# Patient Record
Sex: FEMALE | Race: ASIAN | NOT HISPANIC OR LATINO | Employment: OTHER | URBAN - METROPOLITAN AREA
[De-identification: names, ages, dates, MRNs, and addresses within clinical notes are randomized per-mention and may not be internally consistent; named-entity substitution may affect disease eponyms.]

---

## 2017-01-13 DIAGNOSIS — E78.5 HYPERLIPIDEMIA, UNSPECIFIED HYPERLIPIDEMIA TYPE: ICD-10-CM

## 2017-01-13 RX ORDER — ATORVASTATIN CALCIUM 40 MG/1
TABLET, FILM COATED ORAL
Qty: 90 TABLET | Refills: 1 | Status: SHIPPED | OUTPATIENT
Start: 2017-01-13 | End: 2018-01-11 | Stop reason: SDUPTHER

## 2017-01-30 ENCOUNTER — OFFICE VISIT (OUTPATIENT)
Dept: FAMILY MEDICINE | Facility: CLINIC | Age: 82
End: 2017-01-30
Payer: MEDICARE

## 2017-01-30 ENCOUNTER — HOSPITAL ENCOUNTER (OUTPATIENT)
Dept: RADIOLOGY | Facility: HOSPITAL | Age: 82
Discharge: HOME | End: 2017-01-30
Attending: FAMILY MEDICINE
Payer: MEDICARE

## 2017-01-30 VITALS
HEART RATE: 76 BPM | HEIGHT: 62 IN | DIASTOLIC BLOOD PRESSURE: 60 MMHG | BODY MASS INDEX: 36.31 KG/M2 | SYSTOLIC BLOOD PRESSURE: 120 MMHG | WEIGHT: 197.31 LBS | TEMPERATURE: 99 F

## 2017-01-30 DIAGNOSIS — I10 ESSENTIAL HYPERTENSION: ICD-10-CM

## 2017-01-30 DIAGNOSIS — Z79.4 TYPE 2 DIABETES MELLITUS WITHOUT COMPLICATION, WITH LONG-TERM CURRENT USE OF INSULIN: ICD-10-CM

## 2017-01-30 DIAGNOSIS — T14.90XA TRAUMA: ICD-10-CM

## 2017-01-30 DIAGNOSIS — T14.90XA TRAUMA: Primary | ICD-10-CM

## 2017-01-30 DIAGNOSIS — E11.9 TYPE 2 DIABETES MELLITUS WITHOUT COMPLICATION, WITH LONG-TERM CURRENT USE OF INSULIN: ICD-10-CM

## 2017-01-30 PROCEDURE — 99214 OFFICE O/P EST MOD 30 MIN: CPT | Mod: S$PBB,,, | Performed by: FAMILY MEDICINE

## 2017-01-30 PROCEDURE — 99999 PR PBB SHADOW E&M-EST. PATIENT-LVL III: CPT | Mod: PBBFAC,,, | Performed by: FAMILY MEDICINE

## 2017-01-30 PROCEDURE — 73100 X-RAY EXAM OF WRIST: CPT | Mod: 26,50,, | Performed by: RADIOLOGY

## 2017-01-30 PROCEDURE — 73080 X-RAY EXAM OF ELBOW: CPT | Mod: TC,PO,LT

## 2017-01-30 PROCEDURE — 73100 X-RAY EXAM OF WRIST: CPT | Mod: 50,TC,PO

## 2017-01-30 PROCEDURE — 73130 X-RAY EXAM OF HAND: CPT | Mod: 26,LT,, | Performed by: RADIOLOGY

## 2017-01-30 PROCEDURE — 73130 X-RAY EXAM OF HAND: CPT | Mod: TC,PO,LT

## 2017-01-30 PROCEDURE — 73080 X-RAY EXAM OF ELBOW: CPT | Mod: 26,LT,, | Performed by: RADIOLOGY

## 2017-01-30 NOTE — MR AVS SNAPSHOT
[facility]  [address] [facility], [address]  [facility] [address]  Phone: [phone]  Fax: [phone]                  Eliana Wagner   2017 4:00 PM   Office Visit    Description:  Female : 1935   Provider:  Nara Lawton MD   Department:  [facility]           Reason for Visit     Fall           Diagnoses this Visit        Comments    Trauma    -  Primary     Type 2 diabetes mellitus without complication, with long-term current use of insulin         Essential hypertension                To Do List           Future Appointments        Provider Department Dept Phone    2017  4:45 PM [facility] XR1 300 LB LIMIT [facility] [phone]      Goals (5 Years of Data)     None      [facility]     [facility] -  Assistance  Registered nurses in the [facility] provide clinical advisement, health education, appointment booking, and other advisory services.  Call for this free service at [phone].             Medications           Message regarding Medications     Verify the changes and/or additions to your medication regime listed below are the same as discussed with your clinician today.  If any of these changes or additions are incorrect, please notify your healthcare provider.        STOP taking these medications     naproxen (NAPROSYN) 500 MG tablet Take 1 tablet (500 mg total) by mouth 2 (two) times daily with meals.    omega-3 fatty acids-vitamin E (FISH OIL) 1,000 mg Cap Take by mouth. 1 Capsule Oral Every day           Verify that the below list of medications is an accurate representation of the medications you are currently taking.  If none reported, the list may be blank. If incorrect, please contact your healthcare provider. Carry this list with you in case of emergency.           Current Medications     amlodipine (NORVASC) 2.5 MG tablet Take 1 tablet (2.5 mg total) by mouth  "once daily.    aspirin 81 mg Tab Take by mouth. 1 Tablet Oral Every day    atorvastatin (LIPITOR) 40 MG tablet TAKE 1 TABLET ONE TIME DAILY    blood sugar diagnostic Strp 1 strip by Misc.(Non-Drug; Combo Route) route 2 (two) times daily as needed.    blood-glucose meter kit Use as instructed    clopidogrel (PLAVIX) 75 mg tablet Take 1 tablet (75 mg total) by mouth once daily.    glipiZIDE (GLUCOTROL) 2.5 MG TR24 Take 1 tablet (2.5 mg total) by mouth once daily.    lancets Misc 1 lancet by Misc.(Non-Drug; Combo Route) route 2 (two) times daily as needed.    lisinopril (PRINIVIL,ZESTRIL) 40 MG tablet Take 1 tablet (40 mg total) by mouth once daily.    metoprolol succinate (TOPROL-XL) 25 MG 24 hr tablet Take 1 tablet (25 mg total) by mouth once daily.    mv-min-FA-Rj-Sl-gqdojgq-lutein (CENTRUM) 0.4-162-18 mg Tab Take by mouth. 1 Tablet Oral Every day    nitroGLYCERIN (NITROSTAT) 0.4 MG SL tablet Place 1 tablet (0.4 mg total) under the tongue every 5 (five) minutes as needed for Chest pain. 1 Tablet, Sublingual Sublingual As directed.  take one dose with chest pain up to 3 doses in 15 minutes, if no better go to ER           Clinical Reference Information           Vital Signs - Last Recorded  Most recent update: 1/30/2017  3:42 PM by Magaly Collins MA    BP Pulse Temp Ht Wt BMI    120/60 (BP Location: Right arm) 76 98.5 °F (36.9 °C) 5' 2" (1.575 m) 89.5 kg (197 lb 5 oz) 36.09 kg/m2      Blood Pressure          Most Recent Value    BP  120/60      Allergies as of 1/30/2017     Penicillins      Immunizations Administered on Date of Encounter - 1/30/2017     None      Orders Placed During Today's Visit     Future Labs/Procedures Expected by Expires    CBC auto differential  1/30/2017 3/31/2018    Comprehensive metabolic panel  1/30/2017 3/31/2018    Hemoglobin A1c  1/30/2017 3/31/2018    Lipid panel  1/30/2017 3/31/2018    TSH  1/30/2017 3/31/2018    X-Ray Elbow Complete Left  1/30/2017 1/30/2018    X-Ray Hand Complete Left  " 1/30/2017 1/30/2018    X-Ray Wrist 2 View Bilateral  1/30/2017 1/30/2018

## 2017-01-31 NOTE — PROGRESS NOTES
Pt experiancing left elbow wrist and hand pain  for 9 days.  Patient experienced a fall onto her butt and her left elbow  She was on a cruise at the time said that she did not seek medical attention.  Patient experienced swelling and pain at the   elbow since that time a lot of fluid is draining down to the wrist.  The pain is gotten somewhat better.  But the inner portion of   her arm was contused and swollen.  Patient is having stiffness of the rest of the hand.  She is one to come in today to make sure nothing was broken.    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     Social History Main Topics    Smoking status: Never Smoker    Smokeless tobacco: Never Used    Alcohol use Yes      Comment: socially    Drug use: No    Sexual activity: Not Currently     Other Topics Concern    Not on file     Social History Narrative       ROS: Negative except elbow pain, wrist pain and hand pain from fall    Physical exam:    General appearance: Alert oriented no acute distress   Vs:  Vitals:    01/30/17 1537   BP: 120/60   Pulse: 76   Temp: 98.5 °F (36.9 °C)     Musculo: Patient has some edema at the elbow soft tissue swelling from the elbow to the wrist  Full range of motion of the elbow is exhibited full range of motion of the wrist and hand are exhibited   Vascular: Distal pulses are palpable throughout swelling in the left forearm is soft.    Skin: Large amount of ecchymoses resolving on the medial aspect of the elbow down to the wrist area  The remained of the skin examinations within normal limits  X-rays  of the elbow wrist and hand were obtained no acute fractures were identified results were discussed with the patient and her daughter   IMP: Contusion of the elbow          Wrist sprain          Hand contusion           History of type 2 diabetes   Plan: Patient declined any medications at this time  We will order CBC, TSH, lipid profile, hemoglobin A1c comprehensive metabolic profile.  Patient will return for follow-up of all the above and routine medical problems in 2 months.  Patient follow-up if she starts to experience increased swelling or pain.

## 2017-02-21 ENCOUNTER — OFFICE VISIT (OUTPATIENT)
Dept: PODIATRY | Facility: CLINIC | Age: 82
End: 2017-02-21
Payer: MEDICARE

## 2017-02-21 ENCOUNTER — LAB VISIT (OUTPATIENT)
Dept: LAB | Facility: HOSPITAL | Age: 82
End: 2017-02-21
Attending: FAMILY MEDICINE
Payer: MEDICARE

## 2017-02-21 VITALS
SYSTOLIC BLOOD PRESSURE: 132 MMHG | WEIGHT: 197 LBS | DIASTOLIC BLOOD PRESSURE: 71 MMHG | HEIGHT: 62 IN | HEART RATE: 73 BPM | BODY MASS INDEX: 36.25 KG/M2 | RESPIRATION RATE: 18 BRPM

## 2017-02-21 DIAGNOSIS — E11.9 TYPE 2 DIABETES MELLITUS WITHOUT COMPLICATION, WITH LONG-TERM CURRENT USE OF INSULIN: ICD-10-CM

## 2017-02-21 DIAGNOSIS — E11.9 ENCOUNTER FOR DIABETIC FOOT EXAM: Primary | ICD-10-CM

## 2017-02-21 DIAGNOSIS — Z79.4 TYPE 2 DIABETES MELLITUS WITHOUT COMPLICATION, WITH LONG-TERM CURRENT USE OF INSULIN: ICD-10-CM

## 2017-02-21 DIAGNOSIS — M20.5X9 OVERRIDING TOE, UNSPECIFIED LATERALITY: ICD-10-CM

## 2017-02-21 DIAGNOSIS — I10 ESSENTIAL HYPERTENSION: ICD-10-CM

## 2017-02-21 DIAGNOSIS — E11.9 TYPE 2 DIABETES MELLITUS WITHOUT COMPLICATION, UNSPECIFIED LONG TERM INSULIN USE STATUS: ICD-10-CM

## 2017-02-21 LAB
ALBUMIN SERPL BCP-MCNC: 3.4 G/DL
ALP SERPL-CCNC: 90 U/L
ALT SERPL W/O P-5'-P-CCNC: 32 U/L
ANION GAP SERPL CALC-SCNC: 8 MMOL/L
AST SERPL-CCNC: 30 U/L
BASOPHILS # BLD AUTO: 0.02 K/UL
BASOPHILS NFR BLD: 0.2 %
BILIRUB SERPL-MCNC: 0.5 MG/DL
BUN SERPL-MCNC: 17 MG/DL
CALCIUM SERPL-MCNC: 9 MG/DL
CHLORIDE SERPL-SCNC: 107 MMOL/L
CHOLEST/HDLC SERPL: 3.2 {RATIO}
CO2 SERPL-SCNC: 26 MMOL/L
CREAT SERPL-MCNC: 0.8 MG/DL
DIFFERENTIAL METHOD: NORMAL
EOSINOPHIL # BLD AUTO: 0.1 K/UL
EOSINOPHIL NFR BLD: 1.2 %
ERYTHROCYTE [DISTWIDTH] IN BLOOD BY AUTOMATED COUNT: 13.8 %
EST. GFR  (AFRICAN AMERICAN): >60 ML/MIN/1.73 M^2
EST. GFR  (NON AFRICAN AMERICAN): >60 ML/MIN/1.73 M^2
ESTIMATED AVG GLUCOSE: 186 MG/DL
GLUCOSE SERPL-MCNC: 157 MG/DL
HBA1C MFR BLD HPLC: 8.1 %
HCT VFR BLD AUTO: 43.8 %
HDL/CHOLESTEROL RATIO: 31.6 %
HDLC SERPL-MCNC: 171 MG/DL
HDLC SERPL-MCNC: 54 MG/DL
HGB BLD-MCNC: 14 G/DL
LDLC SERPL CALC-MCNC: 82.4 MG/DL
LYMPHOCYTES # BLD AUTO: 3.5 K/UL
LYMPHOCYTES NFR BLD: 33.8 %
MCH RBC QN AUTO: 29.5 PG
MCHC RBC AUTO-ENTMCNC: 32 %
MCV RBC AUTO: 92 FL
MONOCYTES # BLD AUTO: 0.6 K/UL
MONOCYTES NFR BLD: 5.8 %
NEUTROPHILS # BLD AUTO: 6 K/UL
NEUTROPHILS NFR BLD: 58.6 %
NONHDLC SERPL-MCNC: 117 MG/DL
PLATELET # BLD AUTO: 162 K/UL
PMV BLD AUTO: 10.7 FL
POTASSIUM SERPL-SCNC: 5 MMOL/L
PROT SERPL-MCNC: 6.8 G/DL
RBC # BLD AUTO: 4.75 M/UL
SODIUM SERPL-SCNC: 141 MMOL/L
TRIGL SERPL-MCNC: 173 MG/DL
TSH SERPL DL<=0.005 MIU/L-ACNC: 1.92 UIU/ML
WBC # BLD AUTO: 10.3 K/UL

## 2017-02-21 PROCEDURE — 99214 OFFICE O/P EST MOD 30 MIN: CPT | Mod: S$PBB,,, | Performed by: PODIATRIST

## 2017-02-21 PROCEDURE — 99214 OFFICE O/P EST MOD 30 MIN: CPT | Mod: PBBFAC,PO | Performed by: PODIATRIST

## 2017-02-21 PROCEDURE — 99999 PR PBB SHADOW E&M-EST. PATIENT-LVL IV: CPT | Mod: PBBFAC,,, | Performed by: PODIATRIST

## 2017-02-21 RX ORDER — SULFACETAMIDE SODIUM AND PREDNISOLONE SODIUM PHOSPHATE 100; 2.3 MG/ML; MG/ML
SOLUTION/ DROPS OPHTHALMIC
Refills: 0 | COMMUNITY
Start: 2017-02-02 | End: 2017-02-21 | Stop reason: ALTCHOICE

## 2017-02-21 RX ORDER — CEFDINIR 300 MG/1
300 CAPSULE ORAL 2 TIMES DAILY
Refills: 0 | COMMUNITY
Start: 2017-02-02 | End: 2018-06-10 | Stop reason: ALTCHOICE

## 2017-02-21 NOTE — PATIENT INSTRUCTIONS
Your A1c:    Hemoglobin A1C   Date Value Ref Range Status   12/30/2015 7.2 (H) 4.5 - 6.2 % Final   10/08/2015 7.4 (H) 4.5 - 6.2 % Final   01/14/2015 7.2 (H) 4.5 - 6.2 % Final       How to Check Your Feet    Below are tips to help you look for foot problems. Try to check your feet at the same time each day, such as when you get out of bed in the morning.    · Check the top of each foot. The tops of toes, back of the heel, and outer edge of the foot can get a lot of rubbing from poor-fitting shoes.    · Check the bottom of each foot. Daily wear and tear often leads to problems at pressure spots.    · Check the toes and nails. Fungal infections often occur between toes. Toenail problems can also be a sign of fungal infections or lead to breaks in the skin.    · Check your shoes, too. Loose objects inside a shoe can injure the foot. Use your hand to feel inside your shoes for things like viraj, loose stitching, or rough areas that could irritate your skin.        Diabetic Foot Care    Diabetes can lead to a number of different foot complications. Fortunately, most of these complications can be prevented with a little extra foot care. If diabetes is not well controlled, the high blood sugar can cause damage to blood vessels and result in poor circulation to the foot. When the skin does not get enough blood flow, it becomes prone to pressure sores and ulcers, which heal slowly.  High blood sugar can also damage nerves, interfering with the ability to feel pain and pressure. When you cant feel your foot normally, it is easy to injure your skin, bones and joints without knowing it. For these reasons diabetes increases the risk of fungal infections, bunions and ulcers. Deep ulcers can lead to bone infection. Gangrene is the most serious foot complication of diabetes. It usually occurs on the tips of the toes as blacked areas of skin. The black area is dead tissue. In severe cases, gangrene spreads to involve the entire  toe, other toes and the entire foot. Foot or toe amputation may be required. Good foot care and blood sugar control can prevent this.    Home Care  1. Wear comfortable, proper fitting shoes.  2. Wash your feet daily with warm water and mild soap.  3. After drying, apply a moisturizing cream or lotion.  4. Check your feet daily for skin breaks, blisters, swelling, or redness. Look between your toes also.  5. Wear cotton socks and change them every day.  6. Trim toe nails carefully and do not cut your cuticles.  7. Strive to keep your blood sugar under control with a combination of medicines, diet and activity.  8. If you smoke and have diabetes, it is very important that you stop. Smoking reduces blood flow to your foot.  9. Avoid activities that increase your risk of foot injury:  · Do not walk barefoot.  · Do not use heating pads or hot water bottles on your feet.  · Do not put your foot in a hot tub without first checking the temperature with your hand.  10) Schedule yearly foot exams.    Follow Up  with your doctor or as advised by our staff. Report any cut, puncture, scrape, other injury, blister, ingrown toenail or ulcer on your foot.    Get Prompt Medical Attention  if any of the following occur:  -- Open ulcer with pus draining from the wound  -- Increasing foot or leg pain  -- New areas of redness or swelling or tender areas of the foot    © 1709-3930 [employer]. [address] [place] [address], [place] [address]. All rights reserved. This information is not intended as a substitute for professional medical care. Always follow your healthcare professional's instructions.

## 2017-02-21 NOTE — PROGRESS NOTES
CC:     Foot exam       HPI:   The patient is a 82 y.o.  female  who presents for a diabetic foot exam.     Patient reports no presence of abnormal sensation to the feet .    History of diabetic foot ulcers: none   History of foot surgery: left foot neuroma surgery.     Shoes worn today:  Flat suede shoes.   She has overlapping 2nd toes and gets relief with toe spacers.  She is not interested in surgical correction.   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 90 tablet 3    aspirin 81 mg Tab Take by mouth. 1 Tablet Oral Every day      atorvastatin (LIPITOR) 40 MG tablet TAKE 1 TABLET ONE TIME DAILY 90 tablet 1    blood sugar diagnostic Strp 1 strip by Misc.(Non-Drug; Combo Route) route 2 (two) times daily as needed. 200 strip 4    blood-glucose meter kit Use as instructed 1 each 0    clopidogrel (PLAVIX) 75 mg tablet Take 1 tablet (75 mg total) by mouth once daily. 90 tablet 3    glipiZIDE (GLUCOTROL) 2.5 MG TR24 Take 1 tablet (2.5 mg total) by mouth once daily. 90 tablet 3    lancets Misc 1 lancet by Misc.(Non-Drug; Combo Route) route 2 (two) times daily as needed. 200 each 2    lisinopril (PRINIVIL,ZESTRIL) 40 MG tablet Take 1 tablet (40 mg total) by mouth once daily. 90 tablet 3    metoprolol succinate (TOPROL-XL) 25 MG 24 hr tablet Take 1 tablet (25 mg total) by mouth once daily. 90 tablet 3    mv-min-FA-Ak-Uq-nvklxjy-lutein  "(CENTRUM) 0.4-162-18 mg Tab Take by mouth. 1 Tablet Oral Every day      nitroGLYCERIN (NITROSTAT) 0.4 MG SL tablet Place 1 tablet (0.4 mg total) under the tongue every 5 (five) minutes as needed for Chest pain. 1 Tablet, Sublingual Sublingual As directed.  take one dose with chest pain up to 3 doses in 15 minutes, if no better go to  tablet 5     No current facility-administered medications on file prior to visit.          Review of patient's allergies indicates:   Allergen Reactions    Penicillins      Caused uti             ROS:  General ROS: negative  Respiratory ROS: no cough, shortness of breath, or wheezing  Cardiovascular ROS: no chest pain or dyspnea on exertion  Musculoskeletal ROS: negative  Neurological ROS:   negative for - impaired coordination/balance or numbness/tingling  Dermatological ROS: negative      LAST HbA1c:   Hemoglobin A1C   Date Value Ref Range Status   12/30/2015 7.2 (H) 4.5 - 6.2 % Final   10/08/2015 7.4 (H) 4.5 - 6.2 % Final   01/14/2015 7.2 (H) 4.5 - 6.2 % Final           EXAM:   Vitals:    02/21/17 1005   BP: 132/71   Pulse: 73   Resp: 18   Weight: 89.4 kg (197 lb)   Height: 5' 2" (1.575 m)       General: alert, no distress, cooperative    Vascular:   Dorsalis pedis:   2+ bilateral.   Posterior Tibial:   2+ bilateral.   3 seconds capillary refill time   Temperature of toes are warm to touch.   normal hair growth on the feet.    Edema on feet:   Trace and non-pitting   Varicosities:  none    Dermatological:    Skin: thin,  Warm and dry. no hyperpigmentation, vitiligo, or suspicious lesions  Nails: toenails 1-5 L and 1-5 R  are normal nails without lesions  Callus:  None  Open Wounds: None  Ecchymoses is not observed.      Erythema:  none .     Interdigital spaces: clean, dry and without evidence of break in skin integrity      Neurological:    normal light touch sensation and normal position sensation  [place] diminished      Musculoskeletal:     Muscle strength: 5/5, adequate " ROM, adequate strength     Right foot:  2nd digit overrides hallux , no wounds.   Left foot: 2nd digit overrides hallux, no wounds             ASSESSMENT/PLAN:      I counseled the patient on her conditions, their implications and medical management.       Encounter for diabetic foot exam    Type 2 diabetes mellitus without complication, unspecified long term insulin use status    Overriding toe, unspecified laterality   - continue toe spacer.    - wider extra depth shoes.       Shoe inspection. Diabetic Foot Education. Patient reminded of the importance of good nutrition and blood sugar control to help prevent podiatric complications of diabetes. Patient instructed on proper foot hygiene. We discussed wearing proper shoe gear, daily foot inspections, never walking without protective shoe gear, never putting sharp instruments to feet.    Return in about 1 year (around 2/21/2018) for diabetic foot exam, or sooner if concerned.

## 2017-02-22 DIAGNOSIS — E11.9 TYPE 2 DIABETES MELLITUS WITHOUT COMPLICATION, WITHOUT LONG-TERM CURRENT USE OF INSULIN: Primary | ICD-10-CM

## 2017-02-22 RX ORDER — GLIPIZIDE 5 MG/1
5 TABLET, FILM COATED, EXTENDED RELEASE ORAL
Qty: 90 TABLET | Refills: 3 | Status: SHIPPED | OUTPATIENT
Start: 2017-02-22 | End: 2017-03-07 | Stop reason: SDUPTHER

## 2017-03-07 DIAGNOSIS — E11.9 TYPE 2 DIABETES MELLITUS WITHOUT COMPLICATION, WITHOUT LONG-TERM CURRENT USE OF INSULIN: ICD-10-CM

## 2017-03-08 RX ORDER — GLIPIZIDE 5 MG/1
5 TABLET, FILM COATED, EXTENDED RELEASE ORAL
Qty: 30 TABLET | Refills: 0 | Status: SHIPPED | OUTPATIENT
Start: 2017-03-08 | End: 2017-04-07

## 2017-03-21 ENCOUNTER — OFFICE VISIT (OUTPATIENT)
Dept: OBSTETRICS AND GYNECOLOGY | Facility: CLINIC | Age: 82
End: 2017-03-21
Payer: MEDICARE

## 2017-03-21 VITALS
WEIGHT: 192.25 LBS | BODY MASS INDEX: 35.38 KG/M2 | HEIGHT: 62 IN | SYSTOLIC BLOOD PRESSURE: 142 MMHG | DIASTOLIC BLOOD PRESSURE: 70 MMHG

## 2017-03-21 DIAGNOSIS — Z01.419 WELL WOMAN EXAM WITH ROUTINE GYNECOLOGICAL EXAM: ICD-10-CM

## 2017-03-21 DIAGNOSIS — Z12.39 SCREENING FOR BREAST CANCER: Primary | ICD-10-CM

## 2017-03-21 PROCEDURE — G0101 CA SCREEN;PELVIC/BREAST EXAM: HCPCS | Mod: S$PBB,,, | Performed by: OBSTETRICS & GYNECOLOGY

## 2017-03-21 PROCEDURE — 99213 OFFICE O/P EST LOW 20 MIN: CPT | Mod: PBBFAC | Performed by: OBSTETRICS & GYNECOLOGY

## 2017-03-21 PROCEDURE — 99999 PR PBB SHADOW E&M-EST. PATIENT-LVL III: CPT | Mod: PBBFAC,,, | Performed by: OBSTETRICS & GYNECOLOGY

## 2017-03-21 NOTE — PROGRESS NOTES
Subjective:       Patient ID: Eliana Wagner is a 82 y.o. female.    Chief Complaint:  Well Woman      History of Present Illness  HPI  This 82 yr old P4 female is here for routine exam and has no complaints.  She is a very young 82 yr old and travels extensively.  She has defibrillater on her right chest and we will get ultrasound instead of mammogram.  No complaints. She has some tenderness occasionaly in lower pelvis but comes and goes and dull 2-3/10.  She knows to call if persists.    GYN & OB History  No LMP recorded. Patient is postmenopausal.   Date of Last Pap: 2016    OB History    Para Term  AB SAB TAB Ectopic Multiple Living   4         4      # Outcome Date GA Lbr [place]/2nd Weight Sex Delivery Anes PTL Lv   4             3             2             1                    Review of Systems  Review of Systems   Constitutional: Negative for chills and fever.   Respiratory: Negative for shortness of breath.    Cardiovascular: Negative for chest pain.   Gastrointestinal: Negative for abdominal pain, nausea and vomiting.   Genitourinary: Negative for difficulty urinating, dyspareunia, genital sores, menstrual problem, pelvic pain, vaginal bleeding, vaginal discharge and vaginal pain.   Skin: Negative for wound.   Hematological: Negative for adenopathy.           Objective:    Physical Exam:   Constitutional: She is oriented to person, place, and time. She appears well-developed and well-nourished.    HENT:   Head: Normocephalic.    Eyes: EOM are normal.    Neck: Normal range of motion.    Cardiovascular: Normal rate.     Pulmonary/Chest: Effort normal. She exhibits no mass and no tenderness. Right breast exhibits no inverted nipple, no mass, no skin change and no tenderness. Left breast exhibits no inverted nipple, no mass, no skin change and no tenderness.        Abdominal: Soft. She exhibits no distension. There is no tenderness.     Genitourinary: Vagina normal  and uterus normal. There is no rash, tenderness or lesion on the right labia. There is no rash, tenderness or lesion on the left labia. Uterus is not tender. Cervix is normal. Right adnexum displays no mass, no tenderness and no fullness. Left adnexum displays no mass, no tenderness and no fullness. Cervix exhibits no discharge.           Musculoskeletal: Normal range of motion.       Neurological: She is alert and oriented to person, place, and time.    Skin: Skin is warm and dry.    Psychiatric: She has a normal mood and affect.          Assessment:        1. Screening for breast cancer    2. Well woman exam with routine gynecological exam               Plan:      Routine follow up as needed  Ultrasound breast today

## 2017-03-30 ENCOUNTER — HOSPITAL ENCOUNTER (OUTPATIENT)
Dept: RADIOLOGY | Facility: HOSPITAL | Age: 82
Discharge: HOME | End: 2017-03-30
Attending: OBSTETRICS & GYNECOLOGY
Payer: MEDICARE

## 2017-03-30 ENCOUNTER — TELEPHONE (OUTPATIENT)
Dept: OBSTETRICS AND GYNECOLOGY | Facility: CLINIC | Age: 82
End: 2017-03-30

## 2017-03-30 DIAGNOSIS — Z12.39 SCREENING FOR BREAST CANCER: ICD-10-CM

## 2017-03-30 DIAGNOSIS — Z12.31 VISIT FOR SCREENING MAMMOGRAM: ICD-10-CM

## 2017-03-30 PROCEDURE — 77063 BREAST TOMOSYNTHESIS BI: CPT | Mod: 26,,, | Performed by: RADIOLOGY

## 2017-03-30 PROCEDURE — 77067 SCR MAMMO BI INCL CAD: CPT | Mod: 26,52,RT, | Performed by: RADIOLOGY

## 2017-03-30 PROCEDURE — 76641 ULTRASOUND BREAST COMPLETE: CPT | Mod: 26,LT,, | Performed by: RADIOLOGY

## 2017-03-30 PROCEDURE — 77067 SCR MAMMO BI INCL CAD: CPT | Mod: TC

## 2017-03-30 PROCEDURE — 76641 ULTRASOUND BREAST COMPLETE: CPT | Mod: TC,LT

## 2017-03-30 NOTE — TELEPHONE ENCOUNTER
----- Message from Rose Louis MD sent at 3/30/2017  1:08 PM CDT -----  Please let this pt know her breast imaging is normal.  Thanks

## 2017-03-30 NOTE — TELEPHONE ENCOUNTER
Patient made aware that breast imaging was normal. Patient verbalized understanding. Patient asked about her PAP smear results, pt informed that due to her age guidelines no longer require her to have the PAP smear. Pt verbalized understanding.

## 2017-04-10 ENCOUNTER — TELEPHONE (OUTPATIENT)
Dept: FAMILY MEDICINE | Facility: CLINIC | Age: 82
End: 2017-04-10

## 2017-04-10 NOTE — TELEPHONE ENCOUNTER
Spoke with patient states she's traveling to [place] and would like to get the small pox vaccine.  Advised patient that she would need to contact the travel clinic who will assist with the recommended vaccines in that area.  Department phone number provided to patient.

## 2017-04-10 NOTE — TELEPHONE ENCOUNTER
----- Message from Amanda Olsen sent at 4/10/2017  2:38 PM CDT -----  Contact: daughter   Pt would like shot for small pox.

## 2017-04-20 ENCOUNTER — TELEPHONE (OUTPATIENT)
Dept: OBSTETRICS AND GYNECOLOGY | Facility: CLINIC | Age: 82
End: 2017-04-20

## 2017-06-03 DIAGNOSIS — I25.10 CORONARY ARTERY DISEASE INVOLVING NATIVE CORONARY ARTERY WITHOUT ANGINA PECTORIS, UNSPECIFIED WHETHER NATIVE OR TRANSPLANTED HEART: ICD-10-CM

## 2017-06-03 DIAGNOSIS — I10 ESSENTIAL HYPERTENSION: ICD-10-CM

## 2017-06-03 RX ORDER — AMLODIPINE BESYLATE 2.5 MG/1
TABLET ORAL
Qty: 90 TABLET | Refills: 3 | Status: SHIPPED | OUTPATIENT
Start: 2017-06-03 | End: 2018-06-04 | Stop reason: SDUPTHER

## 2017-06-03 RX ORDER — LISINOPRIL 40 MG/1
TABLET ORAL
Qty: 90 TABLET | Refills: 3 | Status: SHIPPED | OUTPATIENT
Start: 2017-06-03 | End: 2018-06-04 | Stop reason: SDUPTHER

## 2017-06-03 RX ORDER — METOPROLOL SUCCINATE 25 MG/1
TABLET, EXTENDED RELEASE ORAL
Qty: 90 TABLET | Refills: 3 | Status: SHIPPED | OUTPATIENT
Start: 2017-06-03 | End: 2018-06-04 | Stop reason: SDUPTHER

## 2017-06-03 RX ORDER — METOPROLOL SUCCINATE 25 MG/1
TABLET, EXTENDED RELEASE ORAL
Qty: 90 TABLET | Refills: 3 | Status: SHIPPED | OUTPATIENT
Start: 2017-06-03 | End: 2017-06-03 | Stop reason: SDUPTHER

## 2017-06-03 RX ORDER — AMLODIPINE BESYLATE 2.5 MG/1
TABLET ORAL
Qty: 90 TABLET | Refills: 3 | Status: SHIPPED | OUTPATIENT
Start: 2017-06-03 | End: 2017-06-03 | Stop reason: SDUPTHER

## 2017-08-01 RX ORDER — CLOPIDOGREL BISULFATE 75 MG/1
TABLET ORAL
Qty: 90 TABLET | Refills: 3 | Status: SHIPPED | OUTPATIENT
Start: 2017-08-01 | End: 2019-10-11 | Stop reason: SDUPTHER

## 2017-10-27 DIAGNOSIS — E11.9 TYPE 2 DIABETES MELLITUS WITHOUT COMPLICATION: ICD-10-CM

## 2017-11-13 RX ORDER — GLIPIZIDE 2.5 MG/1
TABLET, EXTENDED RELEASE ORAL
Qty: 90 TABLET | Refills: 3 | Status: SHIPPED | OUTPATIENT
Start: 2017-11-13 | End: 2017-11-14 | Stop reason: SDUPTHER

## 2017-11-14 RX ORDER — GLIPIZIDE 2.5 MG/1
TABLET, EXTENDED RELEASE ORAL
Qty: 90 TABLET | Refills: 3 | Status: SHIPPED | OUTPATIENT
Start: 2017-11-14 | End: 2018-06-12

## 2018-01-11 DIAGNOSIS — E78.5 HYPERLIPIDEMIA, UNSPECIFIED HYPERLIPIDEMIA TYPE: ICD-10-CM

## 2018-01-11 RX ORDER — ATORVASTATIN CALCIUM 40 MG/1
TABLET, FILM COATED ORAL
Qty: 90 TABLET | Refills: 1 | Status: SHIPPED | OUTPATIENT
Start: 2018-01-11 | End: 2019-10-11 | Stop reason: SDUPTHER

## 2018-05-30 DIAGNOSIS — E78.5 HYPERLIPIDEMIA, UNSPECIFIED HYPERLIPIDEMIA TYPE: ICD-10-CM

## 2018-05-31 RX ORDER — ATORVASTATIN CALCIUM 40 MG/1
TABLET, FILM COATED ORAL
Qty: 90 TABLET | Refills: 1 | OUTPATIENT
Start: 2018-05-31

## 2018-06-04 ENCOUNTER — TELEPHONE (OUTPATIENT)
Dept: ADMINISTRATIVE | Facility: HOSPITAL | Age: 83
End: 2018-06-04

## 2018-06-04 DIAGNOSIS — I10 ESSENTIAL HYPERTENSION: ICD-10-CM

## 2018-06-04 DIAGNOSIS — I25.10 CORONARY ARTERY DISEASE INVOLVING NATIVE CORONARY ARTERY WITHOUT ANGINA PECTORIS, UNSPECIFIED WHETHER NATIVE OR TRANSPLANTED HEART: ICD-10-CM

## 2018-06-04 DIAGNOSIS — Z00.00 ROUTINE GENERAL MEDICAL EXAMINATION AT A HEALTH CARE FACILITY: Primary | ICD-10-CM

## 2018-06-04 RX ORDER — AMLODIPINE BESYLATE 2.5 MG/1
TABLET ORAL
Qty: 90 TABLET | Refills: 3 | Status: SHIPPED | OUTPATIENT
Start: 2018-06-04 | End: 2019-10-11 | Stop reason: SDUPTHER

## 2018-06-04 RX ORDER — METOPROLOL SUCCINATE 25 MG/1
TABLET, EXTENDED RELEASE ORAL
Qty: 90 TABLET | Refills: 3 | Status: SHIPPED | OUTPATIENT
Start: 2018-06-04 | End: 2019-10-11 | Stop reason: SDUPTHER

## 2018-06-04 RX ORDER — LISINOPRIL 40 MG/1
TABLET ORAL
Qty: 30 TABLET | Refills: 0 | Status: SHIPPED | OUTPATIENT
Start: 2018-06-04 | End: 2018-07-02 | Stop reason: SDUPTHER

## 2018-06-04 NOTE — TELEPHONE ENCOUNTER
Spoke with patient's daughter states patient is currently sleeping will inform her mother to contact our office when she wakes up concerning scheduled appt on 6/6/18.

## 2018-06-04 NOTE — TELEPHONE ENCOUNTER
----- Message from Wood Villarreal sent at 6/4/2018  4:29 PM CDT -----  Doctor appointment and lab have been scheduled.  Please link lab orders to the lab appointment.  Date of doctor appointment:  8/30  Physical or EP:  Physical  Date of lab appointment: 8/30   Comments:

## 2018-06-05 NOTE — TELEPHONE ENCOUNTER
2nd attempt to contact patient voicemail left advising her that all requested medications was refilled and no need for scheduled appt tomorrow unless she still would like to be seen.  Informed that medications will be refilled until she's seen in clinic for her physical in August.  Advised patient to return call if she would like to cancel or keep scheduled appt for 6/6/18

## 2018-06-10 ENCOUNTER — OFFICE VISIT (OUTPATIENT)
Dept: URGENT CARE | Facility: CLINIC | Age: 83
End: 2018-06-10
Payer: MEDICARE

## 2018-06-10 VITALS
TEMPERATURE: 99 F | OXYGEN SATURATION: 98 % | HEART RATE: 88 BPM | HEIGHT: 62 IN | SYSTOLIC BLOOD PRESSURE: 145 MMHG | WEIGHT: 195 LBS | BODY MASS INDEX: 35.88 KG/M2 | DIASTOLIC BLOOD PRESSURE: 75 MMHG

## 2018-06-10 DIAGNOSIS — J02.9 PHARYNGITIS, UNSPECIFIED ETIOLOGY: Primary | ICD-10-CM

## 2018-06-10 DIAGNOSIS — J02.9 SORE THROAT: ICD-10-CM

## 2018-06-10 PROCEDURE — 96372 THER/PROPH/DIAG INJ SC/IM: CPT | Mod: S$GLB,,, | Performed by: NURSE PRACTITIONER

## 2018-06-10 PROCEDURE — 99214 OFFICE O/P EST MOD 30 MIN: CPT | Mod: 25,S$GLB,, | Performed by: NURSE PRACTITIONER

## 2018-06-10 RX ORDER — CEFDINIR 300 MG/1
300 CAPSULE ORAL 2 TIMES DAILY
Qty: 14 CAPSULE | Refills: 0 | Status: SHIPPED | OUTPATIENT
Start: 2018-06-10 | End: 2018-06-17

## 2018-06-10 RX ORDER — BETAMETHASONE SODIUM PHOSPHATE AND BETAMETHASONE ACETATE 3; 3 MG/ML; MG/ML
6 INJECTION, SUSPENSION INTRA-ARTICULAR; INTRALESIONAL; INTRAMUSCULAR; SOFT TISSUE
Status: COMPLETED | OUTPATIENT
Start: 2018-06-10 | End: 2018-06-10

## 2018-06-10 RX ADMIN — BETAMETHASONE SODIUM PHOSPHATE AND BETAMETHASONE ACETATE 6 MG: 3; 3 INJECTION, SUSPENSION INTRA-ARTICULAR; INTRALESIONAL; INTRAMUSCULAR; SOFT TISSUE at 06:06

## 2018-06-10 NOTE — PATIENT INSTRUCTIONS
S      Start taking Omnicef in 2 days if still not feeling better or if symptoms worsen          Pharyngitis: Strep (Presumed)    You have pharyngitis (sore throat). The cause is thought to be the streptococcus, or strep, bacterium. Strep throat infection can cause throat pain that is worse when swallowing, aching all over, headache, and fever. The infection may be spread by coughing, kissing, or touching others after touching your mouth or nose. Antibiotic medications are given to treat the infection.  Home care  · Rest at home. Drink plenty of fluids to avoid dehydration.  · No work or school for the first 2 days of taking the antibiotics. After this time, you will not be contagious. You can then return to work or school if you are feeling better.   · The antibiotic medication must be taken for the full 10 days, even if you feel better. This is very important to ensure the infection is treated. It is also important to prevent drug-resistant organisms from developing. If you were given an antibiotic shot, no more antibiotics are needed.  · You may use acetaminophen or ibuprofen to control pain or fever, unless another medicine was prescribed for this. If you have chronic liver or kidney disease or ever had a stomach ulcer or GI bleeding, talk with your doctor before using these medicines.  · Throat lozenges or a throat-numbing sprays can help reduce throat pain. Gargling with warm salt water can also help. Dissolve 1/2 teaspoon of salt in 1 8 ounce glass of warm water.   · Avoid salty or spicy foods, which can irritate the throat.  Follow-up care  Follow up with your healthcare provider or our staff if you are not improving over the next week.  When to seek medical advice  Call your healthcare provider right away if any of these occur:  · Fever as directed by your doctor.   · New or worsening ear pain, sinus pain, or headache  · Painful lumps in the back of neck  · Stiff neck  · Lymph nodes are getting  larger  · Inability to swallow liquids, excessive drooling, or inability to open mouth wide due to throat pain  · Signs of dehydration (very dark urine or no urine, sunken eyes, dizziness)  · Trouble breathing or noisy breathing  · Muffled voice  · New rash  Date Last Reviewed: 4/13/2015  © 5776-2356 [employer]. [address] [place] [address], [place] [id number]. All rights reserved. This information is not intended as a substitute for professional medical care. Always follow your healthcare professional's instructions.

## 2018-06-10 NOTE — PROGRESS NOTES
"Subjective:       Patient ID: Eliana Wagner is a 83 y.o. female.    Vitals:  height is 5' 2" (1.575 m) and weight is 88.5 kg (195 lb). Her temperature is 98.5 °F (36.9 °C). Her blood pressure is 145/75 (abnormal) and her pulse is 88. Her oxygen saturation is 98%.     Chief Complaint: Sore Throat    Sore Throat    This is a new problem. The current episode started today. The pain is worse on the left side. There has been no fever. The pain is at a severity of 4/10. The pain is mild. Associated symptoms include ear pain, a hoarse voice and trouble swallowing. Pertinent negatives include no abdominal pain, diarrhea, headaches, shortness of breath or vomiting. She has tried nothing for the symptoms. The treatment provided no relief.     Review of Systems   Constitution: Negative for chills and fever.   HENT: Positive for ear pain, hoarse voice, sore throat and trouble swallowing.    Eyes: Negative for blurred vision.   Cardiovascular: Negative for chest pain.   Respiratory: Negative for shortness of breath.    Skin: Negative for rash.   Musculoskeletal: Negative for back pain and joint pain.   Gastrointestinal: Negative for abdominal pain, diarrhea, nausea and vomiting.   Neurological: Negative for headaches.   Psychiatric/Behavioral: The patient is not nervous/anxious.        Objective:      Physical Exam   Constitutional: She is oriented to person, place, and time. She appears well-developed and well-nourished. She is cooperative.  Non-toxic appearance. She does not appear ill. No distress.   HENT:   Head: Normocephalic and atraumatic.   Right Ear: Hearing, tympanic membrane and ear canal normal.   Left Ear: Hearing, tympanic membrane and ear canal normal.   Nose: No mucosal edema, rhinorrhea or nasal deformity. No epistaxis. Right sinus exhibits no maxillary sinus tenderness and no frontal sinus tenderness. Left sinus exhibits no maxillary sinus tenderness and no frontal sinus tenderness.   Mouth/Throat: Uvula " is midline and mucous membranes are normal. No trismus in the jaw. Normal dentition. No uvula swelling. Posterior oropharyngeal erythema present.   Eyes: Conjunctivae and lids are normal. Right eye exhibits no discharge. Left eye exhibits no discharge. No scleral icterus.   Sclera clear bilat   Neck: Trachea normal, normal range of motion, full passive range of motion without pain and phonation normal. Neck supple.   Cardiovascular: Normal rate, regular rhythm and normal pulses.    Pulmonary/Chest: Effort normal and breath sounds normal. No respiratory distress.   Abdominal: Normal appearance. She exhibits no distension, no pulsatile midline mass and no mass. There is no tenderness.   Musculoskeletal: Normal range of motion. She exhibits no edema or deformity.   Lymphadenopathy:        Head (left side): Tonsillar adenopathy present.   Neurological: She is alert and oriented to person, place, and time. She exhibits normal muscle tone. Coordination normal.   Skin: Skin is warm, dry and intact. She is not diaphoretic. No pallor.   Psychiatric: She has a normal mood and affect. Her speech is normal and behavior is normal. Judgment and thought content normal. Cognition and memory are normal.   Nursing note and vitals reviewed.      Assessment:       1. Pharyngitis, unspecified etiology    2. Sore throat        Plan:       Patient Instructions   S      Start taking Omnicef in 2 days if still not feeling better or if symptoms worsen          Pharyngitis: Strep (Presumed)    You have pharyngitis (sore throat). The cause is thought to be the streptococcus, or strep, bacterium. Strep throat infection can cause throat pain that is worse when swallowing, aching all over, headache, and fever. The infection may be spread by coughing, kissing, or touching others after touching your mouth or nose. Antibiotic medications are given to treat the infection.  Home care  · Rest at home. Drink plenty of fluids to avoid dehydration.  · No  work or school for the first 2 days of taking the antibiotics. After this time, you will not be contagious. You can then return to work or school if you are feeling better.   · The antibiotic medication must be taken for the full 10 days, even if you feel better. This is very important to ensure the infection is treated. It is also important to prevent drug-resistant organisms from developing. If you were given an antibiotic shot, no more antibiotics are needed.  · You may use acetaminophen or ibuprofen to control pain or fever, unless another medicine was prescribed for this. If you have chronic liver or kidney disease or ever had a stomach ulcer or GI bleeding, talk with your doctor before using these medicines.  · Throat lozenges or a throat-numbing sprays can help reduce throat pain. Gargling with warm salt water can also help. Dissolve 1/2 teaspoon of salt in 1 8 ounce glass of warm water.   · Avoid salty or spicy foods, which can irritate the throat.  Follow-up care  Follow up with your healthcare provider or our staff if you are not improving over the next week.  When to seek medical advice  Call your healthcare provider right away if any of these occur:  · Fever as directed by your doctor.   · New or worsening ear pain, sinus pain, or headache  · Painful lumps in the back of neck  · Stiff neck  · Lymph nodes are getting larger  · Inability to swallow liquids, excessive drooling, or inability to open mouth wide due to throat pain  · Signs of dehydration (very dark urine or no urine, sunken eyes, dizziness)  · Trouble breathing or noisy breathing  · Muffled voice  · New rash  Date Last Reviewed: 4/13/2015  © 1116-5299 [employer]. [address] [place] [address], [place] [address]. All rights reserved. This information is not intended as a substitute for professional medical care. Always follow your healthcare professional's instructions.              Pharyngitis, unspecified etiology    Sore  throat    Other orders  -     betamethasone acetate-betamethasone sodium phosphate injection 6 mg; Inject 1 mL (6 mg total) into the muscle one time.  -     cefdinir (OMNICEF) 300 MG capsule; Take 1 capsule (300 mg total) by mouth 2 (two) times daily.  Dispense: 14 capsule; Refill: 0  -     diphenhydrAMINE-aluminum-magnesium hydroxide-simethicone-lidocaine HCl 2%; Swish and spit 15 mLs every 4 (four) hours as needed.  Dispense: 240 mL; Refill: 0

## 2018-06-11 ENCOUNTER — TELEPHONE (OUTPATIENT)
Dept: FAMILY MEDICINE | Facility: CLINIC | Age: 83
End: 2018-06-11

## 2018-06-11 NOTE — TELEPHONE ENCOUNTER
----- Message from Arnoldo Maguire sent at 6/11/2018  9:48 AM CDT -----  Contact: Patient [phone] cell or [phone] Sissy daughter's cell  She would like a call if there is a cancellation today.    Thank you

## 2018-06-12 ENCOUNTER — OFFICE VISIT (OUTPATIENT)
Dept: FAMILY MEDICINE | Facility: CLINIC | Age: 83
End: 2018-06-12
Payer: MEDICARE

## 2018-06-12 VITALS
TEMPERATURE: 98 F | HEIGHT: 62 IN | SYSTOLIC BLOOD PRESSURE: 119 MMHG | WEIGHT: 186.94 LBS | DIASTOLIC BLOOD PRESSURE: 60 MMHG | RESPIRATION RATE: 20 BRPM | BODY MASS INDEX: 34.4 KG/M2

## 2018-06-12 DIAGNOSIS — N39.0 URINARY TRACT INFECTION WITH HEMATURIA, SITE UNSPECIFIED: ICD-10-CM

## 2018-06-12 DIAGNOSIS — Z79.4 TYPE 2 DIABETES MELLITUS WITHOUT COMPLICATION, WITH LONG-TERM CURRENT USE OF INSULIN: Primary | ICD-10-CM

## 2018-06-12 DIAGNOSIS — E11.9 TYPE 2 DIABETES MELLITUS WITHOUT COMPLICATION, WITH LONG-TERM CURRENT USE OF INSULIN: Primary | ICD-10-CM

## 2018-06-12 DIAGNOSIS — R31.9 URINARY TRACT INFECTION WITH HEMATURIA, SITE UNSPECIFIED: ICD-10-CM

## 2018-06-12 DIAGNOSIS — M94.9 DISORDER OF CARTILAGE: ICD-10-CM

## 2018-06-12 LAB
BILIRUB SERPL-MCNC: NORMAL MG/DL
BLOOD URINE, POC: NORMAL
COLOR, POC UA: YELLOW
CREAT UR-MCNC: 33 MG/DL
GLUCOSE SERPL-MCNC: 288 MG/DL (ref 70–110)
GLUCOSE UR QL STRIP: 250
KETONES UR QL STRIP: NORMAL
LEUKOCYTE ESTERASE URINE, POC: NORMAL
MICROALBUMIN UR DL<=1MG/L-MCNC: 6 UG/ML
MICROALBUMIN/CREATININE RATIO: 18.2 UG/MG
NITRITE, POC UA: NORMAL
PH, POC UA: 6
PROTEIN, POC: NORMAL
SPECIFIC GRAVITY, POC UA: 1.01
UROBILINOGEN, POC UA: NORMAL

## 2018-06-12 PROCEDURE — 81002 URINALYSIS NONAUTO W/O SCOPE: CPT | Mod: PBBFAC,PO | Performed by: FAMILY MEDICINE

## 2018-06-12 PROCEDURE — 36416 COLLJ CAPILLARY BLOOD SPEC: CPT | Mod: PBBFAC,PO | Performed by: FAMILY MEDICINE

## 2018-06-12 PROCEDURE — 99214 OFFICE O/P EST MOD 30 MIN: CPT | Mod: PBBFAC,PO | Performed by: FAMILY MEDICINE

## 2018-06-12 PROCEDURE — 87086 URINE CULTURE/COLONY COUNT: CPT

## 2018-06-12 PROCEDURE — 82043 UR ALBUMIN QUANTITATIVE: CPT

## 2018-06-12 PROCEDURE — 82948 REAGENT STRIP/BLOOD GLUCOSE: CPT | Mod: PBBFAC,PO | Performed by: FAMILY MEDICINE

## 2018-06-12 PROCEDURE — 99999 PR PBB SHADOW E&M-EST. PATIENT-LVL IV: CPT | Mod: PBBFAC,,, | Performed by: FAMILY MEDICINE

## 2018-06-12 PROCEDURE — 99214 OFFICE O/P EST MOD 30 MIN: CPT | Mod: S$PBB,,, | Performed by: FAMILY MEDICINE

## 2018-06-12 RX ORDER — CIPROFLOXACIN 500 MG/1
500 TABLET ORAL 2 TIMES DAILY
Qty: 20 TABLET | Refills: 0 | Status: SHIPPED | OUTPATIENT
Start: 2018-06-12 | End: 2018-06-12 | Stop reason: SDUPTHER

## 2018-06-12 RX ORDER — METFORMIN HYDROCHLORIDE 500 MG/1
500 TABLET, FILM COATED, EXTENDED RELEASE ORAL
Qty: 30 TABLET | Refills: 11 | Status: SHIPPED | OUTPATIENT
Start: 2018-06-12 | End: 2018-07-24

## 2018-06-12 RX ORDER — CIPROFLOXACIN 500 MG/1
500 TABLET ORAL 2 TIMES DAILY
Qty: 20 TABLET | Refills: 0 | Status: SHIPPED | OUTPATIENT
Start: 2018-06-12 | End: 2018-08-30

## 2018-06-12 RX ORDER — METFORMIN HYDROCHLORIDE 500 MG/1
500 TABLET, FILM COATED, EXTENDED RELEASE ORAL
Qty: 30 TABLET | Refills: 11 | Status: SHIPPED | OUTPATIENT
Start: 2018-06-12 | End: 2018-06-12 | Stop reason: SDUPTHER

## 2018-06-12 NOTE — PROGRESS NOTES
Subjective:       Patient ID: Eliana Wagner is a 83 y.o. female.    Chief Complaint: Lymphadenopathy (left neck region) and Animal Bite (scrached by pet cat)   [facility] was [facility] and treated for sore throat feeling much better but now   patient is and will bites and scratches and concerned about cat scratch disease with her history of uncontrolled type 2 diabetes.  HPI see above  Review of Systems   Constitutional: Negative.    HENT: Positive for rhinorrhea and sore throat.    Eyes: Negative.    Respiratory: Negative.    Cardiovascular: Negative.    Gastrointestinal: Negative.    Endocrine:        Uncontrolled type 2 diabetes   Genitourinary: Negative.    Musculoskeletal: Negative.    Skin: Positive for color change and wound.   Allergic/Immunologic: Negative.    Neurological: Negative.    Hematological: Positive for adenopathy.   Psychiatric/Behavioral: Negative.        Objective:      Physical Exam   Constitutional: She is oriented to person, place, and time. She appears well-developed and well-nourished. No distress.   HENT:   Head: Normocephalic and atraumatic.   Right Ear: External ear normal.   Left Ear: External ear normal.   Nose: Nose normal.   Mouth/Throat: Oropharynx is clear and moist. No oropharyngeal exudate.   Eyes: Conjunctivae and EOM are normal. Pupils are equal, round, and reactive to light. Right eye exhibits no discharge. Left eye exhibits no discharge. No scleral icterus.   Neck: Normal range of motion. Neck supple. No JVD present. No tracheal deviation present. No thyromegaly present.   Cardiovascular: Normal rate, regular rhythm, normal heart sounds and intact distal pulses.    No murmur heard.  Pulmonary/Chest: Effort normal and breath sounds normal. No respiratory distress.   Abdominal: Soft. Bowel sounds are normal. She exhibits no distension. There is no tenderness. There is no guarding.   Musculoskeletal: Normal range of motion.   Lymphadenopathy:     She has  cervical adenopathy.   Neurological: She is alert and oriented to person, place, and time. She displays normal reflexes. No cranial nerve deficit or sensory deficit. She exhibits normal muscle tone. Coordination normal.   Skin: Skin is warm and dry. She is not diaphoretic. There is erythema. No pallor.        The marks on the graph represent cat bites and scratches on the patient's arms   Psychiatric: She has a normal mood and affect. Her behavior is normal. Judgment and thought content normal.   Nursing note and vitals reviewed.      Assessment:       1. Type 2 diabetes mellitus without complication, with long-term current use of insulin    2. Followup 1 cat scratches lymphadenopathy   3. Urinary tract infection with hematuria, site unspecified        Plan:     orders for today:   Calcitriol         CBC auto differential         Comprehensive metabolic panel         Hemoglobin A1c         Lipid panel         Microalbumin/creatinine urine ratio         POCT glucose         POCT URINE DIPSTICK WITHOUT MICROSCOPE         TSH         Urine culture          Will contact patient with results of testing once available  Refer to the med card dated 06/12/2018  amLODIPine (NORVASC) 2.5 MG tablet          aspirin 81 mg Tab         atorvastatin (LIPITOR) 40 MG tablet         blood sugar diagnostic Strp 1 strip, 2 times daily PRN        blood-glucose meter kit         cefdinir (OMNICEF) 300 MG capsule 300 mg, 2 times daily        ciprofloxacin HCl (CIPRO) 500 MG tablet 500 mg, 2 times daily        clopidogrel (PLAVIX) 75 mg tablet         diphenhydrAMINE-aluminum-magnesium hydroxide-simethicone-lidocaine HCl 2% 15 mL, Every 4 hours PRN        lancets Misc 1 lancet, 2 times daily PRN        lisinopril (PRINIVIL,ZESTRIL) 40 MG tablet         metFORMIN (GLUMETZA) 500 MG (MOD) 24 hr tablet 500 mg, With breakfast        metoprolol succinate (TOPROL-XL) 25 MG 24 hr tablet         mv-min-FA-Ew-Rs-rctkdzf-lutein (CENTRUM) 0.4-162-18 mg  Tab         nitroGLYCERIN (NITROSTAT) 0.4 MG SL tablet 0.4 mg, Every 5 min PRN

## 2018-06-14 LAB — BACTERIA UR CULT: NO GROWTH

## 2018-07-02 DIAGNOSIS — I25.10 CORONARY ARTERY DISEASE INVOLVING NATIVE CORONARY ARTERY WITHOUT ANGINA PECTORIS, UNSPECIFIED WHETHER NATIVE OR TRANSPLANTED HEART: ICD-10-CM

## 2018-07-02 RX ORDER — LISINOPRIL 40 MG/1
TABLET ORAL
Qty: 30 TABLET | Refills: 0 | Status: SHIPPED | OUTPATIENT
Start: 2018-07-02 | End: 2018-07-27 | Stop reason: SDUPTHER

## 2018-07-06 ENCOUNTER — TELEPHONE (OUTPATIENT)
Dept: FAMILY MEDICINE | Facility: CLINIC | Age: 83
End: 2018-07-06

## 2018-07-06 NOTE — TELEPHONE ENCOUNTER
Prior authorization completed for metFORMIN (GLUMETZA) 500 MG (MOD) 24 hr tablet per covermymeds, patient informed per voicemail.

## 2018-07-06 NOTE — TELEPHONE ENCOUNTER
"----- Message from Brianne Heredia sent at 7/6/2018  2:21 PM CDT -----  Contact: self/ [phone]  RX request - refill or new RX.  Is this a refill or new RX:    RX name and strength: metFORMIN (GLUMETZA) 500 MG (MOD) 24 hr tablet  Directions:   Is this a 30 day or 90 day RX:    Local pharmacy or mail order pharmacy:    Pharmacy name and phone # (DON'T enter "on file" or "in chart"): [facility] - [address] [phone] (Phone)  [phone] (Fax)    Comments:  Need a prior auth for medication, it is a pier 4. Please fax to . Patient Human id# [id number]    "

## 2018-07-17 NOTE — TELEPHONE ENCOUNTER
spSpoke with patient advised her that her insurance company has denied her medication metformin (glumetza).  Advised patient to contact her insurance company to see exactly what's covered under her formulary, and to contact our office back with the approved medications.

## 2018-07-24 ENCOUNTER — TELEPHONE (OUTPATIENT)
Dept: FAMILY MEDICINE | Facility: CLINIC | Age: 83
End: 2018-07-24

## 2018-07-24 DIAGNOSIS — E11.9 TYPE 2 DIABETES MELLITUS WITHOUT COMPLICATION, WITH LONG-TERM CURRENT USE OF INSULIN: Primary | ICD-10-CM

## 2018-07-24 DIAGNOSIS — Z79.4 TYPE 2 DIABETES MELLITUS WITHOUT COMPLICATION, WITH LONG-TERM CURRENT USE OF INSULIN: Primary | ICD-10-CM

## 2018-07-24 RX ORDER — METFORMIN HYDROCHLORIDE 500 MG/1
500 TABLET, EXTENDED RELEASE ORAL
Qty: 90 TABLET | Refills: 0 | Status: SHIPPED | OUTPATIENT
Start: 2018-07-24 | End: 2018-11-14 | Stop reason: SDUPTHER

## 2018-07-24 NOTE — TELEPHONE ENCOUNTER
----- Message from Briseyda Eric sent at 7/24/2018  9:33 AM CDT -----  Contact: [facility] pharmacy [phone]  Pharmacy is calling to clarify an RX.  RX name:  metFORMIN (GLUMETZA) 500 MG (MOD) 24 hr tablet  What do they need to clarify:  Modified version of medication costs about 600.00 and pt can not afford, would like to know if you could call in regular version of this medication   Comments:

## 2018-07-27 DIAGNOSIS — I25.10 CORONARY ARTERY DISEASE INVOLVING NATIVE CORONARY ARTERY WITHOUT ANGINA PECTORIS, UNSPECIFIED WHETHER NATIVE OR TRANSPLANTED HEART: ICD-10-CM

## 2018-07-27 RX ORDER — LISINOPRIL 40 MG/1
TABLET ORAL
Qty: 30 TABLET | Refills: 0 | Status: SHIPPED | OUTPATIENT
Start: 2018-07-27 | End: 2019-12-05

## 2018-08-08 ENCOUNTER — LAB VISIT (OUTPATIENT)
Dept: LAB | Facility: HOSPITAL | Age: 83
End: 2018-08-08
Attending: FAMILY MEDICINE
Payer: MEDICARE

## 2018-08-08 DIAGNOSIS — M94.9 DISORDER OF CARTILAGE: ICD-10-CM

## 2018-08-08 DIAGNOSIS — I25.10 CORONARY ARTERY DISEASE INVOLVING NATIVE CORONARY ARTERY WITHOUT ANGINA PECTORIS, UNSPECIFIED WHETHER NATIVE OR TRANSPLANTED HEART: ICD-10-CM

## 2018-08-08 DIAGNOSIS — Z00.00 ROUTINE GENERAL MEDICAL EXAMINATION AT A HEALTH CARE FACILITY: ICD-10-CM

## 2018-08-08 DIAGNOSIS — I10 ESSENTIAL HYPERTENSION: ICD-10-CM

## 2018-08-08 DIAGNOSIS — E11.9 TYPE 2 DIABETES MELLITUS WITHOUT COMPLICATION, WITH LONG-TERM CURRENT USE OF INSULIN: ICD-10-CM

## 2018-08-08 DIAGNOSIS — Z79.4 TYPE 2 DIABETES MELLITUS WITHOUT COMPLICATION, WITH LONG-TERM CURRENT USE OF INSULIN: ICD-10-CM

## 2018-08-08 LAB
ALBUMIN SERPL BCP-MCNC: 3.5 G/DL
ALP SERPL-CCNC: 89 U/L
ALT SERPL W/O P-5'-P-CCNC: 31 U/L
ANION GAP SERPL CALC-SCNC: 8 MMOL/L
AST SERPL-CCNC: 26 U/L
BASOPHILS # BLD AUTO: 0.03 K/UL
BASOPHILS NFR BLD: 0.4 %
BILIRUB SERPL-MCNC: 0.4 MG/DL
BUN SERPL-MCNC: 15 MG/DL
CALCIUM SERPL-MCNC: 9.6 MG/DL
CHLORIDE SERPL-SCNC: 104 MMOL/L
CHOLEST SERPL-MCNC: 174 MG/DL
CHOLEST/HDLC SERPL: 3.3 {RATIO}
CO2 SERPL-SCNC: 28 MMOL/L
CREAT SERPL-MCNC: 0.8 MG/DL
DIFFERENTIAL METHOD: ABNORMAL
EOSINOPHIL # BLD AUTO: 0.1 K/UL
EOSINOPHIL NFR BLD: 1.2 %
ERYTHROCYTE [DISTWIDTH] IN BLOOD BY AUTOMATED COUNT: 13.6 %
EST. GFR  (AFRICAN AMERICAN): >60 ML/MIN/1.73 M^2
EST. GFR  (NON AFRICAN AMERICAN): >60 ML/MIN/1.73 M^2
ESTIMATED AVG GLUCOSE: 223 MG/DL
GLUCOSE SERPL-MCNC: 199 MG/DL
HBA1C MFR BLD HPLC: 9.4 %
HCT VFR BLD AUTO: 42.6 %
HDLC SERPL-MCNC: 52 MG/DL
HDLC SERPL: 29.9 %
HGB BLD-MCNC: 13.3 G/DL
IMM GRANULOCYTES # BLD AUTO: 0.05 K/UL
IMM GRANULOCYTES NFR BLD AUTO: 0.7 %
LDLC SERPL CALC-MCNC: 87.4 MG/DL
LYMPHOCYTES # BLD AUTO: 3.1 K/UL
LYMPHOCYTES NFR BLD: 41.1 %
MCH RBC QN AUTO: 29.6 PG
MCHC RBC AUTO-ENTMCNC: 31.2 G/DL
MCV RBC AUTO: 95 FL
MONOCYTES # BLD AUTO: 0.4 K/UL
MONOCYTES NFR BLD: 5.8 %
NEUTROPHILS # BLD AUTO: 3.8 K/UL
NEUTROPHILS NFR BLD: 50.8 %
NONHDLC SERPL-MCNC: 122 MG/DL
NRBC BLD-RTO: 0 /100 WBC
PLATELET # BLD AUTO: 160 K/UL
PMV BLD AUTO: 10.4 FL
POTASSIUM SERPL-SCNC: 4.5 MMOL/L
PROT SERPL-MCNC: 6.6 G/DL
RBC # BLD AUTO: 4.5 M/UL
SODIUM SERPL-SCNC: 140 MMOL/L
TRIGL SERPL-MCNC: 173 MG/DL
TSH SERPL DL<=0.005 MIU/L-ACNC: 2.55 UIU/ML
WBC # BLD AUTO: 7.54 K/UL

## 2018-08-08 PROCEDURE — 82652 VIT D 1 25-DIHYDROXY: CPT

## 2018-08-08 PROCEDURE — 80061 LIPID PANEL: CPT

## 2018-08-08 PROCEDURE — 83036 HEMOGLOBIN GLYCOSYLATED A1C: CPT

## 2018-08-08 PROCEDURE — 84443 ASSAY THYROID STIM HORMONE: CPT

## 2018-08-08 PROCEDURE — 80053 COMPREHEN METABOLIC PANEL: CPT

## 2018-08-08 PROCEDURE — 85025 COMPLETE CBC W/AUTO DIFF WBC: CPT

## 2018-08-08 PROCEDURE — 36415 COLL VENOUS BLD VENIPUNCTURE: CPT | Mod: PO

## 2018-08-09 LAB — 1,25(OH)2D3 SERPL-MCNC: 44 PG/ML

## 2018-08-30 ENCOUNTER — LAB VISIT (OUTPATIENT)
Dept: LAB | Facility: HOSPITAL | Age: 83
End: 2018-08-30
Attending: FAMILY MEDICINE
Payer: MEDICARE

## 2018-08-30 ENCOUNTER — OFFICE VISIT (OUTPATIENT)
Dept: FAMILY MEDICINE | Facility: CLINIC | Age: 83
End: 2018-08-30
Payer: MEDICARE

## 2018-08-30 VITALS
SYSTOLIC BLOOD PRESSURE: 124 MMHG | TEMPERATURE: 98 F | HEART RATE: 73 BPM | DIASTOLIC BLOOD PRESSURE: 64 MMHG | BODY MASS INDEX: 33.48 KG/M2 | HEIGHT: 63 IN | WEIGHT: 188.94 LBS

## 2018-08-30 DIAGNOSIS — E11.9 TYPE 2 DIABETES MELLITUS WITHOUT COMPLICATION, WITHOUT LONG-TERM CURRENT USE OF INSULIN: ICD-10-CM

## 2018-08-30 DIAGNOSIS — Z23 IMMUNIZATION DUE: ICD-10-CM

## 2018-08-30 DIAGNOSIS — M85.80 OSTEOPENIA, UNSPECIFIED LOCATION: Primary | ICD-10-CM

## 2018-08-30 DIAGNOSIS — M94.9 DISORDER OF CARTILAGE: ICD-10-CM

## 2018-08-30 DIAGNOSIS — E11.9 TYPE 2 DIABETES MELLITUS WITHOUT COMPLICATION, WITHOUT LONG-TERM CURRENT USE OF INSULIN: Primary | ICD-10-CM

## 2018-08-30 LAB
ALBUMIN SERPL BCP-MCNC: 3.5 G/DL
ALP SERPL-CCNC: 89 U/L
ALT SERPL W/O P-5'-P-CCNC: 36 U/L
ANION GAP SERPL CALC-SCNC: 8 MMOL/L
AST SERPL-CCNC: 30 U/L
BILIRUB SERPL-MCNC: 0.3 MG/DL
BUN SERPL-MCNC: 13 MG/DL
CALCIUM SERPL-MCNC: 9.4 MG/DL
CHLORIDE SERPL-SCNC: 102 MMOL/L
CO2 SERPL-SCNC: 27 MMOL/L
CREAT SERPL-MCNC: 0.8 MG/DL
EST. GFR  (AFRICAN AMERICAN): >60 ML/MIN/1.73 M^2
EST. GFR  (NON AFRICAN AMERICAN): >60 ML/MIN/1.73 M^2
ESTIMATED AVG GLUCOSE: 220 MG/DL
GLUCOSE SERPL-MCNC: 272 MG/DL
HBA1C MFR BLD HPLC: 9.3 %
POTASSIUM SERPL-SCNC: 4.5 MMOL/L
PROT SERPL-MCNC: 6.5 G/DL
SODIUM SERPL-SCNC: 137 MMOL/L

## 2018-08-30 PROCEDURE — 99214 OFFICE O/P EST MOD 30 MIN: CPT | Mod: S$PBB,,, | Performed by: FAMILY MEDICINE

## 2018-08-30 PROCEDURE — 36415 COLL VENOUS BLD VENIPUNCTURE: CPT | Mod: PO

## 2018-08-30 PROCEDURE — 36416 COLLJ CAPILLARY BLOOD SPEC: CPT | Mod: PBBFAC,PO | Performed by: FAMILY MEDICINE

## 2018-08-30 PROCEDURE — 99215 OFFICE O/P EST HI 40 MIN: CPT | Mod: PBBFAC,PO,25 | Performed by: FAMILY MEDICINE

## 2018-08-30 PROCEDURE — 83036 HEMOGLOBIN GLYCOSYLATED A1C: CPT

## 2018-08-30 PROCEDURE — 90670 PCV13 VACCINE IM: CPT | Mod: PBBFAC,PO

## 2018-08-30 PROCEDURE — 80053 COMPREHEN METABOLIC PANEL: CPT

## 2018-08-30 PROCEDURE — 99999 PR PBB SHADOW E&M-EST. PATIENT-LVL V: CPT | Mod: PBBFAC,,, | Performed by: FAMILY MEDICINE

## 2018-08-30 RX ORDER — FLUOROURACIL 50 MG/G
CREAM TOPICAL DAILY
COMMUNITY
Start: 2018-06-18

## 2018-08-30 RX ORDER — EZETIMIBE 10 MG/1
1 TABLET ORAL EVERY MORNING
COMMUNITY
Start: 2018-08-29 | End: 2019-10-11 | Stop reason: SDUPTHER

## 2018-08-30 NOTE — PROGRESS NOTES
Two patient identifiers verified.  Allergies reviewed.   Prevnar administered to Left deltoid as per MD order advise patient to wait 15min after shot is given for any adverse reaction.

## 2018-08-30 NOTE — PROGRESS NOTES
Eliana Wagner is a 83 y.o. 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 she has never used smokeless tobacco. She reports that she drinks alcohol. She reports that she does not use drugs.  Family History   Problem Relation Age of Onset    Breast cancer Neg Hx     Colon cancer Neg Hx     Ovarian cancer Neg Hx      ROS:  GENERAL: No fever, chills, fatigability or weight loss.poor sugar control  SKIN: No rashes, itching or changes in color or texture of skin.  HEAD: No headaches or recent head trauma.  EYES: Visual acuity fine. No photophobia, ocular pain or diplopia.  EARS: Denies ear pain, discharge or vertigo.  NOSE: No loss of smell, no epistaxis or postnasal drip.  MOUTH & THROAT: No hoarseness or change in voice. No excessive gum bleeding.  NODES: Denies swollen glands.  CHEST: Denies BUENROSTRO, cyanosis, wheezing, cough and sputum production.  CARDIOVASCULAR: Denies chest pain, PND, orthopnea or reduced exercise tolerance.  ABDOMEN: Appetite fine. No weight loss. Denies diarrhea, abdominal pain, hematemesis or blood in stool.  URINARY: No flank pain, dysuria or hematuria.  PERIPHERAL VASCULAR: No claudication or cyanosis.  MUSCULOSKELETAL: No joint stiffness or swelling. Denies back pain.  NEUROLOGIC: No history of seizures, paralysis, alteration of gait or coordination.    OBJECTIVE:  APPEARANCE:   Vitals:    08/30/18 1016   BP: 124/64   Pulse: 73   Temp: 98 °F (36.7 °C)     SKIN: Normal skin turgor, no lesions.  HEENT: Both external auditory canals clear. Both tympanic membranes intact. PERRL. EOMI.   Disk margins sharp. No tonsillar enlargement. No pharyngeal erythema or exudate.  No stridor.  NECK: No bruits. No cervical spine tenderness. No cervical lymphadenopathy. No thyromegaly.  NODES: No cervical, axillary or inguinal lymph node enlargement.  CHEST: Breath sounds clear bilaterally. Lungs clear to auscultation & percussion.   Good air movement. No rales. No retractions. No rhonchi. No stridor. No wheezes.  CARDIOVASCULAR: Normal S1, S2. No murmurs. No edema.  BREASTS: no masses palpated in either breast or axillary area, symmetry noted.  ABDOMEN: Bowel sounds normal. No palpable aortic enlargement. No CVA tenderness. No pulsatile mass. No rebound tenderness.  PERIPHERAL VASCULAR: Femoral pulses present and symmetrical. No edema.  MUSCULOSKELETAL: Degenerative changes of both ankles, foot, knee, wrist and hand.  BACK: No CVA tenderness. There is no spasm, tenderness or radiculopathy noted with palpation and there is full range of motion.   NEUROLOGIC:   Cranial Nerves: II-XII grossly intact.  Motor: 5/5 strength major flexors/extensors. No tremor.  DTR's: Knees, Ankles 2+ and equal bilaterally; downgoing toes.  Sensory: Intact to light touch distally.  Gait & Posture: Normal gait and fine motion. No cerebellar signs.  MENTAL STATUS: Alert. Oriented x 3. Language skills normal. Memory intact. No suicidal ideation. Well kept appearance.    ASSESSMENT/PLAN:   Eliana SHAH was seen today for annual exam.    Diagnoses and all orders for this visit:    Osteopenia, unspecified location  -     DXA Bone Density Spine And Hip; Future    Type 2 diabetes mellitus without complication, without long-term current use of insulin  -     Ambulatory consult to Podiatry  -     Ambulatory referral to Ophthalmology  -     Comprehensive metabolic panel; Future  -     Hemoglobin A1c; Future  -     Ambulatory consult to Diabetic Education    Immunization due  -     (In Office Administered) Pneumococcal Conjugate Vaccine (13 Valent) (IM)    Disorder of cartilage   -     DXA Bone Density Spine And Hip;  Future    will contact pt with results when available.  Labs discussed with pt    F/u 3 months with labs.

## 2018-09-20 ENCOUNTER — TELEPHONE (OUTPATIENT)
Dept: FAMILY MEDICINE | Facility: CLINIC | Age: 83
End: 2018-09-20

## 2018-09-20 NOTE — TELEPHONE ENCOUNTER
----- Message from Wisam Valles sent at 9/20/2018 10:42 AM CDT -----  Contact: Patient [phone]  Patient would like to get test results.  Name of test (lab, mammo, etc.):  NON FASTING LAB [[id number]]  Date of test:  8/30/18  Ordering provider: LUZ  Where was the test performed: [facility]  Comments:    Please call an advise  Thank you

## 2018-09-24 ENCOUNTER — CLINICAL SUPPORT (OUTPATIENT)
Dept: DIABETES | Facility: CLINIC | Age: 83
End: 2018-09-24
Payer: MEDICARE

## 2018-09-24 DIAGNOSIS — E11.9 TYPE 2 DIABETES MELLITUS WITHOUT COMPLICATION, WITHOUT LONG-TERM CURRENT USE OF INSULIN: ICD-10-CM

## 2018-09-24 PROCEDURE — 99999 PR PBB SHADOW E&M-EST. PATIENT-LVL II: CPT | Mod: PBBFAC,,,

## 2018-09-24 PROCEDURE — G0108 DIAB MANAGE TRN  PER INDIV: HCPCS | Mod: PBBFAC,PO | Performed by: DIETITIAN, REGISTERED

## 2018-09-24 PROCEDURE — 99212 OFFICE O/P EST SF 10 MIN: CPT | Mod: PBBFAC,PO

## 2018-09-24 RX ORDER — INSULIN PUMP SYRINGE, 3 ML
EACH MISCELLANEOUS
Qty: 1 EACH | Refills: 0 | Status: ON HOLD | OUTPATIENT
Start: 2018-09-24 | End: 2023-10-15 | Stop reason: HOSPADM

## 2018-09-24 RX ORDER — LANCETS
1 EACH MISCELLANEOUS DAILY
Qty: 100 EACH | Refills: 3 | Status: SHIPPED | OUTPATIENT
Start: 2018-09-24

## 2018-09-24 NOTE — TELEPHONE ENCOUNTER
Patient informed of your message below, patient states she has an appt scheduled with a dietitian today.

## 2018-09-24 NOTE — PROGRESS NOTES
Diabetes Education  Author: Stephania Barr RD, CDE  Date: 9/24/2018    Diabetes Care Management Summary  Diabetes Education Record Assessment/Progress: Initial  Current Diabetes Risk Level: High     Last A1c:   Lab Results   Component Value Date    HGBA1C 9.3 (H) 08/30/2018     Last Visit with Diabetes Educator: : 09/24/2018  Last OPCM Referral: : Not Found   Enrolled in OPCM: No    Diabetes Type  Diabetes Type : Type II    Diabetes History  Diabetes Diagnosis: 3-5 years  Current Treatment: Oral Medication(Had been on Glipizide - just discontinued and started on Metformin)  Reviewed Problem List with Patient: No    Health Maintenance was reviewed today with patient. Discussed with patient importance of routine eye exams, foot exams/foot care, blood work (i.e.: A1c, microalbumin, and lipid), dental visits, yearly flu vaccine, and pneumonia vaccine as indicated by PCP. Patient verbalized understanding.     Health Maintenance Topics with due status: Not Due       Topic Last Completion Date    Lipid Panel 08/08/2018    Pneumococcal (65+) 08/30/2018    Foot Exam 08/30/2018    Hemoglobin A1c 08/30/2018    DEXA SCAN 08/30/2018     Health Maintenance Due   Topic Date Due    TETANUS VACCINE  01/19/1953    Influenza Vaccine  08/01/2018     Nutrition  Meal Planning: 3 meals per day, snacks between meal, water, artificial sweeteners, diet drinks, eats out often  What type of sweetener do you use?: Splenda  What type of beverages do you drink?: water, milk, other (see comments), diet soda/tea, juice(Coffee, iced tea w/ splenda, fresh squeezed OJ - 1/3 cup before bed)    Monitoring   Self Monitoring : (Checks every other day - reports  this AM)  Blood Glucose Logs: No  Do you use a personal continuous glucose monitor?: No  In the last month, how often have you had a low blood sugar reaction?: never  What are your symptoms of low blood sugar?: (N/A)  How do you treat low blood sugar?: (N/A)  Can you tell when your blood  sugar is too high?: yes  How do you treat high blood sugar?: (Avoids sweets)    Exercise   Exercise Type: ([facility] - walks a mile and 2 days will do the weight machines)    Current Diabetes Treatment   Current Treatment: Oral Medication(Had been on Glipizide - just discontinued and started on Metformin)    Social History  Preferred Learning Method: Face to Face  Primary Support: Self  Smoking Status: Never a Smoker  Alcohol Use: Rarely    Barriers to Change  Barriers to Change: None  Learning Challenges : None    Readiness to Learn   Readiness to Learn : Acceptance    Cultural Influences  Cultural Influences: No    Diabetes Education Assessment/Progress  Diabetes Disease Process (diabetes disease process and treatment options): Instructed, Discussion, Individual Session, Written Materials Provided  Nutrition (Incorporating nutritional management into one's lifestyle): Instructed, Discussion, Individual Session, Written Materials Provided(Reviewed CHO counting, label reading and addt'l resources to assist w/ CHO counting; plate method discussed)  Physical Activity (incorporating physical activity into one's lifestyle): Instructed, Discussion, Individual Session, Written Materials Provided(Reviewed goals and benefits)  Medications (states correct name, dose, onset, peak, duration, side effects & timing of meds): Instructed, Discussion, Individual Session, Written Materials Provided(Reviewed medication regimen)  Monitoring (monitoring blood glucose/other parameters & using results): Instructed, Discussion, Individual Session, Written Materials Provided(Reviewed SMBG schedule and BG goals; log sheet provided and encouraged to call me with BG readings in 2 weeks for review; patient would like Rx for new meter/supplies sent to [facility])  Acute Complications (preventing, detecting, and treating acute complications): Instructed, Discussion, Individual Session, Written Materials  Provided(Reviewed s/s and treatment of hypoglycemia)  Chronic Complications (preventing, detecting, and treating chronic complications): Instructed, Discussion, Individual Session, Written Materials Provided(Reviewed standards of care; scheduled for eye and podiatry exams this week)  Clinical (diabetes, other pertinent medical history, and relevant comorbidities reviewed during visit): Instructed, Discussion, Individual Session  Cognitive (knowledge of self-management skills, functional health literacy): Instructed, Discussion, Individual Session  Psychosocial (emotional response to diabetes): Instructed, Discussion, Individual Session  Diabetes Distress and Support Systems: Not Covered/Deferred(Time constraints)  Behavioral (readiness for change, lifestyle practices, self-care behaviors): Instructed, Discussion, Individual Session    Goals  Patient has selected/evaluated goals during today's session: Yes, selected  Monitoring: Set(Check BG once a day and call in 2 weeks with BG readings)    Diabetes Care Plan/Intervention  Education Plan/Intervention: Individual Follow-Up DSMT    Diabetes Meal Plan  Carbohydrate Per Meal: 30-45g  Carbohydrate Per Snack : 7-15g    Today's Self-Management Care Plan was developed with the patient's input and is based on barriers identified during today's assessment.    The long and short-term goals in the care plan were written with the patient/caregiver's input. The patient has agreed to work toward these goals to improve her overall diabetes control.      The patient received a copy of today's self-management plan and verbalized understanding of the care plan, goals, and all of today's instructions.      The patient was encouraged to communicate with her physician and care team regarding her condition(s) and treatment.  I provided the patient with my contact information today and encouraged her to contact me via phone or patient portal as needed.     Education Units of Time   Time  Spent: 60 min

## 2018-09-26 ENCOUNTER — OFFICE VISIT (OUTPATIENT)
Dept: PODIATRY | Facility: CLINIC | Age: 83
End: 2018-09-26
Payer: MEDICARE

## 2018-09-26 VITALS
HEIGHT: 62 IN | SYSTOLIC BLOOD PRESSURE: 138 MMHG | DIASTOLIC BLOOD PRESSURE: 61 MMHG | HEART RATE: 66 BPM | WEIGHT: 188 LBS | BODY MASS INDEX: 34.6 KG/M2

## 2018-09-26 DIAGNOSIS — E11.9 DIABETES MELLITUS WITHOUT COMPLICATION: Primary | ICD-10-CM

## 2018-09-26 PROCEDURE — 99214 OFFICE O/P EST MOD 30 MIN: CPT | Mod: PBBFAC,PO | Performed by: PODIATRIST

## 2018-09-26 PROCEDURE — 99213 OFFICE O/P EST LOW 20 MIN: CPT | Mod: S$PBB,,, | Performed by: PODIATRIST

## 2018-09-26 PROCEDURE — 99999 PR PBB SHADOW E&M-EST. PATIENT-LVL IV: CPT | Mod: PBBFAC,,, | Performed by: PODIATRIST

## 2018-09-26 NOTE — LETTER
October 1, 2018      Nara Lawton MD  [address] [facility]  [address]  [facility] [address]           [place] [facility]  [address] [facility] [address]  Phone: [phone]          Patient: Eliana Wagner   MR Number: [id number]   YOB: 1935   Date of Visit: 9/26/2018       Dear Dr. Nara Lawton:    Thank you for referring Eliana Wagner to me for evaluation. Attached you will find relevant portions of my assessment and plan of care.    If you have questions, please do not hesitate to call me. I look forward to following Eliana Wagner along with you.    Sincerely,    Rhonda Montana, CAMILLE    Enclosure  CC:  No Recipients    If you would like to receive this communication electronically, please contact [email or web address] or [phone] to request more information on [employer] Link access.    For providers and/or their staff who would like to refer a patient to [facility], please contact us through our one-stop-shop provider referral line, [facility], at [phone].    If you feel you have received this communication in error or would no longer like to receive these types of communications, please e-mail [email or web address]

## 2018-09-27 ENCOUNTER — APPOINTMENT (OUTPATIENT)
Dept: RADIOLOGY | Facility: CLINIC | Age: 83
End: 2018-09-27
Attending: FAMILY MEDICINE
Payer: MEDICARE

## 2018-09-27 ENCOUNTER — OFFICE VISIT (OUTPATIENT)
Dept: OPTOMETRY | Facility: CLINIC | Age: 83
End: 2018-09-27
Payer: MEDICARE

## 2018-09-27 DIAGNOSIS — Z13.5 GLAUCOMA SCREENING: ICD-10-CM

## 2018-09-27 DIAGNOSIS — D31.31 CHOROIDAL NEVUS OF RIGHT EYE: ICD-10-CM

## 2018-09-27 DIAGNOSIS — M94.9 DISORDER OF CARTILAGE: ICD-10-CM

## 2018-09-27 DIAGNOSIS — E11.9 TYPE 2 DIABETES MELLITUS WITHOUT RETINOPATHY: Primary | ICD-10-CM

## 2018-09-27 DIAGNOSIS — M85.80 OSTEOPENIA, UNSPECIFIED LOCATION: ICD-10-CM

## 2018-09-27 DIAGNOSIS — H52.4 PRESBYOPIA: ICD-10-CM

## 2018-09-27 PROCEDURE — 99212 OFFICE O/P EST SF 10 MIN: CPT | Mod: PBBFAC,25,PO | Performed by: OPTOMETRIST

## 2018-09-27 PROCEDURE — 92004 COMPRE OPH EXAM NEW PT 1/>: CPT | Mod: S$PBB,,, | Performed by: OPTOMETRIST

## 2018-09-27 PROCEDURE — 77080 DXA BONE DENSITY AXIAL: CPT | Mod: 26,,, | Performed by: INTERNAL MEDICINE

## 2018-09-27 PROCEDURE — 99999 PR PBB SHADOW E&M-EST. PATIENT-LVL II: CPT | Mod: PBBFAC,,, | Performed by: OPTOMETRIST

## 2018-09-27 PROCEDURE — 77080 DXA BONE DENSITY AXIAL: CPT | Mod: TC,PO

## 2018-09-27 PROCEDURE — 92015 DETERMINE REFRACTIVE STATE: CPT | Mod: ,,, | Performed by: OPTOMETRIST

## 2018-09-27 NOTE — LETTER
September 27, 2018      Nara Lawton MD  [address] [facility]  [address]  [facility] [address]           [place] - Optometry  [address] [facility] [place] [address]  Phone: [phone]  Fax: [phone]          Patient: Eliana Wagner   MR Number: [id number]   YOB: 1935   Date of Visit: 9/27/2018       Dear Dr. Nara Lawton:    Thank you for referring Eliana Wagner to me for evaluation. Attached you will find relevant portions of my assessment and plan of care.    If you have questions, please do not hesitate to call me. I look forward to following Eliana Wagner along with you.    Sincerely,    Moises Cobos, OD    Enclosure  CC:  No Recipients    If you would like to receive this communication electronically, please contact [email or web address] or [phone] to request more information on [employer] Link access.    For providers and/or their staff who would like to refer a patient to [facility], please contact us through our one-stop-shop provider referral line, [facility], at [phone].    If you feel you have received this communication in error or would no longer like to receive these types of communications, please e-mail [email or web address]

## 2018-09-27 NOTE — PROGRESS NOTES
HPI     JESSICA:2016  Pt states at night sees halos and has trouble seeing the signs. During the   day she is fine. States she has a mole in the right eye.   floater in right eye  No gtts   Cat Sx about 8 years ago both eye   Hemoglobin A1C       Date                     Value               Ref Range             Status                08/30/2018               9.3 (H)             4.0 - 5.6 %           Final              Comment:    ADA Screening Guidelines:  5.7-6.4%  Consistent with   prediabetes  >or=6.5%  Consistent with diabetes  High levels of fetal   hemoglobin interfere with the HbA1C  assay. Heterozygous hemoglobin   variants (HbS, HgC, etc)do  not significantly interfere with this assay.     However, presence of multiple variants may affect accuracy.         08/08/2018               9.4 (H)             4.0 - 5.6 %           Final              Comment:    ADA Screening Guidelines:  5.7-6.4%  Consistent with   prediabetes  >or=6.5%  Consistent with diabetes  High levels of fetal   hemoglobin interfere with the HbA1C  assay. Heterozygous hemoglobin   variants (HbS, HgC, etc)do  not significantly interfere with this assay.     However, presence of multiple variants may affect accuracy.         02/21/2017               8.1 (H)             4.5 - 6.2 %           Final              Comment:    According to ADA guidelines, hemoglobin A1C <7.0% represents  optimal   control in non-pregnant diabetic patients.  Different  metrics may apply   to specific populations.   Standards of Medical Care in Diabetes -   2016.  For the purpose of screening for the presence of diabetes:  <5.7%       Consistent with the absence of diabetes  5.7-6.4%  Consistent with   increasing risk for diabetes   (prediabetes)  >or=6.5%  Consistent with   diabetes  Currently no consensus exists for use of hemoglobin A1C  for   diagnosis of diabetes for children.    ----------      Last edited by Moises Cobos OD on 9/27/2018  8:47 AM. (History)         ROS     Positive for: Endocrine (DM), Eyes (cat surgery)    Negative for: Constitutional, Gastrointestinal, Neurological, Skin,   Genitourinary, Musculoskeletal, HENT, Cardiovascular, Respiratory,   Psychiatric, Allergic/Imm, Heme/Lymph    Last edited by Moises Cobos, OD on 9/27/2018  8:47 AM. (History)        Assessment /Plan     For exam results, see Encounter Report.    Type 2 diabetes mellitus without retinopathy    Choroidal nevus of right eye    Glaucoma screening    Presbyopia      1. Sp MFIOL OU.  Pt happy without spex, but reports problems with glare at night. Wrote distance Rx and discussed CRIZAL for glare  2. Choroidal nevus OD--pt reports was first noticed in 1980s  3. DM- WITHOUT RETINOPATHY.  Advised yearly DFE    PLAN:    rtc 1 yr

## 2018-09-28 RX ORDER — INSULIN PUMP SYRINGE, 3 ML
EACH MISCELLANEOUS
Qty: 1 EACH | Refills: 0 | Status: SHIPPED | OUTPATIENT
Start: 2018-09-28 | End: 2019-02-15 | Stop reason: SDUPTHER

## 2018-09-28 RX ORDER — LANCETS
1 EACH MISCELLANEOUS 2 TIMES DAILY PRN
Qty: 200 EACH | Refills: 6 | Status: SHIPPED | OUTPATIENT
Start: 2018-09-28 | End: 2019-02-15 | Stop reason: SDUPTHER

## 2018-09-28 NOTE — TELEPHONE ENCOUNTER
----- Message from Arnoldo Maguire sent at 9/28/2018 11:51 AM CDT -----  Contact: Patient [phone]  She said to order the diabetic supplies from [facility] pharmacy. She has the SHA Precision Freestyle Kit, per [employer] because it is done under part B, not Part D    Thank you

## 2018-09-28 NOTE — TELEPHONE ENCOUNTER
Patient informed that requested diabetic supplies sent to [facility] pharmacy.

## 2018-09-30 NOTE — TELEPHONE ENCOUNTER
----- Message from Dameon Kinney MA sent at 3/7/2017  9:51 AM CST -----  Contact: Aorg-[phone]  Type: Rx    Name of medication(s): glipiZIDE (GLUCOTROL) 5 MG TR24    Is this a refill? New rx? Refill    Who prescribed medication?    Pharmacy Name, Phone, & Location: [facility] [place] [address]    Comments: Please advise. Thanks!    
Patient notified Rx sent to pharmacy.  
Spoke with patient advised her that glipizide was faxed to [facility] on 2/22/17 patient states she's going out of town and [employer] advised her that the medication would not arrive before she leave.  Patient would like a temporary supply sent to local pharmacy.  Medication pended.    
11/01/2016     U/A:    Lab Results   Component Value Date    COLORU YELLOW 03/26/2018    TURBIDITY CLEAR 03/26/2018    SPECGRAV 1.020 03/26/2018    HGBUR 3+ 03/26/2018    PHUR 5.5 03/26/2018    PROTEINU TRACE 03/26/2018    GLUCOSEU 3+ 03/26/2018    KETUA NEGATIVE 03/26/2018    BILIRUBINUR NEGATIVE 03/26/2018    UROBILINOGEN Normal 03/26/2018    NITRU POSITIVE 03/26/2018    LEUKOCYTESUR NEGATIVE 03/26/2018     TSH:    Lab Results   Component Value Date    TSH 1.63 01/17/2017       Radiology:    Xr Chest Standard (2 Vw)    Result Date: 9/28/2018  EXAMINATION: TWO VIEWS OF THE CHEST 9/28/2018 4:54 pm COMPARISON: Chest x-ray dated 02/15/2018 HISTORY: ORDERING SYSTEM PROVIDED HISTORY: seizure TECHNOLOGIST PROVIDED HISTORY: seizure FINDINGS: Cardiomediastinal silhouette and pulmonary vasculature are within normal limits. No focal airspace consolidation, pneumothorax, or pleural effusion. Mild interstitial prominence in the lung bases is unchanged. No free air beneath the diaphragm. No acute osseous abnormality. No acute intrathoracic process. Ct Head Wo Contrast    Result Date: 9/28/2018  EXAMINATION: CT OF THE HEAD WITHOUT CONTRAST  9/28/2018 4:56 pm TECHNIQUE: CT of the head was performed without the administration of intravenous contrast. Dose modulation, iterative reconstruction, and/or weight based adjustment of the mA/kV was utilized to reduce the radiation dose to as low as reasonably achievable. COMPARISON: 02/21/2018 HISTORY: ORDERING SYSTEM PROVIDED HISTORY: seizure TECHNOLOGIST PROVIDED HISTORY: FINDINGS: BRAIN/VENTRICLES: There is no acute intracranial hemorrhage, mass effect or midline shift. No abnormal extra-axial fluid collection. The gray-white differentiation is maintained without evidence of an acute infarct. There is no evidence of hydrocephalus. ORBITS: The visualized portion of the orbits demonstrate no acute abnormality.  SINUSES: Peripheral mucosal thickening left sphenoid sinus,

## 2018-10-01 NOTE — PROGRESS NOTES
Subjective:      Patient ID: Eliana Wagner is a 83 y.o. female.    Chief Complaint: Diabetic Foot Exam (dr elvira pinto 8/30/18)    Eliana SHAH is a 83 y.o. female who presents to the clinic upon referral from Dr. Lawton  for evaluation and treatment of diabetic feet. 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 Patient relates no major problem with feet. Only complaints today consist of diabetic foot exam.    PCP: Elvira Lawton MD    Date Last Seen by PCP:     Current shoe gear: Slip-on shoes    Hemoglobin A1C   Date Value Ref Range Status   08/30/2018 9.3 (H) 4.0 - 5.6 % Final     Comment:     ADA Screening Guidelines:  5.7-6.4%  Consistent with prediabetes  >or=6.5%  Consistent with diabetes  High levels of fetal hemoglobin interfere with the HbA1C  assay. Heterozygous hemoglobin variants (HbS, HgC, etc)do  not significantly interfere with this assay.   However, presence of multiple variants may affect accuracy.     08/08/2018 9.4 (H) 4.0 - 5.6 % Final     Comment:     ADA Screening Guidelines:  5.7-6.4%  Consistent with prediabetes  >or=6.5%  Consistent with diabetes  High levels of fetal hemoglobin interfere with the HbA1C  assay. Heterozygous hemoglobin variants (HbS, HgC, etc)do  not significantly interfere with this assay.   However, presence of multiple variants may affect accuracy.     02/21/2017 8.1 (H) 4.5 - 6.2 % Final     Comment:     According to ADA guidelines, hemoglobin A1C <7.0% represents  optimal control in non-pregnant diabetic patients.  Different  metrics may apply to specific populations.   Standards of Medical Care in Diabetes - 2016.  For the purpose of screening for the presence of diabetes:  <5.7%     Consistent with the absence of diabetes  5.7-6.4%  Consistent with increasing risk for diabetes   (prediabetes)  >or=6.5%  Consistent  with diabetes  Currently no consensus exists for use of hemoglobin A1C  for diagnosis of diabetes for children.             Review of Systems   Constitution: Negative for chills, fever and malaise/fatigue.   HENT: Negative for hearing loss.    Cardiovascular: Negative for claudication.   Respiratory: Negative for shortness of breath.    Skin: Negative for flushing and rash.   Musculoskeletal: Negative for joint pain and myalgias.   Neurological: Negative for loss of balance, numbness, paresthesias and sensory change.   Psychiatric/Behavioral: Negative for altered mental status.           Objective:      Physical Exam   Constitutional: She is oriented to person, place, and time. She appears well-developed and well-nourished.   Cardiovascular:   Pulses:       Dorsalis pedis pulses are 2+ on the right side, and 2+ on the left side.        Posterior tibial pulses are 2+ on the right side, and 2+ on the left side.   no edema noted to b/L LEs   Musculoskeletal:        Right knee: She exhibits no swelling and no ecchymosis.        Left knee: She exhibits no swelling and no ecchymosis.        Right ankle: She exhibits normal range of motion, no swelling, no ecchymosis and normal pulse. No lateral malleolus, no medial malleolus and no head of 5th metatarsal tenderness found. Achilles tendon exhibits no pain, no defect and normal Schafer's test results.        Left ankle: She exhibits normal range of motion, no swelling, no ecchymosis and normal pulse. No lateral malleolus, no medial malleolus and no head of 5th metatarsal tenderness found. Achilles tendon exhibits no pain and normal Schafer's test results.        Right lower leg: She exhibits no tenderness, no bony tenderness, no swelling, no edema and no deformity.        Left lower leg: She exhibits no tenderness, no swelling and no edema.        Right foot: There is normal range of motion and no deformity.        Left foot: There is normal range of motion and no  deformity.   Adequate joint ROM noted to all lower extremity muscle groups with no pain or crepitation noted. Muscle strength is 5/5 in all groups bilaterally.  Hammertoes noted, digits 2-5 b/L    with adductovarus rotation of b/L   fifth digit.         Feet:   Right Foot:   Protective Sensation: 5 sites tested. 5 sites sensed.   Left Foot:   Protective Sensation: 5 sites tested. 5 sites sensed.   Neurological: She is alert and oriented to person, place, and time.   Gross sensation intact to b/L lower extremities   Skin: Skin is warm. Capillary refill takes more than 3 seconds. No abrasion, no bruising, no burn and no ecchymosis noted.   No open lesions noted to b/L lower extremities.   Skin temp is warm to warm from proximal to distal b/L.  Nails x10 short  Webspaces 1-4 b/L clean, dry, and intact.       Psychiatric: She has a normal mood and affect. Her speech is normal and behavior is normal. She is attentive.             Assessment:       Encounter Diagnosis   Name Primary?    Diabetes mellitus without complication Yes         Plan:       Eliana SHAH was seen today for diabetic foot exam.    Diagnoses and all orders for this visit:    Diabetes mellitus without complication      I counseled the patient on her conditions, their implications and medical management.      Diabetic foot exam performed on pt. Pt advised on daily monitoring and moisturizing of the feet and keeping the webspaces clean and dry Patient advised to contact the clinic if any new pedal problems should arise.   RTC in 1 year or sooner if any new pedal problems should arise.    .

## 2018-11-14 ENCOUNTER — TELEPHONE (OUTPATIENT)
Dept: FAMILY MEDICINE | Facility: CLINIC | Age: 83
End: 2018-11-14

## 2018-11-14 DIAGNOSIS — E11.9 TYPE 2 DIABETES MELLITUS WITHOUT COMPLICATION, WITH LONG-TERM CURRENT USE OF INSULIN: ICD-10-CM

## 2018-11-14 DIAGNOSIS — Z79.4 TYPE 2 DIABETES MELLITUS WITHOUT COMPLICATION, WITH LONG-TERM CURRENT USE OF INSULIN: ICD-10-CM

## 2018-11-14 DIAGNOSIS — B00.1 COLD SORE: Primary | ICD-10-CM

## 2018-11-14 RX ORDER — METFORMIN HYDROCHLORIDE 500 MG/1
TABLET, EXTENDED RELEASE ORAL
Qty: 90 TABLET | Refills: 0 | Status: SHIPPED | OUTPATIENT
Start: 2018-11-14 | End: 2018-12-10 | Stop reason: SDUPTHER

## 2018-11-15 RX ORDER — ACYCLOVIR 400 MG/1
400 TABLET ORAL 2 TIMES DAILY
Qty: 15 TABLET | Refills: 0 | Status: SHIPPED | OUTPATIENT
Start: 2018-11-15 | End: 2020-02-21

## 2018-11-15 NOTE — TELEPHONE ENCOUNTER
"----- Message from Brianne Heredia sent at 11/15/2018  9:21 AM CST -----  Contact: nick/ Ev/ [phone]  Patient would like to get medical advice.    Symptoms (please be specific):  Patient  bottom lip has fever blister    How long has patient had these symptoms:  4 days    Pharmacy name and phone # (DON'T enter "on file" or "in chart"):  [facility] - [place], [place] - [address] [place] [address] [phone] (Phone)  [phone] (Fax)        Any drug allergies:      Would you prefer a response via [employer]?:      Comments:  Patient would like Zovirax or generic.  Please call to confirm.  "

## 2018-11-30 ENCOUNTER — LAB VISIT (OUTPATIENT)
Dept: LAB | Facility: HOSPITAL | Age: 83
End: 2018-11-30
Attending: FAMILY MEDICINE
Payer: MEDICARE

## 2018-11-30 DIAGNOSIS — E11.9 TYPE 2 DIABETES MELLITUS WITHOUT COMPLICATION, WITHOUT LONG-TERM CURRENT USE OF INSULIN: ICD-10-CM

## 2018-11-30 LAB
ALBUMIN SERPL BCP-MCNC: 3.4 G/DL
ALP SERPL-CCNC: 93 U/L
ALT SERPL W/O P-5'-P-CCNC: 28 U/L
ANION GAP SERPL CALC-SCNC: 9 MMOL/L
AST SERPL-CCNC: 23 U/L
BILIRUB SERPL-MCNC: 0.4 MG/DL
BUN SERPL-MCNC: 15 MG/DL
CALCIUM SERPL-MCNC: 9.1 MG/DL
CHLORIDE SERPL-SCNC: 103 MMOL/L
CO2 SERPL-SCNC: 26 MMOL/L
CREAT SERPL-MCNC: 0.8 MG/DL
EST. GFR  (AFRICAN AMERICAN): >60 ML/MIN/1.73 M^2
EST. GFR  (NON AFRICAN AMERICAN): >60 ML/MIN/1.73 M^2
ESTIMATED AVG GLUCOSE: 223 MG/DL
GLUCOSE SERPL-MCNC: 248 MG/DL
HBA1C MFR BLD HPLC: 9.4 %
POTASSIUM SERPL-SCNC: 4.7 MMOL/L
PROT SERPL-MCNC: 6.6 G/DL
SODIUM SERPL-SCNC: 138 MMOL/L

## 2018-11-30 PROCEDURE — 80053 COMPREHEN METABOLIC PANEL: CPT

## 2018-11-30 PROCEDURE — 83036 HEMOGLOBIN GLYCOSYLATED A1C: CPT

## 2018-11-30 PROCEDURE — 36415 COLL VENOUS BLD VENIPUNCTURE: CPT | Mod: PO

## 2018-12-07 ENCOUNTER — TELEPHONE (OUTPATIENT)
Dept: FAMILY MEDICINE | Facility: CLINIC | Age: 83
End: 2018-12-07

## 2018-12-07 DIAGNOSIS — E11.9 TYPE 2 DIABETES MELLITUS WITHOUT COMPLICATION, WITH LONG-TERM CURRENT USE OF INSULIN: Primary | ICD-10-CM

## 2018-12-07 DIAGNOSIS — Z79.4 TYPE 2 DIABETES MELLITUS WITHOUT COMPLICATION, WITH LONG-TERM CURRENT USE OF INSULIN: Primary | ICD-10-CM

## 2018-12-07 NOTE — TELEPHONE ENCOUNTER
Called pt about labs will increase metformin to 2 tablets po qd and make an appt in 2 months to check [id number].

## 2018-12-07 NOTE — TELEPHONE ENCOUNTER
Attempted to contact patient VM left informing patient to return our call to clarify below messages, according to patient chart she's not on insulin, and several diabetic supplies were sent to her pharmacy recently.     Spoke with Todd with [facility] pharmacy, states she received the diabetic supplies on September which patient never pickup.  Patient's insurance will only cover accu check or freestyle.  Todd states she will give patient the option between the covered supplies.

## 2018-12-07 NOTE — TELEPHONE ENCOUNTER
----- Message from Dorene Aguillon sent at 12/7/2018  9:50 AM CST -----  Contact: fyi  Patient sent a message to you requesting a rx for insulin. She forgot to tell you in that message that you have to request free style light.

## 2018-12-07 NOTE — TELEPHONE ENCOUNTER
----- Message from Arnoldo Maguire sent at 12/7/2018  9:16 AM CST -----  Contact: Patient [phone]  She needs a new prescription for a Michael Meter plus supplies because, she said she bought some strips OTC and the  gave her the wrong ones. The pharmacy told her to order the Michael Meter that the strips go with.    She said if you can email the new prescription to her personal email address that would be appreciated at, [email or web address].    Please call and advise

## 2018-12-10 DIAGNOSIS — E11.9 TYPE 2 DIABETES MELLITUS WITHOUT COMPLICATION, WITH LONG-TERM CURRENT USE OF INSULIN: ICD-10-CM

## 2018-12-10 DIAGNOSIS — Z79.4 TYPE 2 DIABETES MELLITUS WITHOUT COMPLICATION, WITH LONG-TERM CURRENT USE OF INSULIN: ICD-10-CM

## 2018-12-10 RX ORDER — METFORMIN HYDROCHLORIDE 500 MG/1
TABLET, EXTENDED RELEASE ORAL
Qty: 90 TABLET | Refills: 0 | Status: SHIPPED | OUTPATIENT
Start: 2018-12-10 | End: 2019-02-15 | Stop reason: DRUGHIGH

## 2018-12-10 NOTE — TELEPHONE ENCOUNTER
Spoke with patient lab appt scheduled for HA1C in 2 months, please send new RX to [facility] pharmacy.

## 2018-12-21 NOTE — TELEPHONE ENCOUNTER
"Received fax from patient's pharmacy stating new RX was never called in for the change in directions for the "metformin 500mg 2 tabs po QD"  Please advise.   "

## 2018-12-24 RX ORDER — METFORMIN HYDROCHLORIDE 500 MG/1
500 TABLET, EXTENDED RELEASE ORAL
Qty: 180 TABLET | Refills: 3 | Status: SHIPPED | OUTPATIENT
Start: 2018-12-24 | End: 2019-01-04

## 2019-01-04 RX ORDER — METFORMIN HYDROCHLORIDE 500 MG/1
500 TABLET, EXTENDED RELEASE ORAL 2 TIMES DAILY WITH MEALS
Qty: 180 TABLET | Refills: 3 | Status: SHIPPED | OUTPATIENT
Start: 2019-01-04 | End: 2019-03-14 | Stop reason: SDUPTHER

## 2019-02-11 ENCOUNTER — LAB VISIT (OUTPATIENT)
Dept: LAB | Facility: HOSPITAL | Age: 84
End: 2019-02-11
Attending: FAMILY MEDICINE
Payer: MEDICARE

## 2019-02-11 DIAGNOSIS — E11.9 TYPE 2 DIABETES MELLITUS WITHOUT COMPLICATION: ICD-10-CM

## 2019-02-11 LAB
ESTIMATED AVG GLUCOSE: 217 MG/DL
HBA1C MFR BLD HPLC: 9.2 %

## 2019-02-11 PROCEDURE — 83036 HEMOGLOBIN GLYCOSYLATED A1C: CPT

## 2019-02-11 PROCEDURE — 36415 COLL VENOUS BLD VENIPUNCTURE: CPT | Mod: PO

## 2019-02-15 ENCOUNTER — OFFICE VISIT (OUTPATIENT)
Dept: INTERNAL MEDICINE | Facility: CLINIC | Age: 84
End: 2019-02-15
Payer: MEDICARE

## 2019-02-15 ENCOUNTER — TELEPHONE (OUTPATIENT)
Dept: FAMILY MEDICINE | Facility: CLINIC | Age: 84
End: 2019-02-15

## 2019-02-15 VITALS
TEMPERATURE: 99 F | SYSTOLIC BLOOD PRESSURE: 112 MMHG | RESPIRATION RATE: 16 BRPM | BODY MASS INDEX: 33.87 KG/M2 | WEIGHT: 184.06 LBS | HEART RATE: 70 BPM | DIASTOLIC BLOOD PRESSURE: 64 MMHG | OXYGEN SATURATION: 98 % | HEIGHT: 62 IN

## 2019-02-15 DIAGNOSIS — N30.00 ACUTE CYSTITIS WITHOUT HEMATURIA: Primary | ICD-10-CM

## 2019-02-15 DIAGNOSIS — B37.9 YEAST INFECTION: ICD-10-CM

## 2019-02-15 PROCEDURE — 99999 PR PBB SHADOW E&M-EST. PATIENT-LVL IV: ICD-10-PCS | Mod: PBBFAC,,, | Performed by: INTERNAL MEDICINE

## 2019-02-15 PROCEDURE — 99214 PR OFFICE/OUTPT VISIT, EST, LEVL IV, 30-39 MIN: ICD-10-PCS | Mod: S$PBB,,, | Performed by: INTERNAL MEDICINE

## 2019-02-15 PROCEDURE — 99999 PR PBB SHADOW E&M-EST. PATIENT-LVL IV: CPT | Mod: PBBFAC,,, | Performed by: INTERNAL MEDICINE

## 2019-02-15 PROCEDURE — 99214 OFFICE O/P EST MOD 30 MIN: CPT | Mod: PBBFAC,PO | Performed by: INTERNAL MEDICINE

## 2019-02-15 PROCEDURE — 99214 OFFICE O/P EST MOD 30 MIN: CPT | Mod: S$PBB,,, | Performed by: INTERNAL MEDICINE

## 2019-02-15 RX ORDER — CEPHALEXIN 500 MG/1
CAPSULE ORAL
COMMUNITY
Start: 2019-01-29 | End: 2019-02-15 | Stop reason: ALTCHOICE

## 2019-02-15 RX ORDER — FLUCONAZOLE 150 MG/1
150 TABLET ORAL ONCE
Qty: 2 TABLET | Refills: 0 | Status: SHIPPED | OUTPATIENT
Start: 2019-02-15 | End: 2019-02-15

## 2019-02-15 RX ORDER — CIPROFLOXACIN 500 MG/1
500 TABLET ORAL EVERY 12 HOURS
Qty: 6 TABLET | Refills: 0 | Status: SHIPPED | OUTPATIENT
Start: 2019-02-15 | End: 2019-02-18

## 2019-02-15 NOTE — TELEPHONE ENCOUNTER
Spoke with patient's sister informed her that pt would need to be seen, call transferred to [facility] to schedule patient an appt today.

## 2019-02-15 NOTE — PROGRESS NOTES
Subjective:       Patient ID: Eliana Wagner is a 84 y.o. female.    Chief Complaint: Dysuria; Vaginal Itching (feels raw.  recently completed abx); and Urinary Frequency    HPI     84-year-old female here for evaluation of dysuria, vaginal itching, and urinary frequency.  Patient reports she had antibiotics 2 weeks ago for her tooth.  After this, she started with vaginal itching.  She has had diarrhea and changing.  She has tried butt paste and vaginal still and azo.  Patient complains of dysuria and urinary frequency.  She also has urinary incontinence since this started.    Review of Systems    Objective:      Physical Exam   Constitutional: She is oriented to person, place, and time. She appears well-developed and well-nourished.   HENT:   Head: Normocephalic and atraumatic.   Mouth/Throat: No oropharyngeal exudate.   Eyes: EOM are normal. Pupils are equal, round, and reactive to light. Right eye exhibits no discharge. Left eye exhibits no discharge. No scleral icterus.   Neck: Normal range of motion. Neck supple. No tracheal deviation present. No thyromegaly present.   Cardiovascular: Normal rate, regular rhythm and normal heart sounds. Exam reveals no gallop and no friction rub.   No murmur heard.  Pulmonary/Chest: Effort normal and breath sounds normal. No respiratory distress. She has no wheezes. She has no rales. She exhibits no tenderness.   Abdominal: Soft. Bowel sounds are normal. She exhibits no distension and no mass. There is no tenderness. There is no rebound and no guarding.   Musculoskeletal: Normal range of motion. She exhibits no edema or tenderness.   Neurological: She is alert and oriented to person, place, and time.   Skin: Skin is warm and dry. No rash noted. No erythema. No pallor.   Psychiatric: She has a normal mood and affect. Her behavior is normal.   Vitals reviewed.      Assessment:       1. Acute cystitis without hematuria    2. Yeast infection        Plan:       1.  Cipro 500 mg  b.i.d. x3 days, urinalysis, urine culture.  2.  Diflucan after completion of Cipro.

## 2019-02-15 NOTE — TELEPHONE ENCOUNTER
----- Message from Dorene Yu sent at 2/15/2019  9:24 AM CST -----  Contact: daughter/radha/[phone]  Pt daughter called in regards to the pt may have a UTI or a yeast infection and would like to drop off a urine sample.      Please advise

## 2019-02-21 ENCOUNTER — TELEPHONE (OUTPATIENT)
Dept: INTERNAL MEDICINE | Facility: CLINIC | Age: 84
End: 2019-02-21

## 2019-02-21 ENCOUNTER — TELEPHONE (OUTPATIENT)
Dept: FAMILY MEDICINE | Facility: CLINIC | Age: 84
End: 2019-02-21

## 2019-02-21 NOTE — TELEPHONE ENCOUNTER
----- Message from Carisa Rocha sent at 2/21/2019 11:12 AM CST -----  Contact: self/[phone]  Name of test:HEMOGLOBIN A1C [LAB90    Date of test: 02/11/19    Ordering provider: Dr Cabrales    Where was the test performed:[facility]    Comments: please sent results by my [facility]

## 2019-02-21 NOTE — TELEPHONE ENCOUNTER
----- Message from Carisa Rocha sent at 2/21/2019 11:10 AM CST -----  Contact: self/[phone]  Name of test:URINE [2283    Date of test: 02/15/19    Ordering provider: Dr Collins    Where was the test performed:[facility]    Comments:

## 2019-02-21 NOTE — TELEPHONE ENCOUNTER
Her blood sugar was poorly controlled 9.2 hemoglobin A1c.she needs to come in for an appt to get her sugars controlled. I sent her a letter on 2-18-19

## 2019-03-14 ENCOUNTER — TELEPHONE (OUTPATIENT)
Dept: FAMILY MEDICINE | Facility: CLINIC | Age: 84
End: 2019-03-14

## 2019-03-14 ENCOUNTER — OFFICE VISIT (OUTPATIENT)
Dept: FAMILY MEDICINE | Facility: CLINIC | Age: 84
End: 2019-03-14
Payer: MEDICARE

## 2019-03-14 VITALS
HEART RATE: 63 BPM | BODY MASS INDEX: 32.94 KG/M2 | WEIGHT: 179 LBS | DIASTOLIC BLOOD PRESSURE: 64 MMHG | TEMPERATURE: 98 F | HEIGHT: 62 IN | SYSTOLIC BLOOD PRESSURE: 128 MMHG

## 2019-03-14 DIAGNOSIS — Z79.4 TYPE 2 DIABETES MELLITUS WITHOUT COMPLICATION, WITH LONG-TERM CURRENT USE OF INSULIN: Primary | ICD-10-CM

## 2019-03-14 DIAGNOSIS — E11.9 TYPE 2 DIABETES MELLITUS WITHOUT COMPLICATION, WITH LONG-TERM CURRENT USE OF INSULIN: Primary | ICD-10-CM

## 2019-03-14 DIAGNOSIS — E11.9 TYPE 2 DIABETES MELLITUS WITHOUT COMPLICATION, WITHOUT LONG-TERM CURRENT USE OF INSULIN: Primary | ICD-10-CM

## 2019-03-14 DIAGNOSIS — Z23 IMMUNIZATION DUE: ICD-10-CM

## 2019-03-14 LAB
BILIRUB SERPL-MCNC: NORMAL MG/DL
BLOOD URINE, POC: NORMAL
COLOR, POC UA: YELLOW
GLUCOSE SERPL-MCNC: 166 MG/DL (ref 70–110)
GLUCOSE UR QL STRIP: NORMAL
KETONES UR QL STRIP: NORMAL
LEUKOCYTE ESTERASE URINE, POC: NORMAL
NITRITE, POC UA: NORMAL
PH, POC UA: 5
PROTEIN, POC: NORMAL
SPECIFIC GRAVITY, POC UA: 1.01
UROBILINOGEN, POC UA: NORMAL

## 2019-03-14 PROCEDURE — 99214 OFFICE O/P EST MOD 30 MIN: CPT | Mod: S$PBB,,, | Performed by: FAMILY MEDICINE

## 2019-03-14 PROCEDURE — 99999 PR PBB SHADOW E&M-EST. PATIENT-LVL III: ICD-10-PCS | Mod: PBBFAC,,, | Performed by: FAMILY MEDICINE

## 2019-03-14 PROCEDURE — 81002 URINALYSIS NONAUTO W/O SCOPE: CPT | Mod: PBBFAC,PO | Performed by: FAMILY MEDICINE

## 2019-03-14 PROCEDURE — 99214 PR OFFICE/OUTPT VISIT, EST, LEVL IV, 30-39 MIN: ICD-10-PCS | Mod: S$PBB,,, | Performed by: FAMILY MEDICINE

## 2019-03-14 PROCEDURE — 82962 GLUCOSE BLOOD TEST: CPT | Mod: PBBFAC,PO | Performed by: FAMILY MEDICINE

## 2019-03-14 PROCEDURE — 99999 PR PBB SHADOW E&M-EST. PATIENT-LVL III: CPT | Mod: PBBFAC,,, | Performed by: FAMILY MEDICINE

## 2019-03-14 PROCEDURE — 99213 OFFICE O/P EST LOW 20 MIN: CPT | Mod: PBBFAC,PO | Performed by: FAMILY MEDICINE

## 2019-03-14 PROCEDURE — 36415 COLL VENOUS BLD VENIPUNCTURE: CPT | Mod: PBBFAC,PO | Performed by: FAMILY MEDICINE

## 2019-03-14 RX ORDER — METFORMIN HYDROCHLORIDE 500 MG/1
500 TABLET, EXTENDED RELEASE ORAL 3 TIMES DAILY
Qty: 270 TABLET | Refills: 3 | Status: SHIPPED | OUTPATIENT
Start: 2019-03-14 | End: 2019-10-11 | Stop reason: SDUPTHER

## 2019-03-14 NOTE — TELEPHONE ENCOUNTER
----- Message from Nadia Gar sent at 3/14/2019  2:13 PM CDT -----  Contact: [phone]  Patient is returning a phone call.  Who left a message for the patient: Tyra  Does patient know what this is regarding:    Comments: please advise, thanks

## 2019-03-14 NOTE — TELEPHONE ENCOUNTER
Patient returning phone call informed her of your message below, patient verbalizes understanding.

## 2019-03-14 NOTE — TELEPHONE ENCOUNTER
----- Message from Brianne Heredia sent at 3/14/2019 10:25 AM CDT -----  Contact: self/ [phone]  Please call patient about how she is to take metFORMIN (GLUCOPHAGE-XR) 500 MG 24 hr tablet.     Patient says she is to take 6 a day instead of 3 , please call to confirm.

## 2019-03-14 NOTE — TELEPHONE ENCOUNTER
The last bottle said one tablet 2x per day.  If she has been taking 4 tablets per day,  She needs to drop it to 3 tablets per day  And we will add a new medication invokana   This has been sent to her pharmacy

## 2019-03-14 NOTE — TELEPHONE ENCOUNTER
Spoke with patient informed her of your message below, patient states according to her last RX for metformin she was taking a total of 4 tabs daily.  Patient would like to know how to proceed since she's been taking 4 daily since January.

## 2019-03-15 NOTE — PROGRESS NOTES
Eliana Wagner 84 y.o. in for sugar check and f/u of meds  well tolerated  Past Medical History:   Diagnosis Date    AICD (automatic cardioverter/defibrillator) present 10/2013    medtronic  Model [id number]   Serial [id number]    pacemaker    Diabetes mellitus, type 2     Hyperlipidemia     Hypertension     Stroke     tia x 3   ( last in 2005)     Patient  reports that  has never smoked. she has never used smokeless tobacco. She reports that she drinks alcohol. She reports that she does not use drugs.  Family History   Problem Relation Age of Onset    Cataracts Mother     Cataracts Father     Glaucoma Maternal Grandfather     Breast cancer Neg Hx     Colon cancer Neg Hx     Ovarian cancer Neg Hx        Physical Exam:  Vitals:  Vitals:    03/14/19 0802   BP: 128/64   Pulse: 63   Temp: 98 °F (36.7 °C)     CV:rrr without murmur  Lungs:clear to auscultation bilaterally with good resp excursion  ABD: normal BS, non-tender,no  hepatosplenomegaly  Extremities:no clubbing, cyanosis or edema  Skin:good turgor, no rashes,without areas of break down  Imp: diabetes uncontrolled            Current Outpatient Medications:     amLODIPine (NORVASC) 2.5 MG tablet, TAKE 1 TABLET EVERY DAY, Disp: 90 tablet, Rfl: 3    aspirin 81 mg Tab, Take by mouth. 1 Tablet Oral Every day, Disp: , Rfl:     atorvastatin (LIPITOR) 40 MG tablet, TAKE 1 TABLET EVERY DAY, Disp: 90 tablet, Rfl: 1    blood sugar diagnostic Strp, 1 each by Misc.(Non-Drug; Combo Route) route once daily., Disp: 100 each, Rfl: 3    blood-glucose meter kit, Use as instructed, Disp: 1 each, Rfl: 0    clopidogrel (PLAVIX) 75 mg tablet, TAKE 1 TABLET ONE TIME DAILY, Disp: 90 tablet, Rfl: 3    diphenhydrAMINE-aluminum-magnesium hydroxide-simethicone-lidocaine HCl 2%, Swish and spit 15 mLs every 4 (four) hours as needed., Disp: 240 mL, Rfl: 0    ezetimibe (ZETIA) 10 mg tablet, Take 1 tablet by mouth every morning., Disp: , Rfl:     fluorouracil (EFUDEX) 5 %  cream, , Disp: , Rfl:     lancets Misc, 1 each by Misc.(Non-Drug; Combo Route) route once daily., Disp: 100 each, Rfl: 3    lisinopril (PRINIVIL,ZESTRIL) 40 MG tablet, TAKE 1 TABLET EVERY DAY, Disp: 30 tablet, Rfl: 0    metFORMIN (GLUCOPHAGE-XR) 500 MG 24 hr tablet, Take 1 tablet (500 mg total) by mouth 3 (three) times daily., Disp: 270 tablet, Rfl: 3    metoprolol succinate (TOPROL-XL) 25 MG 24 hr tablet, TAKE 1 TABLET EVERY DAY, Disp: 90 tablet, Rfl: 3    mv-min-FA-Ly-Oc-qhpyytc-lutein (CENTRUM) 0.4-162-18 mg Tab, Take by mouth. 1 Tablet Oral Every day, Disp: , Rfl:     nitroGLYCERIN (NITROSTAT) 0.4 MG SL tablet, Place 1 tablet (0.4 mg total) under the tongue every 5 (five) minutes as needed for Chest pain. 1 Tablet, Sublingual Sublingual As directed.  take one dose with chest pain up to 3 doses in 15 minutes, if no better go to ER, Disp: 100 tablet, Rfl: 5    acyclovir (ZOVIRAX) 400 MG tablet, Take 1 tablet (400 mg total) by mouth 2 (two) times daily., Disp: 15 tablet, Rfl: 0    canagliflozin (INVOKANA) 100 mg Tab, Take 1 tablet (100 mg total) by mouth once daily., Disp: 30 tablet, Rfl: 3    diphth,pertus,acell,,tetanus (BOOSTRIX) 2.5-8-5 Lf-mcg-Lf/0.5mL Susp, Inject 0.5 mLs into the muscle once. for 1 dose, Disp: 0.5 mL, Rfl: 0  Plan:see eye doctor yearly, podiatrist yearly  MEDS:   Diet: ADA diet reenforced  lifestlye modifications: low impact aerobic exercise  F/u: in 3-4 months.  Labs reviewed today  Refer to the med card for medication changes patient follow-up in 3 months to re-evaluate blood sugar control

## 2019-04-17 DIAGNOSIS — E11.9 TYPE 2 DIABETES MELLITUS WITHOUT COMPLICATION, WITHOUT LONG-TERM CURRENT USE OF INSULIN: ICD-10-CM

## 2019-04-17 RX ORDER — IRBESARTAN 300 MG/1
TABLET ORAL
COMMUNITY
Start: 2019-03-27 | End: 2019-10-11 | Stop reason: SDUPTHER

## 2019-05-10 ENCOUNTER — OFFICE VISIT (OUTPATIENT)
Dept: OBSTETRICS AND GYNECOLOGY | Facility: CLINIC | Age: 84
End: 2019-05-10
Payer: MEDICARE

## 2019-05-10 ENCOUNTER — TELEPHONE (OUTPATIENT)
Dept: OBSTETRICS AND GYNECOLOGY | Facility: CLINIC | Age: 84
End: 2019-05-10

## 2019-05-10 VITALS
WEIGHT: 177.5 LBS | HEIGHT: 62 IN | SYSTOLIC BLOOD PRESSURE: 110 MMHG | DIASTOLIC BLOOD PRESSURE: 70 MMHG | BODY MASS INDEX: 32.66 KG/M2

## 2019-05-10 DIAGNOSIS — N90.89 IRRITATION OF VULVA: Primary | ICD-10-CM

## 2019-05-10 LAB
CANDIDA RRNA VAG QL PROBE: NEGATIVE
G VAGINALIS RRNA GENITAL QL PROBE: NEGATIVE
T VAGINALIS RRNA GENITAL QL PROBE: NEGATIVE

## 2019-05-10 PROCEDURE — 99999 PR PBB SHADOW E&M-EST. PATIENT-LVL IV: CPT | Mod: PBBFAC,,, | Performed by: NURSE PRACTITIONER

## 2019-05-10 PROCEDURE — 99214 OFFICE O/P EST MOD 30 MIN: CPT | Mod: PBBFAC | Performed by: NURSE PRACTITIONER

## 2019-05-10 PROCEDURE — 87480 CANDIDA DNA DIR PROBE: CPT

## 2019-05-10 PROCEDURE — 99999 PR PBB SHADOW E&M-EST. PATIENT-LVL IV: ICD-10-PCS | Mod: PBBFAC,,, | Performed by: NURSE PRACTITIONER

## 2019-05-10 PROCEDURE — 87510 GARDNER VAG DNA DIR PROBE: CPT

## 2019-05-10 PROCEDURE — 99214 OFFICE O/P EST MOD 30 MIN: CPT | Mod: S$PBB,,, | Performed by: NURSE PRACTITIONER

## 2019-05-10 PROCEDURE — 99214 PR OFFICE/OUTPT VISIT, EST, LEVL IV, 30-39 MIN: ICD-10-PCS | Mod: S$PBB,,, | Performed by: NURSE PRACTITIONER

## 2019-05-10 RX ORDER — NYSTATIN AND TRIAMCINOLONE ACETONIDE 100000; 1 [USP'U]/G; MG/G
CREAM TOPICAL
Qty: 30 G | Refills: 1 | Status: SHIPPED | OUTPATIENT
Start: 2019-05-10 | End: 2020-02-21

## 2019-05-10 NOTE — PROGRESS NOTES
History & Physical  Gynecology      SUBJECTIVE:     Chief Complaint: Vaginal Itching       History of Present Illness: Patient reports vulva irritation and itching that began 1-2 months ago after taking an antibiotic following a tooth procedure, she also is prescribed Invokana for treatment of T2DM. Denies abnormal vaginal discharge. She is not sexually active and does not desire STD testing.       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    medtronic  Model [id number]   Serial [id number]    pacemaker    Diabetes mellitus, type 2     Hyperlipidemia     Hypertension     Stroke     tia x 3   ( last in )     Past Surgical History:   Procedure Laterality Date    APPENDECTOMY      EYE SURGERY Bilateral     cataract    [id number] N/A 2015    Performed [facility]    TONSILLECTOMY       OB History        4    Para        Term                AB        Living   4       SAB        TAB        Ectopic        Multiple        Live Births                   Family History   Problem Relation Age of Onset    Cataracts Mother     Cataracts Father     Glaucoma Maternal Grandfather     Breast cancer Neg Hx     Colon cancer Neg Hx     Ovarian cancer Neg Hx      Social History     Tobacco Use    Smoking status: Never Smoker    Smokeless tobacco: Never Used   Substance Use Topics    Alcohol use: Yes     Comment: socially    Drug use: No       Current Outpatient Medications   Medication Sig    amLODIPine (NORVASC) 2.5 MG tablet TAKE 1 TABLET EVERY DAY    aspirin-calcium carbonate (WOMEN'S ASPIRIN WITH CALCIUM) 81 mg-300 mg calcium(777 mg) Tab Take by mouth.    atorvastatin (LIPITOR) 40 MG tablet TAKE 1 TABLET EVERY DAY    blood sugar diagnostic Strp 1 each by Misc.(Non-Drug; Combo Route) route  once daily.    blood-glucose meter kit Use as instructed    canagliflozin (INVOKANA) 100 mg Tab Take 1 tablet (100 mg total) by mouth once daily.    clopidogrel (PLAVIX) 75 mg tablet TAKE 1 TABLET ONE TIME DAILY    diphenhydrAMINE-aluminum-magnesium hydroxide-simethicone-lidocaine HCl 2% Swish and spit 15 mLs every 4 (four) hours as needed.    irbesartan (AVAPRO) 300 MG tablet     lancets Misc 1 each by Misc.(Non-Drug; Combo Route) route once daily.    metFORMIN (GLUCOPHAGE-XR) 500 MG 24 hr tablet Take 1 tablet (500 mg total) by mouth 3 (three) times daily.    metoprolol succinate (TOPROL-XL) 25 MG 24 hr tablet TAKE 1 TABLET EVERY DAY    mv-min-FA-Tc-Ay-koicdbx-lutein (CENTRUM) 0.4-162-18 mg Tab Take by mouth. 1 Tablet Oral Every day    acyclovir (ZOVIRAX) 400 MG tablet Take 1 tablet (400 mg total) by mouth 2 (two) times daily.    ezetimibe (ZETIA) 10 mg tablet Take 1 tablet by mouth every morning.    fluorouracil (EFUDEX) 5 % cream     lisinopril (PRINIVIL,ZESTRIL) 40 MG tablet TAKE 1 TABLET EVERY DAY    nitroGLYCERIN (NITROSTAT) 0.4 MG SL tablet Place 1 tablet (0.4 mg total) under the tongue every 5 (five) minutes as needed for Chest pain. 1 Tablet, Sublingual Sublingual As directed.  take one dose with chest pain up to 3 doses in 15 minutes, if no better go to ER    nystatin-triamcinolone (MYCOLOG II) cream Apply to affected area 2 times daily     No current facility-administered medications for this visit.          Review of Systems:  Review of Systems   Constitutional: Negative for activity change, appetite change, chills, fatigue, fever and unexpected weight change.   HENT: Negative for nasal congestion and mouth sores.    Eyes: Negative for discharge and visual disturbance.   Respiratory: Negative for shortness of breath and wheezing.    Cardiovascular: Negative for chest pain, palpitations and leg swelling.   Gastrointestinal: Negative for abdominal pain, constipation, diarrhea, nausea,  vomiting and reflux.   Endocrine: Negative for diabetes, hair loss, hot flashes, hyperthyroidism and hypothyroidism.   Genitourinary: Negative for bladder incontinence, decreased libido, dysmenorrhea, dyspareunia, dysuria, flank pain, frequency, genital sores, hematuria, hot flashes, menorrhagia, menstrual problem, pelvic pain, urgency, vaginal bleeding, vaginal discharge, vaginal pain, urinary incontinence, postcoital bleeding, postmenopausal bleeding, vaginal dryness and vaginal odor.        Vulvar irritation and itching   Musculoskeletal: Negative for arthralgias, back pain, joint swelling, leg pain and myalgias.   Integumentary:  Negative for rash, acne, hair changes, mole/lesion, breast mass, nipple discharge, breast skin changes and breast tenderness.   Neurological: Negative for vertigo, seizures, syncope, numbness and headaches.   Hematological: Negative for adenopathy. Does not bruise/bleed easily.   Psychiatric/Behavioral: Negative for depression and sleep disturbance. The patient is not nervous/anxious.    Breast: Negative for asymmetry, breast self exam, lump, mass, mastodynia, nipple discharge, skin changes and tenderness       OBJECTIVE:     Physical Exam:  Physical Exam   Constitutional: She is oriented to person, place, and time. She appears well-developed and well-nourished. No distress.   HENT:   Head: Normocephalic and atraumatic.   Nose: Nose normal.   Mouth/Throat: Oropharynx is clear and moist.   Eyes: Pupils are equal, round, and reactive to light. Conjunctivae and EOM are normal.   Neck: Normal range of motion. Neck supple. No JVD present. No tracheal deviation present. No thyromegaly present.   Cardiovascular: Normal rate, regular rhythm, normal heart sounds and intact distal pulses. Exam reveals no gallop and no friction rub.   No murmur heard.  Pulmonary/Chest: Effort normal and breath sounds normal. No stridor. No respiratory distress. She has no wheezes. She has no rales. She exhibits no  tenderness.   Abdominal: Soft. Bowel sounds are normal. She exhibits no distension and no mass. There is no tenderness. There is no rebound and no guarding. No hernia. Hernia confirmed negative in the right inguinal area and confirmed negative in the left inguinal area.   Genitourinary: Vagina normal and uterus normal. There is rash on the right labia. There is no tenderness, lesion or injury on the right labia. There is rash on the left labia. There is no tenderness, lesion or injury on the left labia. Cervix exhibits no motion tenderness, no discharge and no friability. No erythema, tenderness or bleeding in the vagina. No vaginal discharge found.   Genitourinary Comments: Beefy, erythremic rash noted to labia majora bilaterally with noted satellite lesions at left groin.   Musculoskeletal: Normal range of motion. She exhibits no edema or tenderness.   Lymphadenopathy:     She has no cervical adenopathy. No inguinal adenopathy noted on the right or left side.   Neurological: She is alert and oriented to person, place, and time. She displays normal reflexes. No sensory deficit. She exhibits normal muscle tone. Coordination normal.   Skin: Skin is warm and dry. Capillary refill takes less than 2 seconds. No rash noted. She is not diaphoretic. No erythema. No pallor.   Psychiatric: She has a normal mood and affect. Her behavior is normal. Judgment and thought content normal.   Nursing note and vitals reviewed.        ASSESSMENT:       ICD-10-CM ICD-9-CM    1. Irritation of vulva N90.89 624.8 nystatin-triamcinolone (MYCOLOG II) cream      Vaginosis Screen by DNA Probe          Plan:      Based on HPI and physical exam findings, the patient is likely experiencing an external vulva fungal infection. She is prescribed Mycolog for treatment. Will await Affirm testing for further treatment as needed.    Counseling time: 15 minutes       RAUL Becerra

## 2019-05-11 ENCOUNTER — TELEPHONE (OUTPATIENT)
Dept: OBSTETRICS AND GYNECOLOGY | Facility: CLINIC | Age: 84
End: 2019-05-11

## 2019-05-11 NOTE — TELEPHONE ENCOUNTER
Please call pt and inform her that her vaginosis screening was negative for yeast and bacteria.    Continue using cream that was prescribed at visit.     Thanks

## 2019-08-02 ENCOUNTER — TELEPHONE (OUTPATIENT)
Dept: PRIMARY CARE CLINIC | Facility: CLINIC | Age: 84
End: 2019-08-02

## 2019-08-02 DIAGNOSIS — R35.0 FREQUENCY OF MICTURITION: Primary | ICD-10-CM

## 2019-08-02 NOTE — TELEPHONE ENCOUNTER
Please see message and advise     ----- Message from Natalia White sent at 8/2/2019  9:33 AM CDT -----  Contact: pt [phone]  Patient would like to get medical advice.  Symptoms (please be specific):  Possible UTI   How long has patient had these symptoms:  2 days   Pharmacy name and phone # (copy/paste from chart):  [facility] - [place], [place] - [address] [phone] (Phone)  [phone] (Fax)  Any drug allergies (copy/paste from chart):      Would the patient rather a call back or a response via [facility]?:  Call back   Comments:  Pt requesting order for urine speciman

## 2019-08-02 NOTE — TELEPHONE ENCOUNTER
Spoke with pt and advised the urin order is in and pt can bring in sample to send off for possible UTI. Pt verbalized understanding and stated she will drop it off on Monday.

## 2019-08-05 ENCOUNTER — LAB VISIT (OUTPATIENT)
Dept: LAB | Facility: HOSPITAL | Age: 84
End: 2019-08-05
Attending: FAMILY MEDICINE
Payer: MEDICARE

## 2019-08-05 DIAGNOSIS — R35.0 FREQUENCY OF MICTURITION: ICD-10-CM

## 2019-08-05 LAB
BACTERIA #/AREA URNS AUTO: ABNORMAL /HPF
BILIRUB UR QL STRIP: NEGATIVE
CLARITY UR REFRACT.AUTO: ABNORMAL
COLOR UR AUTO: YELLOW
GLUCOSE UR QL STRIP: ABNORMAL
HGB UR QL STRIP: ABNORMAL
HYALINE CASTS UR QL AUTO: 0 /LPF
KETONES UR QL STRIP: NEGATIVE
LEUKOCYTE ESTERASE UR QL STRIP: ABNORMAL
MICROSCOPIC COMMENT: ABNORMAL
NITRITE UR QL STRIP: NEGATIVE
PH UR STRIP: 5 [PH] (ref 5–8)
PROT UR QL STRIP: ABNORMAL
RBC #/AREA URNS AUTO: 78 /HPF (ref 0–4)
SP GR UR STRIP: 1.01 (ref 1–1.03)
URN SPEC COLLECT METH UR: ABNORMAL
WBC #/AREA URNS AUTO: >100 /HPF (ref 0–5)
WBC CLUMPS UR QL AUTO: ABNORMAL
YEAST UR QL AUTO: ABNORMAL

## 2019-08-05 PROCEDURE — 87186 SC STD MICRODIL/AGAR DIL: CPT

## 2019-08-05 PROCEDURE — 81001 URINALYSIS AUTO W/SCOPE: CPT

## 2019-08-05 PROCEDURE — 87088 URINE BACTERIA CULTURE: CPT

## 2019-08-05 PROCEDURE — 87086 URINE CULTURE/COLONY COUNT: CPT

## 2019-08-05 PROCEDURE — 87077 CULTURE AEROBIC IDENTIFY: CPT

## 2019-08-06 ENCOUNTER — TELEPHONE (OUTPATIENT)
Dept: PRIMARY CARE CLINIC | Facility: CLINIC | Age: 84
End: 2019-08-06

## 2019-08-06 DIAGNOSIS — R31.9 URINARY TRACT INFECTION WITH HEMATURIA, SITE UNSPECIFIED: ICD-10-CM

## 2019-08-06 DIAGNOSIS — I10 HYPERTENSION, UNSPECIFIED TYPE: ICD-10-CM

## 2019-08-06 DIAGNOSIS — E11.9 TYPE 2 DIABETES MELLITUS WITHOUT COMPLICATION, UNSPECIFIED WHETHER LONG TERM INSULIN USE: ICD-10-CM

## 2019-08-06 DIAGNOSIS — E10.10 DIABETIC ACETONEMIA: Primary | ICD-10-CM

## 2019-08-06 DIAGNOSIS — N39.0 URINARY TRACT INFECTION WITH HEMATURIA, SITE UNSPECIFIED: ICD-10-CM

## 2019-08-06 RX ORDER — SULFAMETHOXAZOLE AND TRIMETHOPRIM 800; 160 MG/1; MG/1
1 TABLET ORAL 2 TIMES DAILY
Qty: 20 TABLET | Refills: 0 | Status: SHIPPED | OUTPATIENT
Start: 2019-08-06 | End: 2019-12-05

## 2019-08-06 NOTE — TELEPHONE ENCOUNTER
Patients appt  and labs are scheduled for pt. Was unable to reach pt to give appt details. Pt VM was full. Will try again after 12 pm

## 2019-08-06 NOTE — TELEPHONE ENCOUNTER
Pt needs to schedule diabetic appt now with hemoglobin A1c, comprehensive met profile and next available epp with cbc, tsh, comp met profile, lipid

## 2019-08-07 LAB — BACTERIA UR CULT: ABNORMAL

## 2019-08-08 ENCOUNTER — TELEPHONE (OUTPATIENT)
Dept: PRIMARY CARE CLINIC | Facility: CLINIC | Age: 84
End: 2019-08-08

## 2019-08-08 NOTE — TELEPHONE ENCOUNTER
Left VM advising pt to call the office back to advise pt per provider;Please let the pt know that the culture showed an infectionn that should be cured by the antibiotic we sent for her . Complete full course of the medication

## 2019-08-08 NOTE — TELEPHONE ENCOUNTER
Please let the pt know that the culture showed an infectionn that should be cured by the antibiotic we sent for her . Complete full course of the medication

## 2019-09-18 ENCOUNTER — TELEPHONE (OUTPATIENT)
Dept: PRIMARY CARE CLINIC | Facility: CLINIC | Age: 84
End: 2019-09-18

## 2019-09-18 DIAGNOSIS — E11.9 TYPE 2 DIABETES MELLITUS WITHOUT COMPLICATION, WITHOUT LONG-TERM CURRENT USE OF INSULIN: ICD-10-CM

## 2019-09-19 RX ORDER — CANAGLIFLOZIN 100 MG/1
TABLET, FILM COATED ORAL
Qty: 30 TABLET | Refills: 0 | Status: SHIPPED | OUTPATIENT
Start: 2019-09-19 | End: 2019-10-11 | Stop reason: SDUPTHER

## 2019-09-19 NOTE — TELEPHONE ENCOUNTER
Left VM advising pt per attending provider;She is past due for a follow up with Dr Wagner. Please contact to schedule.

## 2019-10-01 ENCOUNTER — TELEPHONE (OUTPATIENT)
Dept: PRIMARY CARE CLINIC | Facility: CLINIC | Age: 84
End: 2019-10-01

## 2019-10-01 DIAGNOSIS — I10 ESSENTIAL HYPERTENSION: ICD-10-CM

## 2019-10-01 DIAGNOSIS — I10 HYPERTENSION, UNSPECIFIED TYPE: Primary | ICD-10-CM

## 2019-10-04 ENCOUNTER — TELEPHONE (OUTPATIENT)
Dept: PRIMARY CARE CLINIC | Facility: CLINIC | Age: 84
End: 2019-10-04

## 2019-10-04 NOTE — TELEPHONE ENCOUNTER
Left VM advising pt I was returning her called but initially  call to advised pt that she is past due for her follow up with the provider and needs to schedule an appointment.

## 2019-10-04 NOTE — TELEPHONE ENCOUNTER
----- Message from Carisa Rocha sent at 10/4/2019  4:12 PM CDT -----  Contact: self/[phone]  Patient is returning a phone call.  Who left a message for the patient: Nereida  Does patient know what this is regarding:  no  Comments:

## 2019-10-07 ENCOUNTER — TELEPHONE (OUTPATIENT)
Dept: PRIMARY CARE CLINIC | Facility: CLINIC | Age: 84
End: 2019-10-07

## 2019-10-07 DIAGNOSIS — I10 HYPERTENSION, UNSPECIFIED TYPE: Primary | ICD-10-CM

## 2019-10-07 DIAGNOSIS — E11.9 TYPE 2 DIABETES MELLITUS WITHOUT COMPLICATION, WITHOUT LONG-TERM CURRENT USE OF INSULIN: ICD-10-CM

## 2019-10-08 ENCOUNTER — LAB VISIT (OUTPATIENT)
Dept: LAB | Facility: HOSPITAL | Age: 84
End: 2019-10-08
Attending: FAMILY MEDICINE
Payer: MEDICARE

## 2019-10-08 ENCOUNTER — TELEPHONE (OUTPATIENT)
Dept: PRIMARY CARE CLINIC | Facility: CLINIC | Age: 84
End: 2019-10-08

## 2019-10-08 DIAGNOSIS — E11.9 TYPE 2 DIABETES MELLITUS WITHOUT COMPLICATION, UNSPECIFIED WHETHER LONG TERM INSULIN USE: ICD-10-CM

## 2019-10-08 DIAGNOSIS — I10 HYPERTENSION, UNSPECIFIED TYPE: ICD-10-CM

## 2019-10-08 LAB
ALBUMIN SERPL BCP-MCNC: 3.7 G/DL (ref 3.5–5.2)
ALP SERPL-CCNC: 76 U/L (ref 55–135)
ALT SERPL W/O P-5'-P-CCNC: 30 U/L (ref 10–44)
ANION GAP SERPL CALC-SCNC: 11 MMOL/L (ref 8–16)
AST SERPL-CCNC: 26 U/L (ref 10–40)
BILIRUB SERPL-MCNC: 0.5 MG/DL (ref 0.1–1)
BUN SERPL-MCNC: 19 MG/DL (ref 8–23)
CALCIUM SERPL-MCNC: 9.3 MG/DL (ref 8.7–10.5)
CHLORIDE SERPL-SCNC: 104 MMOL/L (ref 95–110)
CO2 SERPL-SCNC: 25 MMOL/L (ref 23–29)
CREAT SERPL-MCNC: 0.8 MG/DL (ref 0.5–1.4)
EST. GFR  (AFRICAN AMERICAN): >60 ML/MIN/1.73 M^2
EST. GFR  (NON AFRICAN AMERICAN): >60 ML/MIN/1.73 M^2
GLUCOSE SERPL-MCNC: 126 MG/DL (ref 70–110)
POTASSIUM SERPL-SCNC: 4.7 MMOL/L (ref 3.5–5.1)
PROT SERPL-MCNC: 7 G/DL (ref 6–8.4)
SODIUM SERPL-SCNC: 140 MMOL/L (ref 136–145)

## 2019-10-08 PROCEDURE — 36415 COLL VENOUS BLD VENIPUNCTURE: CPT | Mod: PN

## 2019-10-08 PROCEDURE — 80053 COMPREHEN METABOLIC PANEL: CPT

## 2019-10-08 NOTE — TELEPHONE ENCOUNTER
left VM advising pt that she will have to get a A1 c done since it wasn't added to labs she just had done and to call us back to schedule

## 2019-10-08 NOTE — TELEPHONE ENCOUNTER
No hemoglobin A1c was included the order was from 8-6-19 and was not linked let pt know she will need to be drawn again for this test.

## 2019-10-10 ENCOUNTER — LAB VISIT (OUTPATIENT)
Dept: LAB | Facility: HOSPITAL | Age: 84
End: 2019-10-10
Attending: FAMILY MEDICINE
Payer: MEDICARE

## 2019-10-10 DIAGNOSIS — E11.9 TYPE 2 DIABETES MELLITUS WITHOUT COMPLICATION, WITHOUT LONG-TERM CURRENT USE OF INSULIN: ICD-10-CM

## 2019-10-10 LAB
ESTIMATED AVG GLUCOSE: 177 MG/DL (ref 68–131)
HBA1C MFR BLD HPLC: 7.8 % (ref 4–5.6)

## 2019-10-10 PROCEDURE — 36415 COLL VENOUS BLD VENIPUNCTURE: CPT | Mod: PO

## 2019-10-10 PROCEDURE — 83036 HEMOGLOBIN GLYCOSYLATED A1C: CPT

## 2019-10-11 ENCOUNTER — OFFICE VISIT (OUTPATIENT)
Dept: PRIMARY CARE CLINIC | Facility: CLINIC | Age: 84
End: 2019-10-11
Payer: MEDICARE

## 2019-10-11 ENCOUNTER — IMMUNIZATION (OUTPATIENT)
Dept: PHARMACY | Facility: CLINIC | Age: 84
End: 2019-10-11
Payer: MEDICARE

## 2019-10-11 VITALS
OXYGEN SATURATION: 97 % | WEIGHT: 178.81 LBS | TEMPERATURE: 98 F | BODY MASS INDEX: 32.91 KG/M2 | SYSTOLIC BLOOD PRESSURE: 124 MMHG | HEIGHT: 62 IN | HEART RATE: 73 BPM | DIASTOLIC BLOOD PRESSURE: 62 MMHG

## 2019-10-11 DIAGNOSIS — Z79.4 TYPE 2 DIABETES MELLITUS WITHOUT COMPLICATION, WITH LONG-TERM CURRENT USE OF INSULIN: ICD-10-CM

## 2019-10-11 DIAGNOSIS — I25.10 CORONARY ARTERY DISEASE, ANGINA PRESENCE UNSPECIFIED, UNSPECIFIED VESSEL OR LESION TYPE, UNSPECIFIED WHETHER NATIVE OR TRANSPLANTED HEART: ICD-10-CM

## 2019-10-11 DIAGNOSIS — E11.9 TYPE 2 DIABETES MELLITUS WITHOUT COMPLICATION, WITH LONG-TERM CURRENT USE OF INSULIN: ICD-10-CM

## 2019-10-11 DIAGNOSIS — I25.10 CORONARY ARTERY DISEASE INVOLVING NATIVE CORONARY ARTERY WITHOUT ANGINA PECTORIS, UNSPECIFIED WHETHER NATIVE OR TRANSPLANTED HEART: ICD-10-CM

## 2019-10-11 DIAGNOSIS — E11.9 TYPE 2 DIABETES MELLITUS WITHOUT COMPLICATION, WITHOUT LONG-TERM CURRENT USE OF INSULIN: ICD-10-CM

## 2019-10-11 DIAGNOSIS — I10 ESSENTIAL HYPERTENSION: ICD-10-CM

## 2019-10-11 DIAGNOSIS — E78.5 HYPERLIPIDEMIA, UNSPECIFIED HYPERLIPIDEMIA TYPE: ICD-10-CM

## 2019-10-11 DIAGNOSIS — Z23 IMMUNIZATION DUE: Primary | ICD-10-CM

## 2019-10-11 PROCEDURE — 99999 PR PBB SHADOW E&M-EST. PATIENT-LVL IV: ICD-10-PCS | Mod: PBBFAC,,, | Performed by: FAMILY MEDICINE

## 2019-10-11 PROCEDURE — 99999 PR PBB SHADOW E&M-EST. PATIENT-LVL IV: CPT | Mod: PBBFAC,,, | Performed by: FAMILY MEDICINE

## 2019-10-11 PROCEDURE — 99214 OFFICE O/P EST MOD 30 MIN: CPT | Mod: PBBFAC,PN,25 | Performed by: FAMILY MEDICINE

## 2019-10-11 PROCEDURE — 99214 PR OFFICE/OUTPT VISIT, EST, LEVL IV, 30-39 MIN: ICD-10-PCS | Mod: S$PBB,,, | Performed by: FAMILY MEDICINE

## 2019-10-11 PROCEDURE — 99214 OFFICE O/P EST MOD 30 MIN: CPT | Mod: S$PBB,,, | Performed by: FAMILY MEDICINE

## 2019-10-11 PROCEDURE — G0009 ADMIN PNEUMOCOCCAL VACCINE: HCPCS | Mod: PBBFAC,PN

## 2019-10-11 RX ORDER — ATORVASTATIN CALCIUM 40 MG/1
40 TABLET, FILM COATED ORAL DAILY
Qty: 90 TABLET | Refills: 1 | Status: SHIPPED | OUTPATIENT
Start: 2019-10-11 | End: 2020-10-22 | Stop reason: SDUPTHER

## 2019-10-11 RX ORDER — METOPROLOL SUCCINATE 25 MG/1
25 TABLET, EXTENDED RELEASE ORAL DAILY
Qty: 90 TABLET | Refills: 3 | Status: SHIPPED | OUTPATIENT
Start: 2019-10-11 | End: 2020-10-22 | Stop reason: SDUPTHER

## 2019-10-11 RX ORDER — METFORMIN HYDROCHLORIDE 500 MG/1
500 TABLET, EXTENDED RELEASE ORAL 3 TIMES DAILY
Qty: 270 TABLET | Refills: 3 | Status: SHIPPED | OUTPATIENT
Start: 2019-10-11 | End: 2019-12-05

## 2019-10-11 RX ORDER — EZETIMIBE 10 MG/1
10 TABLET ORAL EVERY MORNING
Qty: 90 TABLET | Refills: 1 | Status: SHIPPED | OUTPATIENT
Start: 2019-10-11 | End: 2019-12-05

## 2019-10-11 RX ORDER — CLOPIDOGREL BISULFATE 75 MG/1
TABLET ORAL
Qty: 90 TABLET | Refills: 3 | Status: SHIPPED | OUTPATIENT
Start: 2019-10-11 | End: 2021-04-05 | Stop reason: SDUPTHER

## 2019-10-11 RX ORDER — AMLODIPINE BESYLATE 2.5 MG/1
2.5 TABLET ORAL DAILY
Qty: 90 TABLET | Refills: 3 | Status: SHIPPED | OUTPATIENT
Start: 2019-10-11 | End: 2020-10-22 | Stop reason: SDUPTHER

## 2019-10-11 RX ORDER — IRBESARTAN 300 MG/1
150 TABLET ORAL DAILY
Qty: 30 TABLET | Refills: 4 | Status: SHIPPED | OUTPATIENT
Start: 2019-10-11 | End: 2020-10-22 | Stop reason: SDUPTHER

## 2019-10-11 NOTE — PROGRESS NOTES
Two patient identifiers verified.  Allergies reviewed.  Pneumovax IM administered to right deltoid per MD order.  Patient tolerated injection well; no redness, bleeding, or bruising noted to injection site.  Patient instructed to remain in clinic setting for 15 minutes.  Verbalizes understanding.

## 2019-10-14 NOTE — PROGRESS NOTES
Eliana Wagner 84 y.o. 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 She has never used smokeless tobacco. She reports that she drinks alcohol. She reports that she does not use drugs.  Family History   Problem Relation Age of Onset    Cataracts Mother     Cataracts Father     Glaucoma Maternal Grandfather     Breast cancer Neg Hx     Colon cancer Neg Hx     Ovarian cancer Neg Hx        Physical Exam:  Vitals:  Vitals:    10/11/19 0938   BP: 124/62   Pulse: 73   Temp: 98.4 °F (36.9 °C)     CV:rrr without murmur  Lungs:clear to auscultation bilaterally with good resp excursion  ABD: normal BS, non-tender,no  hepatosplenomegaly  Extremities:no clubbing, cyanosis or edema  Skin:good turgor, no rashes,without areas of break down  Foot exam:  Deferred  Imp: diabetes improving control            Current Outpatient Medications:     amLODIPine (NORVASC) 2.5 MG tablet, Take 1 tablet (2.5 mg total) by mouth once daily., Disp: 90 tablet, Rfl: 3    aspirin-calcium carbonate (WOMEN'S ASPIRIN WITH CALCIUM) 81 mg-300 mg calcium(777 mg) Tab, Take by mouth., Disp: , Rfl:     atorvastatin (LIPITOR) 40 MG tablet, Take 1 tablet (40 mg total) by mouth once daily., Disp: 90 tablet, Rfl: 1    blood sugar diagnostic Strp, 1 each by Misc.(Non-Drug; Combo Route) route once daily., Disp: 100 each, Rfl: 3    canagliflozin (INVOKANA) 100 mg Tab, Take 1 tablet (100 mg total) by mouth once daily., Disp: 30 tablet, Rfl: 5    clopidogrel (PLAVIX) 75 mg tablet, TAKE 1 TABLET ONE TIME DAILY, Disp: 90 tablet, Rfl: 3    irbesartan (AVAPRO) 300 MG tablet, Take 0.5 tablets (150 mg total) by mouth once daily., Disp:  30 tablet, Rfl: 4    lancets Misc, 1 each by Misc.(Non-Drug; Combo Route) route once daily., Disp: 100 each, Rfl: 3    metFORMIN (GLUCOPHAGE-XR) 500 MG 24 hr tablet, Take 1 tablet (500 mg total) by mouth 3 (three) times daily., Disp: 270 tablet, Rfl: 3    metoprolol succinate (TOPROL-XL) 25 MG 24 hr tablet, Take 1 tablet (25 mg total) by mouth once daily., Disp: 90 tablet, Rfl: 3    mv-min-FA-Cl-Gi-ybwzcnb-lutein (CENTRUM) 0.4-162-18 mg Tab, Take by mouth. 1 Tablet Oral Every day, Disp: , Rfl:     nitroGLYCERIN (NITROSTAT) 0.4 MG SL tablet, Place 1 tablet (0.4 mg total) under the tongue every 5 (five) minutes as needed for Chest pain. 1 Tablet, Sublingual Sublingual As directed.  take one dose with chest pain up to 3 doses in 15 minutes, if no better go to ER, Disp: 100 tablet, Rfl: 5    acyclovir (ZOVIRAX) 400 MG tablet, Take 1 tablet (400 mg total) by mouth 2 (two) times daily. (Patient not taking: Reported on 10/11/2019), Disp: 15 tablet, Rfl: 0    aspirin-calcium carbonate 81 mg-300 mg calcium(777 mg) Tab, Take 81 mg by mouth., Disp: , Rfl:     blood-glucose meter kit, Use as instructed, Disp: 1 each, Rfl: 0    diphenhydrAMINE-aluminum-magnesium hydroxide-simethicone-lidocaine HCl 2%, Swish and spit 15 mLs every 4 (four) hours as needed. (Patient not taking: Reported on 10/11/2019), Disp: 240 mL, Rfl: 0    ezetimibe (ZETIA) 10 mg tablet, Take 1 tablet (10 mg total) by mouth every morning., Disp: 90 tablet, Rfl: 1    diphth,pertus,acell,,tetanus (BOOSTRIX) 2.5-8-5 Lf-mcg-Lf/0.5mL Syrg injection, Inject into the muscle. (Patient not taking: Reported on 10/11/2019), Disp: 0.5 mL, Rfl: 0    fluorouracil (EFUDEX) 5 % cream, , Disp: , Rfl:     lisinopril (PRINIVIL,ZESTRIL) 40 MG tablet, TAKE 1 TABLET EVERY DAY (Patient not taking: Reported on 10/11/2019), Disp: 30 tablet, Rfl: 0    nystatin-triamcinolone (MYCOLOG II) cream, Apply to affected area 2 times daily (Patient not taking: Reported on  10/11/2019), Disp: 30 g, Rfl: 1    pneumococcal 23-gigi ps (PNEUMOVAX 23) 25 mcg/0.5 mL, Inject into the muscle., Disp: 0.5 mL, Rfl: 0    sulfamethoxazole-trimethoprim 800-160mg (BACTRIM DS) 800-160 mg Tab, Take 1 tablet by mouth 2 (two) times daily. (Patient not taking: Reported on 10/11/2019), Disp: 20 tablet, Rfl: 0  Plan:see eye doctor yearly, podiatrist yearly  MEDS:   Diet: ADA diet  lifestlye modifications: low impact aerobic exercise  F/u: in 3-4 months.  Labs today

## 2019-11-08 ENCOUNTER — TELEPHONE (OUTPATIENT)
Dept: ADMINISTRATIVE | Facility: OTHER | Age: 84
End: 2019-11-08

## 2019-11-08 NOTE — TELEPHONE ENCOUNTER
Left voice message for patient to return call to schedule appointment from referral to Podiatry department.  Mónica STEWART [phone]

## 2019-11-12 ENCOUNTER — LAB VISIT (OUTPATIENT)
Dept: LAB | Facility: HOSPITAL | Age: 84
End: 2019-11-12
Attending: FAMILY MEDICINE
Payer: MEDICARE

## 2019-11-12 DIAGNOSIS — I10 HYPERTENSION, UNSPECIFIED TYPE: ICD-10-CM

## 2019-11-12 DIAGNOSIS — E11.9 TYPE 2 DIABETES MELLITUS WITHOUT COMPLICATION, UNSPECIFIED WHETHER LONG TERM INSULIN USE: ICD-10-CM

## 2019-11-12 LAB
ALBUMIN SERPL BCP-MCNC: 3.4 G/DL (ref 3.5–5.2)
ALP SERPL-CCNC: 74 U/L (ref 55–135)
ALT SERPL W/O P-5'-P-CCNC: 25 U/L (ref 10–44)
ANION GAP SERPL CALC-SCNC: 8 MMOL/L (ref 8–16)
AST SERPL-CCNC: 22 U/L (ref 10–40)
BASOPHILS # BLD AUTO: 0.04 K/UL (ref 0–0.2)
BASOPHILS NFR BLD: 0.5 % (ref 0–1.9)
BILIRUB SERPL-MCNC: 0.4 MG/DL (ref 0.1–1)
BUN SERPL-MCNC: 17 MG/DL (ref 8–23)
CALCIUM SERPL-MCNC: 9 MG/DL (ref 8.7–10.5)
CHLORIDE SERPL-SCNC: 106 MMOL/L (ref 95–110)
CHOLEST SERPL-MCNC: 180 MG/DL (ref 120–199)
CHOLEST/HDLC SERPL: 3.2 {RATIO} (ref 2–5)
CO2 SERPL-SCNC: 27 MMOL/L (ref 23–29)
CREAT SERPL-MCNC: 0.8 MG/DL (ref 0.5–1.4)
DIFFERENTIAL METHOD: ABNORMAL
EOSINOPHIL # BLD AUTO: 0.1 K/UL (ref 0–0.5)
EOSINOPHIL NFR BLD: 1.7 % (ref 0–8)
ERYTHROCYTE [DISTWIDTH] IN BLOOD BY AUTOMATED COUNT: 14.1 % (ref 11.5–14.5)
EST. GFR  (AFRICAN AMERICAN): >60 ML/MIN/1.73 M^2
EST. GFR  (NON AFRICAN AMERICAN): >60 ML/MIN/1.73 M^2
ESTIMATED AVG GLUCOSE: 183 MG/DL (ref 68–131)
GLUCOSE SERPL-MCNC: 166 MG/DL (ref 70–110)
HBA1C MFR BLD HPLC: 8 % (ref 4–5.6)
HCT VFR BLD AUTO: 46.3 % (ref 37–48.5)
HDLC SERPL-MCNC: 56 MG/DL (ref 40–75)
HDLC SERPL: 31.1 % (ref 20–50)
HGB BLD-MCNC: 14.6 G/DL (ref 12–16)
IMM GRANULOCYTES # BLD AUTO: 0.03 K/UL (ref 0–0.04)
IMM GRANULOCYTES NFR BLD AUTO: 0.4 % (ref 0–0.5)
LDLC SERPL CALC-MCNC: 88.4 MG/DL (ref 63–159)
LYMPHOCYTES # BLD AUTO: 3 K/UL (ref 1–4.8)
LYMPHOCYTES NFR BLD: 37 % (ref 18–48)
MCH RBC QN AUTO: 29.7 PG (ref 27–31)
MCHC RBC AUTO-ENTMCNC: 31.5 G/DL (ref 32–36)
MCV RBC AUTO: 94 FL (ref 82–98)
MONOCYTES # BLD AUTO: 0.5 K/UL (ref 0.3–1)
MONOCYTES NFR BLD: 6.3 % (ref 4–15)
NEUTROPHILS # BLD AUTO: 4.4 K/UL (ref 1.8–7.7)
NEUTROPHILS NFR BLD: 54.1 % (ref 38–73)
NONHDLC SERPL-MCNC: 124 MG/DL
NRBC BLD-RTO: 0 /100 WBC
PLATELET # BLD AUTO: 144 K/UL (ref 150–350)
PMV BLD AUTO: 10.2 FL (ref 9.2–12.9)
POTASSIUM SERPL-SCNC: 5 MMOL/L (ref 3.5–5.1)
PROT SERPL-MCNC: 6.8 G/DL (ref 6–8.4)
RBC # BLD AUTO: 4.91 M/UL (ref 4–5.4)
SODIUM SERPL-SCNC: 141 MMOL/L (ref 136–145)
TRIGL SERPL-MCNC: 178 MG/DL (ref 30–150)
TSH SERPL DL<=0.005 MIU/L-ACNC: 1.81 UIU/ML (ref 0.4–4)
WBC # BLD AUTO: 8.11 K/UL (ref 3.9–12.7)

## 2019-11-12 PROCEDURE — 85025 COMPLETE CBC W/AUTO DIFF WBC: CPT

## 2019-11-12 PROCEDURE — 80061 LIPID PANEL: CPT

## 2019-11-12 PROCEDURE — 84443 ASSAY THYROID STIM HORMONE: CPT

## 2019-11-12 PROCEDURE — 36415 COLL VENOUS BLD VENIPUNCTURE: CPT | Mod: PO

## 2019-11-12 PROCEDURE — 83036 HEMOGLOBIN GLYCOSYLATED A1C: CPT

## 2019-11-12 PROCEDURE — 80053 COMPREHEN METABOLIC PANEL: CPT

## 2019-11-19 ENCOUNTER — TELEPHONE (OUTPATIENT)
Dept: PRIMARY CARE CLINIC | Facility: CLINIC | Age: 84
End: 2019-11-19

## 2019-11-19 NOTE — TELEPHONE ENCOUNTER
Spoke with pt and advised per provider;We don't call in antibiotics for anyone unless they have been seen and it it determined that they need one. Pt verbalized understanding.

## 2019-11-19 NOTE — TELEPHONE ENCOUNTER
Please see message and advise     ----- Message from Dorene Yu sent at 11/19/2019  2:21 PM CST -----  Contact: daughter/LISA/[phone]  Pt daughter called in regards to getting a new Rx for a z pack due to has a sinus infection. Pt will be going out of town.    [facility] [phone]   Please advise

## 2019-11-19 NOTE — TELEPHONE ENCOUNTER
We don't call in antibiotics for anyone unless they have been seen and it it determined that they need one

## 2019-11-21 ENCOUNTER — PATIENT OUTREACH (OUTPATIENT)
Dept: ADMINISTRATIVE | Facility: HOSPITAL | Age: 84
End: 2019-11-21

## 2019-11-21 NOTE — PROGRESS NOTES
Pre-visit chart review completed.  Immunizations reviewed and updated.    Patient due for the following    Shingles Vaccine (2 of 3)

## 2019-12-05 ENCOUNTER — OFFICE VISIT (OUTPATIENT)
Dept: PRIMARY CARE CLINIC | Facility: CLINIC | Age: 84
End: 2019-12-05
Payer: MEDICARE

## 2019-12-05 ENCOUNTER — IMMUNIZATION (OUTPATIENT)
Dept: PHARMACY | Facility: CLINIC | Age: 84
End: 2019-12-05
Payer: MEDICARE

## 2019-12-05 VITALS
DIASTOLIC BLOOD PRESSURE: 60 MMHG | HEIGHT: 62 IN | HEART RATE: 73 BPM | TEMPERATURE: 98 F | SYSTOLIC BLOOD PRESSURE: 110 MMHG | BODY MASS INDEX: 33.1 KG/M2 | OXYGEN SATURATION: 98 % | RESPIRATION RATE: 16 BRPM | WEIGHT: 179.88 LBS

## 2019-12-05 DIAGNOSIS — E11.9 TYPE 2 DIABETES MELLITUS WITHOUT COMPLICATION, WITH LONG-TERM CURRENT USE OF INSULIN: ICD-10-CM

## 2019-12-05 DIAGNOSIS — Z79.4 TYPE 2 DIABETES MELLITUS WITHOUT COMPLICATION, WITH LONG-TERM CURRENT USE OF INSULIN: ICD-10-CM

## 2019-12-05 DIAGNOSIS — R92.2 INCONCLUSIVE MAMMOGRAM: ICD-10-CM

## 2019-12-05 DIAGNOSIS — Z12.31 SCREENING MAMMOGRAM FOR HIGH-RISK PATIENT: Primary | ICD-10-CM

## 2019-12-05 DIAGNOSIS — E11.40 TYPE 2 DIABETES MELLITUS WITH DIABETIC NEUROPATHY, WITHOUT LONG-TERM CURRENT USE OF INSULIN: ICD-10-CM

## 2019-12-05 LAB
BILIRUB SERPL-MCNC: NORMAL MG/DL
BLOOD URINE, POC: NORMAL
COLOR, POC UA: NORMAL
GLUCOSE UR QL STRIP: 1000
KETONES UR QL STRIP: NORMAL
LEUKOCYTE ESTERASE URINE, POC: NORMAL
NITRITE, POC UA: NORMAL
PH, POC UA: 5
PROTEIN, POC: NORMAL
SPECIFIC GRAVITY, POC UA: 1.01
UROBILINOGEN, POC UA: NORMAL

## 2019-12-05 PROCEDURE — 1126F AMNT PAIN NOTED NONE PRSNT: CPT | Mod: ,,, | Performed by: FAMILY MEDICINE

## 2019-12-05 PROCEDURE — 99215 OFFICE O/P EST HI 40 MIN: CPT | Mod: S$PBB,,, | Performed by: FAMILY MEDICINE

## 2019-12-05 PROCEDURE — 99215 PR OFFICE/OUTPT VISIT, EST, LEVL V, 40-54 MIN: ICD-10-PCS | Mod: S$PBB,,, | Performed by: FAMILY MEDICINE

## 2019-12-05 PROCEDURE — 1126F PR PAIN SEVERITY QUANTIFIED, NO PAIN PRESENT: ICD-10-PCS | Mod: ,,, | Performed by: FAMILY MEDICINE

## 2019-12-05 PROCEDURE — 99999 PR PBB SHADOW E&M-EST. PATIENT-LVL IV: CPT | Mod: PBBFAC,,, | Performed by: FAMILY MEDICINE

## 2019-12-05 PROCEDURE — 99999 PR PBB SHADOW E&M-EST. PATIENT-LVL IV: ICD-10-PCS | Mod: PBBFAC,,, | Performed by: FAMILY MEDICINE

## 2019-12-05 PROCEDURE — 1159F MED LIST DOCD IN RCRD: CPT | Mod: ,,, | Performed by: FAMILY MEDICINE

## 2019-12-05 PROCEDURE — 1159F PR MEDICATION LIST DOCUMENTED IN MEDICAL RECORD: ICD-10-PCS | Mod: ,,, | Performed by: FAMILY MEDICINE

## 2019-12-05 PROCEDURE — 81002 URINALYSIS NONAUTO W/O SCOPE: CPT | Mod: PBBFAC,PN | Performed by: FAMILY MEDICINE

## 2019-12-05 PROCEDURE — 99214 OFFICE O/P EST MOD 30 MIN: CPT | Mod: PBBFAC,PN | Performed by: FAMILY MEDICINE

## 2019-12-05 RX ORDER — METFORMIN HYDROCHLORIDE 500 MG/1
1000 TABLET, EXTENDED RELEASE ORAL 2 TIMES DAILY WITH MEALS
Qty: 360 TABLET | Refills: 3 | Status: SHIPPED | OUTPATIENT
Start: 2019-12-05 | End: 2020-10-22 | Stop reason: SDUPTHER

## 2019-12-06 NOTE — PROGRESS NOTES
Eliana Wagner is a 84 y.o. 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 She has never used smokeless tobacco. She reports that she drinks alcohol. She reports that she does not use drugs.  Family History   Problem Relation Age of Onset    Cataracts Mother     Cataracts Father     Glaucoma Maternal Grandfather     Breast cancer Neg Hx     Colon cancer Neg Hx     Ovarian cancer Neg Hx      ROS:  GENERAL: No fever, chills, fatigability or weight loss.  SKIN: No rashes, itching or changes in color or texture of skin.  HEAD: No headaches or recent head trauma.  EYES: Visual acuity fine. No photophobia, ocular pain or diplopia.  EARS: Denies ear pain, discharge or vertigo.  NOSE: No loss of smell, no epistaxis or postnasal drip.  MOUTH & THROAT: No hoarseness or change in voice. No excessive gum bleeding.  NODES: Denies swollen glands.  CHEST: Denies BUENROSTRO, cyanosis, wheezing, cough and sputum production.  CARDIOVASCULAR: Denies chest pain, PND, orthopnea or reduced exercise tolerance.  ABDOMEN: Appetite fine. No weight loss. Denies diarrhea, abdominal pain, hematemesis or blood in stool.  URINARY: No flank pain, dysuria or hematuria.  PERIPHERAL VASCULAR: No claudication or cyanosis.  MUSCULOSKELETAL: No joint stiffness or swelling. Denies back pain.  NEUROLOGIC: No history of seizures, paralysis, alteration of gait or coordination.    OBJECTIVE:  APPEARANCE:  Overweight no acute distress  Vitals:    12/05/19 1002   BP: 110/60   Pulse: 73   Resp: 16   Temp: 98.3 °F (36.8 °C)     SKIN: Normal skin turgor, no lesions.  HEENT: Both external auditory canals clear. Both tympanic membranes  intact. PERRL. EOMI.   Disk margins sharp. No tonsillar enlargement. No pharyngeal erythema or exudate. No stridor.  NECK: No bruits. No cervical spine tenderness. No cervical lymphadenopathy. No thyromegaly.  NODES: No cervical, axillary or inguinal lymph node enlargement.  CHEST: Breath sounds clear bilaterally. Lungs clear to auscultation & percussion.   Good air movement. No rales. No retractions. No rhonchi. No stridor. No wheezes.  CARDIOVASCULAR: Normal S1, S2. No murmurs. No edema.  BREASTS: no masses palpated in either breast or axillary area, symmetry noted.  ABDOMEN: Bowel sounds normal. No palpable aortic enlargement. No CVA tenderness.   No pulsatile mass. No rebound tenderness.  PERIPHERAL VASCULAR: Femoral pulses present and symmetrical. No edema.  MUSCULOSKELETAL: Degenerative changes of both ankles, foot, knee, wrist and hand.  BACK: No CVA tenderness. There is no spasm, tenderness or radiculopathy noted with palpation and there is full range of motion.   NEUROLOGIC:   Cranial Nerves: II-XII grossly intact.  Motor: 5/5 strength major flexors/extensors. No tremor.  DTR's: Knees, Ankles 2+ and equal bilaterally; downgoing toes.  Sensory: Intact to light touch distally.  Gait & Posture: Normal gait and fine motion. No cerebellar signs.  MENTAL STATUS: Alert. Oriented x 3. Language skills normal. Memory intact.   No suicidal ideation. Normal affect. Normal cognitive functions. Well kept appearance.    ASSESSMENT/PLAN:   Eliana SHAH was seen today for annual exam.    Diagnoses and all orders for this visit:    Screening mammogram for high-risk patient  -     Mammo Digital Diagnostic Bilateral; Future  -     US Breast Right Complete; Future    Inconclusive mammogram   -     Mammo Digital Diagnostic Bilateral; Future  -     US Breast Left Complete; Future    Type 2 diabetes mellitus with diabetic neuropathy, without long-term current use of insulin  -     POCT URINE DIPSTICK WITHOUT MICROSCOPE  -      Hemoglobin A1c; Future    Type 2 diabetes mellitus without complication, with long-term current use of insulin  -     metFORMIN (GLUCOPHAGE-XR) 500 MG 24 hr tablet; Take 2 tablets (1,000 mg total) by mouth 2 (two) times daily with meals.     Patient's labs reviewed will contact patient with results of pending test when available  Follow-up in 4 months to re-evaluate blood sugars  25 min extra was spent in counseling regarding medications possible side effects and proper diet. all questions were answered

## 2019-12-26 ENCOUNTER — TELEPHONE (OUTPATIENT)
Dept: OPHTHALMOLOGY | Facility: CLINIC | Age: 84
End: 2019-12-26

## 2019-12-26 NOTE — TELEPHONE ENCOUNTER
Left vm and call back number to arrange appt. Nothing available til after the new year     ----- Message from Meera Mathews sent at 12/26/2019 11:18 AM CST -----  Contact:   Pt  [phone]  Pt calling to schedule  an appt before 1/1 due to change of health insurance , give the pt a call back to schedule

## 2019-12-27 ENCOUNTER — OFFICE VISIT (OUTPATIENT)
Dept: OPTOMETRY | Facility: CLINIC | Age: 84
End: 2019-12-27
Payer: MEDICARE

## 2019-12-27 DIAGNOSIS — Z96.1 PSEUDOPHAKIA: ICD-10-CM

## 2019-12-27 DIAGNOSIS — E11.9 TYPE 2 DIABETES MELLITUS WITHOUT RETINOPATHY: Primary | ICD-10-CM

## 2019-12-27 DIAGNOSIS — D31.31 CHOROIDAL NEVUS OF RIGHT EYE: ICD-10-CM

## 2019-12-27 DIAGNOSIS — H52.7 REFRACTIVE ERROR: ICD-10-CM

## 2019-12-27 PROCEDURE — 99999 PR PBB SHADOW E&M-EST. PATIENT-LVL II: CPT | Mod: PBBFAC,,, | Performed by: OPTOMETRIST

## 2019-12-27 PROCEDURE — 92014 PR EYE EXAM, EST PATIENT,COMPREHESV: ICD-10-PCS | Mod: S$PBB,,, | Performed by: OPTOMETRIST

## 2019-12-27 PROCEDURE — 92015 DETERMINE REFRACTIVE STATE: CPT | Mod: ,,, | Performed by: OPTOMETRIST

## 2019-12-27 PROCEDURE — 92015 PR REFRACTION: ICD-10-PCS | Mod: ,,, | Performed by: OPTOMETRIST

## 2019-12-27 PROCEDURE — 92014 COMPRE OPH EXAM EST PT 1/>: CPT | Mod: S$PBB,,, | Performed by: OPTOMETRIST

## 2019-12-27 PROCEDURE — 99999 PR PBB SHADOW E&M-EST. PATIENT-LVL II: ICD-10-PCS | Mod: PBBFAC,,, | Performed by: OPTOMETRIST

## 2019-12-27 PROCEDURE — 99212 OFFICE O/P EST SF 10 MIN: CPT | Mod: PBBFAC,PO | Performed by: OPTOMETRIST

## 2019-12-27 NOTE — PROGRESS NOTES
"Subjective:       Patient ID: Eliana Wagner is a 84 y.o. female      Chief Complaint   Patient presents with    Diabetic Eye Exam     History of Present Illness  DLS: 9/27/18 With Dr. Cobos    Pt here for Diabetic Eye Exam;  Pt states near vision seems to be decreasing "bringing material closer to face to see." Pt denies any flashes or diplopia but does notice floaters in OU. Pt states she didn't get glasses at her last visit.     Meds: No GTTS     today    Hemoglobin A1C       Date                     Value               Ref Range           Status               11/12/2019               8.0 (H)             4.0 - 5.6 %         Final         10/10/2019               7.8 (H)             4.0 - 5.6 %         Final              02/11/2019               9.2 (H)             4.0 - 5.6 %         Final               Assessment/Plan:     1. Type 2 diabetes mellitus without retinopathy  No diabetic retinopathy. Discussed with pt the effects of diabetes on vision, importance of good blood sugar control, compliance with meds, and follow up care with PCP. Return in 1 year for dilated eye exam, sooner PRN.    2. Choroidal nevus of right eye  Stable. Monitor.    3. Pseudophakia  MFIOL. Well-centered. Clear.     4. Refractive error  Educated patient on refractive error and discussed lens options. Dispensed updated spectacle Rx. Educated about adaptation period to new specs.    Eyeglass Final Rx     Eyeglass Final Rx       Sphere Cylinder Axis    Right -0.25 Sphere     Left -0.50 +0.50 065    Type:  SVL    Expiration Date:  12/27/2020                  Follow up in about 1 year (around 12/27/2020) for Diabetic Eye Exam.       "

## 2020-01-03 ENCOUNTER — OFFICE VISIT (OUTPATIENT)
Dept: PODIATRY | Facility: CLINIC | Age: 85
End: 2020-01-03
Payer: MEDICARE

## 2020-01-03 VITALS
BODY MASS INDEX: 32.94 KG/M2 | WEIGHT: 179 LBS | HEART RATE: 77 BPM | HEIGHT: 62 IN | SYSTOLIC BLOOD PRESSURE: 126 MMHG | DIASTOLIC BLOOD PRESSURE: 71 MMHG

## 2020-01-03 DIAGNOSIS — E11.9 ENCOUNTER FOR COMPREHENSIVE DIABETIC FOOT EXAMINATION, TYPE 2 DIABETES MELLITUS: Primary | ICD-10-CM

## 2020-01-03 PROCEDURE — 1159F MED LIST DOCD IN RCRD: CPT | Mod: ,,, | Performed by: PODIATRIST

## 2020-01-03 PROCEDURE — 99999 PR PBB SHADOW E&M-EST. PATIENT-LVL III: ICD-10-PCS | Mod: PBBFAC,,, | Performed by: PODIATRIST

## 2020-01-03 PROCEDURE — 1126F PR PAIN SEVERITY QUANTIFIED, NO PAIN PRESENT: ICD-10-PCS | Mod: ,,, | Performed by: PODIATRIST

## 2020-01-03 PROCEDURE — 99213 OFFICE O/P EST LOW 20 MIN: CPT | Mod: PBBFAC | Performed by: PODIATRIST

## 2020-01-03 PROCEDURE — 99213 OFFICE O/P EST LOW 20 MIN: CPT | Mod: S$PBB,,, | Performed by: PODIATRIST

## 2020-01-03 PROCEDURE — 99999 PR PBB SHADOW E&M-EST. PATIENT-LVL III: CPT | Mod: PBBFAC,,, | Performed by: PODIATRIST

## 2020-01-03 PROCEDURE — 1126F AMNT PAIN NOTED NONE PRSNT: CPT | Mod: ,,, | Performed by: PODIATRIST

## 2020-01-03 PROCEDURE — 1159F PR MEDICATION LIST DOCUMENTED IN MEDICAL RECORD: ICD-10-PCS | Mod: ,,, | Performed by: PODIATRIST

## 2020-01-03 PROCEDURE — 99213 PR OFFICE/OUTPT VISIT, EST, LEVL III, 20-29 MIN: ICD-10-PCS | Mod: S$PBB,,, | Performed by: PODIATRIST

## 2020-01-03 NOTE — PROGRESS NOTES
Subjective:      Patient ID: Eliana Wagner is a 84 y.o. female.    Chief Complaint: Diabetic Foot Exam (12/5/19 dr elvira wagner)    Eliana SHAH is a 84 y.o. female who presents to the clinic upon referral from Dr. Alvarenga ref. provider found  for evaluation and treatment of diabetic feet. 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 Patient relates no major problem with feet. Only complaints today consist of diabetic foot exam.    PCP: Elvira Lawton MD    Date Last Seen by PCP:     Current shoe gear: Slip-on shoes    Hemoglobin A1C   Date Value Ref Range Status   11/12/2019 8.0 (H) 4.0 - 5.6 % Final     Comment:     ADA Screening Guidelines:  5.7-6.4%  Consistent with prediabetes  >or=6.5%  Consistent with diabetes  High levels of fetal hemoglobin interfere with the HbA1C  assay. Heterozygous hemoglobin variants (HbS, HgC, etc)do  not significantly interfere with this assay.   However, presence of multiple variants may affect accuracy.     10/10/2019 7.8 (H) 4.0 - 5.6 % Final     Comment:     ADA Screening Guidelines:  5.7-6.4%  Consistent with prediabetes  >or=6.5%  Consistent with diabetes  High levels of fetal hemoglobin interfere with the HbA1C  assay. Heterozygous hemoglobin variants (HbS, HgC, etc)do  not significantly interfere with this assay.   However, presence of multiple variants may affect accuracy.     02/11/2019 9.2 (H) 4.0 - 5.6 % Final     Comment:     ADA Screening Guidelines:  5.7-6.4%  Consistent with prediabetes  >or=6.5%  Consistent with diabetes  High levels of fetal hemoglobin interfere with the HbA1C  assay. Heterozygous hemoglobin variants (HbS, HgC, etc)do  not significantly interfere with this assay.   However, presence of multiple variants may affect accuracy.             Review of Systems   Constitution: Negative for chills, fever and  malaise/fatigue.   HENT: Negative for hearing loss.    Cardiovascular: Negative for claudication.   Respiratory: Negative for shortness of breath.    Skin: Negative for flushing and rash.   Musculoskeletal: Negative for joint pain and myalgias.   Neurological: Negative for loss of balance, numbness, paresthesias and sensory change.   Psychiatric/Behavioral: Negative for altered mental status.           Objective:      Physical Exam   Constitutional: She is oriented to person, place, and time. She appears well-developed and well-nourished.   Cardiovascular:   Pulses:       Dorsalis pedis pulses are 2+ on the right side, and 2+ on the left side.        Posterior tibial pulses are 2+ on the right side, and 2+ on the left side.   no edema noted to b/L LEs   Musculoskeletal:        Right knee: She exhibits no swelling and no ecchymosis.        Left knee: She exhibits no swelling and no ecchymosis.        Right ankle: She exhibits normal range of motion, no swelling, no ecchymosis and normal pulse. No lateral malleolus, no medial malleolus and no head of 5th metatarsal tenderness found. Achilles tendon exhibits no pain, no defect and normal Schafer's test results.        Left ankle: She exhibits normal range of motion, no swelling, no ecchymosis and normal pulse. No lateral malleolus, no medial malleolus and no head of 5th metatarsal tenderness found. Achilles tendon exhibits no pain and normal Schafer's test results.        Right lower leg: She exhibits no tenderness, no bony tenderness, no swelling, no edema and no deformity.        Left lower leg: She exhibits no tenderness, no swelling and no edema.        Right foot: There is normal range of motion and no deformity.        Left foot: There is normal range of motion and no deformity.   Adequate joint ROM noted to all lower extremity muscle groups with no pain or crepitation noted. Muscle strength is 5/5 in all groups bilaterally.  Hammertoes noted, digits 2-5 b/L     with adductovarus rotation of b/L   fifth digit.         Feet:   Right Foot:   Protective Sensation: 5 sites tested. 5 sites sensed.   Left Foot:   Protective Sensation: 5 sites tested. 5 sites sensed.   Neurological: She is alert and oriented to person, place, and time.   Gross sensation intact to b/L lower extremities   Skin: Skin is warm. Capillary refill takes more than 3 seconds. No abrasion, no bruising, no burn and no ecchymosis noted.   No open lesions noted to b/L lower extremities.   Skin temp is warm to warm from proximal to distal b/L.  Nails x10 short  Webspaces 1-4 b/L clean, dry, and intact.    Left hallux nail non-adherent    Psychiatric: She has a normal mood and affect. Her speech is normal and behavior is normal. She is attentive.             Assessment:       Encounter Diagnosis   Name Primary?    Encounter for comprehensive diabetic foot examination, type 2 diabetes mellitus Yes         Plan:       Eliana SHAH was seen today for diabetic foot exam.    Diagnoses and all orders for this visit:    Encounter for comprehensive diabetic foot examination, type 2 diabetes mellitus      I counseled the patient on her conditions, their implications and medical management.    Pt refused debridement of loose hallux nail  Diabetic foot exam performed on pt. Pt advised on daily monitoring and moisturizing of the feet and keeping the webspaces clean and dry Patient advised to contact the clinic if any new pedal problems should arise.   RTC in 1 year or sooner if any new pedal problems should arise.    .

## 2020-02-17 ENCOUNTER — TELEPHONE (OUTPATIENT)
Dept: PRIMARY CARE CLINIC | Facility: CLINIC | Age: 85
End: 2020-02-17

## 2020-02-17 NOTE — TELEPHONE ENCOUNTER
schedule pt to come in to get refill on medications before pt goes out of country.     ----- Message from Arnoldo Maguire sent at 2/17/2020  8:05 AM CST -----  Contact: Pt [phone]  Caller is requesting an earlier appt than we can schedule.  Caller declined first available appointment listed below. Caller will not accept being placed on the wait list and is requesting a message be sent to the provider.  When is the next available appointment:  3/4/20  Did you offer to schedule the next available appt and put the patient on the wait list?:  Yes   What visit type: EP  Symptoms: Check up before flying, per Dr. Wagner   Patient preference of timeframe to be scheduled:  Any day, preferrably in the morning this week.  What is the reason the patient is requesting a sooner appointment? (insurance terminating, changing jobs):  She only wants to see you

## 2020-02-21 ENCOUNTER — OFFICE VISIT (OUTPATIENT)
Dept: PRIMARY CARE CLINIC | Facility: CLINIC | Age: 85
End: 2020-02-21
Payer: MEDICARE

## 2020-02-21 VITALS
SYSTOLIC BLOOD PRESSURE: 130 MMHG | HEART RATE: 70 BPM | BODY MASS INDEX: 33.6 KG/M2 | DIASTOLIC BLOOD PRESSURE: 62 MMHG | HEIGHT: 62 IN | OXYGEN SATURATION: 98 % | WEIGHT: 182.56 LBS | TEMPERATURE: 98 F

## 2020-02-21 DIAGNOSIS — E11.40 TYPE 2 DIABETES MELLITUS WITH DIABETIC NEUROPATHY, WITHOUT LONG-TERM CURRENT USE OF INSULIN: ICD-10-CM

## 2020-02-21 DIAGNOSIS — E11.59 OBESITY, DIABETES, AND HYPERTENSION SYNDROME: ICD-10-CM

## 2020-02-21 DIAGNOSIS — D31.31 CHOROIDAL NEVUS OF RIGHT EYE: ICD-10-CM

## 2020-02-21 DIAGNOSIS — I50.22 CHRONIC SYSTOLIC (CONGESTIVE) HEART FAILURE: ICD-10-CM

## 2020-02-21 DIAGNOSIS — E11.69 OBESITY, DIABETES, AND HYPERTENSION SYNDROME: ICD-10-CM

## 2020-02-21 DIAGNOSIS — E66.9 OBESITY, DIABETES, AND HYPERTENSION SYNDROME: ICD-10-CM

## 2020-02-21 DIAGNOSIS — Z71.84 COUNSELING ABOUT TRAVEL: Primary | ICD-10-CM

## 2020-02-21 DIAGNOSIS — H52.7 REFRACTIVE ERROR: ICD-10-CM

## 2020-02-21 DIAGNOSIS — I20.89 OTHER FORMS OF ANGINA PECTORIS: ICD-10-CM

## 2020-02-21 DIAGNOSIS — M54.50 ACUTE MIDLINE LOW BACK PAIN WITHOUT SCIATICA: ICD-10-CM

## 2020-02-21 DIAGNOSIS — I15.2 OBESITY, DIABETES, AND HYPERTENSION SYNDROME: ICD-10-CM

## 2020-02-21 DIAGNOSIS — I10 ESSENTIAL HYPERTENSION: ICD-10-CM

## 2020-02-21 DIAGNOSIS — Z96.1 PSEUDOPHAKIA: ICD-10-CM

## 2020-02-21 PROCEDURE — 99999 PR PBB SHADOW E&M-EST. PATIENT-LVL III: CPT | Mod: PBBFAC,,, | Performed by: FAMILY MEDICINE

## 2020-02-21 PROCEDURE — 99214 OFFICE O/P EST MOD 30 MIN: CPT | Mod: S$PBB,,, | Performed by: FAMILY MEDICINE

## 2020-02-21 PROCEDURE — 99999 PR PBB SHADOW E&M-EST. PATIENT-LVL III: ICD-10-PCS | Mod: PBBFAC,,, | Performed by: FAMILY MEDICINE

## 2020-02-21 PROCEDURE — 99214 PR OFFICE/OUTPT VISIT, EST, LEVL IV, 30-39 MIN: ICD-10-PCS | Mod: S$PBB,,, | Performed by: FAMILY MEDICINE

## 2020-02-21 PROCEDURE — 99213 OFFICE O/P EST LOW 20 MIN: CPT | Mod: PBBFAC,PN | Performed by: FAMILY MEDICINE

## 2020-02-21 RX ORDER — CIPROFLOXACIN 500 MG/1
500 TABLET ORAL EVERY 12 HOURS
Qty: 20 TABLET | Refills: 0 | Status: SHIPPED | OUTPATIENT
Start: 2020-02-21 | End: 2020-10-22 | Stop reason: ALTCHOICE

## 2020-02-21 RX ORDER — HYDROCODONE BITARTRATE AND ACETAMINOPHEN 7.5; 325 MG/1; MG/1
1 TABLET ORAL EVERY 6 HOURS PRN
Qty: 28 TABLET | Refills: 0 | Status: SHIPPED | OUTPATIENT
Start: 2020-02-21 | End: 2020-10-22 | Stop reason: ALTCHOICE

## 2020-02-21 RX ORDER — ONDANSETRON 4 MG/1
4 TABLET, ORALLY DISINTEGRATING ORAL EVERY 6 HOURS PRN
Qty: 30 TABLET | Refills: 0 | Status: SHIPPED | OUTPATIENT
Start: 2020-02-21 | End: 2020-10-22 | Stop reason: ALTCHOICE

## 2020-02-21 NOTE — PROGRESS NOTES
Subjective:   Patient ID: Eliana Wagner is a 85 y.o. female.    Chief Complaint: Medication Management      HPI  85-year-old female with type 2 diabetes mellitus going on vacation to [place].  Patient queried and denies any further complaints.        ALLERGIES AND MEDICATIONS: updated and reviewed.  Review of patient's allergies indicates:   Allergen Reactions    Penicillins      Caused uti       Current Outpatient Medications:     amLODIPine (NORVASC) 2.5 MG tablet, Take 1 tablet (2.5 mg total) by mouth once daily., Disp: 90 tablet, Rfl: 3    aspirin-calcium carbonate (WOMEN'S ASPIRIN WITH CALCIUM) 81 mg-300 mg calcium(777 mg) Tab, Take by mouth., Disp: , Rfl:     aspirin-calcium carbonate 81 mg-300 mg calcium(777 mg) Tab, Take 81 mg by mouth., Disp: , Rfl:     atorvastatin (LIPITOR) 40 MG tablet, Take 1 tablet (40 mg total) by mouth once daily., Disp: 90 tablet, Rfl: 1    blood sugar diagnostic Strp, 1 each by Misc.(Non-Drug; Combo Route) route once daily., Disp: 100 each, Rfl: 3    canagliflozin (INVOKANA) 100 mg Tab, Take 1 tablet (100 mg total) by mouth once daily., Disp: 30 tablet, Rfl: 5    clopidogrel (PLAVIX) 75 mg tablet, TAKE 1 TABLET ONE TIME DAILY, Disp: 90 tablet, Rfl: 3    irbesartan (AVAPRO) 300 MG tablet, Take 0.5 tablets (150 mg total) by mouth once daily., Disp: 30 tablet, Rfl: 4    lancets Misc, 1 each by Misc.(Non-Drug; Combo Route) route once daily., Disp: 100 each, Rfl: 3    metFORMIN (GLUCOPHAGE-XR) 500 MG 24 hr tablet, Take 2 tablets (1,000 mg total) by mouth 2 (two) times daily with meals., Disp: 360 tablet, Rfl: 3    metoprolol succinate (TOPROL-XL) 25 MG 24 hr tablet, Take 1 tablet (25 mg total) by mouth once daily., Disp: 90 tablet, Rfl: 3    mv-min-FA-Vh-Kl-ckzdfjc-lutein (CENTRUM) 0.4-162-18 mg Tab, Take by mouth. 1 Tablet Oral Every day, Disp: , Rfl:     blood-glucose meter kit, Use as instructed, Disp: 1 each, Rfl: 0    ciprofloxacin HCl (CIPRO)  "500 MG tablet, Take 1 tablet (500 mg total) by mouth every 12 (twelve) hours. X 10 days, Disp: 20 tablet, Rfl: 0    fluorouracil (EFUDEX) 5 % cream, , Disp: , Rfl:     HYDROcodone-acetaminophen (NORCO) 7.5-325 mg per tablet, Take 1 tablet by mouth every 6 (six) hours as needed. Prn severe pain; do not take and drive/work Dx M54.5. Medically necessary, Disp: 28 tablet, Rfl: 0    ondansetron (ZOFRAN-ODT) 4 MG TbDL, Take 1 tablet (4 mg total) by mouth every 6 (six) hours as needed. nausea, Disp: 30 tablet, Rfl: 0    Review of Systems   Constitutional: Negative for activity change, fatigue, fever and unexpected weight change.   HENT: Negative for congestion, hearing loss, postnasal drip, rhinorrhea, sinus pressure, sore throat and trouble swallowing.    Eyes: Negative for discharge and visual disturbance.   Respiratory: Negative for chest tightness, shortness of breath and wheezing.    Cardiovascular: Negative for chest pain, palpitations and leg swelling.   Gastrointestinal: Negative for abdominal pain, blood in stool, constipation, diarrhea, nausea and vomiting.   Endocrine: Negative for polydipsia and polyuria.   Genitourinary: Negative for difficulty urinating, dysuria, hematuria and menstrual problem.   Musculoskeletal: Negative for arthralgias, joint swelling and neck pain.   Neurological: Negative for dizziness, weakness, light-headedness and headaches.   Psychiatric/Behavioral: Negative for confusion and dysphoric mood.       Objective:     Vitals:    02/21/20 1106   BP: 130/62   Pulse: 70   Temp: 97.9 °F (36.6 °C)   TempSrc: Oral   SpO2: 98%   Weight: 82.8 kg (182 lb 8.7 oz)   Height: 5' 2" (1.575 m)   PainSc: 0-No pain     Body mass index is 33.39 kg/m².    Physical Exam   Constitutional: She appears well-developed and well-nourished.   HENT:   Head: Normocephalic and atraumatic.   Eyes: Conjunctivae are normal. Right eye exhibits no discharge. Left eye exhibits no discharge.   Skin: Skin is warm. "   Psychiatric: She has a normal mood and affect. Her behavior is normal.   Nursing note and vitals reviewed.      Assessment and Plan:   Eliana SHAH was seen today for medication management.    Diagnoses and all orders for this visit:    Counseling about travel    Type 2 diabetes mellitus with diabetic neuropathy, without long-term current use of insulin    Chronic systolic (congestive) heart failure    Other forms of angina pectoris    Refractive error    Pseudophakia    Choroidal nevus of right eye    Essential hypertension    Obesity, diabetes, and hypertension syndrome    Acute midline low back pain without sciatica    Other orders  -     ciprofloxacin HCl (CIPRO) 500 MG tablet; Take 1 tablet (500 mg total) by mouth every 12 (twelve) hours. X 10 days  -     HYDROcodone-acetaminophen (NORCO) 7.5-325 mg per tablet; Take 1 tablet by mouth every 6 (six) hours as needed. Prn severe pain; do not take and drive/work Dx M54.5. Medically necessary  -     ondansetron (ZOFRAN-ODT) 4 MG TbDL; Take 1 tablet (4 mg total) by mouth every 6 (six) hours as needed. nausea     She will hold antibiotics unless she develops a urinary tract infection or severe upper respiratory tract infection occurs on her trip    Old records reviewed.  Went over medications and last labs with patient and her daughter who is present.  Patient had normal renal function last labs.    Time spent in the evaluation and management of this patient exceeded 45min and greater than 50% of this time was in face-to-face education regarding diagnoses, medications, plan, and follow-up.      Follow up in about 3 months (around 5/21/2020).    THIS NOTE WILL BE SHARED WITH THE PATIENT.

## 2020-05-22 ENCOUNTER — LAB VISIT (OUTPATIENT)
Dept: PRIMARY CARE CLINIC | Facility: CLINIC | Age: 85
End: 2020-05-22
Payer: MEDICARE

## 2020-05-22 DIAGNOSIS — R05.9 COUGH: Primary | ICD-10-CM

## 2020-05-22 PROCEDURE — U0003 INFECTIOUS AGENT DETECTION BY NUCLEIC ACID (DNA OR RNA); SEVERE ACUTE RESPIRATORY SYNDROME CORONAVIRUS 2 (SARS-COV-2) (CORONAVIRUS DISEASE [COVID-19]), AMPLIFIED PROBE TECHNIQUE, MAKING USE OF HIGH THROUGHPUT TECHNOLOGIES AS DESCRIBED BY CMS-2020-01-R: HCPCS

## 2020-05-23 LAB — SARS-COV-2 RNA RESP QL NAA+PROBE: NOT DETECTED

## 2020-07-08 ENCOUNTER — IMMUNIZATION (OUTPATIENT)
Dept: PHARMACY | Facility: CLINIC | Age: 85
End: 2020-07-08
Payer: MEDICARE

## 2020-08-13 DIAGNOSIS — E11.9 TYPE 2 DIABETES MELLITUS WITHOUT COMPLICATION, WITHOUT LONG-TERM CURRENT USE OF INSULIN: ICD-10-CM

## 2020-08-13 RX ORDER — CANAGLIFLOZIN 100 MG/1
TABLET, FILM COATED ORAL
Qty: 90 TABLET | OUTPATIENT
Start: 2020-08-13

## 2020-08-13 NOTE — TELEPHONE ENCOUNTER
Left VM advising pt per provider:Past due diabetic appt and labs. Pt can call and scheduled theses appts at her convenience.

## 2020-09-10 ENCOUNTER — TELEPHONE (OUTPATIENT)
Dept: PRIMARY CARE CLINIC | Facility: CLINIC | Age: 85
End: 2020-09-10

## 2020-09-10 DIAGNOSIS — I10 ESSENTIAL HYPERTENSION: Primary | ICD-10-CM

## 2020-09-10 DIAGNOSIS — E11.9 TYPE 2 DIABETES MELLITUS WITHOUT COMPLICATION, WITHOUT LONG-TERM CURRENT USE OF INSULIN: ICD-10-CM

## 2020-09-10 RX ORDER — CANAGLIFLOZIN 100 MG/1
100 TABLET, FILM COATED ORAL DAILY
Qty: 30 TABLET | Refills: 5 | Status: SHIPPED | OUTPATIENT
Start: 2020-09-10 | End: 2020-10-22 | Stop reason: SDUPTHER

## 2020-09-10 NOTE — TELEPHONE ENCOUNTER
----- Message from Radha Cordoba sent at 9/10/2020 10:59 AM CDT -----  Contact: Eliana lind [phone]  Type:  Needs Medical Advice    Who Called: Eliana lind  Symptoms (please be specific):   How long has patient had these symptoms:   Pharmacy name and phone #:   Would the patient rather a call back or a response via [facility]? Call back  Best Call Back Number: [phone]  Additional Information: Pt is requesting a call back from the nurse because she needs her diabetic medication but the doctor won't fill the medication until she is seen. She says that she has an appt but not until 10/22 and she called in August to make an appt and that was first available. If she can't refill the medication she would like to be referred to the diabetes doctor so that she could get her medication refilled because she's been out of medication for a month

## 2020-09-10 NOTE — TELEPHONE ENCOUNTER
----- Message from Arnoldo Maguire sent at 9/10/2020  1:06 PM CDT -----  Is this a refill or new RX:  Refill    RX name and strength: canagliflozin (INVOKANA) 100 mg Tabt    Pharmacy name and phone # [facility] [place] - [address] [facility] [phone] (Phone) [phone] (Fax)

## 2020-09-29 ENCOUNTER — PATIENT MESSAGE (OUTPATIENT)
Dept: OTHER | Facility: OTHER | Age: 85
End: 2020-09-29

## 2020-10-22 ENCOUNTER — OFFICE VISIT (OUTPATIENT)
Dept: PRIMARY CARE CLINIC | Facility: CLINIC | Age: 85
End: 2020-10-22
Payer: MEDICARE

## 2020-10-22 VITALS
OXYGEN SATURATION: 93 % | BODY MASS INDEX: 32.33 KG/M2 | HEIGHT: 62 IN | SYSTOLIC BLOOD PRESSURE: 112 MMHG | WEIGHT: 175.69 LBS | DIASTOLIC BLOOD PRESSURE: 58 MMHG | HEART RATE: 93 BPM

## 2020-10-22 DIAGNOSIS — R92.8 ABNORMAL MAMMOGRAM: ICD-10-CM

## 2020-10-22 DIAGNOSIS — E78.5 HYPERLIPIDEMIA, UNSPECIFIED HYPERLIPIDEMIA TYPE: ICD-10-CM

## 2020-10-22 DIAGNOSIS — Z79.4 TYPE 2 DIABETES MELLITUS WITHOUT COMPLICATION, WITH LONG-TERM CURRENT USE OF INSULIN: Primary | ICD-10-CM

## 2020-10-22 DIAGNOSIS — I25.10 CORONARY ARTERY DISEASE, ANGINA PRESENCE UNSPECIFIED, UNSPECIFIED VESSEL OR LESION TYPE, UNSPECIFIED WHETHER NATIVE OR TRANSPLANTED HEART: ICD-10-CM

## 2020-10-22 DIAGNOSIS — I25.10 CORONARY ARTERY DISEASE INVOLVING NATIVE CORONARY ARTERY WITHOUT ANGINA PECTORIS, UNSPECIFIED WHETHER NATIVE OR TRANSPLANTED HEART: ICD-10-CM

## 2020-10-22 DIAGNOSIS — Z23 NEED FOR INFLUENZA VACCINATION: ICD-10-CM

## 2020-10-22 DIAGNOSIS — I10 ESSENTIAL HYPERTENSION: ICD-10-CM

## 2020-10-22 DIAGNOSIS — B37.9 YEAST INFECTION: ICD-10-CM

## 2020-10-22 DIAGNOSIS — E11.9 TYPE 2 DIABETES MELLITUS WITHOUT COMPLICATION, WITH LONG-TERM CURRENT USE OF INSULIN: Primary | ICD-10-CM

## 2020-10-22 LAB
BILIRUB SERPL-MCNC: NORMAL MG/DL
BLOOD URINE, POC: NORMAL
CLARITY, POC UA: NORMAL
COLOR, POC UA: NORMAL
GLUCOSE SERPL-MCNC: 135 MG/DL (ref 70–110)
GLUCOSE UR QL STRIP: 1000
KETONES UR QL STRIP: NORMAL
LEUKOCYTE ESTERASE URINE, POC: NORMAL
NITRITE, POC UA: NORMAL
PH, POC UA: 5
PROTEIN, POC: NORMAL
SPECIFIC GRAVITY, POC UA: 1.01
UROBILINOGEN, POC UA: NORMAL

## 2020-10-22 PROCEDURE — G0008 ADMIN INFLUENZA VIRUS VAC: HCPCS | Mod: PBBFAC,PN

## 2020-10-22 PROCEDURE — 99215 PR OFFICE/OUTPT VISIT, EST, LEVL V, 40-54 MIN: ICD-10-PCS | Mod: S$PBB,,, | Performed by: FAMILY MEDICINE

## 2020-10-22 PROCEDURE — 81002 URINALYSIS NONAUTO W/O SCOPE: CPT | Mod: PBBFAC,PN | Performed by: FAMILY MEDICINE

## 2020-10-22 PROCEDURE — 99999 PR PBB SHADOW E&M-EST. PATIENT-LVL V: CPT | Mod: PBBFAC,,, | Performed by: FAMILY MEDICINE

## 2020-10-22 PROCEDURE — 82962 GLUCOSE BLOOD TEST: CPT | Mod: PBBFAC,PN | Performed by: FAMILY MEDICINE

## 2020-10-22 PROCEDURE — 99999 PR PBB SHADOW E&M-EST. PATIENT-LVL V: ICD-10-PCS | Mod: PBBFAC,,, | Performed by: FAMILY MEDICINE

## 2020-10-22 PROCEDURE — 99215 OFFICE O/P EST HI 40 MIN: CPT | Mod: PBBFAC,PN | Performed by: FAMILY MEDICINE

## 2020-10-22 PROCEDURE — 90694 VACC AIIV4 NO PRSRV 0.5ML IM: CPT | Mod: PBBFAC,PN

## 2020-10-22 PROCEDURE — 99215 OFFICE O/P EST HI 40 MIN: CPT | Mod: S$PBB,,, | Performed by: FAMILY MEDICINE

## 2020-10-22 RX ORDER — CANAGLIFLOZIN 100 MG/1
100 TABLET, FILM COATED ORAL DAILY
Qty: 90 TABLET | Refills: 3 | Status: SHIPPED | OUTPATIENT
Start: 2020-10-22 | End: 2020-12-16

## 2020-10-22 RX ORDER — AMLODIPINE BESYLATE 2.5 MG/1
2.5 TABLET ORAL DAILY
Qty: 90 TABLET | Refills: 3 | Status: SHIPPED | OUTPATIENT
Start: 2020-10-22 | End: 2021-02-19

## 2020-10-22 RX ORDER — METFORMIN HYDROCHLORIDE 500 MG/1
1000 TABLET, EXTENDED RELEASE ORAL 2 TIMES DAILY WITH MEALS
Qty: 360 TABLET | Refills: 3 | Status: SHIPPED | OUTPATIENT
Start: 2020-10-22 | End: 2020-12-16

## 2020-10-22 RX ORDER — ATORVASTATIN CALCIUM 40 MG/1
40 TABLET, FILM COATED ORAL DAILY
Qty: 90 TABLET | Refills: 3 | Status: SHIPPED | OUTPATIENT
Start: 2020-10-22 | End: 2021-11-01

## 2020-10-22 RX ORDER — IRBESARTAN 300 MG/1
150 TABLET ORAL DAILY
Qty: 90 TABLET | Refills: 3 | Status: SHIPPED | OUTPATIENT
Start: 2020-10-22 | End: 2022-01-07 | Stop reason: SDUPTHER

## 2020-10-22 RX ORDER — NYSTATIN AND TRIAMCINOLONE ACETONIDE 100000; 1 [USP'U]/G; MG/G
CREAM TOPICAL 4 TIMES DAILY
Qty: 30 G | Refills: 6 | Status: SHIPPED | OUTPATIENT
Start: 2020-10-22 | End: 2021-01-13

## 2020-10-22 RX ORDER — METOPROLOL SUCCINATE 25 MG/1
25 TABLET, EXTENDED RELEASE ORAL DAILY
Qty: 90 TABLET | Refills: 3 | Status: SHIPPED | OUTPATIENT
Start: 2020-10-22 | End: 2020-12-03

## 2020-10-22 NOTE — PROGRESS NOTES
Two patient identifiers verified.  Allergies reviewed. Flu HD 0.5 ml IM administered to Left deltoid per MD order.  Patient tolerated injection well; no redness, bleeding, or bruising noted to injection site.  Patient instructed to remain in clinic setting for 15 minutes.  Verbalizes understanding.

## 2020-10-23 NOTE — PROGRESS NOTES
Eliana Wagner is a 85 y.o. female   F/u multiple medical issues  Source of history: Patient  Past Medical History:   Diagnosis Date    AICD (automatic cardioverter/defibrillator) present 10/2013    medtronic  Model [id number]   Serial [id number]    pacemaker    Cervical polyp 5/28/2015    Diabetes mellitus, type 2     Hyperlipidemia     Hypertension     Stroke     tia x 3   ( last in 2005)     Patient  reports that she has never smoked. She has never used smokeless tobacco. She reports current alcohol use. She reports that she does not use drugs.  Family History   Problem Relation Age of Onset    Cataracts Mother     Cataracts Father     Glaucoma Maternal Grandfather     Breast cancer Neg Hx     Colon cancer Neg Hx     Ovarian cancer Neg Hx      ROS:  GENERAL: No fever, chills, fatigability or weight loss.  SKIN: No rashes, itching or changes in color or texture of skin.  HEAD: No headaches or recent head trauma.  EYES: Visual acuity fine. No photophobia, ocular pain or diplopia.  EARS: Denies ear pain, discharge or vertigo.  NOSE: No loss of smell, no epistaxis or postnasal drip.  MOUTH & THROAT: No hoarseness or change in voice. No excessive gum bleeding.  NODES: Denies swollen glands.  CHEST: Denies BUENROSTRO, cyanosis, wheezing, cough and sputum production.  CARDIOVASCULAR: Denies chest pain, PND, orthopnea or reduced exercise tolerance.  ABDOMEN: Appetite fine. No weight loss. Denies diarrhea, abdominal pain, hematemesis or blood in stool.  URINARY: No flank pain, dysuria or hematuria.  PERIPHERAL VASCULAR: No claudication or cyanosis.  MUSCULOSKELETAL: No joint stiffness or swelling. Denies back pain.  NEUROLOGIC: No history of seizures, paralysis, alteration of gait or coordination.    OBJECTIVE:  APPEARANCE: normal  Vitals:    10/22/20 1255   BP: (!) 112/58   Pulse: 93     SKIN: Normal skin turgor, no lesions.  HEENT: Both external auditory canals clear. Both tympanic   membranes intact. PERRL. EOMI.  Disk margins sharp. No tonsillar enlargement.   No pharyngeal erythema or exudate. No stridor.  NECK: No bruits. No cervical spine tenderness. No cervical lymphadenopathy. No thyromegaly.  NODES: No cervical, axillary or inguinal lymph node enlargement.  CHEST: Breath sounds clear bilaterally. Lungs clear to auscultation & percussion.   Good air movement. No rales. No retractions. No rhonchi. No stridor. No wheezes.  CARDIOVASCULAR: Normal S1, S2. No murmurs. No edema.  BREASTS: no masses palpated in either breast or axillary area, symmetry noted.  ABDOMEN: Bowel sounds normal. No palpable aortic enlargement.   No CVA tenderness. No pulsatile mass. No rebound tenderness.  PERIPHERAL VASCULAR: Femoral pulses present and symmetrical. No edema.  MUSCULOSKELETAL: Degenerative changes of both ankles, foot, knee, wrist and hand.  BACK: No CVA tenderness. There is no spasm, tenderness or radiculopathy noted with palpation and there is full range of motion.   NEUROLOGIC:   Cranial Nerves: II-XII grossly intact.  Motor: 5/5 strength major flexors/extensors. No tremor.  DTR's: Knees, Ankles 2+ and equal bilaterally; downgoing toes.  Sensory: Intact to light touch distally.  Gait & Posture: Normal gait and fine motion. No cerebellar signs.  MENTAL STATUS: Alert. Oriented x 3. Language skills normal. Memory intact.   No suicidal ideation. Normal affect. Normal cognitive functions. Well kept appearance.    ASSESSMENT/PLAN:   Eliana SHAH was seen today for medication refill.    Diagnoses and all orders for this visit:    Type 2 diabetes mellitus without complication, with long-term current use of insulin  -     POCT URINE DIPSTICK WITHOUT MICROSCOPE  -     POCT Glucose, Hand-Held Device  -     canagliflozin (INVOKANA) 100 mg Tab tablet; Take 1 tablet (100 mg total) by mouth once daily.  -     metFORMIN (GLUCOPHAGE-XR) 500 MG ER 24hr tablet; Take 2 tablets (1,000 mg total) by mouth 2 (two) times daily with meals.  -      Comprehensive Metabolic Panel; Future  -     Hemoglobin A1C; Future  -     Ambulatory referral/consult to Ophthalmology; Future  -     Ambulatory referral/consult to Podiatry; Future    Coronary artery disease, angina presence unspecified, unspecified vessel or lesion type, unspecified whether native or transplanted heart  -     irbesartan (AVAPRO) 300 MG tablet; Take 0.5 tablets (150 mg total) by mouth once daily.    Hyperlipidemia, unspecified hyperlipidemia type  -     atorvastatin (LIPITOR) 40 MG tablet; Take 1 tablet (40 mg total) by mouth once daily.  -     Lipid Panel; Future    Coronary artery disease involving native coronary artery without angina pectoris, unspecified whether native or transplanted heart  -     metoprolol succinate (TOPROL-XL) 25 MG 24 hr tablet; Take 1 tablet (25 mg total) by mouth once daily.  -     CBC auto differential; Future    Essential hypertension  -     amLODIPine (NORVASC) 2.5 MG tablet; Take 1 tablet (2.5 mg total) by mouth once daily.  -     TSH; Future    Yeast infection  -     nystatin-triamcinolone (MYCOLOG II) cream; Apply topically 4 (four) times daily.    Abnormal mammogram  -     US Breast Bilateral Limited; Future    Need for influenza vaccination  -     Influenza (FLUAD) - Quadrivalent (Adjuvanted) *Preferred* (65+) (PF)    labs reviewed will contact pt will results when available  A total of 45 minutes were spent face-to-face with the patient during this   encounter and over half of that time was spent on counseling and coordination of care.

## 2020-11-04 ENCOUNTER — HOSPITAL ENCOUNTER (OUTPATIENT)
Dept: RADIOLOGY | Facility: HOSPITAL | Age: 85
Discharge: HOME | End: 2020-11-04
Attending: FAMILY MEDICINE
Payer: MEDICARE

## 2020-11-04 ENCOUNTER — PATIENT OUTREACH (OUTPATIENT)
Dept: ADMINISTRATIVE | Facility: OTHER | Age: 85
End: 2020-11-04

## 2020-11-04 DIAGNOSIS — Z12.31 SCREENING MAMMOGRAM FOR HIGH-RISK PATIENT: ICD-10-CM

## 2020-11-04 DIAGNOSIS — R92.2 INCONCLUSIVE MAMMOGRAM: ICD-10-CM

## 2020-11-04 DIAGNOSIS — R92.8 ABNORMAL MAMMOGRAM: ICD-10-CM

## 2020-11-04 DIAGNOSIS — Z87.898 HX OF ABNORMAL MAMMOGRAM: ICD-10-CM

## 2020-11-04 PROCEDURE — 77062 MAMMO DIGITAL DIAGNOSTIC BILAT WITH TOMO: ICD-10-PCS | Mod: 26,,, | Performed by: RADIOLOGY

## 2020-11-04 PROCEDURE — 77066 DX MAMMO INCL CAD BI: CPT | Mod: TC

## 2020-11-04 PROCEDURE — 77062 BREAST TOMOSYNTHESIS BI: CPT | Mod: 26,,, | Performed by: RADIOLOGY

## 2020-11-04 PROCEDURE — 77066 DX MAMMO INCL CAD BI: CPT | Mod: 26,,, | Performed by: RADIOLOGY

## 2020-11-04 PROCEDURE — 77066 MAMMO DIGITAL DIAGNOSTIC BILAT WITH TOMO: ICD-10-PCS | Mod: 26,,, | Performed by: RADIOLOGY

## 2020-11-05 NOTE — PROGRESS NOTES
Care Everywhere: updated  Immunization:   Health Maintenance: updated  Media Review:   Legacy Review:   Order placed:   Upcoming appts:hemoglobin 11/10

## 2020-11-06 ENCOUNTER — OFFICE VISIT (OUTPATIENT)
Dept: PODIATRY | Facility: CLINIC | Age: 85
End: 2020-11-06
Payer: MEDICARE

## 2020-11-06 VITALS
HEIGHT: 62 IN | SYSTOLIC BLOOD PRESSURE: 135 MMHG | HEART RATE: 71 BPM | BODY MASS INDEX: 32.14 KG/M2 | DIASTOLIC BLOOD PRESSURE: 75 MMHG

## 2020-11-06 DIAGNOSIS — M20.41 HAMMER TOES OF BOTH FEET: ICD-10-CM

## 2020-11-06 DIAGNOSIS — Z79.4 TYPE 2 DIABETES MELLITUS WITHOUT COMPLICATION, WITH LONG-TERM CURRENT USE OF INSULIN: ICD-10-CM

## 2020-11-06 DIAGNOSIS — M20.42 HAMMER TOES OF BOTH FEET: ICD-10-CM

## 2020-11-06 DIAGNOSIS — E11.9 TYPE 2 DIABETES MELLITUS WITHOUT COMPLICATION, WITH LONG-TERM CURRENT USE OF INSULIN: ICD-10-CM

## 2020-11-06 DIAGNOSIS — B35.1 ONYCHOMYCOSIS DUE TO DERMATOPHYTE: Primary | ICD-10-CM

## 2020-11-06 PROCEDURE — 11721 DEBRIDE NAIL 6 OR MORE: CPT | Mod: Q9,S$PBB,, | Performed by: PODIATRIST

## 2020-11-06 PROCEDURE — 99999 PR PBB SHADOW E&M-EST. PATIENT-LVL III: CPT | Mod: PBBFAC,,, | Performed by: PODIATRIST

## 2020-11-06 PROCEDURE — 99213 OFFICE O/P EST LOW 20 MIN: CPT | Mod: PBBFAC,PO,25 | Performed by: PODIATRIST

## 2020-11-06 PROCEDURE — 11721 PR DEBRIDEMENT OF NAILS, 6 OR MORE: ICD-10-PCS | Mod: Q9,S$PBB,, | Performed by: PODIATRIST

## 2020-11-06 PROCEDURE — 11721 DEBRIDE NAIL 6 OR MORE: CPT | Mod: Q9,PBBFAC,PO | Performed by: PODIATRIST

## 2020-11-06 PROCEDURE — 99999 PR PBB SHADOW E&M-EST. PATIENT-LVL III: ICD-10-PCS | Mod: PBBFAC,,, | Performed by: PODIATRIST

## 2020-11-06 PROCEDURE — 99213 PR OFFICE/OUTPT VISIT, EST, LEVL III, 20-29 MIN: ICD-10-PCS | Mod: 25,S$PBB,, | Performed by: PODIATRIST

## 2020-11-06 PROCEDURE — 99213 OFFICE O/P EST LOW 20 MIN: CPT | Mod: 25,S$PBB,, | Performed by: PODIATRIST

## 2020-11-06 RX ORDER — CICLOPIROX 80 MG/ML
SOLUTION TOPICAL NIGHTLY
Qty: 1 BOTTLE | Refills: 0 | Status: SHIPPED | OUTPATIENT
Start: 2020-11-06 | End: 2021-01-13

## 2020-11-06 NOTE — PROGRESS NOTES
Subjective:      Patient ID: Eliana Wagner is a 85 y.o. female.    Chief Complaint:   Diabetes Mellitus (10/22/20 PCP Dr. Iza Bloom), Diabetic Foot Exam, Routine Foot Care, and Peripheral Neuropathy    Eliana SHAH is a 85 y.o. female who presents to the clinic upon referral from Dr. Lawton  for evaluation and treatment of diabetic feet. 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 Patient relates no major problem with feet. Only complaints today consist of needing a diabetic foot exam.   Pt relates she gets occasional tightness in his toes... she denies any real numbness or tinglining. She usually gets pedicures... but hasn't in while ... the left big toenail has been bad for a while.     Pt relates she is very active... plays cards and jacinda's with a group of widows.... also goes regularly to the [place]....and is very active. No issues with shoes.     Last podiatry visit 1/2020 with Dr. Chavez.    PCP: Nara Lawton MD    Date Last Seen by PCP:10/22/20    Current shoe gear: Flip-flops    Hemoglobin A1C   Date Value Ref Range Status   11/12/2019 8.0 (H) 4.0 - 5.6 % Final     Comment:     ADA Screening Guidelines:  5.7-6.4%  Consistent with prediabetes  >or=6.5%  Consistent with diabetes  High levels of fetal hemoglobin interfere with the HbA1C  assay. Heterozygous hemoglobin variants (HbS, HgC, etc)do  not significantly interfere with this assay.   However, presence of multiple variants may affect accuracy.     10/10/2019 7.8 (H) 4.0 - 5.6 % Final     Comment:     ADA Screening Guidelines:  5.7-6.4%  Consistent with prediabetes  >or=6.5%  Consistent with diabetes  High levels of fetal hemoglobin interfere with the HbA1C  assay. Heterozygous hemoglobin variants (HbS, HgC, etc)do  not significantly interfere with this assay.    However, presence of multiple variants may affect accuracy.     02/11/2019 9.2 (H) 4.0 - 5.6 % Final     Comment:     ADA Screening Guidelines:  5.7-6.4%  Consistent with prediabetes  >or=6.5%  Consistent with diabetes  High levels of fetal hemoglobin interfere with the HbA1C  assay. Heterozygous hemoglobin variants (HbS, HgC, etc)do  not significantly interfere with this assay.   However, presence of multiple variants may affect accuracy.              Past Medical History:   Diagnosis Date    AICD (automatic cardioverter/defibrillator) present 10/2013    medtronic  Model [id number]   Serial [id number]    pacemaker    Cervical polyp 5/28/2015    Diabetes mellitus, type 2     Hyperlipidemia     Hypertension     Stroke     tia x 3   ( last in 2005)     Past Surgical History:   Procedure Laterality Date    APPENDECTOMY      EYE SURGERY Bilateral     cataract    INSERTION OF PACEMAKER      july 22 2019     Orestes Kim MD    TONSILLECTOMY       Current Outpatient Medications on File Prior to Visit   Medication Sig Dispense Refill    amLODIPine (NORVASC) 2.5 MG tablet Take 1 tablet (2.5 mg total) by mouth once daily. 90 tablet 3    aspirin-calcium carbonate (WOMEN'S ASPIRIN WITH CALCIUM) 81 mg-300 mg calcium(777 mg) Tab Take by mouth.      aspirin-calcium carbonate 81 mg-300 mg calcium(777 mg) Tab Take 81 mg by mouth.      atorvastatin (LIPITOR) 40 MG tablet Take 1 tablet (40 mg total) by mouth once daily. 90 tablet 3    blood sugar diagnostic Strp 1 each by Misc.(Non-Drug; Combo Route) route once daily. 100 each 3    canagliflozin (INVOKANA) 100 mg Tab tablet Take 1 tablet (100 mg total) by mouth once daily. 90 tablet 3    clopidogrel (PLAVIX) 75 mg tablet TAKE 1 TABLET ONE TIME DAILY 90 tablet 3    fluorouracil (EFUDEX) 5 % cream       irbesartan (AVAPRO) 300 MG tablet Take 0.5 tablets (150 mg total) by mouth once daily. 90 tablet 3    lancets Misc 1 each by Misc.(Non-Drug; Combo Route) route  once daily. 100 each 3    metFORMIN (GLUCOPHAGE-XR) 500 MG ER 24hr tablet Take 2 tablets (1,000 mg total) by mouth 2 (two) times daily with meals. 360 tablet 3    metoprolol succinate (TOPROL-XL) 25 MG 24 hr tablet Take 1 tablet (25 mg total) by mouth once daily. 90 tablet 3    mv-min-FA-Rd-Mu-cdxouez-lutein (CENTRUM) 0.4-162-18 mg Tab Take by mouth. 1 Tablet Oral Every day      nystatin-triamcinolone (MYCOLOG II) cream Apply topically 4 (four) times daily. 30 g 6    blood-glucose meter kit Use as instructed 1 each 0     No current facility-administered medications on file prior to visit.      Review of patient's allergies indicates:   Allergen Reactions    Penicillins      Caused uti       Review of Systems   Constitution: Negative for chills, decreased appetite, fever, malaise/fatigue, night sweats, weight gain and weight loss.   Cardiovascular: Positive for leg swelling. Negative for chest pain, claudication, dyspnea on exertion, palpitations and syncope.   Respiratory: Negative for cough and shortness of breath.    Endocrine: Negative for cold intolerance and heat intolerance.   Hematologic/Lymphatic: Negative for bleeding problem. Does not bruise/bleed easily.   Skin: Positive for nail changes. Negative for color change, dry skin, flushing, itching, poor wound healing, rash, skin cancer, suspicious lesions and unusual hair distribution.   Musculoskeletal: Positive for arthritis. Negative for back pain, falls, gout, joint pain, joint swelling, muscle cramps, muscle weakness, myalgias, neck pain and stiffness.   Gastrointestinal: Negative for diarrhea, nausea and vomiting.   Neurological: Negative for dizziness, focal weakness, light-headedness, numbness, paresthesias, tremors, vertigo and weakness.   Psychiatric/Behavioral: Negative for altered mental status and depression. The patient does not have insomnia.    Allergic/Immunologic: Negative.            Objective:       Vitals:    11/06/20 1007   BP:  "135/75   Pulse: 71   Height: 5' 2" (1.575 m)   PainSc: 0-No pain         Physical Exam  Vitals signs reviewed.   Constitutional:       General: She is not in acute distress.     Appearance: She is well-developed. She is not ill-appearing, toxic-appearing or diaphoretic.      Comments: Proper supportive shoegear   Cardiovascular:      Pulses:           Dorsalis pedis pulses are 2+ on the right side and 2+ on the left side.        Posterior tibial pulses are 1+ on the right side and 1+ on the left side.   Musculoskeletal:         General: No tenderness.      Right lower le+ Edema present.      Left lower le+ Edema present.      Right foot: Decreased range of motion. Deformity present.      Left foot: Decreased range of motion. Deformity present.      Comments: Flexible pes planus foot type w/ medial arch collapse and mild gastroc equinus      Reducible extensor and flexor contractures at the MTPJ and PIPJ of toes 2-5, bilat.      1st MPJ exostosis w/ lateral deviation of hallux, non trackbound.     + pop with debridement left hallux nail   Feet:      Right foot:      Protective Sensation: 10 sites tested. 10 sites sensed.      Skin integrity: Dry skin present. No ulcer, blister, skin breakdown, erythema, warmth or callus.      Toenail Condition: Fungal disease present.     Left foot:      Protective Sensation: 10 sites tested. 10 sites sensed.      Skin integrity: Dry skin present. No ulcer, blister, skin breakdown, erythema, warmth or callus.      Toenail Condition: Fungal disease present.     Comments: Toenails 1-5 bilaterally are elongated by 2-3 mm, thickened by 2-3 mm, discolored/yellowed, dystrophic, brittle with subungual debris.    No soi or open lesions  Skin:     General: Skin is warm.      Capillary Refill: Capillary refill takes 2 to 3 seconds.      Coloration: Skin is not pale.      Findings: No erythema or rash.   Neurological:      Mental Status: She is alert and oriented to person, place, and " time.      Sensory: No sensory deficit.      Gait: Gait is intact.   Psychiatric:         Attention and Perception: Attention normal.         Mood and Affect: Mood normal.         Speech: Speech normal.         Behavior: Behavior normal.         Thought Content: Thought content normal.         Judgment: Judgment normal.               Assessment:       Encounter Diagnoses   Name Primary?    Type 2 diabetes mellitus without complication, with long-term current use of insulin     Onychomycosis due to dermatophyte Yes    Hammer toes of both feet          Plan:       Eliana SHAH was seen today for diabetes mellitus, diabetic foot exam, routine foot care and peripheral neuropathy.    Diagnoses and all orders for this visit:    Onychomycosis due to dermatophyte    Type 2 diabetes mellitus without complication, with long-term current use of insulin  -     Ambulatory referral/consult to Podiatry    Hammer toes of both feet    Other orders  -     ciclopirox (PENLAC) 8 % Soln; Apply topically nightly. Apply 7 days and once a week remove with nail polish remover.      I counseled the patient on her conditions, their implications and medical management.    - d/w pedicure precautions... recommend wait until nails improve in condition    - Shoe inspection. Diabetic Foot Education. Patient reminded of the importance of good nutrition and blood sugar control to help prevent podiatric complications of diabetes. Patient instructed on proper foot hygeine. We discussed wearing proper shoe gear, daily foot inspections, never walking without protective shoe gear, never putting sharp instruments to feet, routine podiatric nail visits every 2-3 months.      - With patient's permission, nails were aggressively reduced and debrided x 10 to their soft tissue attachment mechanically , removing all offending nail and debris. Patient relates relief following the procedure. She will continue to monitor the areas daily, inspect her feet, wear  protective shoe gear when ambulatory, moisturizer to maintain skin integrity and follow in this office in approximately 2-3 months, sooner p.r.n.        - recommend penlac  ..Discuss treatment options for nail fungus.  I explained that fungus lives in a warm dark moist environment and therefore patient should make every attempt to keep feet clean and dry.  We discussed drying feet thoroughly after shower particularly between the toes and then applying powder between the toes and in the shoes.   For fungal toenails I prescribed Penlac to be used daily for up to a year.  We discussed oral Lamisil but I did not recommend it as a first line of treatment since it is an internal medicine that may potentially have side effects, including liver problems. Patient elects for topical treatment. Patient instructed on proper use of Penlac.    - continue shoes and check feet    - rtc 6 months

## 2020-11-06 NOTE — LETTER
November 6, 2020      Nara Lawton MD  [address]  [facility] [address]           [facility]  [address]   [facility] [address]  Phone: [phone]  Fax: [phone]          Patient: Eliana Wagner   MR Number: [id number]   YOB: 1935   Date of Visit: 11/6/2020       Dear Dr. Nara Lawton:    Thank you for referring Eliana Wagner to me for evaluation. Attached you will find relevant portions of my assessment and plan of care.    If you have questions, please do not hesitate to call me. I look forward to following Eliana Wagner along with you.    Sincerely,    Stephania Valentin DPM    Enclosure  CC:  No Recipients    If you would like to receive this communication electronically, please contact [email or web address] or [phone] to request more information on [email or web address] Link access.    For providers and/or their staff who would like to refer a patient to [facility], please contact us through our one-stop-shop provider referral line, [facility], at [phone].    If you feel you have received this communication in error or would no longer like to receive these types of communications, please e-mail [email or web address]

## 2020-11-10 ENCOUNTER — LAB VISIT (OUTPATIENT)
Dept: LAB | Facility: HOSPITAL | Age: 85
End: 2020-11-10
Attending: FAMILY MEDICINE
Payer: MEDICARE

## 2020-11-10 DIAGNOSIS — E78.5 HYPERLIPIDEMIA, UNSPECIFIED HYPERLIPIDEMIA TYPE: ICD-10-CM

## 2020-11-10 DIAGNOSIS — I10 ESSENTIAL HYPERTENSION: ICD-10-CM

## 2020-11-10 DIAGNOSIS — E11.9 TYPE 2 DIABETES MELLITUS WITHOUT COMPLICATION, WITH LONG-TERM CURRENT USE OF INSULIN: ICD-10-CM

## 2020-11-10 DIAGNOSIS — Z79.4 TYPE 2 DIABETES MELLITUS WITHOUT COMPLICATION, WITH LONG-TERM CURRENT USE OF INSULIN: ICD-10-CM

## 2020-11-10 DIAGNOSIS — I25.10 CORONARY ARTERY DISEASE INVOLVING NATIVE CORONARY ARTERY WITHOUT ANGINA PECTORIS, UNSPECIFIED WHETHER NATIVE OR TRANSPLANTED HEART: ICD-10-CM

## 2020-11-10 LAB
ALBUMIN SERPL BCP-MCNC: 3.6 G/DL (ref 3.5–5.2)
ALP SERPL-CCNC: 75 U/L (ref 55–135)
ALT SERPL W/O P-5'-P-CCNC: 27 U/L (ref 10–44)
ANION GAP SERPL CALC-SCNC: 11 MMOL/L (ref 8–16)
AST SERPL-CCNC: 25 U/L (ref 10–40)
BASOPHILS # BLD AUTO: 0.03 K/UL (ref 0–0.2)
BASOPHILS NFR BLD: 0.4 % (ref 0–1.9)
BILIRUB SERPL-MCNC: 0.4 MG/DL (ref 0.1–1)
BUN SERPL-MCNC: 14 MG/DL (ref 8–23)
CALCIUM SERPL-MCNC: 9.3 MG/DL (ref 8.7–10.5)
CHLORIDE SERPL-SCNC: 105 MMOL/L (ref 95–110)
CHOLEST SERPL-MCNC: 149 MG/DL (ref 120–199)
CHOLEST/HDLC SERPL: 2.7 {RATIO} (ref 2–5)
CO2 SERPL-SCNC: 27 MMOL/L (ref 23–29)
CREAT SERPL-MCNC: 0.8 MG/DL (ref 0.5–1.4)
DIFFERENTIAL METHOD: ABNORMAL
EOSINOPHIL # BLD AUTO: 0.1 K/UL (ref 0–0.5)
EOSINOPHIL NFR BLD: 1.4 % (ref 0–8)
ERYTHROCYTE [DISTWIDTH] IN BLOOD BY AUTOMATED COUNT: 13.7 % (ref 11.5–14.5)
EST. GFR  (AFRICAN AMERICAN): >60 ML/MIN/1.73 M^2
EST. GFR  (NON AFRICAN AMERICAN): >60 ML/MIN/1.73 M^2
ESTIMATED AVG GLUCOSE: 174 MG/DL (ref 68–131)
GLUCOSE SERPL-MCNC: 139 MG/DL (ref 70–110)
HBA1C MFR BLD HPLC: 7.7 % (ref 4–5.6)
HCT VFR BLD AUTO: 45.7 % (ref 37–48.5)
HDLC SERPL-MCNC: 55 MG/DL (ref 40–75)
HDLC SERPL: 36.9 % (ref 20–50)
HGB BLD-MCNC: 13.8 G/DL (ref 12–16)
IMM GRANULOCYTES # BLD AUTO: 0.04 K/UL (ref 0–0.04)
IMM GRANULOCYTES NFR BLD AUTO: 0.5 % (ref 0–0.5)
LDLC SERPL CALC-MCNC: 66.8 MG/DL (ref 63–159)
LYMPHOCYTES # BLD AUTO: 3 K/UL (ref 1–4.8)
LYMPHOCYTES NFR BLD: 36 % (ref 18–48)
MCH RBC QN AUTO: 29.4 PG (ref 27–31)
MCHC RBC AUTO-ENTMCNC: 30.2 G/DL (ref 32–36)
MCV RBC AUTO: 97 FL (ref 82–98)
MONOCYTES # BLD AUTO: 0.4 K/UL (ref 0.3–1)
MONOCYTES NFR BLD: 5.3 % (ref 4–15)
NEUTROPHILS # BLD AUTO: 4.7 K/UL (ref 1.8–7.7)
NEUTROPHILS NFR BLD: 56.4 % (ref 38–73)
NONHDLC SERPL-MCNC: 94 MG/DL
NRBC BLD-RTO: 0 /100 WBC
PLATELET # BLD AUTO: 164 K/UL (ref 150–350)
PMV BLD AUTO: 10.9 FL (ref 9.2–12.9)
POTASSIUM SERPL-SCNC: 4.5 MMOL/L (ref 3.5–5.1)
PROT SERPL-MCNC: 6.7 G/DL (ref 6–8.4)
RBC # BLD AUTO: 4.7 M/UL (ref 4–5.4)
SODIUM SERPL-SCNC: 143 MMOL/L (ref 136–145)
TRIGL SERPL-MCNC: 136 MG/DL (ref 30–150)
TSH SERPL DL<=0.005 MIU/L-ACNC: 2.08 UIU/ML (ref 0.4–4)
WBC # BLD AUTO: 8.38 K/UL (ref 3.9–12.7)

## 2020-11-10 PROCEDURE — 36415 COLL VENOUS BLD VENIPUNCTURE: CPT | Mod: PO

## 2020-11-10 PROCEDURE — 83036 HEMOGLOBIN GLYCOSYLATED A1C: CPT

## 2020-11-10 PROCEDURE — 85025 COMPLETE CBC W/AUTO DIFF WBC: CPT

## 2020-11-10 PROCEDURE — 84443 ASSAY THYROID STIM HORMONE: CPT

## 2020-11-10 PROCEDURE — 80061 LIPID PANEL: CPT

## 2020-11-10 PROCEDURE — 80053 COMPREHEN METABOLIC PANEL: CPT

## 2020-11-23 ENCOUNTER — TELEPHONE (OUTPATIENT)
Dept: PRIMARY CARE CLINIC | Facility: CLINIC | Age: 85
End: 2020-11-23

## 2020-11-23 NOTE — TELEPHONE ENCOUNTER
[employer] message sent to patient to schedule fasting lab appointment prior to annual on 12/16/2020.

## 2020-12-01 ENCOUNTER — OFFICE VISIT (OUTPATIENT)
Dept: OPTOMETRY | Facility: CLINIC | Age: 85
End: 2020-12-01
Payer: MEDICARE

## 2020-12-01 DIAGNOSIS — Z96.1 PSEUDOPHAKIA: ICD-10-CM

## 2020-12-01 DIAGNOSIS — E11.9 TYPE 2 DIABETES MELLITUS WITHOUT COMPLICATION, WITH LONG-TERM CURRENT USE OF INSULIN: Primary | ICD-10-CM

## 2020-12-01 DIAGNOSIS — Z79.4 TYPE 2 DIABETES MELLITUS WITHOUT COMPLICATION, WITH LONG-TERM CURRENT USE OF INSULIN: Primary | ICD-10-CM

## 2020-12-01 DIAGNOSIS — D31.31 CHOROIDAL NEVUS OF RIGHT EYE: ICD-10-CM

## 2020-12-01 PROCEDURE — 99999 PR PBB SHADOW E&M-EST. PATIENT-LVL III: ICD-10-PCS | Mod: PBBFAC,,, | Performed by: OPTOMETRIST

## 2020-12-01 PROCEDURE — 99999 PR PBB SHADOW E&M-EST. PATIENT-LVL III: CPT | Mod: PBBFAC,,, | Performed by: OPTOMETRIST

## 2020-12-01 PROCEDURE — 92014 PR EYE EXAM, EST PATIENT,COMPREHESV: ICD-10-PCS | Mod: S$PBB,,, | Performed by: OPTOMETRIST

## 2020-12-01 PROCEDURE — 99213 OFFICE O/P EST LOW 20 MIN: CPT | Mod: PBBFAC,PO | Performed by: OPTOMETRIST

## 2020-12-01 PROCEDURE — 92014 COMPRE OPH EXAM EST PT 1/>: CPT | Mod: S$PBB,,, | Performed by: OPTOMETRIST

## 2020-12-01 NOTE — PROGRESS NOTES
Subjective:       Patient ID: Eliana Wagner is a 85 y.o. female      Chief Complaint   Patient presents with    Concerns About Ocular Health    Diabetic Eye Exam     History of Present Illness  HDls: 12/27/19 Dr. Hoffman     84 y/o female presents today for diabetic eye exam.   Pt states no changes in vision. Pt does not wear any glasses.      x 2 days     No tearing  No itching  No burning  No pain  No ha's  + od floaters  No flashes    Eye meds  None    Hemoglobin A1C       Date                     Value               Ref Range           Status                11/10/2020               7.7 (H)             4.0 - 5.6 %         Final                  11/12/2019               8.0 (H)             4.0 - 5.6 %         Final                 10/10/2019               7.8 (H)             4.0 - 5.6 %         Final                  Assessment/Plan:     1. Type 2 diabetes mellitus without complication, with long-term current use of insulin  No diabetic retinopathy. Discussed with pt the effects of diabetes on vision, importance of good blood sugar control, compliance with meds, and follow up care with PCP. Return in 1 year for dilated eye exam, sooner PRN.      2. Choroidal nevus of right eye  Stable. Monitor.    3. Pseudophakia  MFIOL doing well. Clear. Centered. Declined MRx.     Follow up in about 1 year (around 12/1/2021) for Diabetic Eye Exam.

## 2020-12-01 NOTE — LETTER
December 1, 2020      Nara Lawton MD  [address] [facility] [address]           [facility]  [address]  [place] [address]  Phone: [phone]  Fax: [phone]          Patient: Eliana Wagner   MR Number: [id number]   YOB: 1935   Date of Visit: 12/1/2020       Dear Dr. Nara Lawton:    Thank you for referring Eliana Wagner to me for evaluation. Attached you will find relevant portions of my assessment and plan of care.    If you have questions, please do not hesitate to call me. I look forward to following Eliana Wagner along with you.    Sincerely,    Elin Hoffman, OD    Enclosure  CC:  No Recipients    If you would like to receive this communication electronically, please contact [email or web address] or [phone] to request more information on [employer] Link access.    For providers and/or their staff who would like to refer a patient to [facility], please contact us through our one-stop-shop provider referral line, [facility] , at [phone].    If you feel you have received this communication in error or would no longer like to receive these types of communications, please e-mail [email or web address]

## 2020-12-11 ENCOUNTER — PATIENT MESSAGE (OUTPATIENT)
Dept: OTHER | Facility: OTHER | Age: 85
End: 2020-12-11

## 2020-12-16 ENCOUNTER — TELEPHONE (OUTPATIENT)
Dept: PRIMARY CARE CLINIC | Facility: CLINIC | Age: 85
End: 2020-12-16

## 2020-12-16 ENCOUNTER — OFFICE VISIT (OUTPATIENT)
Dept: PRIMARY CARE CLINIC | Facility: CLINIC | Age: 85
End: 2020-12-16
Payer: MEDICARE

## 2020-12-16 VITALS
WEIGHT: 179.44 LBS | SYSTOLIC BLOOD PRESSURE: 132 MMHG | OXYGEN SATURATION: 99 % | HEIGHT: 62 IN | TEMPERATURE: 98 F | BODY MASS INDEX: 33.02 KG/M2 | HEART RATE: 75 BPM | DIASTOLIC BLOOD PRESSURE: 66 MMHG | RESPIRATION RATE: 19 BRPM

## 2020-12-16 DIAGNOSIS — Z79.4 TYPE 2 DIABETES MELLITUS WITHOUT COMPLICATION, WITH LONG-TERM CURRENT USE OF INSULIN: ICD-10-CM

## 2020-12-16 DIAGNOSIS — Z95.0 PACEMAKER: Primary | ICD-10-CM

## 2020-12-16 DIAGNOSIS — E11.9 TYPE 2 DIABETES MELLITUS WITHOUT COMPLICATION, WITH LONG-TERM CURRENT USE OF INSULIN: ICD-10-CM

## 2020-12-16 DIAGNOSIS — L03.90 CELLULITIS, UNSPECIFIED CELLULITIS SITE: ICD-10-CM

## 2020-12-16 PROCEDURE — 99215 OFFICE O/P EST HI 40 MIN: CPT | Mod: PBBFAC,PN | Performed by: FAMILY MEDICINE

## 2020-12-16 PROCEDURE — 99215 OFFICE O/P EST HI 40 MIN: CPT | Mod: S$PBB,,, | Performed by: FAMILY MEDICINE

## 2020-12-16 PROCEDURE — 99999 PR PBB SHADOW E&M-EST. PATIENT-LVL V: CPT | Mod: PBBFAC,,, | Performed by: FAMILY MEDICINE

## 2020-12-16 PROCEDURE — 99999 PR PBB SHADOW E&M-EST. PATIENT-LVL V: ICD-10-PCS | Mod: PBBFAC,,, | Performed by: FAMILY MEDICINE

## 2020-12-16 PROCEDURE — 99215 PR OFFICE/OUTPT VISIT, EST, LEVL V, 40-54 MIN: ICD-10-PCS | Mod: S$PBB,,, | Performed by: FAMILY MEDICINE

## 2020-12-16 RX ORDER — SULFAMETHOXAZOLE AND TRIMETHOPRIM 400; 80 MG/1; MG/1
1 TABLET ORAL 2 TIMES DAILY
Qty: 20 TABLET | Refills: 0 | Status: SHIPPED | OUTPATIENT
Start: 2020-12-16 | End: 2021-01-13

## 2020-12-16 RX ORDER — METFORMIN HYDROCHLORIDE 500 MG/1
500 TABLET, EXTENDED RELEASE ORAL 2 TIMES DAILY WITH MEALS
Qty: 60 TABLET | Refills: 0 | Status: SHIPPED | OUTPATIENT
Start: 2020-12-16 | End: 2021-09-24

## 2020-12-16 NOTE — TELEPHONE ENCOUNTER
Left VM advising pt per provider;Let pt know covid vaccine will not be available for another six months call back then to check

## 2020-12-16 NOTE — PROGRESS NOTES
Eliana Wagner is a 85 y.o. female   F/u of multiple medical issues  Source of history: Patient  Past Medical History:   Diagnosis Date    AICD (automatic cardioverter/defibrillator) present 10/2013    medtronic  Model [id number]   Serial [id number]    pacemaker    Cervical polyp 5/28/2015    Diabetes mellitus, type 2     Hyperlipidemia     Hypertension     Retinal detachment     od     Stroke     tia x 3   ( last in 2005)     Patient  reports that she has never smoked. She has never used smokeless tobacco. She reports current alcohol use. She reports that she does not use drugs.  Family History   Problem Relation Age of Onset    Cataracts Mother     Cataracts Father     Glaucoma Maternal Grandfather     No Known Problems Sister     No Known Problems Brother     No Known Problems Maternal Aunt     No Known Problems Maternal Uncle     No Known Problems Paternal Aunt     No Known Problems Paternal Uncle     No Known Problems Maternal Grandmother     No Known Problems Paternal Grandmother     No Known Problems Paternal Grandfather     Breast cancer Neg Hx     Colon cancer Neg Hx     Ovarian cancer Neg Hx     Amblyopia Neg Hx     Blindness Neg Hx     Cancer Neg Hx     Diabetes Neg Hx     Hypertension Neg Hx     Macular degeneration Neg Hx     Retinal detachment Neg Hx     Strabismus Neg Hx     Stroke Neg Hx     Thyroid disease Neg Hx      ROS:  GENERAL: No fever, chills, fatigability or weight loss.  SKIN: No rashes, itching or changes in color or texture of skin.  HEAD: No headaches or recent head trauma.  EYES: Visual acuity fine. No photophobia, ocular pain or diplopia.  EARS: Denies ear pain, discharge or vertigo.  NOSE: No loss of smell, no epistaxis or postnasal drip.  MOUTH & THROAT: No hoarseness or change in voice. No excessive gum bleeding.  NODES: Denies swollen glands.  CHEST: Denies BUENROSTRO, cyanosis, wheezing, cough and sputum production.  CARDIOVASCULAR: Denies chest pain,  PND, orthopnea or reduced exercise tolerance.  ABDOMEN: Appetite fine. No weight loss. Denies diarrhea, abdominal pain, hematemesis or blood in stool.  URINARY: No flank pain, dysuria or hematuria.  PERIPHERAL VASCULAR: No claudication or cyanosis.  MUSCULOSKELETAL: No joint stiffness or swelling. Denies back pain.  NEUROLOGIC: No history of seizures, paralysis, alteration of gait or coordination.    OBJECTIVE:  APPEARANCE: normal appearance  Vitals:    12/16/20 0959   BP: 132/66   Pulse: 75   Resp: 19   Temp: 97.8 °F (36.6 °C)     SKIN: Normal skin turgor, no lesions.  HEENT: Both external auditory canals clear. Both tympanic membranes intact.   PERRL. EOMI. Disk margins sharp. No tonsillar enlargement. No pharyngeal erythema or exudate. No stridor.  NECK: No bruits. No cervical spine tenderness. No cervical lymphadenopathy. No thyromegaly.  NODES: No cervical, axillary or inguinal lymph node enlargement.  CHEST: Breath sounds clear bilaterally. Lungs clear to auscultation & percussion.   Good air movement. No rales. No retractions. No rhonchi. No stridor. No wheezes.  CARDIOVASCULAR: Normal S1, S2. No murmurs. No edema.  BREASTS: no masses palpated in either breast or axillary area, symmetry noted.  ABDOMEN: Bowel sounds normal. No palpable aortic enlargement. No CVA tenderness. No pulsatile mass. No rebound tenderness.  PERIPHERAL VASCULAR: Femoral pulses present and symmetrical. No edema.  MUSCULOSKELETAL: Degenerative changes of both ankles, foot, knee, wrist and hand.  BACK: No CVA tenderness. There is no spasm, tenderness or radiculopathy noted with palpation and there is full range of motion.   NEUROLOGIC:   Cranial Nerves: II-XII grossly intact.  Motor: 5/5 strength major flexors/extensors. No tremor.  DTR's: Knees, Ankles 2+ and equal bilaterally; downgoing toes.  Sensory: Intact to light touch distally.  Gait & Posture: Normal gait and fine motion. No cerebellar signs.  MENTAL STATUS: Alert. Oriented x 3.  Language skills normal.   Memory intact. No suicidal ideation. Normal affect. Normal cognitive functions. Well kept appearance.    ASSESSMENT/PLAN:   Eliana SHAH was seen today for annual exam.    Diagnoses and all orders for this visit:    Pacemaker  -     Ambulatory referral/consult to Cardiology; Future    Type 2 diabetes mellitus without complication, with long-term current use of insulin  -     metFORMIN (GLUCOPHAGE-XR) 500 MG ER 24hr tablet; Take 1 tablet (500 mg total) by mouth 2 (two) times daily with meals.  -     Discontinue: canagliflozin (INVOKANA) 300 mg Tab tablet; Take 1 tablet (300 mg total) by mouth once daily.  -     Hemoglobin A1C; Future  -     Comprehensive Metabolic Panel; Future    Cellulitis, unspecified cellulitis site  -     sulfamethoxazole-trimethoprim 400-80mg (BACTRIM,SEPTRA) 400-80 mg per tablet; Take 1 tablet by mouth 2 (two) times daily.      Labs reviewed blood sugars higher than optimal. F/u in 3 months to reevaluate med changes.  A total of 45 minutes were spent face-to-face with the patient during this encounter and over half of that time was spent on counseling and coordination of care.

## 2021-01-06 ENCOUNTER — PATIENT MESSAGE (OUTPATIENT)
Dept: PRIMARY CARE CLINIC | Facility: CLINIC | Age: 86
End: 2021-01-06

## 2021-01-09 ENCOUNTER — IMMUNIZATION (OUTPATIENT)
Dept: INTERNAL MEDICINE | Facility: CLINIC | Age: 86
End: 2021-01-09
Payer: MEDICARE

## 2021-01-09 DIAGNOSIS — Z23 NEED FOR VACCINATION: ICD-10-CM

## 2021-01-09 PROCEDURE — 91300 COVID-19, MRNA, LNP-S, PF, 30 MCG/0.3 ML DOSE VACCINE: CPT | Mod: PBBFAC | Performed by: FAMILY MEDICINE

## 2021-01-13 ENCOUNTER — OFFICE VISIT (OUTPATIENT)
Dept: CARDIOLOGY | Facility: CLINIC | Age: 86
End: 2021-01-13
Payer: MEDICARE

## 2021-01-13 VITALS
DIASTOLIC BLOOD PRESSURE: 74 MMHG | HEIGHT: 62 IN | SYSTOLIC BLOOD PRESSURE: 116 MMHG | HEART RATE: 68 BPM | BODY MASS INDEX: 32.41 KG/M2 | WEIGHT: 176.13 LBS

## 2021-01-13 DIAGNOSIS — Z95.0 PACEMAKER: ICD-10-CM

## 2021-01-13 DIAGNOSIS — Z95.810 BIVENTRICULAR ICD (IMPLANTABLE CARDIOVERTER-DEFIBRILLATOR) IN PLACE: ICD-10-CM

## 2021-01-13 DIAGNOSIS — I50.22 CHRONIC SYSTOLIC (CONGESTIVE) HEART FAILURE: Chronic | ICD-10-CM

## 2021-01-13 DIAGNOSIS — I25.10 ATHEROSCLEROSIS OF NATIVE CORONARY ARTERY OF NATIVE HEART WITHOUT ANGINA PECTORIS: Chronic | ICD-10-CM

## 2021-01-13 DIAGNOSIS — I42.0 NONISCHEMIC CONGESTIVE CARDIOMYOPATHY: Primary | Chronic | ICD-10-CM

## 2021-01-13 DIAGNOSIS — I10 ESSENTIAL HYPERTENSION: Chronic | ICD-10-CM

## 2021-01-13 DIAGNOSIS — E78.00 PURE HYPERCHOLESTEROLEMIA: Chronic | ICD-10-CM

## 2021-01-13 PROCEDURE — 99999 PR PBB SHADOW E&M-EST. PATIENT-LVL IV: ICD-10-PCS | Mod: PBBFAC,,,

## 2021-01-13 PROCEDURE — 99214 PR OFFICE/OUTPT VISIT, EST, LEVL IV, 30-39 MIN: ICD-10-PCS | Mod: S$PBB,,, | Performed by: INTERNAL MEDICINE

## 2021-01-13 PROCEDURE — 99214 OFFICE O/P EST MOD 30 MIN: CPT | Mod: PBBFAC,PO | Performed by: INTERNAL MEDICINE

## 2021-01-13 PROCEDURE — 99214 OFFICE O/P EST MOD 30 MIN: CPT | Mod: S$PBB,,, | Performed by: INTERNAL MEDICINE

## 2021-01-13 PROCEDURE — 99999 PR PBB SHADOW E&M-EST. PATIENT-LVL IV: CPT | Mod: PBBFAC,,,

## 2021-01-13 RX ORDER — ASPIRIN 81 MG/1
81 TABLET ORAL DAILY
COMMUNITY

## 2021-01-20 ENCOUNTER — HOSPITAL ENCOUNTER (OUTPATIENT)
Dept: CARDIOLOGY | Facility: HOSPITAL | Age: 86
Discharge: HOME | End: 2021-01-20
Attending: INTERNAL MEDICINE
Payer: MEDICARE

## 2021-01-20 VITALS
HEIGHT: 62 IN | WEIGHT: 176 LBS | SYSTOLIC BLOOD PRESSURE: 100 MMHG | DIASTOLIC BLOOD PRESSURE: 60 MMHG | BODY MASS INDEX: 32.39 KG/M2 | HEART RATE: 73 BPM

## 2021-01-20 DIAGNOSIS — I42.0 NONISCHEMIC CONGESTIVE CARDIOMYOPATHY: ICD-10-CM

## 2021-01-20 DIAGNOSIS — Z95.810 BIVENTRICULAR ICD (IMPLANTABLE CARDIOVERTER-DEFIBRILLATOR) IN PLACE: ICD-10-CM

## 2021-01-20 LAB
ASCENDING AORTA: 3.22 CM
AV INDEX (PROSTH): 0.6
AV MEAN GRADIENT: 5 MMHG
AV PEAK GRADIENT: 9 MMHG
AV VALVE AREA: 1.86 CM2
AV VELOCITY RATIO: 0.73
BSA FOR ECHO PROCEDURE: 1.87 M2
CV ECHO LV RWT: 0.3 CM
DOP CALC AO PEAK VEL: 1.46 M/S
DOP CALC AO VTI: 29.51 CM
DOP CALC LVOT AREA: 3.1 CM2
DOP CALC LVOT DIAMETER: 1.98 CM
DOP CALC LVOT PEAK VEL: 1.06 M/S
DOP CALC LVOT STROKE VOLUME: 54.84 CM3
DOP CALC RVOT PEAK VEL: 1.1 M/S
DOP CALC RVOT VTI: 23.93 CM
DOP CALCLVOT PEAK VEL VTI: 17.82 CM
E WAVE DECELERATION TIME: 219.41 MSEC
E/A RATIO: 0.62
E/E' RATIO: 12.75 M/S
ECHO LV POSTERIOR WALL: 0.78 CM (ref 0.6–1.1)
FRACTIONAL SHORTENING: 17 % (ref 28–44)
INTERVENTRICULAR SEPTUM: 0.84 CM (ref 0.6–1.1)
IVRT: 114.18 MSEC
LA MAJOR: 5.16 CM
LA MINOR: 5.05 CM
LA WIDTH: 3.26 CM
LEFT ATRIUM SIZE: 3.46 CM
LEFT ATRIUM VOLUME INDEX MOD: 20.7 ML/M2
LEFT ATRIUM VOLUME INDEX: 27 ML/M2
LEFT ATRIUM VOLUME MOD: 37.49 CM3
LEFT ATRIUM VOLUME: 48.94 CM3
LEFT INTERNAL DIMENSION IN SYSTOLE: 4.3 CM (ref 2.1–4)
LEFT VENTRICLE DIASTOLIC VOLUME INDEX: 70.49 ML/M2
LEFT VENTRICLE DIASTOLIC VOLUME: 127.61 ML
LEFT VENTRICLE MASS INDEX: 81 G/M2
LEFT VENTRICLE SYSTOLIC VOLUME INDEX: 45.8 ML/M2
LEFT VENTRICLE SYSTOLIC VOLUME: 82.84 ML
LEFT VENTRICULAR INTERNAL DIMENSION IN DIASTOLE: 5.17 CM (ref 3.5–6)
LEFT VENTRICULAR MASS: 146.08 G
LV LATERAL E/E' RATIO: 12.75 M/S
LV SEPTAL E/E' RATIO: 12.75 M/S
MV A" WAVE DURATION": 11.13 MSEC
MV PEAK A VEL: 0.82 M/S
MV PEAK E VEL: 0.51 M/S
PISA TR MAX VEL: 2.39 M/S
PULM VEIN S/D RATIO: 1.58
PV MEAN GRADIENT: 2 MMHG
PV PEAK D VEL: 0.26 M/S
PV PEAK GRADIENT: 5 MMHG
PV PEAK S VEL: 0.41 M/S
PV PEAK VELOCITY: 1.05 CM/S
RA MAJOR: 5.13 CM
RA WIDTH: 3.03 CM
RIGHT VENTRICULAR END-DIASTOLIC DIMENSION: 2.82 CM
SINUS: 2.64 CM
STJ: 2.43 CM
TDI LATERAL: 0.04 M/S
TDI SEPTAL: 0.04 M/S
TDI: 0.04 M/S
TR MAX PG: 23 MMHG
TRICUSPID ANNULAR PLANE SYSTOLIC EXCURSION: 1.63 CM

## 2021-01-20 PROCEDURE — 93306 TTE W/DOPPLER COMPLETE: CPT

## 2021-01-20 PROCEDURE — 93306 TTE W/DOPPLER COMPLETE: CPT | Mod: 26,,, | Performed by: INTERNAL MEDICINE

## 2021-01-20 PROCEDURE — 93306 ECHO (CUPID ONLY): ICD-10-PCS | Mod: 26,,, | Performed by: INTERNAL MEDICINE

## 2021-01-30 ENCOUNTER — IMMUNIZATION (OUTPATIENT)
Dept: INTERNAL MEDICINE | Facility: CLINIC | Age: 86
End: 2021-01-30
Attending: FAMILY MEDICINE
Payer: MEDICARE

## 2021-01-30 DIAGNOSIS — Z23 NEED FOR VACCINATION: Primary | ICD-10-CM

## 2021-01-30 PROCEDURE — 0002A COVID-19, MRNA, LNP-S, PF, 30 MCG/0.3 ML DOSE VACCINE: CPT | Mod: PBBFAC | Performed by: FAMILY MEDICINE

## 2021-01-30 PROCEDURE — 91300 COVID-19, MRNA, LNP-S, PF, 30 MCG/0.3 ML DOSE VACCINE: CPT | Mod: PBBFAC | Performed by: FAMILY MEDICINE

## 2021-02-05 ENCOUNTER — TELEPHONE (OUTPATIENT)
Dept: CARDIOLOGY | Facility: HOSPITAL | Age: 86
End: 2021-02-05

## 2021-02-10 ENCOUNTER — DOCUMENTATION ONLY (OUTPATIENT)
Dept: CARDIOLOGY | Facility: HOSPITAL | Age: 86
End: 2021-02-10

## 2021-02-10 ENCOUNTER — TELEPHONE (OUTPATIENT)
Dept: CARDIOLOGY | Facility: HOSPITAL | Age: 86
End: 2021-02-10

## 2021-02-19 DIAGNOSIS — I10 ESSENTIAL HYPERTENSION: ICD-10-CM

## 2021-02-19 RX ORDER — AMLODIPINE BESYLATE 2.5 MG/1
TABLET ORAL
Qty: 90 TABLET | Refills: 3 | Status: SHIPPED | OUTPATIENT
Start: 2021-02-19 | End: 2022-01-07 | Stop reason: SDUPTHER

## 2021-02-22 ENCOUNTER — PES CALL (OUTPATIENT)
Dept: ADMINISTRATIVE | Facility: CLINIC | Age: 86
End: 2021-02-22

## 2021-02-23 ENCOUNTER — OFFICE VISIT (OUTPATIENT)
Dept: INTERNAL MEDICINE | Facility: CLINIC | Age: 86
End: 2021-02-23
Payer: MEDICARE

## 2021-02-23 VITALS
RESPIRATION RATE: 14 BRPM | HEART RATE: 76 BPM | DIASTOLIC BLOOD PRESSURE: 60 MMHG | WEIGHT: 178 LBS | SYSTOLIC BLOOD PRESSURE: 110 MMHG | BODY MASS INDEX: 32.76 KG/M2 | OXYGEN SATURATION: 97 % | HEIGHT: 62 IN

## 2021-02-23 DIAGNOSIS — H61.23 BILATERAL IMPACTED CERUMEN: ICD-10-CM

## 2021-02-23 DIAGNOSIS — Z95.810 BIVENTRICULAR ICD (IMPLANTABLE CARDIOVERTER-DEFIBRILLATOR) IN PLACE: ICD-10-CM

## 2021-02-23 DIAGNOSIS — Z13.820 SCREENING FOR OSTEOPOROSIS: ICD-10-CM

## 2021-02-23 DIAGNOSIS — E11.59 OBESITY, DIABETES, AND HYPERTENSION SYNDROME: ICD-10-CM

## 2021-02-23 DIAGNOSIS — Z00.00 ENCOUNTER FOR PREVENTIVE HEALTH EXAMINATION: Primary | ICD-10-CM

## 2021-02-23 DIAGNOSIS — I15.2 OBESITY, DIABETES, AND HYPERTENSION SYNDROME: ICD-10-CM

## 2021-02-23 DIAGNOSIS — I42.0 NONISCHEMIC CONGESTIVE CARDIOMYOPATHY: ICD-10-CM

## 2021-02-23 DIAGNOSIS — E11.65 TYPE 2 DIABETES MELLITUS WITH HYPERGLYCEMIA, WITHOUT LONG-TERM CURRENT USE OF INSULIN: ICD-10-CM

## 2021-02-23 DIAGNOSIS — E11.59 HYPERTENSION ASSOCIATED WITH DIABETES: ICD-10-CM

## 2021-02-23 DIAGNOSIS — E11.69 HYPERLIPIDEMIA ASSOCIATED WITH TYPE 2 DIABETES MELLITUS: ICD-10-CM

## 2021-02-23 DIAGNOSIS — Z96.1 PSEUDOPHAKIA: ICD-10-CM

## 2021-02-23 DIAGNOSIS — E66.09 CLASS 1 OBESITY DUE TO EXCESS CALORIES WITH SERIOUS COMORBIDITY AND BODY MASS INDEX (BMI) OF 32.0 TO 32.9 IN ADULT: ICD-10-CM

## 2021-02-23 DIAGNOSIS — I50.22 CHRONIC SYSTOLIC (CONGESTIVE) HEART FAILURE: Chronic | ICD-10-CM

## 2021-02-23 DIAGNOSIS — E11.9 CONTROLLED TYPE 2 DIABETES MELLITUS WITHOUT COMPLICATION, WITHOUT LONG-TERM CURRENT USE OF INSULIN: ICD-10-CM

## 2021-02-23 DIAGNOSIS — I25.10 CORONARY ARTERY DISEASE INVOLVING NATIVE CORONARY ARTERY OF NATIVE HEART WITHOUT ANGINA PECTORIS: ICD-10-CM

## 2021-02-23 DIAGNOSIS — H52.7 REFRACTIVE ERROR: ICD-10-CM

## 2021-02-23 DIAGNOSIS — Z86.73 HISTORY OF CVA (CEREBROVASCULAR ACCIDENT): ICD-10-CM

## 2021-02-23 DIAGNOSIS — I15.2 HYPERTENSION ASSOCIATED WITH DIABETES: ICD-10-CM

## 2021-02-23 DIAGNOSIS — E66.9 OBESITY, DIABETES, AND HYPERTENSION SYNDROME: ICD-10-CM

## 2021-02-23 DIAGNOSIS — M85.89 OTHER SPECIFIED DISORDERS OF BONE DENSITY AND STRUCTURE, MULTIPLE SITES: ICD-10-CM

## 2021-02-23 DIAGNOSIS — D31.31 CHOROIDAL NEVUS OF RIGHT EYE: ICD-10-CM

## 2021-02-23 DIAGNOSIS — E11.69 OBESITY, DIABETES, AND HYPERTENSION SYNDROME: ICD-10-CM

## 2021-02-23 DIAGNOSIS — E11.9 DM TYPE 2 WITHOUT RETINOPATHY: ICD-10-CM

## 2021-02-23 DIAGNOSIS — E78.5 HYPERLIPIDEMIA ASSOCIATED WITH TYPE 2 DIABETES MELLITUS: ICD-10-CM

## 2021-02-23 PROBLEM — E66.3 OVERWEIGHT (BMI 25.0-29.9): Status: ACTIVE | Noted: 2021-02-23

## 2021-02-23 PROBLEM — E66.811 CLASS 1 OBESITY WITH SERIOUS COMORBIDITY IN ADULT: Status: ACTIVE | Noted: 2021-02-23

## 2021-02-23 PROCEDURE — 99999 PR PBB SHADOW E&M-EST. PATIENT-LVL IV: CPT | Mod: PBBFAC,,, | Performed by: NURSE PRACTITIONER

## 2021-02-23 PROCEDURE — 99214 OFFICE O/P EST MOD 30 MIN: CPT | Mod: PBBFAC,PO | Performed by: NURSE PRACTITIONER

## 2021-02-23 PROCEDURE — 99999 PR PBB SHADOW E&M-EST. PATIENT-LVL IV: ICD-10-PCS | Mod: PBBFAC,,, | Performed by: NURSE PRACTITIONER

## 2021-02-23 PROCEDURE — G0439 PPPS, SUBSEQ VISIT: HCPCS | Mod: ,,, | Performed by: NURSE PRACTITIONER

## 2021-02-23 PROCEDURE — G0439 PR MEDICARE ANNUAL WELLNESS SUBSEQUENT VISIT: ICD-10-PCS | Mod: ,,, | Performed by: NURSE PRACTITIONER

## 2021-02-23 RX ORDER — CHOLECALCIFEROL (VITAMIN D3) 25 MCG
1000 TABLET ORAL DAILY
COMMUNITY

## 2021-03-09 ENCOUNTER — HOSPITAL ENCOUNTER (OUTPATIENT)
Dept: RADIOLOGY | Facility: CLINIC | Age: 86
Discharge: HOME | End: 2021-03-09
Attending: NURSE PRACTITIONER
Payer: MEDICARE

## 2021-03-09 ENCOUNTER — OFFICE VISIT (OUTPATIENT)
Dept: OTOLARYNGOLOGY | Facility: CLINIC | Age: 86
End: 2021-03-09
Payer: MEDICARE

## 2021-03-09 VITALS — WEIGHT: 173.06 LBS | BODY MASS INDEX: 31.65 KG/M2

## 2021-03-09 DIAGNOSIS — H61.23 BILATERAL IMPACTED CERUMEN: ICD-10-CM

## 2021-03-09 DIAGNOSIS — H61.22 IMPACTED CERUMEN OF LEFT EAR: Primary | ICD-10-CM

## 2021-03-09 DIAGNOSIS — M85.89 OTHER SPECIFIED DISORDERS OF BONE DENSITY AND STRUCTURE, MULTIPLE SITES: ICD-10-CM

## 2021-03-09 DIAGNOSIS — Z13.820 SCREENING FOR OSTEOPOROSIS: ICD-10-CM

## 2021-03-09 PROCEDURE — 77080 DXA BONE DENSITY AXIAL: CPT | Mod: 26,,, | Performed by: INTERNAL MEDICINE

## 2021-03-09 PROCEDURE — 77080 DXA BONE DENSITY AXIAL: CPT | Mod: TC

## 2021-03-09 PROCEDURE — 69210 REMOVE IMPACTED EAR WAX UNI: CPT | Mod: PBBFAC | Performed by: OTOLARYNGOLOGY

## 2021-03-09 PROCEDURE — 99499 NO LOS: ICD-10-PCS | Mod: S$PBB,,, | Performed by: OTOLARYNGOLOGY

## 2021-03-09 PROCEDURE — 99213 OFFICE O/P EST LOW 20 MIN: CPT | Mod: PBBFAC,25 | Performed by: OTOLARYNGOLOGY

## 2021-03-09 PROCEDURE — 77080 DEXA BONE DENSITY SPINE HIP: ICD-10-PCS | Mod: 26,,, | Performed by: INTERNAL MEDICINE

## 2021-03-09 PROCEDURE — 99999 PR PBB SHADOW E&M-EST. PATIENT-LVL III: CPT | Mod: PBBFAC,,, | Performed by: OTOLARYNGOLOGY

## 2021-03-09 PROCEDURE — 99999 PR PBB SHADOW E&M-EST. PATIENT-LVL III: ICD-10-PCS | Mod: PBBFAC,,, | Performed by: OTOLARYNGOLOGY

## 2021-03-09 PROCEDURE — 99499 UNLISTED E&M SERVICE: CPT | Mod: S$PBB,,, | Performed by: OTOLARYNGOLOGY

## 2021-03-09 PROCEDURE — 69210 EAR CERUMEN REMOVAL: ICD-10-PCS | Mod: S$PBB,,, | Performed by: OTOLARYNGOLOGY

## 2021-03-16 ENCOUNTER — LAB VISIT (OUTPATIENT)
Dept: LAB | Facility: HOSPITAL | Age: 86
End: 2021-03-16
Attending: FAMILY MEDICINE
Payer: MEDICARE

## 2021-03-16 DIAGNOSIS — E11.9 TYPE 2 DIABETES MELLITUS WITHOUT COMPLICATION, WITH LONG-TERM CURRENT USE OF INSULIN: ICD-10-CM

## 2021-03-16 DIAGNOSIS — E11.9 TYPE 2 DIABETES MELLITUS WITHOUT COMPLICATION, WITHOUT LONG-TERM CURRENT USE OF INSULIN: ICD-10-CM

## 2021-03-16 DIAGNOSIS — Z79.4 TYPE 2 DIABETES MELLITUS WITHOUT COMPLICATION, WITH LONG-TERM CURRENT USE OF INSULIN: ICD-10-CM

## 2021-03-16 DIAGNOSIS — I10 ESSENTIAL HYPERTENSION: ICD-10-CM

## 2021-03-16 LAB
BASOPHILS # BLD AUTO: 0.03 K/UL (ref 0–0.2)
BASOPHILS NFR BLD: 0.3 % (ref 0–1.9)
DIFFERENTIAL METHOD: NORMAL
EOSINOPHIL # BLD AUTO: 0.1 K/UL (ref 0–0.5)
EOSINOPHIL NFR BLD: 1.2 % (ref 0–8)
ERYTHROCYTE [DISTWIDTH] IN BLOOD BY AUTOMATED COUNT: 13.7 % (ref 11.5–14.5)
HCT VFR BLD AUTO: 45.6 % (ref 37–48.5)
HGB BLD-MCNC: 14.6 G/DL (ref 12–16)
IMM GRANULOCYTES # BLD AUTO: 0.02 K/UL (ref 0–0.04)
IMM GRANULOCYTES NFR BLD AUTO: 0.2 % (ref 0–0.5)
LYMPHOCYTES # BLD AUTO: 3.3 K/UL (ref 1–4.8)
LYMPHOCYTES NFR BLD: 36.5 % (ref 18–48)
MCH RBC QN AUTO: 29.3 PG (ref 27–31)
MCHC RBC AUTO-ENTMCNC: 32 G/DL (ref 32–36)
MCV RBC AUTO: 92 FL (ref 82–98)
MONOCYTES # BLD AUTO: 0.5 K/UL (ref 0.3–1)
MONOCYTES NFR BLD: 6 % (ref 4–15)
NEUTROPHILS # BLD AUTO: 5 K/UL (ref 1.8–7.7)
NEUTROPHILS NFR BLD: 55.8 % (ref 38–73)
NRBC BLD-RTO: 0 /100 WBC
PLATELET # BLD AUTO: 176 K/UL (ref 150–350)
PMV BLD AUTO: 10.8 FL (ref 9.2–12.9)
RBC # BLD AUTO: 4.98 M/UL (ref 4–5.4)
WBC # BLD AUTO: 9.03 K/UL (ref 3.9–12.7)

## 2021-03-16 PROCEDURE — 80053 COMPREHEN METABOLIC PANEL: CPT | Performed by: FAMILY MEDICINE

## 2021-03-16 PROCEDURE — 85025 COMPLETE CBC W/AUTO DIFF WBC: CPT | Performed by: FAMILY MEDICINE

## 2021-03-16 PROCEDURE — 83036 HEMOGLOBIN GLYCOSYLATED A1C: CPT | Performed by: FAMILY MEDICINE

## 2021-03-16 PROCEDURE — 36415 COLL VENOUS BLD VENIPUNCTURE: CPT | Mod: PO | Performed by: FAMILY MEDICINE

## 2021-03-16 PROCEDURE — 84443 ASSAY THYROID STIM HORMONE: CPT | Performed by: FAMILY MEDICINE

## 2021-03-16 PROCEDURE — 80061 LIPID PANEL: CPT | Performed by: FAMILY MEDICINE

## 2021-03-17 LAB
ALBUMIN SERPL BCP-MCNC: 3.7 G/DL (ref 3.5–5.2)
ALBUMIN SERPL BCP-MCNC: 3.7 G/DL (ref 3.5–5.2)
ALP SERPL-CCNC: 71 U/L (ref 55–135)
ALP SERPL-CCNC: 71 U/L (ref 55–135)
ALT SERPL W/O P-5'-P-CCNC: 22 U/L (ref 10–44)
ALT SERPL W/O P-5'-P-CCNC: 22 U/L (ref 10–44)
ANION GAP SERPL CALC-SCNC: 11 MMOL/L (ref 8–16)
ANION GAP SERPL CALC-SCNC: 11 MMOL/L (ref 8–16)
AST SERPL-CCNC: 22 U/L (ref 10–40)
AST SERPL-CCNC: 22 U/L (ref 10–40)
BILIRUB SERPL-MCNC: 0.4 MG/DL (ref 0.1–1)
BILIRUB SERPL-MCNC: 0.4 MG/DL (ref 0.1–1)
BUN SERPL-MCNC: 20 MG/DL (ref 8–23)
BUN SERPL-MCNC: 20 MG/DL (ref 8–23)
CALCIUM SERPL-MCNC: 9 MG/DL (ref 8.7–10.5)
CALCIUM SERPL-MCNC: 9 MG/DL (ref 8.7–10.5)
CHLORIDE SERPL-SCNC: 104 MMOL/L (ref 95–110)
CHLORIDE SERPL-SCNC: 104 MMOL/L (ref 95–110)
CHOLEST SERPL-MCNC: 150 MG/DL (ref 120–199)
CHOLEST/HDLC SERPL: 2.9 {RATIO} (ref 2–5)
CO2 SERPL-SCNC: 26 MMOL/L (ref 23–29)
CO2 SERPL-SCNC: 26 MMOL/L (ref 23–29)
CREAT SERPL-MCNC: 0.8 MG/DL (ref 0.5–1.4)
CREAT SERPL-MCNC: 0.8 MG/DL (ref 0.5–1.4)
EST. GFR  (AFRICAN AMERICAN): >60 ML/MIN/1.73 M^2
EST. GFR  (AFRICAN AMERICAN): >60 ML/MIN/1.73 M^2
EST. GFR  (NON AFRICAN AMERICAN): >60 ML/MIN/1.73 M^2
EST. GFR  (NON AFRICAN AMERICAN): >60 ML/MIN/1.73 M^2
ESTIMATED AVG GLUCOSE: 177 MG/DL (ref 68–131)
ESTIMATED AVG GLUCOSE: 177 MG/DL (ref 68–131)
GLUCOSE SERPL-MCNC: 132 MG/DL (ref 70–110)
GLUCOSE SERPL-MCNC: 132 MG/DL (ref 70–110)
HBA1C MFR BLD: 7.8 % (ref 4–5.6)
HBA1C MFR BLD: 7.8 % (ref 4–5.6)
HDLC SERPL-MCNC: 52 MG/DL (ref 40–75)
HDLC SERPL: 34.7 % (ref 20–50)
LDLC SERPL CALC-MCNC: 69.8 MG/DL (ref 63–159)
NONHDLC SERPL-MCNC: 98 MG/DL
POTASSIUM SERPL-SCNC: 4.8 MMOL/L (ref 3.5–5.1)
POTASSIUM SERPL-SCNC: 4.8 MMOL/L (ref 3.5–5.1)
PROT SERPL-MCNC: 7 G/DL (ref 6–8.4)
PROT SERPL-MCNC: 7 G/DL (ref 6–8.4)
SODIUM SERPL-SCNC: 141 MMOL/L (ref 136–145)
SODIUM SERPL-SCNC: 141 MMOL/L (ref 136–145)
TRIGL SERPL-MCNC: 141 MG/DL (ref 30–150)
TSH SERPL DL<=0.005 MIU/L-ACNC: 2.78 UIU/ML (ref 0.4–4)

## 2021-03-18 ENCOUNTER — TELEPHONE (OUTPATIENT)
Dept: PRIMARY CARE CLINIC | Facility: CLINIC | Age: 86
End: 2021-03-18

## 2021-04-05 DIAGNOSIS — I25.10 CORONARY ARTERY DISEASE, ANGINA PRESENCE UNSPECIFIED, UNSPECIFIED VESSEL OR LESION TYPE, UNSPECIFIED WHETHER NATIVE OR TRANSPLANTED HEART: ICD-10-CM

## 2021-04-05 RX ORDER — CLOPIDOGREL BISULFATE 75 MG/1
75 TABLET ORAL DAILY
Qty: 90 TABLET | Refills: 3 | Status: SHIPPED | OUTPATIENT
Start: 2021-04-05 | End: 2021-09-24

## 2021-05-03 ENCOUNTER — CLINICAL SUPPORT (OUTPATIENT)
Dept: CARDIOLOGY | Facility: HOSPITAL | Age: 86
End: 2021-05-03
Payer: MEDICARE

## 2021-05-03 DIAGNOSIS — Z95.810 PRESENCE OF AUTOMATIC (IMPLANTABLE) CARDIAC DEFIBRILLATOR: ICD-10-CM

## 2021-05-03 PROCEDURE — 93295 DEV INTERROG REMOTE 1/2/MLT: CPT | Mod: ,,, | Performed by: INTERNAL MEDICINE

## 2021-05-03 PROCEDURE — 93295 CARDIAC DEVICE CHECK - REMOTE: ICD-10-PCS | Mod: ,,, | Performed by: INTERNAL MEDICINE

## 2021-05-10 ENCOUNTER — TELEPHONE (OUTPATIENT)
Dept: PRIMARY CARE CLINIC | Facility: CLINIC | Age: 86
End: 2021-05-10

## 2021-05-10 DIAGNOSIS — N39.0 URINARY TRACT INFECTION WITHOUT HEMATURIA, SITE UNSPECIFIED: Primary | ICD-10-CM

## 2021-05-11 ENCOUNTER — LAB VISIT (OUTPATIENT)
Dept: LAB | Facility: HOSPITAL | Age: 86
End: 2021-05-11
Attending: INTERNAL MEDICINE
Payer: MEDICARE

## 2021-05-11 DIAGNOSIS — N39.0 URINARY TRACT INFECTION WITHOUT HEMATURIA, SITE UNSPECIFIED: ICD-10-CM

## 2021-05-11 LAB
BACTERIA #/AREA URNS AUTO: ABNORMAL /HPF
BILIRUB UR QL STRIP: NEGATIVE
CLARITY UR REFRACT.AUTO: ABNORMAL
COLOR UR AUTO: YELLOW
GLUCOSE UR QL STRIP: ABNORMAL
HGB UR QL STRIP: ABNORMAL
HYALINE CASTS UR QL AUTO: 0 /LPF
KETONES UR QL STRIP: NEGATIVE
LEUKOCYTE ESTERASE UR QL STRIP: ABNORMAL
MICROSCOPIC COMMENT: ABNORMAL
NITRITE UR QL STRIP: POSITIVE
PH UR STRIP: 5 [PH] (ref 5–8)
PROT UR QL STRIP: ABNORMAL
RBC #/AREA URNS AUTO: 0 /HPF (ref 0–4)
SP GR UR STRIP: 1.02 (ref 1–1.03)
URN SPEC COLLECT METH UR: ABNORMAL
WBC #/AREA URNS AUTO: >100 /HPF (ref 0–5)
WBC CLUMPS UR QL AUTO: ABNORMAL
YEAST UR QL AUTO: ABNORMAL

## 2021-05-11 PROCEDURE — 87077 CULTURE AEROBIC IDENTIFY: CPT | Performed by: INTERNAL MEDICINE

## 2021-05-11 PROCEDURE — 81001 URINALYSIS AUTO W/SCOPE: CPT | Performed by: INTERNAL MEDICINE

## 2021-05-11 PROCEDURE — 87088 URINE BACTERIA CULTURE: CPT | Performed by: INTERNAL MEDICINE

## 2021-05-11 PROCEDURE — 87086 URINE CULTURE/COLONY COUNT: CPT | Performed by: INTERNAL MEDICINE

## 2021-05-11 PROCEDURE — 87186 SC STD MICRODIL/AGAR DIL: CPT | Performed by: INTERNAL MEDICINE

## 2021-05-12 ENCOUNTER — OFFICE VISIT (OUTPATIENT)
Dept: PRIMARY CARE CLINIC | Facility: CLINIC | Age: 86
End: 2021-05-12
Payer: MEDICARE

## 2021-05-12 VITALS
HEIGHT: 62 IN | BODY MASS INDEX: 32.05 KG/M2 | DIASTOLIC BLOOD PRESSURE: 64 MMHG | WEIGHT: 174.19 LBS | OXYGEN SATURATION: 98 % | HEART RATE: 82 BPM | SYSTOLIC BLOOD PRESSURE: 104 MMHG

## 2021-05-12 DIAGNOSIS — I25.10 CORONARY ARTERY DISEASE INVOLVING NATIVE CORONARY ARTERY OF NATIVE HEART WITHOUT ANGINA PECTORIS: ICD-10-CM

## 2021-05-12 DIAGNOSIS — I50.22 CHRONIC SYSTOLIC (CONGESTIVE) HEART FAILURE: Chronic | ICD-10-CM

## 2021-05-12 DIAGNOSIS — Z95.810 BIVENTRICULAR ICD (IMPLANTABLE CARDIOVERTER-DEFIBRILLATOR) IN PLACE: ICD-10-CM

## 2021-05-12 DIAGNOSIS — N39.42 URINARY INCONTINENCE WITHOUT SENSORY AWARENESS: ICD-10-CM

## 2021-05-12 DIAGNOSIS — N30.01 ACUTE CYSTITIS WITH HEMATURIA: Primary | ICD-10-CM

## 2021-05-12 DIAGNOSIS — E11.9 DM TYPE 2 WITHOUT RETINOPATHY: ICD-10-CM

## 2021-05-12 DIAGNOSIS — E11.59 HYPERTENSION ASSOCIATED WITH DIABETES: ICD-10-CM

## 2021-05-12 DIAGNOSIS — I15.2 HYPERTENSION ASSOCIATED WITH DIABETES: ICD-10-CM

## 2021-05-12 PROCEDURE — 99999 PR PBB SHADOW E&M-EST. PATIENT-LVL V: CPT | Mod: PBBFAC,,, | Performed by: FAMILY MEDICINE

## 2021-05-12 PROCEDURE — 99214 OFFICE O/P EST MOD 30 MIN: CPT | Mod: S$PBB,,, | Performed by: FAMILY MEDICINE

## 2021-05-12 PROCEDURE — 99215 OFFICE O/P EST HI 40 MIN: CPT | Mod: PBBFAC,PN | Performed by: FAMILY MEDICINE

## 2021-05-12 PROCEDURE — 99999 PR PBB SHADOW E&M-EST. PATIENT-LVL V: ICD-10-PCS | Mod: PBBFAC,,, | Performed by: FAMILY MEDICINE

## 2021-05-12 PROCEDURE — 99214 PR OFFICE/OUTPT VISIT, EST, LEVL IV, 30-39 MIN: ICD-10-PCS | Mod: S$PBB,,, | Performed by: FAMILY MEDICINE

## 2021-05-12 RX ORDER — NYSTATIN 100000 U/G
CREAM TOPICAL 2 TIMES DAILY
COMMUNITY
End: 2021-09-24

## 2021-05-12 RX ORDER — CIPROFLOXACIN 250 MG/1
250 TABLET, FILM COATED ORAL 2 TIMES DAILY
Qty: 14 TABLET | Refills: 0 | Status: SHIPPED | OUTPATIENT
Start: 2021-05-12 | End: 2021-05-19

## 2021-05-13 LAB — BACTERIA UR CULT: ABNORMAL

## 2021-06-01 ENCOUNTER — PATIENT MESSAGE (OUTPATIENT)
Dept: PRIMARY CARE CLINIC | Facility: CLINIC | Age: 86
End: 2021-06-01

## 2021-06-01 DIAGNOSIS — N39.0 URINARY TRACT INFECTION WITHOUT HEMATURIA, SITE UNSPECIFIED: Primary | ICD-10-CM

## 2021-06-02 ENCOUNTER — PATIENT MESSAGE (OUTPATIENT)
Dept: PRIMARY CARE CLINIC | Facility: CLINIC | Age: 86
End: 2021-06-02

## 2021-06-04 ENCOUNTER — LAB VISIT (OUTPATIENT)
Dept: LAB | Facility: HOSPITAL | Age: 86
End: 2021-06-04
Attending: INTERNAL MEDICINE
Payer: MEDICARE

## 2021-06-04 DIAGNOSIS — N39.0 URINARY TRACT INFECTION WITHOUT HEMATURIA, SITE UNSPECIFIED: ICD-10-CM

## 2021-06-04 PROCEDURE — 87088 URINE BACTERIA CULTURE: CPT | Performed by: INTERNAL MEDICINE

## 2021-06-04 PROCEDURE — 87086 URINE CULTURE/COLONY COUNT: CPT | Performed by: INTERNAL MEDICINE

## 2021-06-04 PROCEDURE — 87077 CULTURE AEROBIC IDENTIFY: CPT | Performed by: INTERNAL MEDICINE

## 2021-06-04 PROCEDURE — 87186 SC STD MICRODIL/AGAR DIL: CPT | Performed by: INTERNAL MEDICINE

## 2021-06-07 ENCOUNTER — PATIENT MESSAGE (OUTPATIENT)
Dept: PRIMARY CARE CLINIC | Facility: CLINIC | Age: 86
End: 2021-06-07

## 2021-06-07 LAB — BACTERIA UR CULT: ABNORMAL

## 2021-06-07 RX ORDER — CIPROFLOXACIN 250 MG/1
250 TABLET, FILM COATED ORAL 2 TIMES DAILY
Qty: 14 TABLET | Refills: 0 | Status: SHIPPED | OUTPATIENT
Start: 2021-06-07 | End: 2021-06-14

## 2021-07-01 ENCOUNTER — OFFICE VISIT (OUTPATIENT)
Dept: URGENT CARE | Facility: CLINIC | Age: 86
End: 2021-07-01
Payer: MEDICARE

## 2021-07-01 VITALS
RESPIRATION RATE: 14 BRPM | BODY MASS INDEX: 31.47 KG/M2 | HEIGHT: 62 IN | SYSTOLIC BLOOD PRESSURE: 138 MMHG | HEART RATE: 80 BPM | TEMPERATURE: 99 F | WEIGHT: 171 LBS | OXYGEN SATURATION: 95 % | DIASTOLIC BLOOD PRESSURE: 64 MMHG

## 2021-07-01 DIAGNOSIS — Z86.39 HISTORY OF DIABETES MELLITUS: ICD-10-CM

## 2021-07-01 DIAGNOSIS — R81 GLUCOSURIA: ICD-10-CM

## 2021-07-01 DIAGNOSIS — R05.9 COUGH: Primary | ICD-10-CM

## 2021-07-01 DIAGNOSIS — Z87.440 HISTORY OF UTI: ICD-10-CM

## 2021-07-01 DIAGNOSIS — J06.9 UPPER RESPIRATORY TRACT INFECTION, UNSPECIFIED TYPE: ICD-10-CM

## 2021-07-01 DIAGNOSIS — J02.9 SORE THROAT: ICD-10-CM

## 2021-07-01 LAB
BILIRUB UR QL STRIP: NEGATIVE
CTP QC/QA: YES
CTP QC/QA: YES
GLUCOSE UR QL STRIP: POSITIVE
KETONES UR QL STRIP: NEGATIVE
LEUKOCYTE ESTERASE UR QL STRIP: NEGATIVE
MOLECULAR STREP A: NEGATIVE
PH, POC UA: 5 (ref 5–8)
POC BLOOD, URINE: NEGATIVE
POC NITRATES, URINE: NEGATIVE
PROT UR QL STRIP: NEGATIVE
SARS-COV-2 RDRP RESP QL NAA+PROBE: NEGATIVE
SP GR UR STRIP: 1.01 (ref 1–1.03)
UROBILINOGEN UR STRIP-ACNC: NORMAL (ref 0.1–1.1)

## 2021-07-01 PROCEDURE — 87651 STREP A DNA AMP PROBE: CPT | Mod: QW,S$GLB,, | Performed by: NURSE PRACTITIONER

## 2021-07-01 PROCEDURE — 87651 POCT STREP A MOLECULAR: ICD-10-PCS | Mod: QW,S$GLB,, | Performed by: NURSE PRACTITIONER

## 2021-07-01 PROCEDURE — 81003 POCT URINALYSIS, DIPSTICK, AUTOMATED, W/O SCOPE: ICD-10-PCS | Mod: QW,S$GLB,, | Performed by: NURSE PRACTITIONER

## 2021-07-01 PROCEDURE — 99204 PR OFFICE/OUTPT VISIT, NEW, LEVL IV, 45-59 MIN: ICD-10-PCS | Mod: 25,S$GLB,, | Performed by: NURSE PRACTITIONER

## 2021-07-01 PROCEDURE — U0002: ICD-10-PCS | Mod: QW,CR,S$GLB, | Performed by: NURSE PRACTITIONER

## 2021-07-01 PROCEDURE — 81003 URINALYSIS AUTO W/O SCOPE: CPT | Mod: QW,S$GLB,, | Performed by: NURSE PRACTITIONER

## 2021-07-01 PROCEDURE — U0002 COVID-19 LAB TEST NON-CDC: HCPCS | Mod: QW,CR,S$GLB, | Performed by: NURSE PRACTITIONER

## 2021-07-01 PROCEDURE — 99204 OFFICE O/P NEW MOD 45 MIN: CPT | Mod: 25,S$GLB,, | Performed by: NURSE PRACTITIONER

## 2021-08-01 ENCOUNTER — CLINICAL SUPPORT (OUTPATIENT)
Dept: CARDIOLOGY | Facility: HOSPITAL | Age: 86
End: 2021-08-01
Payer: MEDICARE

## 2021-08-01 DIAGNOSIS — Z95.810 PRESENCE OF AUTOMATIC (IMPLANTABLE) CARDIAC DEFIBRILLATOR: ICD-10-CM

## 2021-08-01 PROCEDURE — 93295 CARDIAC DEVICE CHECK - REMOTE: ICD-10-PCS | Mod: ,,, | Performed by: INTERNAL MEDICINE

## 2021-08-01 PROCEDURE — 93295 DEV INTERROG REMOTE 1/2/MLT: CPT | Mod: ,,, | Performed by: INTERNAL MEDICINE

## 2021-09-13 ENCOUNTER — TELEPHONE (OUTPATIENT)
Dept: ELECTROPHYSIOLOGY | Facility: CLINIC | Age: 86
End: 2021-09-13

## 2021-09-23 ENCOUNTER — TELEPHONE (OUTPATIENT)
Dept: PRIMARY CARE CLINIC | Facility: CLINIC | Age: 86
End: 2021-09-23

## 2021-09-23 ENCOUNTER — PATIENT OUTREACH (OUTPATIENT)
Dept: ADMINISTRATIVE | Facility: OTHER | Age: 86
End: 2021-09-23

## 2021-09-24 ENCOUNTER — OFFICE VISIT (OUTPATIENT)
Dept: CARDIOLOGY | Facility: CLINIC | Age: 86
End: 2021-09-24
Payer: MEDICARE

## 2021-09-24 VITALS
WEIGHT: 170 LBS | HEIGHT: 62 IN | DIASTOLIC BLOOD PRESSURE: 63 MMHG | BODY MASS INDEX: 31.28 KG/M2 | SYSTOLIC BLOOD PRESSURE: 132 MMHG | HEART RATE: 66 BPM

## 2021-09-24 DIAGNOSIS — E11.69 HYPERLIPIDEMIA ASSOCIATED WITH TYPE 2 DIABETES MELLITUS: ICD-10-CM

## 2021-09-24 DIAGNOSIS — E11.59 HYPERTENSION ASSOCIATED WITH DIABETES: ICD-10-CM

## 2021-09-24 DIAGNOSIS — T14.8XXA BRUISING: ICD-10-CM

## 2021-09-24 DIAGNOSIS — I50.22 CHRONIC SYSTOLIC (CONGESTIVE) HEART FAILURE: Primary | Chronic | ICD-10-CM

## 2021-09-24 DIAGNOSIS — Z95.810 BIVENTRICULAR ICD (IMPLANTABLE CARDIOVERTER-DEFIBRILLATOR) IN PLACE: ICD-10-CM

## 2021-09-24 DIAGNOSIS — I42.0 NONISCHEMIC CONGESTIVE CARDIOMYOPATHY: ICD-10-CM

## 2021-09-24 DIAGNOSIS — I15.2 HYPERTENSION ASSOCIATED WITH DIABETES: ICD-10-CM

## 2021-09-24 DIAGNOSIS — E78.5 HYPERLIPIDEMIA ASSOCIATED WITH TYPE 2 DIABETES MELLITUS: ICD-10-CM

## 2021-09-24 DIAGNOSIS — I25.10 CORONARY ARTERY DISEASE INVOLVING NATIVE CORONARY ARTERY OF NATIVE HEART WITHOUT ANGINA PECTORIS: ICD-10-CM

## 2021-09-24 PROCEDURE — 99213 OFFICE O/P EST LOW 20 MIN: CPT | Mod: PBBFAC,PO | Performed by: INTERNAL MEDICINE

## 2021-09-24 PROCEDURE — 99999 PR PBB SHADOW E&M-EST. PATIENT-LVL III: CPT | Mod: PBBFAC,,, | Performed by: INTERNAL MEDICINE

## 2021-09-24 PROCEDURE — 99214 OFFICE O/P EST MOD 30 MIN: CPT | Mod: S$PBB,,, | Performed by: INTERNAL MEDICINE

## 2021-09-24 PROCEDURE — 99214 PR OFFICE/OUTPT VISIT, EST, LEVL IV, 30-39 MIN: ICD-10-PCS | Mod: S$PBB,,, | Performed by: INTERNAL MEDICINE

## 2021-09-24 PROCEDURE — 99999 PR PBB SHADOW E&M-EST. PATIENT-LVL III: ICD-10-PCS | Mod: PBBFAC,,, | Performed by: INTERNAL MEDICINE

## 2021-09-27 ENCOUNTER — HOSPITAL ENCOUNTER (OUTPATIENT)
Dept: CARDIOLOGY | Facility: HOSPITAL | Age: 86
Discharge: HOME | End: 2021-09-27
Attending: INTERNAL MEDICINE
Payer: MEDICARE

## 2021-09-27 VITALS
DIASTOLIC BLOOD PRESSURE: 66 MMHG | HEIGHT: 62 IN | BODY MASS INDEX: 31.1 KG/M2 | HEART RATE: 67 BPM | SYSTOLIC BLOOD PRESSURE: 114 MMHG | WEIGHT: 169 LBS

## 2021-09-27 DIAGNOSIS — I50.22 CHRONIC SYSTOLIC (CONGESTIVE) HEART FAILURE: ICD-10-CM

## 2021-09-27 LAB
ASCENDING AORTA: 3.58 CM
AV INDEX (PROSTH): 0.69
AV MEAN GRADIENT: 5 MMHG
AV PEAK GRADIENT: 9 MMHG
AV VALVE AREA: 1.84 CM2
AV VELOCITY RATIO: 0.52
BSA FOR ECHO PROCEDURE: 1.83 M2
CV ECHO LV RWT: 0.28 CM
DOP CALC AO PEAK VEL: 1.52 M/S
DOP CALC AO VTI: 29.92 CM
DOP CALC LVOT AREA: 2.7 CM2
DOP CALC LVOT DIAMETER: 1.84 CM
DOP CALC LVOT PEAK VEL: 0.79 M/S
DOP CALC LVOT STROKE VOLUME: 54.96 CM3
DOP CALCLVOT PEAK VEL VTI: 20.68 CM
E WAVE DECELERATION TIME: 289.43 MSEC
E/A RATIO: 0.56
E/E' RATIO: 16.29 M/S
ECHO LV POSTERIOR WALL: 0.7 CM (ref 0.6–1.1)
EJECTION FRACTION: 45 %
FRACTIONAL SHORTENING: 17 % (ref 28–44)
INTERVENTRICULAR SEPTUM: 0.67 CM (ref 0.6–1.1)
IVRT: 114.18 MSEC
LA MAJOR: 4.97 CM
LA MINOR: 4.88 CM
LA WIDTH: 3.22 CM
LEFT ATRIUM SIZE: 3.69 CM
LEFT ATRIUM VOLUME INDEX MOD: 21.7 ML/M2
LEFT ATRIUM VOLUME INDEX: 27.9 ML/M2
LEFT ATRIUM VOLUME MOD: 38.59 CM3
LEFT ATRIUM VOLUME: 49.74 CM3
LEFT INTERNAL DIMENSION IN SYSTOLE: 4.2 CM (ref 2.1–4)
LEFT VENTRICLE DIASTOLIC VOLUME INDEX: 68.83 ML/M2
LEFT VENTRICLE DIASTOLIC VOLUME: 122.52 ML
LEFT VENTRICLE MASS INDEX: 64 G/M2
LEFT VENTRICLE SYSTOLIC VOLUME INDEX: 44.2 ML/M2
LEFT VENTRICLE SYSTOLIC VOLUME: 78.74 ML
LEFT VENTRICULAR INTERNAL DIMENSION IN DIASTOLE: 5.08 CM (ref 3.5–6)
LEFT VENTRICULAR MASS: 114.78 G
LV LATERAL E/E' RATIO: 19 M/S
LV SEPTAL E/E' RATIO: 14.25 M/S
MV PEAK A VEL: 1.02 M/S
MV PEAK E VEL: 0.57 M/S
MV STENOSIS PRESSURE HALF TIME: 83.93 MS
MV VALVE AREA P 1/2 METHOD: 2.62 CM2
PISA TR MAX VEL: 2.38 M/S
PULM VEIN S/D RATIO: 2.16
PV PEAK D VEL: 0.32 M/S
PV PEAK S VEL: 0.69 M/S
RA MAJOR: 4.66 CM
RA PRESSURE: 3 MMHG
RA WIDTH: 3.35 CM
RIGHT VENTRICULAR END-DIASTOLIC DIMENSION: 2.79 CM
SINUS: 3 CM
STJ: 2.58 CM
TDI LATERAL: 0.03 M/S
TDI SEPTAL: 0.04 M/S
TDI: 0.04 M/S
TR MAX PG: 23 MMHG
TRICUSPID ANNULAR PLANE SYSTOLIC EXCURSION: 2.17 CM
TV REST PULMONARY ARTERY PRESSURE: 26 MMHG

## 2021-09-27 PROCEDURE — 93306 ECHO (CUPID ONLY): ICD-10-PCS | Mod: 26,,, | Performed by: INTERNAL MEDICINE

## 2021-09-27 PROCEDURE — 93306 TTE W/DOPPLER COMPLETE: CPT | Mod: 26,,, | Performed by: INTERNAL MEDICINE

## 2021-09-27 PROCEDURE — 93306 TTE W/DOPPLER COMPLETE: CPT

## 2021-10-02 ENCOUNTER — LAB VISIT (OUTPATIENT)
Dept: LAB | Facility: HOSPITAL | Age: 86
End: 2021-10-02
Attending: INTERNAL MEDICINE
Payer: MEDICARE

## 2021-10-02 DIAGNOSIS — I15.2 HYPERTENSION ASSOCIATED WITH DIABETES: ICD-10-CM

## 2021-10-02 DIAGNOSIS — E78.5 HYPERLIPIDEMIA ASSOCIATED WITH TYPE 2 DIABETES MELLITUS: ICD-10-CM

## 2021-10-02 DIAGNOSIS — T14.8XXA BRUISING: ICD-10-CM

## 2021-10-02 DIAGNOSIS — E11.69 HYPERLIPIDEMIA ASSOCIATED WITH TYPE 2 DIABETES MELLITUS: ICD-10-CM

## 2021-10-02 DIAGNOSIS — E11.59 HYPERTENSION ASSOCIATED WITH DIABETES: ICD-10-CM

## 2021-10-02 DIAGNOSIS — I50.22 CHRONIC SYSTOLIC (CONGESTIVE) HEART FAILURE: Chronic | ICD-10-CM

## 2021-10-02 LAB
ALBUMIN SERPL BCP-MCNC: 3.5 G/DL (ref 3.5–5.2)
ALP SERPL-CCNC: 70 U/L (ref 55–135)
ALT SERPL W/O P-5'-P-CCNC: 26 U/L (ref 10–44)
ANION GAP SERPL CALC-SCNC: 14 MMOL/L (ref 8–16)
AST SERPL-CCNC: 20 U/L (ref 10–40)
BASOPHILS # BLD AUTO: 0.03 K/UL (ref 0–0.2)
BASOPHILS NFR BLD: 0.3 % (ref 0–1.9)
BILIRUB SERPL-MCNC: 0.3 MG/DL (ref 0.1–1)
BUN SERPL-MCNC: 19 MG/DL (ref 8–23)
CALCIUM SERPL-MCNC: 9.3 MG/DL (ref 8.7–10.5)
CHLORIDE SERPL-SCNC: 103 MMOL/L (ref 95–110)
CHOLEST SERPL-MCNC: 182 MG/DL (ref 120–199)
CHOLEST/HDLC SERPL: 3 {RATIO} (ref 2–5)
CO2 SERPL-SCNC: 23 MMOL/L (ref 23–29)
CREAT SERPL-MCNC: 0.8 MG/DL (ref 0.5–1.4)
DIFFERENTIAL METHOD: ABNORMAL
EOSINOPHIL # BLD AUTO: 0.1 K/UL (ref 0–0.5)
EOSINOPHIL NFR BLD: 0.9 % (ref 0–8)
ERYTHROCYTE [DISTWIDTH] IN BLOOD BY AUTOMATED COUNT: 14.5 % (ref 11.5–14.5)
EST. GFR  (AFRICAN AMERICAN): >60 ML/MIN/1.73 M^2
EST. GFR  (NON AFRICAN AMERICAN): >60 ML/MIN/1.73 M^2
GLUCOSE SERPL-MCNC: 161 MG/DL (ref 70–110)
HCT VFR BLD AUTO: 46.3 % (ref 37–48.5)
HDLC SERPL-MCNC: 61 MG/DL (ref 40–75)
HDLC SERPL: 33.5 % (ref 20–50)
HGB BLD-MCNC: 14.1 G/DL (ref 12–16)
IMM GRANULOCYTES # BLD AUTO: 0.04 K/UL (ref 0–0.04)
IMM GRANULOCYTES NFR BLD AUTO: 0.4 % (ref 0–0.5)
LDLC SERPL CALC-MCNC: 91.8 MG/DL (ref 63–159)
LYMPHOCYTES # BLD AUTO: 2.6 K/UL (ref 1–4.8)
LYMPHOCYTES NFR BLD: 24.8 % (ref 18–48)
MCH RBC QN AUTO: 29.4 PG (ref 27–31)
MCHC RBC AUTO-ENTMCNC: 30.5 G/DL (ref 32–36)
MCV RBC AUTO: 97 FL (ref 82–98)
MONOCYTES # BLD AUTO: 0.5 K/UL (ref 0.3–1)
MONOCYTES NFR BLD: 4.3 % (ref 4–15)
NEUTROPHILS # BLD AUTO: 7.4 K/UL (ref 1.8–7.7)
NEUTROPHILS NFR BLD: 69.3 % (ref 38–73)
NONHDLC SERPL-MCNC: 121 MG/DL
NRBC BLD-RTO: 0 /100 WBC
PLATELET # BLD AUTO: 150 K/UL (ref 150–450)
PMV BLD AUTO: 10.7 FL (ref 9.2–12.9)
POTASSIUM SERPL-SCNC: 5.1 MMOL/L (ref 3.5–5.1)
PROT SERPL-MCNC: 6.8 G/DL (ref 6–8.4)
RBC # BLD AUTO: 4.8 M/UL (ref 4–5.4)
SODIUM SERPL-SCNC: 140 MMOL/L (ref 136–145)
TRIGL SERPL-MCNC: 146 MG/DL (ref 30–150)
WBC # BLD AUTO: 10.61 K/UL (ref 3.9–12.7)

## 2021-10-02 PROCEDURE — 36415 COLL VENOUS BLD VENIPUNCTURE: CPT | Mod: PO | Performed by: INTERNAL MEDICINE

## 2021-10-02 PROCEDURE — 80061 LIPID PANEL: CPT | Performed by: INTERNAL MEDICINE

## 2021-10-02 PROCEDURE — 83880 ASSAY OF NATRIURETIC PEPTIDE: CPT | Performed by: INTERNAL MEDICINE

## 2021-10-02 PROCEDURE — 85025 COMPLETE CBC W/AUTO DIFF WBC: CPT | Performed by: INTERNAL MEDICINE

## 2021-10-02 PROCEDURE — 80053 COMPREHEN METABOLIC PANEL: CPT | Performed by: INTERNAL MEDICINE

## 2021-10-05 LAB — NT-PROBNP SERPL-MCNC: 525 PG/ML

## 2021-10-06 ENCOUNTER — TELEPHONE (OUTPATIENT)
Dept: CARDIOLOGY | Facility: CLINIC | Age: 86
End: 2021-10-06

## 2021-10-19 ENCOUNTER — IMMUNIZATION (OUTPATIENT)
Dept: INTERNAL MEDICINE | Facility: CLINIC | Age: 86
End: 2021-10-19
Payer: MEDICARE

## 2021-10-19 DIAGNOSIS — Z23 NEED FOR VACCINATION: Primary | ICD-10-CM

## 2021-10-19 PROCEDURE — 91300 COVID-19, MRNA, LNP-S, PF, 30 MCG/0.3 ML DOSE VACCINE: CPT | Mod: PBBFAC

## 2021-10-19 PROCEDURE — 0003A COVID-19, MRNA, LNP-S, PF, 30 MCG/0.3 ML DOSE VACCINE: CPT | Mod: PBBFAC,CV19

## 2021-10-25 NOTE — TELEPHONE ENCOUNTER
----- Message from Sugey Herndon sent at 6/4/2018  2:21 PM CDT -----  Patient scheduled EP on 6/6 for medications, Patient states she will be out of her medication soon and feel she cant want until 8/29 to be seen.    [facility]    PROGRESS NOTE    Yudith Pavon Patient Status:  Inpatient    1922 MRN [id number]   Location [facility] 3W/SW Attending Malka Ruiz MD   [facility] Day # 2 PCP Amanda Tobar MD, MD     Patient seen and examined face-t noted.    ASSESSMENT AND PLAN   * = Admitting/principal diagnos(i/e)s:     Cardiovascular  · CHF - Acute exacerbation chronic heart failure.  Diastolic, multifactorial, including systemic and pulmonary hypertensive heart disease, mitral and tricuspid regur

## 2021-10-29 ENCOUNTER — OFFICE VISIT (OUTPATIENT)
Dept: URGENT CARE | Facility: CLINIC | Age: 86
End: 2021-10-29
Payer: MEDICARE

## 2021-10-29 ENCOUNTER — TELEPHONE (OUTPATIENT)
Dept: PRIMARY CARE CLINIC | Facility: CLINIC | Age: 86
End: 2021-10-29
Payer: MEDICARE

## 2021-10-29 VITALS
TEMPERATURE: 99 F | OXYGEN SATURATION: 97 % | SYSTOLIC BLOOD PRESSURE: 114 MMHG | DIASTOLIC BLOOD PRESSURE: 63 MMHG | HEIGHT: 62 IN | BODY MASS INDEX: 31.1 KG/M2 | RESPIRATION RATE: 17 BRPM | WEIGHT: 169 LBS | HEART RATE: 84 BPM

## 2021-10-29 DIAGNOSIS — R11.10 VOMITING, INTRACTABILITY OF VOMITING NOT SPECIFIED, PRESENCE OF NAUSEA NOT SPECIFIED, UNSPECIFIED VOMITING TYPE: Primary | ICD-10-CM

## 2021-10-29 DIAGNOSIS — E86.0 DEHYDRATION: ICD-10-CM

## 2021-10-29 LAB
BILIRUB UR QL STRIP: NEGATIVE
CTP QC/QA: YES
CTP QC/QA: YES
GLUCOSE SERPL-MCNC: 195 MG/DL (ref 70–110)
GLUCOSE UR QL STRIP: POSITIVE
KETONES UR QL STRIP: NEGATIVE
LEUKOCYTE ESTERASE UR QL STRIP: NEGATIVE
PH, POC UA: 5 (ref 5–8)
POC ANION GAP: 18 MMOL/L (ref 10–20)
POC BLOOD, URINE: NEGATIVE
POC BUN: 30 MMOL/L (ref 8–26)
POC CHLORIDE: 103 MMOL/L (ref 98–109)
POC CREATININE: 0.6 MG/DL (ref 0.6–1.3)
POC HEMATOCRIT: 47 %PCV (ref 37–47)
POC HEMOGLOBIN: 16 G/DL (ref 12.5–16)
POC ICA: 1.19 MMOL/L (ref 1.12–1.32)
POC MOLECULAR INFLUENZA A AGN: NEGATIVE
POC MOLECULAR INFLUENZA B AGN: NEGATIVE
POC NITRATES, URINE: NEGATIVE
POC POTASSIUM: 4.3 MMOL/L (ref 3.5–4.9)
POC SODIUM: 139 MMOL/L (ref 138–146)
POC TCO2: 23 MMOL/L (ref 24–29)
PROT UR QL STRIP: NEGATIVE
SARS-COV-2 RDRP RESP QL NAA+PROBE: NEGATIVE
SP GR UR STRIP: 1.01 (ref 1–1.03)
UROBILINOGEN UR STRIP-ACNC: ABNORMAL (ref 0.1–1.1)

## 2021-10-29 PROCEDURE — 80047 POCT CHEMISTRY PANEL: ICD-10-PCS | Mod: QW,S$GLB,, | Performed by: FAMILY MEDICINE

## 2021-10-29 PROCEDURE — 81003 URINALYSIS AUTO W/O SCOPE: CPT | Mod: QW,S$GLB,, | Performed by: FAMILY MEDICINE

## 2021-10-29 PROCEDURE — U0002: ICD-10-PCS | Mod: QW,CR,S$GLB, | Performed by: FAMILY MEDICINE

## 2021-10-29 PROCEDURE — 81003 POCT URINALYSIS, DIPSTICK, AUTOMATED, W/O SCOPE: ICD-10-PCS | Mod: QW,S$GLB,, | Performed by: FAMILY MEDICINE

## 2021-10-29 PROCEDURE — 80047 BASIC METABLC PNL IONIZED CA: CPT | Mod: QW,S$GLB,, | Performed by: FAMILY MEDICINE

## 2021-10-29 PROCEDURE — U0002 COVID-19 LAB TEST NON-CDC: HCPCS | Mod: QW,CR,S$GLB, | Performed by: FAMILY MEDICINE

## 2021-10-29 PROCEDURE — 87502 INFLUENZA DNA AMP PROBE: CPT | Mod: QW,S$GLB,, | Performed by: FAMILY MEDICINE

## 2021-10-29 PROCEDURE — 87502 POCT INFLUENZA A/B MOLECULAR: ICD-10-PCS | Mod: QW,S$GLB,, | Performed by: FAMILY MEDICINE

## 2021-10-29 PROCEDURE — 99215 PR OFFICE/OUTPT VISIT, EST, LEVL V, 40-54 MIN: ICD-10-PCS | Mod: 25,S$GLB,, | Performed by: FAMILY MEDICINE

## 2021-10-29 PROCEDURE — 99215 OFFICE O/P EST HI 40 MIN: CPT | Mod: 25,S$GLB,, | Performed by: FAMILY MEDICINE

## 2021-10-29 RX ORDER — ONDANSETRON 4 MG/1
4 TABLET, ORALLY DISINTEGRATING ORAL EVERY 8 HOURS PRN
Qty: 30 TABLET | Refills: 0 | Status: SHIPPED | OUTPATIENT
Start: 2021-10-29 | End: 2022-01-07

## 2021-10-29 RX ORDER — ONDANSETRON 4 MG/1
4 TABLET, ORALLY DISINTEGRATING ORAL
Status: COMPLETED | OUTPATIENT
Start: 2021-10-29 | End: 2021-10-29

## 2021-10-29 RX ORDER — SODIUM CHLORIDE 9 MG/ML
INJECTION, SOLUTION INTRAVENOUS
Status: DISCONTINUED | OUTPATIENT
Start: 2021-10-29 | End: 2022-12-07

## 2021-10-29 RX ADMIN — ONDANSETRON 4 MG: 4 TABLET, ORALLY DISINTEGRATING ORAL at 05:10

## 2021-11-02 DIAGNOSIS — I42.0 NONISCHEMIC CONGESTIVE CARDIOMYOPATHY: ICD-10-CM

## 2021-11-02 DIAGNOSIS — Z95.810 BIVENTRICULAR ICD (IMPLANTABLE CARDIOVERTER-DEFIBRILLATOR) IN PLACE: Primary | ICD-10-CM

## 2021-12-04 ENCOUNTER — CLINICAL SUPPORT (OUTPATIENT)
Dept: CARDIOLOGY | Facility: HOSPITAL | Age: 86
End: 2021-12-04
Payer: MEDICARE

## 2021-12-04 DIAGNOSIS — Z95.810 PRESENCE OF AUTOMATIC (IMPLANTABLE) CARDIAC DEFIBRILLATOR: ICD-10-CM

## 2021-12-04 PROCEDURE — 93295 CARDIAC DEVICE CHECK - REMOTE: ICD-10-PCS | Mod: ,,, | Performed by: INTERNAL MEDICINE

## 2021-12-04 PROCEDURE — 93295 DEV INTERROG REMOTE 1/2/MLT: CPT | Mod: ,,, | Performed by: INTERNAL MEDICINE

## 2021-12-15 ENCOUNTER — PATIENT MESSAGE (OUTPATIENT)
Dept: ADMINISTRATIVE | Facility: HOSPITAL | Age: 86
End: 2021-12-15
Payer: MEDICARE

## 2021-12-21 DIAGNOSIS — I25.10 CORONARY ARTERY DISEASE INVOLVING NATIVE CORONARY ARTERY WITHOUT ANGINA PECTORIS, UNSPECIFIED WHETHER NATIVE OR TRANSPLANTED HEART: ICD-10-CM

## 2021-12-21 DIAGNOSIS — Z79.4 TYPE 2 DIABETES MELLITUS WITHOUT COMPLICATION, WITH LONG-TERM CURRENT USE OF INSULIN: ICD-10-CM

## 2021-12-21 DIAGNOSIS — E11.9 TYPE 2 DIABETES MELLITUS WITHOUT COMPLICATION, WITH LONG-TERM CURRENT USE OF INSULIN: ICD-10-CM

## 2021-12-22 RX ORDER — METOPROLOL SUCCINATE 25 MG/1
25 TABLET, EXTENDED RELEASE ORAL DAILY
Qty: 90 TABLET | Refills: 0 | OUTPATIENT
Start: 2021-12-22

## 2022-01-05 ENCOUNTER — TELEPHONE (OUTPATIENT)
Dept: PRIMARY CARE CLINIC | Facility: CLINIC | Age: 87
End: 2022-01-05
Payer: MEDICARE

## 2022-01-05 NOTE — TELEPHONE ENCOUNTER
----- Message from Duy Cheng MD sent at 1/5/2022 12:55 PM CST -----  Contact: pt  I am not pcp and don't have earlier openings, either  ----- Message -----  From: Derrickwellington Jt  Sent: 1/5/2022  11:15 AM CST  To: [facility] Staff    Caller is requesting an earlier appointment then we can schedule.  Caller is requesting a message be sent to the provider.  If this is for urgent care symptoms, did you offer other providers at this location, providers at other locations, or [facility]? (yes, no, n/a):    If this is for the patients physical, did you offer to schedule next available and put on wait list, or to see NP or PA for their physical?  (yes, no, n/a):    When is the next available appointment with their provider:  02/17  Reason for the appointment:  bacteria infection  Patient preference of timeframe to be scheduled:  asap  Would the patient like a call back, or a response through their [facility] portal?:  call  Comments:

## 2022-01-06 NOTE — TELEPHONE ENCOUNTER
----- Message from Erlinda Thompson sent at 1/6/2022  8:31 AM CST -----  Contact: Pt Mobile  [phone]  Patient is returning a phone call.  Who left a message for the patient:   Does patient know what this is regarding:    Would you like a call back, or a response through your [facility] portal?:   Call  Comments:  Patient said she received a call on yesterday.

## 2022-01-06 NOTE — TELEPHONE ENCOUNTER
Spoke with patient, on a trip had mouth & facial swelling.  She went to the dentist, multiple teeth are rotting at the gumline, he put her on amoxicillin and antibacterial mouthwash.  He advised patient to see PCP to determine whether the or not something is wrong with her body.  Appointment scheduled.

## 2022-01-07 ENCOUNTER — OFFICE VISIT (OUTPATIENT)
Dept: PRIMARY CARE CLINIC | Facility: CLINIC | Age: 87
End: 2022-01-07
Payer: MEDICARE

## 2022-01-07 VITALS
SYSTOLIC BLOOD PRESSURE: 140 MMHG | DIASTOLIC BLOOD PRESSURE: 60 MMHG | BODY MASS INDEX: 31.16 KG/M2 | HEART RATE: 81 BPM | TEMPERATURE: 98 F | WEIGHT: 169.31 LBS | OXYGEN SATURATION: 97 % | HEIGHT: 62 IN

## 2022-01-07 DIAGNOSIS — I25.10 CORONARY ARTERY DISEASE INVOLVING NATIVE CORONARY ARTERY WITHOUT ANGINA PECTORIS, UNSPECIFIED WHETHER NATIVE OR TRANSPLANTED HEART: ICD-10-CM

## 2022-01-07 DIAGNOSIS — K02.9 DENTAL CARIES: Primary | ICD-10-CM

## 2022-01-07 DIAGNOSIS — E08.00 DIABETES MELLITUS DUE TO UNDERLYING CONDITION WITH HYPEROSMOLARITY WITHOUT COMA, WITHOUT LONG-TERM CURRENT USE OF INSULIN: ICD-10-CM

## 2022-01-07 DIAGNOSIS — E78.5 HYPERLIPIDEMIA, UNSPECIFIED HYPERLIPIDEMIA TYPE: ICD-10-CM

## 2022-01-07 DIAGNOSIS — E11.9 TYPE 2 DIABETES MELLITUS WITHOUT COMPLICATION, WITH LONG-TERM CURRENT USE OF INSULIN: ICD-10-CM

## 2022-01-07 DIAGNOSIS — I25.10 CORONARY ARTERY DISEASE INVOLVING NATIVE CORONARY ARTERY OF NATIVE HEART WITHOUT ANGINA PECTORIS: ICD-10-CM

## 2022-01-07 DIAGNOSIS — I10 ESSENTIAL HYPERTENSION: ICD-10-CM

## 2022-01-07 DIAGNOSIS — Z79.4 TYPE 2 DIABETES MELLITUS WITHOUT COMPLICATION, WITH LONG-TERM CURRENT USE OF INSULIN: ICD-10-CM

## 2022-01-07 DIAGNOSIS — R79.9 ABNORMAL FINDING OF BLOOD CHEMISTRY, UNSPECIFIED: ICD-10-CM

## 2022-01-07 DIAGNOSIS — I42.0 NONISCHEMIC CONGESTIVE CARDIOMYOPATHY: ICD-10-CM

## 2022-01-07 DIAGNOSIS — Z23 NEED FOR IMMUNIZATION AGAINST INFLUENZA: ICD-10-CM

## 2022-01-07 LAB
BILIRUB SERPL-MCNC: ABNORMAL MG/DL
BLOOD URINE, POC: ABNORMAL
CLARITY, POC UA: CLEAR
COLOR, POC UA: YELLOW
GLUCOSE SERPL-MCNC: 180 MG/DL (ref 70–110)
GLUCOSE UR QL STRIP: 1000
KETONES UR QL STRIP: ABNORMAL
LEUKOCYTE ESTERASE URINE, POC: ABNORMAL
NITRITE, POC UA: ABNORMAL
PH, POC UA: 5
PROTEIN, POC: ABNORMAL
SPECIFIC GRAVITY, POC UA: 1.01
UROBILINOGEN, POC UA: NORMAL

## 2022-01-07 PROCEDURE — 99214 OFFICE O/P EST MOD 30 MIN: CPT | Mod: PBBFAC,PN,25 | Performed by: FAMILY MEDICINE

## 2022-01-07 PROCEDURE — 99999 PR PBB SHADOW E&M-EST. PATIENT-LVL IV: CPT | Mod: PBBFAC,,, | Performed by: FAMILY MEDICINE

## 2022-01-07 PROCEDURE — 99215 OFFICE O/P EST HI 40 MIN: CPT | Mod: S$PBB,,, | Performed by: FAMILY MEDICINE

## 2022-01-07 PROCEDURE — 99215 PR OFFICE/OUTPT VISIT, EST, LEVL V, 40-54 MIN: ICD-10-PCS | Mod: S$PBB,,, | Performed by: FAMILY MEDICINE

## 2022-01-07 PROCEDURE — 99999 PR PBB SHADOW E&M-EST. PATIENT-LVL IV: ICD-10-PCS | Mod: PBBFAC,,, | Performed by: FAMILY MEDICINE

## 2022-01-07 PROCEDURE — 81002 URINALYSIS NONAUTO W/O SCOPE: CPT | Mod: PBBFAC,PN | Performed by: FAMILY MEDICINE

## 2022-01-07 PROCEDURE — 82962 GLUCOSE BLOOD TEST: CPT | Mod: PBBFAC,PN | Performed by: FAMILY MEDICINE

## 2022-01-07 PROCEDURE — G0008 ADMIN INFLUENZA VIRUS VAC: HCPCS | Mod: PBBFAC,PN

## 2022-01-07 RX ORDER — OXYCODONE AND ACETAMINOPHEN 7.5; 325 MG/1; MG/1
1 TABLET ORAL EVERY 6 HOURS PRN
COMMUNITY
Start: 2021-09-14 | End: 2022-06-24 | Stop reason: ALTCHOICE

## 2022-01-07 RX ORDER — AMLODIPINE BESYLATE 2.5 MG/1
2.5 TABLET ORAL NIGHTLY
Qty: 90 TABLET | Refills: 3 | Status: SHIPPED | OUTPATIENT
Start: 2022-01-07 | End: 2022-06-24 | Stop reason: SDUPTHER

## 2022-01-07 RX ORDER — ATORVASTATIN CALCIUM 40 MG/1
40 TABLET, FILM COATED ORAL DAILY
Qty: 90 TABLET | Refills: 3 | Status: SHIPPED | OUTPATIENT
Start: 2022-01-07 | End: 2022-06-24 | Stop reason: SDUPTHER

## 2022-01-07 RX ORDER — IRBESARTAN 300 MG/1
300 TABLET ORAL DAILY
Qty: 90 TABLET | Refills: 1 | Status: SHIPPED | OUTPATIENT
Start: 2022-01-07 | End: 2022-06-17

## 2022-01-07 RX ORDER — METOPROLOL SUCCINATE 25 MG/1
25 TABLET, EXTENDED RELEASE ORAL DAILY
Qty: 90 TABLET | Refills: 3 | Status: SHIPPED | OUTPATIENT
Start: 2022-01-07 | End: 2022-02-02

## 2022-01-07 RX ORDER — CLINDAMYCIN HYDROCHLORIDE 150 MG/1
CAPSULE ORAL
COMMUNITY
Start: 2021-09-14 | End: 2022-02-02

## 2022-01-09 NOTE — PROGRESS NOTES
Eliana Wagner 86 y.o. in for sugar check and f/u of meds  well tolerated  Past Medical History:   Diagnosis Date    Cervical polyp 5/28/2015    Diabetes mellitus, type 2     HTN (hypertension)     Hyperlipidemia     Retinal detachment     od     Stroke     tia x 3   ( last in 2005)    Urinary incontinence without sensory awareness 5/12/2021     Patient  reports that she has never smoked. She has never used smokeless tobacco. She reports current alcohol use of about 4.0 standard drinks of alcohol per week. She reports that she does not use drugs.  Family History   Problem Relation Age of Onset    Cataracts Mother     Cataracts Father     Glaucoma Maternal Grandfather     No Known Problems Sister     No Known Problems Brother     No Known Problems Maternal Aunt     No Known Problems Maternal Uncle     No Known Problems Paternal Aunt     No Known Problems Paternal Uncle     No Known Problems Maternal Grandmother     No Known Problems Paternal Grandmother     No Known Problems Paternal Grandfather     Cancer Son         prostate    Liver disease Son         liver transplant    Breast cancer Neg Hx     Colon cancer Neg Hx     Ovarian cancer Neg Hx     Amblyopia Neg Hx     Blindness Neg Hx     Diabetes Neg Hx     Hypertension Neg Hx     Macular degeneration Neg Hx     Retinal detachment Neg Hx     Strabismus Neg Hx     Stroke Neg Hx     Thyroid disease Neg Hx        Physical Exam:  Vitals:  Vitals:    01/07/22 1457   BP: (!) 140/60   Pulse: 81   Temp: 97.9 °F (36.6 °C)     CV:rrr without murmur  Lungs:clear to auscultation bilaterally with good resp excursion  ABD: normal BS, non-tender,no  hepatosplenomegaly  Extremities:no clubbing, cyanosis or edema  Skin:good turgor, no rashes,without areas of break down  Foot exam:podiatry referral  Imp: diabetes uncontrolled          Dietary non-compliance          Dental caries new onset            Current Outpatient Medications:     aspirin  (ECOTRIN) 81 MG EC tablet, Take 81 mg by mouth once daily., Disp: , Rfl:     blood sugar diagnostic Strp, 1 each by Misc.(Non-Drug; Combo Route) route once daily., Disp: 100 each, Rfl: 3    blood-glucose meter kit, Use as instructed, Disp: 1 each, Rfl: 0    clindamycin (CLEOCIN) 150 MG capsule, TAKE FOUR CAPSULES BY MOUTH ONE hour BEFORE surgery, THEN TAKE ONE CAPSULE EVERY 6 HOURS, Disp: , Rfl:     fluorouracil (EFUDEX) 5 % cream, Apply topically Daily. , Disp: , Rfl:     lancets Misc, 1 each by Misc.(Non-Drug; Combo Route) route once daily., Disp: 100 each, Rfl: 3    mv-min-FA-Kd-Nm-ononbly-lutein 0.4-162-18 mg Tab, Take by mouth. 1 Tablet Oral Every day, Disp: , Rfl:     vitamin D (VITAMIN D3) 1000 units Tab, Take 1,000 Units by mouth once daily., Disp: , Rfl:     amLODIPine (NORVASC) 2.5 MG tablet, Take 1 tablet (2.5 mg total) by mouth nightly., Disp: 90 tablet, Rfl: 3    atorvastatin (LIPITOR) 40 MG tablet, Take 1 tablet (40 mg total) by mouth once daily., Disp: 90 tablet, Rfl: 3    canagliflozin (INVOKANA) 300 mg Tab tablet, Take 1 tablet (300 mg total) by mouth once daily., Disp: 90 tablet, Rfl: 3    irbesartan (AVAPRO) 300 MG tablet, Take 1 tablet (300 mg total) by mouth once daily., Disp: 90 tablet, Rfl: 1    metoprolol succinate (TOPROL-XL) 25 MG 24 hr tablet, Take 1 tablet (25 mg total) by mouth once daily., Disp: 90 tablet, Rfl: 3    oxyCODONE-acetaminophen (PERCOCET) 7.5-325 mg per tablet, Take 1 tablet by mouth every 6 (six) hours as needed., Disp: , Rfl:     Current Facility-Administered Medications:     0.9%  NaCl infusion, , Intravenous, 1 time in Clinic/HOD, Cayetano Parikh NP  Plan:see eye doctor yearly, podiatrist yearly  MEDS: see above  Diet: ADA diet  lifestlye modifications: low impact aerobic exercise  F/u: in 3-4 months.  Labs today  1. Dental caries  - CBC Auto Differential; Future  - Echo; Future    2. Diabetes mellitus due to underlying condition with hyperosmolarity  without coma, without long-term current use of insulin  - Comprehensive Metabolic Panel; Future  - Hemoglobin A1C; Future  - Ambulatory referral/consult to Podiatry; Future  - POCT URINE DIPSTICK WITHOUT MICROSCOPE  - POCT URINE DIPSTICK WITHOUT MICROSCOPE  - POCT Glucose, Hand-Held Device    3. Abnormal finding of blood chemistry, unspecified   - CBC Auto Differential; Future    4. Nonischemic congestive cardiomyopathy    5. Coronary artery disease  - irbesartan (AVAPRO) 300 MG tablet; Take 1 tablet (300 mg total) by mouth once daily.  Dispense: 90 tablet; Refill: 1    6. Type 2 diabetes mellitus without complication, with long-term current use of insulin  - canagliflozin (INVOKANA) 300 mg Tab tablet; Take 1 tablet (300 mg total) by mouth once daily.  Dispense: 90 tablet; Refill: 3    7. Hyperlipidemia, unspecified hyperlipidemia type  - atorvastatin (LIPITOR) 40 MG tablet; Take 1 tablet (40 mg total) by mouth once daily.  Dispense: 90 tablet; Refill: 3    8. Coronary artery disease involving native coronary artery without angina pectoris, unspecified whether native or transplanted heart  - metoprolol succinate (TOPROL-XL) 25 MG 24 hr tablet; Take 1 tablet (25 mg total) by mouth once daily.  Dispense: 90 tablet; Refill: 3    9. Essential hypertension  - amLODIPine (NORVASC) 2.5 MG tablet; Take 1 tablet (2.5 mg total) by mouth nightly.  Dispense: 90 tablet; Refill: 3    10. Need for immunization against influenza  - Influenza (FLUAD) - Quadrivalent (Adjuvanted) *Preferred* (65+) (PF)  will contact pt with results of pending tests when available.  A total of 45 minutes were spent face-to-face with the patient during this encounter and over half of that time was spent on counseling and coordination of care.

## 2022-01-10 ENCOUNTER — OFFICE VISIT (OUTPATIENT)
Dept: PODIATRY | Facility: CLINIC | Age: 87
End: 2022-01-10
Payer: MEDICARE

## 2022-01-10 VITALS
SYSTOLIC BLOOD PRESSURE: 114 MMHG | RESPIRATION RATE: 18 BRPM | WEIGHT: 170.88 LBS | HEIGHT: 62 IN | BODY MASS INDEX: 31.45 KG/M2 | DIASTOLIC BLOOD PRESSURE: 66 MMHG | HEART RATE: 72 BPM

## 2022-01-10 DIAGNOSIS — M20.41 HAMMER TOES OF BOTH FEET: ICD-10-CM

## 2022-01-10 DIAGNOSIS — B35.1 ONYCHOMYCOSIS DUE TO DERMATOPHYTE: ICD-10-CM

## 2022-01-10 DIAGNOSIS — L84 CORN OR CALLUS: ICD-10-CM

## 2022-01-10 DIAGNOSIS — E11.42 DIABETIC POLYNEUROPATHY ASSOCIATED WITH TYPE 2 DIABETES MELLITUS: ICD-10-CM

## 2022-01-10 DIAGNOSIS — E08.00 DIABETES MELLITUS DUE TO UNDERLYING CONDITION WITH HYPEROSMOLARITY WITHOUT COMA, WITHOUT LONG-TERM CURRENT USE OF INSULIN: Primary | ICD-10-CM

## 2022-01-10 DIAGNOSIS — M20.42 HAMMER TOES OF BOTH FEET: ICD-10-CM

## 2022-01-10 PROCEDURE — 99213 PR OFFICE/OUTPT VISIT, EST, LEVL III, 20-29 MIN: ICD-10-PCS | Mod: 25,S$PBB,, | Performed by: PODIATRIST

## 2022-01-10 PROCEDURE — 11055 PARING/CUTG B9 HYPRKER LES 1: CPT | Mod: Q9,S$PBB,, | Performed by: PODIATRIST

## 2022-01-10 PROCEDURE — 99999 PR PBB SHADOW E&M-EST. PATIENT-LVL IV: CPT | Mod: PBBFAC,,, | Performed by: PODIATRIST

## 2022-01-10 PROCEDURE — 11055 PR TRIM HYPERKERATOTIC SKIN LESION, ONE: ICD-10-PCS | Mod: Q9,S$PBB,, | Performed by: PODIATRIST

## 2022-01-10 PROCEDURE — 11055 PARING/CUTG B9 HYPRKER LES 1: CPT | Mod: PBBFAC,PN | Performed by: PODIATRIST

## 2022-01-10 PROCEDURE — 99213 OFFICE O/P EST LOW 20 MIN: CPT | Mod: 25,S$PBB,, | Performed by: PODIATRIST

## 2022-01-10 PROCEDURE — 99999 PR PBB SHADOW E&M-EST. PATIENT-LVL IV: ICD-10-PCS | Mod: PBBFAC,,, | Performed by: PODIATRIST

## 2022-01-10 PROCEDURE — 99214 OFFICE O/P EST MOD 30 MIN: CPT | Mod: PBBFAC,PN | Performed by: PODIATRIST

## 2022-01-10 NOTE — PROGRESS NOTES
Subjective:      Patient ID: Eliana Wagner is a 86 y.o. female.    Chief Complaint:   PCP (Sirena Long NP  2/23/21/), Diabetic Foot Exam, Foot Problem (Toes are getting stiff  ), and Nail Care    Eliana is a 86 y.o. female who presents to the clinic upon referral from Dr. Lawton  for evaluation and treatment of diabetic feet. Eliana has a past medical history of Cervical polyp (5/28/2015), Diabetes mellitus, type 2, HTN (hypertension), Hyperlipidemia, Retinal detachment, Stroke, and Urinary incontinence without sensory awareness (5/12/2021). Patient relates her feet are doing well.  She did have a problem with her right 2nd toe with a possible callus formation.  She relates that seemed inflamed however has significantly improved.  Denies any drainage .  She relates that she used a spacer which improved the toe    Patient is not aware that the Penlac ointment had been sent in last visit for the nails.  She has been getting pedicures, last 1 was right before her recent cruise.  She recently took the Polish off last night  and noticed there was some changes to the nails.  She relates the nail seems stained.    Patient relates the abrasions on her legs are from her cat biting her.  She relates she is a little slow to heal secondary to the diabetes.  She has been putting topical antibiotic ointment    Patient relates she does have some numbness in her feet but nothing painful    PCP: Nara Lawton MD    Date Last Seen by PCP:2/23/21    Current shoe gear: Casual shoes proper fitting    Hemoglobin A1C   Date Value Ref Range Status   03/16/2021 7.8 (H) 4.0 - 5.6 % Final     Comment:     ADA Screening Guidelines:  5.7-6.4%  Consistent with prediabetes  >or=6.5%  Consistent with diabetes    High levels of fetal hemoglobin interfere with the HbA1C  assay. Heterozygous hemoglobin variants (HbS, HgC, etc)do  not significantly interfere with this assay.   However, presence of multiple  variants may affect accuracy.     03/16/2021 7.8 (H) 4.0 - 5.6 % Final     Comment:     ADA Screening Guidelines:  5.7-6.4%  Consistent with prediabetes  >or=6.5%  Consistent with diabetes    High levels of fetal hemoglobin interfere with the HbA1C  assay. Heterozygous hemoglobin variants (HbS, HgC, etc)do  not significantly interfere with this assay.   However, presence of multiple variants may affect accuracy.     11/10/2020 7.7 (H) 4.0 - 5.6 % Final     Comment:     ADA Screening Guidelines:  5.7-6.4%  Consistent with prediabetes  >or=6.5%  Consistent with diabetes  High levels of fetal hemoglobin interfere with the HbA1C  assay. Heterozygous hemoglobin variants (HbS, HgC, etc)do  not significantly interfere with this assay.   However, presence of multiple variants may affect accuracy.              Past Medical History:   Diagnosis Date    Cervical polyp 5/28/2015    Diabetes mellitus, type 2     HTN (hypertension)     Hyperlipidemia     Retinal detachment     od     Stroke     tia x 3   ( last in 2005)    Urinary incontinence without sensory awareness 5/12/2021     Past Surgical History:   Procedure Laterality Date    APPENDECTOMY      CARDIAC CATHETERIZATION      CATARACT EXTRACTION Bilateral     EYE SURGERY Bilateral     cataract    INSERT / REPLACE / REMOVE PACEMAKER      INSERTION OF PACEMAKER      july 22 2019     Orestes Kim MD    TONSILLECTOMY       Current Outpatient Medications on File Prior to Visit   Medication Sig Dispense Refill    amLODIPine (NORVASC) 2.5 MG tablet Take 1 tablet (2.5 mg total) by mouth nightly. 90 tablet 3    aspirin (ECOTRIN) 81 MG EC tablet Take 81 mg by mouth once daily.      atorvastatin (LIPITOR) 40 MG tablet Take 1 tablet (40 mg total) by mouth once daily. 90 tablet 3    blood sugar diagnostic Strp 1 each by Misc.(Non-Drug; Combo Route) route once daily. 100 each 3    canagliflozin (INVOKANA) 300 mg Tab tablet Take 1 tablet (300 mg total) by mouth once  daily. 90 tablet 3    clindamycin (CLEOCIN) 150 MG capsule TAKE FOUR CAPSULES BY MOUTH ONE hour BEFORE surgery, THEN TAKE ONE CAPSULE EVERY 6 HOURS      fluorouracil (EFUDEX) 5 % cream Apply topically Daily.       irbesartan (AVAPRO) 300 MG tablet Take 1 tablet (300 mg total) by mouth once daily. 90 tablet 1    lancets Misc 1 each by Misc.(Non-Drug; Combo Route) route once daily. 100 each 3    metoprolol succinate (TOPROL-XL) 25 MG 24 hr tablet Take 1 tablet (25 mg total) by mouth once daily. 90 tablet 3    mv-min-FA-Zr-Qs-qhzqpub-lutein 0.4-162-18 mg Tab Take by mouth. 1 Tablet Oral Every day      vitamin D (VITAMIN D3) 1000 units Tab Take 1,000 Units by mouth once daily.      blood-glucose meter kit Use as instructed 1 each 0    oxyCODONE-acetaminophen (PERCOCET) 7.5-325 mg per tablet Take 1 tablet by mouth every 6 (six) hours as needed.       Current Facility-Administered Medications on File Prior to Visit   Medication Dose Route Frequency Provider Last Rate Last Admin    0.9%  NaCl infusion   Intravenous 1 time in Clinic/HOD Cayetano Parikh NP         Review of patient's allergies indicates:   Allergen Reactions    Penicillins      Caused uti       Review of Systems   Constitutional: Negative for chills, decreased appetite, fever, malaise/fatigue, night sweats, weight gain and weight loss.   Cardiovascular: Positive for leg swelling. Negative for chest pain, claudication, dyspnea on exertion, palpitations and syncope.   Respiratory: Negative for cough and shortness of breath.    Endocrine: Negative for cold intolerance and heat intolerance.   Hematologic/Lymphatic: Negative for bleeding problem. Does not bruise/bleed easily.   Skin: Positive for nail changes. Negative for color change, dry skin, flushing, itching, poor wound healing, rash, skin cancer, suspicious lesions and unusual hair distribution.   Musculoskeletal: Positive for arthritis. Negative for back pain, falls, gout, joint pain, joint  "swelling, muscle cramps, muscle weakness, myalgias, neck pain and stiffness.   Gastrointestinal: Negative for diarrhea, nausea and vomiting.   Neurological: Positive for numbness. Negative for dizziness, focal weakness, light-headedness, paresthesias, tremors, vertigo and weakness.   Psychiatric/Behavioral: Negative for altered mental status and depression. The patient does not have insomnia.    Allergic/Immunologic: Negative.            Objective:       Vitals:    01/10/22 0903   BP: 114/66   Pulse: 72   Resp: 18   Weight: 77.5 kg (170 lb 13.7 oz)   Height: 5' 2" (1.575 m)   PainSc: 0-No pain   77.5 kg (170 lb 13.7 oz)     Physical Exam  Vitals reviewed.   Constitutional:       General: She is not in acute distress.     Appearance: She is well-developed. She is not ill-appearing, toxic-appearing or diaphoretic.      Comments: Proper supportive shoegear   Cardiovascular:      Pulses:           Dorsalis pedis pulses are 2+ on the right side and 2+ on the left side.        Posterior tibial pulses are 1+ on the right side and 1+ on the left side.      Comments: Decreased hair growth to lower shins with evidence of chronic venous stasis dz.   Musculoskeletal:         General: No tenderness.      Right lower le+ Edema present.      Left lower le+ Edema present.      Right foot: Decreased range of motion. Deformity and prominent metatarsal heads present. No tenderness or bony tenderness.      Left foot: Decreased range of motion. Deformity and prominent metatarsal heads present. No tenderness or bony tenderness.      Comments: Flexible pes planus foot type w/ medial arch collapse and mild gastroc equinus      Reducible extensor and flexor contractures at the MTPJ and PIPJ of toes 2-5, bilat.      1st MPJ exostosis w/ lateral deviation of hallux, non trackbound.     No pop    Feet:      Right foot:      Protective Sensation: 10 sites tested. 10 sites sensed.      Skin integrity: Skin breakdown, callus and dry skin " present. No ulcer, blister, erythema or warmth.      Toenail Condition: Fungal disease present.     Left foot:      Protective Sensation: 10 sites tested. 10 sites sensed.      Skin integrity: Skin breakdown and dry skin present. No ulcer, blister, erythema, warmth or callus.      Toenail Condition: Fungal disease present.     Comments: Toenails 1-5 bilaterally are short, proximal clearing, distal have thickening discolored no acute signs of infection    Focal hyperkeratotic lesion consisting entirely of hyperkeratotic tissue without open skin, drainage, pus, fluctuance, malodor, or signs of infection medial right PIPJ 2nd digit      Multiple abrasions excoriations/skin tear to shins with mild ecchymosis no acute signs of infection  Skin:     General: Skin is warm.      Capillary Refill: Capillary refill takes 2 to 3 seconds.      Coloration: Skin is not pale.      Findings: No erythema or rash.   Neurological:      Mental Status: She is alert and oriented to person, place, and time.      Gait: Gait abnormal.   Psychiatric:         Attention and Perception: Attention normal.         Mood and Affect: Mood normal.         Speech: Speech normal.         Behavior: Behavior normal.         Thought Content: Thought content normal.         Judgment: Judgment normal.               Assessment:       Encounter Diagnoses   Name Primary?    Diabetes mellitus due to underlying condition with hyperosmolarity without coma, without long-term current use of insulin Yes    Hammer toes of both feet     Onychomycosis due to dermatophyte     Corn or callus     Diabetic polyneuropathy associated with type 2 diabetes mellitus          Plan:       Eliana was seen today for pcp, diabetic foot exam, foot problem and nail care.    Diagnoses and all orders for this visit:    Diabetes mellitus due to underlying condition with hyperosmolarity without coma, without long-term current use of insulin  -     Ambulatory referral/consult to  Podiatry    Hammer toes of both feet    Onychomycosis due to dermatophyte    Corn or callus    Diabetic polyneuropathy associated with type 2 diabetes mellitus      I counseled the patient on her conditions, their implications and medical management.        - Shoe inspection. Diabetic Foot Education. Patient reminded of the importance of good nutrition and blood sugar control to help prevent podiatric complications of diabetes. Patient instructed on proper foot hygeine. We discussed wearing proper shoe gear, daily foot inspections, never walking without protective shoe gear, never putting sharp instruments to feet, routine podiatric nail visits every 2-3 months.      - With patient's permission, nails were aggressively reduced and debrided x 10 to their soft tissue attachment mechanically , removing all offending nail and debris. Patient relates relief following the procedure. She will continue to monitor the areas daily, inspect her feet, wear protective shoe gear when ambulatory, moisturizer to maintain skin integrity and follow in this office in approximately 2-3 months, sooner p.r.n.        - - With patient's permission, Utilizing a #15 scalpel, I trimmed the corns and calluses at the above mentioned location.      The patient will continue to monitor the areas daily, inspect the feet, wear protective shoe gear when ambulatory, and moisturizer to maintain skin integrity.    - continue proper width shoes and check feet daily    - Caution with cat bites.  Continue topical antibiotic ointment no indication for orals    - pedicure precautions; re recommend taking a Polish holiday and avoiding nail Polish on toenails.  Patient did not get Penlac.  Patient likes to paint her nails in likely would not use the Penlac at this time.    -return in 1 year sooner if needed

## 2022-01-12 ENCOUNTER — TELEPHONE (OUTPATIENT)
Dept: OPHTHALMOLOGY | Facility: CLINIC | Age: 87
End: 2022-01-12
Payer: MEDICARE

## 2022-01-12 ENCOUNTER — LAB VISIT (OUTPATIENT)
Dept: LAB | Facility: HOSPITAL | Age: 87
End: 2022-01-12
Attending: FAMILY MEDICINE
Payer: MEDICARE

## 2022-01-12 DIAGNOSIS — E11.65 UNCONTROLLED TYPE 2 DIABETES MELLITUS WITH HYPERGLYCEMIA: Primary | ICD-10-CM

## 2022-01-12 DIAGNOSIS — R79.9 ABNORMAL FINDING OF BLOOD CHEMISTRY, UNSPECIFIED: ICD-10-CM

## 2022-01-12 DIAGNOSIS — Z79.4 TYPE 2 DIABETES MELLITUS WITHOUT COMPLICATION, WITH LONG-TERM CURRENT USE OF INSULIN: ICD-10-CM

## 2022-01-12 DIAGNOSIS — K02.9 DENTAL CARIES: ICD-10-CM

## 2022-01-12 DIAGNOSIS — E08.00 DIABETES MELLITUS DUE TO UNDERLYING CONDITION WITH HYPEROSMOLARITY WITHOUT COMA, WITHOUT LONG-TERM CURRENT USE OF INSULIN: ICD-10-CM

## 2022-01-12 DIAGNOSIS — E11.9 TYPE 2 DIABETES MELLITUS WITHOUT COMPLICATION, WITH LONG-TERM CURRENT USE OF INSULIN: ICD-10-CM

## 2022-01-12 LAB
ALBUMIN SERPL BCP-MCNC: 3.6 G/DL (ref 3.5–5.2)
ALP SERPL-CCNC: 77 U/L (ref 55–135)
ALT SERPL W/O P-5'-P-CCNC: 33 U/L (ref 10–44)
ANION GAP SERPL CALC-SCNC: 7 MMOL/L (ref 8–16)
AST SERPL-CCNC: 33 U/L (ref 10–40)
BASOPHILS # BLD AUTO: 0.03 K/UL (ref 0–0.2)
BASOPHILS # BLD AUTO: 0.03 K/UL (ref 0–0.2)
BASOPHILS NFR BLD: 0.4 % (ref 0–1.9)
BASOPHILS NFR BLD: 0.4 % (ref 0–1.9)
BILIRUB SERPL-MCNC: 0.5 MG/DL (ref 0.1–1)
BUN SERPL-MCNC: 20 MG/DL (ref 8–23)
CALCIUM SERPL-MCNC: 9.4 MG/DL (ref 8.7–10.5)
CHLORIDE SERPL-SCNC: 103 MMOL/L (ref 95–110)
CHOLEST SERPL-MCNC: 185 MG/DL (ref 120–199)
CHOLEST/HDLC SERPL: 2.8 {RATIO} (ref 2–5)
CO2 SERPL-SCNC: 28 MMOL/L (ref 23–29)
CREAT SERPL-MCNC: 0.8 MG/DL (ref 0.5–1.4)
DIFFERENTIAL METHOD: ABNORMAL
DIFFERENTIAL METHOD: ABNORMAL
EOSINOPHIL # BLD AUTO: 0.1 K/UL (ref 0–0.5)
EOSINOPHIL # BLD AUTO: 0.1 K/UL (ref 0–0.5)
EOSINOPHIL NFR BLD: 1.1 % (ref 0–8)
EOSINOPHIL NFR BLD: 1.1 % (ref 0–8)
ERYTHROCYTE [DISTWIDTH] IN BLOOD BY AUTOMATED COUNT: 13.7 % (ref 11.5–14.5)
ERYTHROCYTE [DISTWIDTH] IN BLOOD BY AUTOMATED COUNT: 13.7 % (ref 11.5–14.5)
EST. GFR  (AFRICAN AMERICAN): >60 ML/MIN/1.73 M^2
EST. GFR  (NON AFRICAN AMERICAN): >60 ML/MIN/1.73 M^2
ESTIMATED AVG GLUCOSE: 217 MG/DL (ref 68–131)
GLUCOSE SERPL-MCNC: 164 MG/DL (ref 70–110)
HBA1C MFR BLD: 9.2 % (ref 4–5.6)
HCT VFR BLD AUTO: 46.4 % (ref 37–48.5)
HCT VFR BLD AUTO: 46.4 % (ref 37–48.5)
HDLC SERPL-MCNC: 67 MG/DL (ref 40–75)
HDLC SERPL: 36.2 % (ref 20–50)
HGB BLD-MCNC: 14.7 G/DL (ref 12–16)
HGB BLD-MCNC: 14.7 G/DL (ref 12–16)
IMM GRANULOCYTES # BLD AUTO: 0.01 K/UL (ref 0–0.04)
IMM GRANULOCYTES # BLD AUTO: 0.01 K/UL (ref 0–0.04)
IMM GRANULOCYTES NFR BLD AUTO: 0.1 % (ref 0–0.5)
IMM GRANULOCYTES NFR BLD AUTO: 0.1 % (ref 0–0.5)
LDLC SERPL CALC-MCNC: 96 MG/DL (ref 63–159)
LYMPHOCYTES # BLD AUTO: 2.8 K/UL (ref 1–4.8)
LYMPHOCYTES # BLD AUTO: 2.8 K/UL (ref 1–4.8)
LYMPHOCYTES NFR BLD: 34.5 % (ref 18–48)
LYMPHOCYTES NFR BLD: 34.5 % (ref 18–48)
MCH RBC QN AUTO: 29.1 PG (ref 27–31)
MCH RBC QN AUTO: 29.1 PG (ref 27–31)
MCHC RBC AUTO-ENTMCNC: 31.7 G/DL (ref 32–36)
MCHC RBC AUTO-ENTMCNC: 31.7 G/DL (ref 32–36)
MCV RBC AUTO: 92 FL (ref 82–98)
MCV RBC AUTO: 92 FL (ref 82–98)
MONOCYTES # BLD AUTO: 0.4 K/UL (ref 0.3–1)
MONOCYTES # BLD AUTO: 0.4 K/UL (ref 0.3–1)
MONOCYTES NFR BLD: 5.4 % (ref 4–15)
MONOCYTES NFR BLD: 5.4 % (ref 4–15)
NEUTROPHILS # BLD AUTO: 4.7 K/UL (ref 1.8–7.7)
NEUTROPHILS # BLD AUTO: 4.7 K/UL (ref 1.8–7.7)
NEUTROPHILS NFR BLD: 58.5 % (ref 38–73)
NEUTROPHILS NFR BLD: 58.5 % (ref 38–73)
NONHDLC SERPL-MCNC: 118 MG/DL
NRBC BLD-RTO: 0 /100 WBC
NRBC BLD-RTO: 0 /100 WBC
PLATELET # BLD AUTO: 151 K/UL (ref 150–450)
PLATELET # BLD AUTO: 151 K/UL (ref 150–450)
PMV BLD AUTO: 10 FL (ref 9.2–12.9)
PMV BLD AUTO: 10 FL (ref 9.2–12.9)
POTASSIUM SERPL-SCNC: 5 MMOL/L (ref 3.5–5.1)
PROT SERPL-MCNC: 7 G/DL (ref 6–8.4)
RBC # BLD AUTO: 5.05 M/UL (ref 4–5.4)
RBC # BLD AUTO: 5.05 M/UL (ref 4–5.4)
SODIUM SERPL-SCNC: 138 MMOL/L (ref 136–145)
TRIGL SERPL-MCNC: 110 MG/DL (ref 30–150)
TSH SERPL DL<=0.005 MIU/L-ACNC: 1.83 UIU/ML (ref 0.4–4)
WBC # BLD AUTO: 8.02 K/UL (ref 3.9–12.7)
WBC # BLD AUTO: 8.02 K/UL (ref 3.9–12.7)

## 2022-01-12 PROCEDURE — 84443 ASSAY THYROID STIM HORMONE: CPT | Performed by: FAMILY MEDICINE

## 2022-01-12 PROCEDURE — 83036 HEMOGLOBIN GLYCOSYLATED A1C: CPT | Performed by: FAMILY MEDICINE

## 2022-01-12 PROCEDURE — 80061 LIPID PANEL: CPT | Performed by: FAMILY MEDICINE

## 2022-01-12 PROCEDURE — 36415 COLL VENOUS BLD VENIPUNCTURE: CPT | Mod: PO | Performed by: FAMILY MEDICINE

## 2022-01-12 PROCEDURE — 85025 COMPLETE CBC W/AUTO DIFF WBC: CPT | Performed by: FAMILY MEDICINE

## 2022-01-12 PROCEDURE — 80053 COMPREHEN METABOLIC PANEL: CPT | Performed by: FAMILY MEDICINE

## 2022-01-12 NOTE — TELEPHONE ENCOUNTER
Let pt know her uncontrolled diabetes has contributed to the deterioration of her  Teeth. She needs to see dietician.see orders.

## 2022-01-13 ENCOUNTER — HOSPITAL ENCOUNTER (OUTPATIENT)
Dept: CARDIOLOGY | Facility: HOSPITAL | Age: 87
Discharge: HOME | End: 2022-01-13
Attending: FAMILY MEDICINE
Payer: MEDICARE

## 2022-01-13 VITALS
SYSTOLIC BLOOD PRESSURE: 140 MMHG | WEIGHT: 170 LBS | DIASTOLIC BLOOD PRESSURE: 80 MMHG | BODY MASS INDEX: 31.28 KG/M2 | HEART RATE: 65 BPM | HEIGHT: 62 IN

## 2022-01-13 DIAGNOSIS — K02.9 DENTAL CARIES: ICD-10-CM

## 2022-01-13 LAB
ASCENDING AORTA: 2.98 CM
AV INDEX (PROSTH): 0.68
AV MEAN GRADIENT: 5 MMHG
AV PEAK GRADIENT: 9 MMHG
AV REGURGITATION PRESSURE HALF TIME: 820 MS
AV VALVE AREA: 2.03 CM2
AV VELOCITY RATIO: 0.69
BSA FOR ECHO PROCEDURE: 1.84 M2
CV ECHO LV RWT: 0.36 CM
DOP CALC AO PEAK VEL: 1.5 M/S
DOP CALC AO VTI: 32.54 CM
DOP CALC LVOT AREA: 3 CM2
DOP CALC LVOT DIAMETER: 1.95 CM
DOP CALC LVOT PEAK VEL: 1.03 M/S
DOP CALC LVOT STROKE VOLUME: 65.91 CM3
DOP CALC MV VTI: 29.03 CM
DOP CALC RVOT PEAK VEL: 1.1 M/S
DOP CALC RVOT VTI: 23.45 CM
DOP CALCLVOT PEAK VEL VTI: 22.08 CM
E WAVE DECELERATION TIME: 140.9 MSEC
E/A RATIO: 0.6
E/E' RATIO: 20.67 M/S
ECHO LV POSTERIOR WALL: 0.88 CM (ref 0.6–1.1)
EJECTION FRACTION: 45 %
FRACTIONAL SHORTENING: 14 % (ref 28–44)
INTERVENTRICULAR SEPTUM: 0.81 CM (ref 0.6–1.1)
IVRT: 142.72 MSEC
LA MAJOR: 4.44 CM
LA MINOR: 4.47 CM
LA WIDTH: 2.81 CM
LEFT ATRIUM SIZE: 3.18 CM
LEFT ATRIUM VOLUME INDEX MOD: 14.9 ML/M2
LEFT ATRIUM VOLUME INDEX: 19 ML/M2
LEFT ATRIUM VOLUME MOD: 26.6 CM3
LEFT ATRIUM VOLUME: 33.84 CM3
LEFT INTERNAL DIMENSION IN SYSTOLE: 4.21 CM (ref 2.1–4)
LEFT VENTRICLE DIASTOLIC VOLUME INDEX: 58.61 ML/M2
LEFT VENTRICLE DIASTOLIC VOLUME: 104.32 ML
LEFT VENTRICLE MASS INDEX: 79 G/M2
LEFT VENTRICLE SYSTOLIC VOLUME INDEX: 44.4 ML/M2
LEFT VENTRICLE SYSTOLIC VOLUME: 79.09 ML
LEFT VENTRICULAR INTERNAL DIMENSION IN DIASTOLE: 4.9 CM (ref 3.5–6)
LEFT VENTRICULAR MASS: 140.83 G
LV LATERAL E/E' RATIO: 20.67 M/S
LV SEPTAL E/E' RATIO: 20.67 M/S
MV A" WAVE DURATION": 14.27 MSEC
MV MEAN GRADIENT: 1 MMHG
MV PEAK A VEL: 1.04 M/S
MV PEAK E VEL: 0.62 M/S
MV PEAK GRADIENT: 4 MMHG
MV STENOSIS PRESSURE HALF TIME: 40.86 MS
MV VALVE AREA BY CONTINUITY EQUATION: 2.27 CM2
MV VALVE AREA P 1/2 METHOD: 5.38 CM2
PISA TR MAX VEL: 2.21 M/S
PULM VEIN S/D RATIO: 1.83
PV MEAN GRADIENT: 1.92 MMHG
PV PEAK D VEL: 0.24 M/S
PV PEAK S VEL: 0.44 M/S
PV PEAK VELOCITY: 1.18 CM/S
RA MAJOR: 4.35 CM
RA PRESSURE: 3 MMHG
RA WIDTH: 1.85 CM
RIGHT VENTRICULAR END-DIASTOLIC DIMENSION: 2.28 CM
SINUS: 2.63 CM
STJ: 2.77 CM
TDI LATERAL: 0.03 M/S
TDI SEPTAL: 0.03 M/S
TDI: 0.03 M/S
TR MAX PG: 20 MMHG
TRICUSPID ANNULAR PLANE SYSTOLIC EXCURSION: 2.17 CM
TV REST PULMONARY ARTERY PRESSURE: 23 MMHG

## 2022-01-13 PROCEDURE — 93306 TTE W/DOPPLER COMPLETE: CPT

## 2022-01-13 PROCEDURE — 93306 TTE W/DOPPLER COMPLETE: CPT | Mod: 26,,, | Performed by: INTERNAL MEDICINE

## 2022-01-13 PROCEDURE — 93306 ECHO (CUPID ONLY): ICD-10-PCS | Mod: 26,,, | Performed by: INTERNAL MEDICINE

## 2022-02-02 ENCOUNTER — OFFICE VISIT (OUTPATIENT)
Dept: CARDIOLOGY | Facility: CLINIC | Age: 87
End: 2022-02-02
Payer: MEDICARE

## 2022-02-02 ENCOUNTER — CLINICAL SUPPORT (OUTPATIENT)
Dept: CARDIOLOGY | Facility: HOSPITAL | Age: 87
End: 2022-02-02
Attending: INTERNAL MEDICINE
Payer: MEDICARE

## 2022-02-02 VITALS
HEIGHT: 62 IN | DIASTOLIC BLOOD PRESSURE: 58 MMHG | WEIGHT: 168 LBS | BODY MASS INDEX: 30.91 KG/M2 | HEART RATE: 68 BPM | SYSTOLIC BLOOD PRESSURE: 111 MMHG

## 2022-02-02 DIAGNOSIS — Z95.810 BIVENTRICULAR ICD (IMPLANTABLE CARDIOVERTER-DEFIBRILLATOR) IN PLACE: ICD-10-CM

## 2022-02-02 DIAGNOSIS — I42.0 NONISCHEMIC CONGESTIVE CARDIOMYOPATHY: ICD-10-CM

## 2022-02-02 DIAGNOSIS — I49.3 PREMATURE VENTRICULAR CONTRACTIONS: ICD-10-CM

## 2022-02-02 DIAGNOSIS — I50.22 CHRONIC SYSTOLIC (CONGESTIVE) HEART FAILURE: Primary | Chronic | ICD-10-CM

## 2022-02-02 DIAGNOSIS — I25.10 CORONARY ARTERY DISEASE INVOLVING NATIVE CORONARY ARTERY WITHOUT ANGINA PECTORIS, UNSPECIFIED WHETHER NATIVE OR TRANSPLANTED HEART: ICD-10-CM

## 2022-02-02 PROCEDURE — 99214 OFFICE O/P EST MOD 30 MIN: CPT | Mod: PBBFAC,PO | Performed by: INTERNAL MEDICINE

## 2022-02-02 PROCEDURE — 99214 PR OFFICE/OUTPT VISIT, EST, LEVL IV, 30-39 MIN: ICD-10-PCS | Mod: S$PBB,,, | Performed by: INTERNAL MEDICINE

## 2022-02-02 PROCEDURE — 99999 PR PBB SHADOW E&M-EST. PATIENT-LVL IV: CPT | Mod: PBBFAC,,, | Performed by: INTERNAL MEDICINE

## 2022-02-02 PROCEDURE — 99214 OFFICE O/P EST MOD 30 MIN: CPT | Mod: S$PBB,,, | Performed by: INTERNAL MEDICINE

## 2022-02-02 PROCEDURE — 99999 PR PBB SHADOW E&M-EST. PATIENT-LVL IV: ICD-10-PCS | Mod: PBBFAC,,, | Performed by: INTERNAL MEDICINE

## 2022-02-02 RX ORDER — LANOLIN ALCOHOL/MO/W.PET/CERES
400 CREAM (GRAM) TOPICAL DAILY
Refills: 0
Start: 2022-02-02

## 2022-02-02 RX ORDER — METOPROLOL SUCCINATE 50 MG/1
50 TABLET, EXTENDED RELEASE ORAL DAILY
Qty: 90 TABLET | Refills: 3 | Status: SHIPPED | OUTPATIENT
Start: 2022-02-02 | End: 2022-06-24 | Stop reason: SDUPTHER

## 2022-02-02 NOTE — PROGRESS NOTES
Subjective:   02/02/2022     Patient ID:  Eliana Wagner is a 87 y.o. female who presents for evaulation of Congestive Heart Failure, Coronary Artery Disease, Hyperlipidemia, and s/p BIV-ICD      Comes in for regular follow-up.  She has a history of a dilated cardiomyopathy and congestive heart failure, this is improved since implantation of a biventricular defibrillator.  She is not having chest pains tightness or shortness of breath, but did have an episode of jaw pain.      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  Her most recent ejection fraction was 45%.  She does have less than 95% pacing, possibly due to PVCs.  Will add magnesium oxide an increase metoprolol    Hypertension is present, currently well controlled with guideline directed medical therapy for congestive heart failure.    Hypercholesterolemia is present, her LDL is well controlled on high-dose statin therapy.    Congestive Heart Failure  Associated symptoms include nocturia. Pertinent negatives include no abdominal pain, chest pain, claudication, near-syncope, palpitations or shortness of breath. Her past medical history is significant for CAD.   Coronary Artery Disease  Pertinent negatives include no chest pain, leg swelling, palpitations, shortness of breath or weight gain. Risk factors include hyperlipidemia. Her past medical history is significant for CHF.   Hyperlipidemia  Pertinent negatives include no chest pain, focal weakness, myalgias or shortness of breath.       Patient Active Problem List   Diagnosis    Hypertension associated with diabetes    Refractive error    Pseudophakia    Choroidal nevus of right eye    Chronic systolic (congestive) heart failure    Obesity, diabetes, and hypertension syndrome    Coronary artery disease involving native coronary artery of native heart without angina pectoris    Nonischemic congestive  cardiomyopathy    Biventricular ICD (implantable cardioverter-defibrillator) in place    Hyperlipidemia associated with type 2 diabetes mellitus    DM type 2 without retinopathy    History of CVA (cerebrovascular accident)    Controlled type 2 diabetes mellitus, without long-term current use of insulin    Class 1 obesity with serious comorbidity in adult    Urinary incontinence without sensory awareness    Bruising    Premature ventricular contractions        Past Medical History:   Diagnosis Date    Cervical polyp 5/28/2015    Diabetes mellitus, type 2     HTN (hypertension)     Hyperlipidemia     Retinal detachment     od     Stroke     tia x 3   ( last in 2005)    Urinary incontinence without sensory awareness 5/12/2021       Past Surgical History:   Procedure Laterality Date    APPENDECTOMY      CARDIAC CATHETERIZATION      CATARACT EXTRACTION Bilateral     EYE SURGERY Bilateral     cataract    INSERT / REPLACE / REMOVE PACEMAKER      INSERTION OF PACEMAKER      july 22 2019     Orestes Kim MD    TONSILLECTOMY         Review of patient's allergies indicates:   Allergen Reactions    Penicillins      Caused uti         Current Outpatient Medications:     amLODIPine (NORVASC) 2.5 MG tablet, Take 1 tablet (2.5 mg total) by mouth nightly., Disp: 90 tablet, Rfl: 3    aspirin (ECOTRIN) 81 MG EC tablet, Take 81 mg by mouth once daily., Disp: , Rfl:     atorvastatin (LIPITOR) 40 MG tablet, Take 1 tablet (40 mg total) by mouth once daily., Disp: 90 tablet, Rfl: 3    blood sugar diagnostic Strp, 1 each by Misc.(Non-Drug; Combo Route) route once daily., Disp: 100 each, Rfl: 3    canagliflozin (INVOKANA) 300 mg Tab tablet, Take 1 tablet (300 mg total) by mouth once daily., Disp: 90 tablet, Rfl: 3    fluorouracil (EFUDEX) 5 % cream, Apply topically Daily. , Disp: , Rfl:     irbesartan (AVAPRO) 300 MG tablet, Take 1 tablet (300 mg total) by mouth once daily., Disp: 90 tablet, Rfl: 1     lancets Misc, 1 each by Misc.(Non-Drug; Combo Route) route once daily., Disp: 100 each, Rfl: 3    mv-min-FA-Nj-Zz-rfaqelj-lutein 0.4-162-18 mg Tab, Take by mouth. 1 Tablet Oral Every day, Disp: , Rfl:     oxyCODONE-acetaminophen (PERCOCET) 7.5-325 mg per tablet, Take 1 tablet by mouth every 6 (six) hours as needed., Disp: , Rfl:     vitamin D (VITAMIN D3) 1000 units Tab, Take 1,000 Units by mouth once daily., Disp: , Rfl:     blood-glucose meter kit, Use as instructed, Disp: 1 each, Rfl: 0    magnesium oxide (MAG-OX) 400 mg (241.3 mg magnesium) tablet, Take 1 tablet (400 mg total) by mouth once daily., Disp: , Rfl: 0    metoprolol succinate (TOPROL-XL) 50 MG 24 hr tablet, Take 1 tablet (50 mg total) by mouth once daily., Disp: 90 tablet, Rfl: 3    Current Facility-Administered Medications:     0.9%  NaCl infusion, , Intravenous, 1 time in Clinic/HOD, Cayetano Parikh NP            Objective:   Review of Systems   Constitutional: Negative for chills, decreased appetite, diaphoresis, fever, malaise/fatigue, weight gain and weight loss.   HENT: Negative for congestion, hearing loss, nosebleeds and sore throat.    Eyes: Negative for double vision, vision loss in left eye and vision loss in right eye.   Cardiovascular: Negative for chest pain, claudication, cyanosis, dyspnea on exertion, irregular heartbeat, leg swelling, near-syncope, orthopnea, palpitations, paroxysmal nocturnal dyspnea and syncope.   Respiratory: Negative for cough, hemoptysis, shortness of breath, sleep disturbances due to breathing, snoring and wheezing.    Endocrine: Negative for cold intolerance and heat intolerance.   Hematologic/Lymphatic: Negative for adenopathy and bleeding problem. Bruises/bleeds easily.   Skin: Negative for itching, nail changes and rash.   Musculoskeletal: Positive for falls and neck pain. Negative for arthritis, back pain and myalgias.   Gastrointestinal: Positive for constipation. Negative for bloating, abdominal  pain, anorexia, diarrhea, hematochezia, melena, nausea and vomiting.   Genitourinary: Positive for frequency and nocturia. Negative for hematuria.   Neurological: Positive for excessive daytime sleepiness, loss of balance and paresthesias. Negative for focal weakness, headaches, vertigo and weakness.   Psychiatric/Behavioral: Negative for altered mental status, depression and memory loss.   Allergic/Immunologic: Negative for hives.       Vitals:    02/02/22 1336   BP: (!) 111/58   Pulse: 68       Physical Exam  Vitals reviewed.   Constitutional:       General: She is not in acute distress.     Appearance: She is well-developed.   HENT:      Head: Normocephalic and atraumatic.      Nose: Nose normal.   Eyes:      Conjunctiva/sclera: Conjunctivae normal.      Pupils: Pupils are equal, round, and reactive to light.   Neck:      Vascular: No JVD.   Cardiovascular:      Rate and Rhythm: Normal rate and regular rhythm.      Pulses: Intact distal pulses.      Heart sounds: Normal heart sounds. No murmur heard.  No friction rub. No gallop.       Comments: Jugular venous pressure normal  Defibrillator site appears normal  Pulmonary:      Effort: Pulmonary effort is normal. No respiratory distress.      Breath sounds: Normal breath sounds. No wheezing or rales.   Chest:      Chest wall: No tenderness.   Abdominal:      General: Bowel sounds are normal. There is no distension.      Palpations: Abdomen is soft.      Tenderness: There is no abdominal tenderness.   Musculoskeletal:         General: No tenderness or deformity. Normal range of motion.      Cervical back: Normal range of motion and neck supple.   Skin:     General: Skin is warm and dry.      Findings: No erythema or rash.   Neurological:      Mental Status: She is alert and oriented to person, place, and time.      Cranial Nerves: No cranial nerve deficit.      Motor: No abnormal muscle tone.      Coordination: Coordination normal.   Psychiatric:         Behavior:  Behavior normal.         Thought Content: Thought content normal.         Judgment: Judgment normal.              Assessment and Plan:     Chronic systolic (congestive) heart failure  Comments:  Currently asymptomatic  Orders:  -     metoprolol succinate (TOPROL-XL) 50 MG 24 hr tablet; Take 1 tablet (50 mg total) by mouth once daily.  Dispense: 90 tablet; Refill: 3    Coronary artery disease involving native coronary artery without angina pectoris, unspecified whether native or transplanted heart  Comments:  Currently asymptomatic    Biventricular ICD (implantable cardioverter-defibrillator) in place  Comments:  Diaphragmatic stimulation was present, LV voltage decreased  Frequent PVCs decreasing CRT, add magnesium increase metoprolol    Nonischemic congestive cardiomyopathy    Premature ventricular contractions  -     magnesium oxide (MAG-OX) 400 mg (241.3 mg magnesium) tablet; Take 1 tablet (400 mg total) by mouth once daily.; Refill: 0         Follow up in about 6 months (around 8/2/2022).

## 2022-03-03 ENCOUNTER — TELEPHONE (OUTPATIENT)
Dept: PRIMARY CARE CLINIC | Facility: CLINIC | Age: 87
End: 2022-03-03
Payer: MEDICARE

## 2022-03-03 DIAGNOSIS — R82.90 CLOUDY URINE: Primary | ICD-10-CM

## 2022-03-03 PROBLEM — E11.65 UNCONTROLLED TYPE 2 DIABETES MELLITUS WITH HYPERGLYCEMIA: Status: ACTIVE | Noted: 2021-02-23

## 2022-03-03 PROBLEM — Z91.81 RISK FOR FALLS: Status: ACTIVE | Noted: 2022-03-03

## 2022-03-03 NOTE — TELEPHONE ENCOUNTER
Spoke with patient, noticed cloudy urine x 1-2 week.  Denies fever, abdominal/back pain or nausea/vomiting.  Order placed, patient will come by Monday to complete.

## 2022-03-03 NOTE — TELEPHONE ENCOUNTER
----- Message from Kasey Taylor sent at 3/3/2022  3:54 PM CST -----  Contact: Pt [phone]  Pt called and asked if you can put orders in for a urine specimen she stated that her urine is cloudy.     Please call and advise

## 2022-03-04 ENCOUNTER — CLINICAL SUPPORT (OUTPATIENT)
Dept: CARDIOLOGY | Facility: HOSPITAL | Age: 87
End: 2022-03-04
Payer: MEDICARE

## 2022-03-04 DIAGNOSIS — Z95.810 PRESENCE OF AUTOMATIC (IMPLANTABLE) CARDIAC DEFIBRILLATOR: ICD-10-CM

## 2022-03-07 ENCOUNTER — OFFICE VISIT (OUTPATIENT)
Dept: INTERNAL MEDICINE | Facility: CLINIC | Age: 87
End: 2022-03-07
Payer: MEDICARE

## 2022-03-07 VITALS
HEART RATE: 98 BPM | RESPIRATION RATE: 15 BRPM | DIASTOLIC BLOOD PRESSURE: 60 MMHG | WEIGHT: 171.31 LBS | BODY MASS INDEX: 31.52 KG/M2 | OXYGEN SATURATION: 98 % | SYSTOLIC BLOOD PRESSURE: 120 MMHG | HEIGHT: 62 IN

## 2022-03-07 DIAGNOSIS — E11.59 HYPERTENSION ASSOCIATED WITH DIABETES: ICD-10-CM

## 2022-03-07 DIAGNOSIS — E66.9 OBESITY, DIABETES, AND HYPERTENSION SYNDROME: ICD-10-CM

## 2022-03-07 DIAGNOSIS — Z91.81 RISK FOR FALLS: ICD-10-CM

## 2022-03-07 DIAGNOSIS — Z95.810 BIVENTRICULAR ICD (IMPLANTABLE CARDIOVERTER-DEFIBRILLATOR) IN PLACE: ICD-10-CM

## 2022-03-07 DIAGNOSIS — Z13.5 SCREENING FOR EYE CONDITION: ICD-10-CM

## 2022-03-07 DIAGNOSIS — E11.65 UNCONTROLLED TYPE 2 DIABETES MELLITUS WITH HYPERGLYCEMIA: ICD-10-CM

## 2022-03-07 DIAGNOSIS — E66.09 CLASS 1 OBESITY DUE TO EXCESS CALORIES WITH SERIOUS COMORBIDITY AND BODY MASS INDEX (BMI) OF 30.0 TO 30.9 IN ADULT: ICD-10-CM

## 2022-03-07 DIAGNOSIS — I42.0 NONISCHEMIC CONGESTIVE CARDIOMYOPATHY: ICD-10-CM

## 2022-03-07 DIAGNOSIS — E11.69 OBESITY, DIABETES, AND HYPERTENSION SYNDROME: ICD-10-CM

## 2022-03-07 DIAGNOSIS — I25.10 CORONARY ARTERY DISEASE INVOLVING NATIVE CORONARY ARTERY OF NATIVE HEART WITHOUT ANGINA PECTORIS: ICD-10-CM

## 2022-03-07 DIAGNOSIS — Z86.73 HISTORY OF CVA (CEREBROVASCULAR ACCIDENT): ICD-10-CM

## 2022-03-07 DIAGNOSIS — Z00.00 ENCOUNTER FOR PREVENTIVE HEALTH EXAMINATION: Primary | ICD-10-CM

## 2022-03-07 DIAGNOSIS — E11.69 HYPERLIPIDEMIA ASSOCIATED WITH TYPE 2 DIABETES MELLITUS: ICD-10-CM

## 2022-03-07 DIAGNOSIS — I15.2 OBESITY, DIABETES, AND HYPERTENSION SYNDROME: ICD-10-CM

## 2022-03-07 DIAGNOSIS — E11.59 OBESITY, DIABETES, AND HYPERTENSION SYNDROME: ICD-10-CM

## 2022-03-07 DIAGNOSIS — N39.42 URINARY INCONTINENCE WITHOUT SENSORY AWARENESS: ICD-10-CM

## 2022-03-07 DIAGNOSIS — I15.2 HYPERTENSION ASSOCIATED WITH DIABETES: ICD-10-CM

## 2022-03-07 DIAGNOSIS — I49.3 PREMATURE VENTRICULAR CONTRACTIONS: ICD-10-CM

## 2022-03-07 DIAGNOSIS — D31.31 CHOROIDAL NEVUS OF RIGHT EYE: ICD-10-CM

## 2022-03-07 DIAGNOSIS — E11.9 DM TYPE 2 WITHOUT RETINOPATHY: ICD-10-CM

## 2022-03-07 DIAGNOSIS — E78.5 HYPERLIPIDEMIA ASSOCIATED WITH TYPE 2 DIABETES MELLITUS: ICD-10-CM

## 2022-03-07 DIAGNOSIS — I50.22 CHRONIC SYSTOLIC (CONGESTIVE) HEART FAILURE: Chronic | ICD-10-CM

## 2022-03-07 PROCEDURE — G0439 PR MEDICARE ANNUAL WELLNESS SUBSEQUENT VISIT: ICD-10-PCS | Mod: ,,, | Performed by: NURSE PRACTITIONER

## 2022-03-07 PROCEDURE — 99999 PR PBB SHADOW E&M-EST. PATIENT-LVL V: CPT | Mod: PBBFAC,,, | Performed by: NURSE PRACTITIONER

## 2022-03-07 PROCEDURE — 99999 PR PBB SHADOW E&M-EST. PATIENT-LVL V: ICD-10-PCS | Mod: PBBFAC,,, | Performed by: NURSE PRACTITIONER

## 2022-03-07 PROCEDURE — G0439 PPPS, SUBSEQ VISIT: HCPCS | Mod: ,,, | Performed by: NURSE PRACTITIONER

## 2022-03-07 PROCEDURE — 99215 OFFICE O/P EST HI 40 MIN: CPT | Mod: PBBFAC,PO | Performed by: NURSE PRACTITIONER

## 2022-03-07 NOTE — PATIENT INSTRUCTIONS
Counseling and Referral of Other Preventative  (Italic type indicates deductible and co-insurance are waived)    Patient Name: Eliana Wagner  Today's Date: 3/7/2022    Health Maintenance         Date Due Completion Date    Diabetes Urine Screening 03/16/2022   3/16/2021    Hemoglobin A1c 04/12/2022 1/12/2022    Foot Exam 01/10/2023   1/10/2022    Lipid Panel 01/12/2023 1/12/2022    Aspirin/Antiplatelet Therapy 02/02/2023 2/2/2022    DEXA Scan 03/09/2023 3/9/2021        TETANUS VACCINE    Annual eye exam- due    Abnormal whisper screen- declined audiology    Device check- cardiology- current    Recommend Gabriela Arellano- [facility] for DM - I will send PCP message to order     Start weight bearing exercises & balance   10/11/2029 10/11/2019          Orders Placed This Encounter   Procedures    Ambulatory referral/consult to Ophthalmology    Ambulatory referral/consult to Urogynecology     The following information is provided to all patients.  This information is to help you find resources for any of the problems found today that may be affecting your health:                Living healthy guide: www.[facility].[place].[facility]      Understanding Diabetes: www.diabetes.org      Eating healthy: www.cdc.gov/healthyweight      CDC home safety checklist: www.cdc.gov/steadi/patient.html      Agency on Aging: www.goea.[place].[facility]      Alcoholics anonymous (AA): www.aa.org      Physical Activity: www.leobardo.nih.gov/dz1kwrn      Tobacco use: www.quitwithusla.org

## 2022-03-07 NOTE — PROGRESS NOTES
"Eliana Wagner presented for a  Medicare AWV and comprehensive Health Risk Assessment today. The following components were reviewed and updated:    · Medical history  · Family History  · Social history  · Allergies and Current Medications  · Health Risk Assessment  · Health Maintenance  · Care Team     ** See Completed Assessments for Annual Wellness Visit within the encounter summary.**       The following assessments were completed:  · Living Situation  · CAGE  · Depression Screening  · Timed Get Up and Go  · Whisper Test- abnormal declined communication problems & audiology.  · Cognitive Function Screening  · Nutrition Screening  · ADL Screening  · PAQ Screening    Vitals:    03/07/22 0936   BP: 120/60   BP Location: Right arm   Pulse: 98   Resp: 15   SpO2: 98%   Weight: 77.7 kg (171 lb 4.8 oz)   Height: 5' 2" (1.575 m)     Body mass index is 31.33 kg/m².  Physical Exam  Constitutional:       Appearance: She is well-developed.   HENT:      Head: Normocephalic and atraumatic.      Comments: Wears face mask     Right Ear: External ear normal.      Left Ear: External ear normal.   Eyes:      General: No scleral icterus.  Cardiovascular:      Rate and Rhythm: Normal rate and regular rhythm.   Pulmonary:      Effort: Pulmonary effort is normal.      Breath sounds: Normal breath sounds.   Abdominal:      Palpations: Abdomen is soft.   Musculoskeletal:         General: Swelling present.   Skin:     General: Skin is warm and dry.      Findings: No rash.   Neurological:      Mental Status: She is alert and oriented to person, place, and time.      Motor: No abnormal muscle tone.      Deep Tendon Reflexes: Reflexes are normal and symmetric.   Psychiatric:         Mood and Affect: Mood normal.         Behavior: Behavior normal.         Thought Content: Thought content normal.         Judgment: Judgment normal.           Lab Results   Component Value Date    HGBA1C 9.2 (H) 01/12/2022    CHOL 185 01/12/2022    HDL 67 " 01/12/2022    LDLCALC 96.0 01/12/2022    TRIG 110 01/12/2022    CHOLHDL 36.2 01/12/2022      Results for orders placed during the hospital encounter of 03/09/21    DXA Bone Density Spine And Hip    Narrative  EXAMINATION:  DEXA BONE DENSITY SPINE HIP    CLINICAL HISTORY:  Encounter for screening for osteoporosis.87 y/o female with no history of fractures.  She had menopausal symptoms at 67 y/o.  She is taking 1,000 units of Vit D and Multivitamin.  She has history of Diabetes.  She exercises twice a week and does not smoke.    TECHNIQUE:  DXA specification: [facility] [employer] A ([id number])    Bone Mineral Density scanning was performed over the hip and lumbar spine.    Review of the images confirms satisfactory positioning and technique.    COMPARISON:  Comparison study done on 09/27/2018.    Lumbar spine:  BMD 1.337 g/cm2 and T-score 2.6.    Total Hip:        BMD 1.182 g/cm2 and T-score 2.0.    FINDINGS:  Lumbar spine (L1-L4):              BMD is 1.362 g/cm2, T-score is 2.9, and Z-score is 5.7.    Total hip:                               BMD is 1.126 g/cm2, T-score is 1.5, and Z-score is 3.8.    Femoral neck:                         BMD is 0.968 g/cm2, T-score is 1.1, and Z-score is 3.6.    FRAX:    5.8% risk of a major osteoporotic fracture in the next 10 years.    0.9% risk of hip fracture in the next 10 years.    Impression  1. Elevated BMD of the lumbar spine and hip.  Elevated BMD may be associated with elevated BMI.  2. Compared with previous DXA, BMD at the lumbar spine has increased by 1.9%, and the BMD at the total hip has remained stable.    RECOMMENDATIONS:  RECOMMENDATIONS of [facility]:    1. Recommend daily calcium intake 7156-9676 mg, dietary sources preferred; Vitamin D 5667-5017 IU daily.  2. Adequate exercise and fall precautions.  3. Repeat BMD in 5 years.    EXPLANATION OF RESULTS:  The T-score compares these results to the average bone density  of a 20-29 year-old of the same gender. The Z-score compares this result to the average bone density to people of the same age, gender, and race. The amounts indicate the number of standard deviations above or below the mean.    * Osteoporosis is generally defined as having a T-score between less than or equal to -2.5.    * Osteopenia is generally defined as having a T-score between -1.0 and -2.5.    * The normal range is generally defined as having a T-score greater than or equal to -1.0.    * Calculated FRAX scores for fracture risk prediction may not be accurate in the setting of certain clinical factors such as pharmacologic therapy for osteoporosis, prior fragility fractures, high dose glucocorticoid use.      Electronically signed by: Jyoti Grijalva MD  Date:    03/31/2021  Time:    12:25    Results for orders placed during the hospital encounter of 12/29/15    Mammo Digital Screening Bilateral With CAD    Narrative  The present examination has been compared to prior imaging studies.    BILATERAL MAMMOGRAM FINDINGS:  The breasts are almost entirely fat.    No significant abnormalities are seen.  There are no significant changes from  the prior study.    Left side positioning is suboptimal due to pacemaker.    These images  were processed to produce digital images analyzed for potential  abnormalities.    IMPRESSION:    There is no mammographic evidence of malignancy.    Mammogram in 1 year is recommended.    ACR BI-RADS Category 1: Negative      SWETA BANEGAS M.D.  12/30/2015                Diagnoses and health risks identified today and associated recommendations/orders:    1. Biventricular ICD (implantable cardioverter-defibrillator) in place  Stable- followed by cardiology    2. Class 1 obesity due to excess calories with serious comorbidity and body mass index (BMI) of 30.0 to 30.9 in adult  Chronic . Followed by PCP.   Centers for Disease Control and Prevention (CDC)  weight recommendations for  current BMI & ideal BMI range discussed with patient.  Recommended  Continued gradual weight loss,   diabetic diet , nstart structured regular exercise every day.      3. Urinary incontinence without sensory awareness  Stable- followed by PCP  - Ambulatory referral/consult to Urogynecology; Future    4. Premature ventricular contractions  Stable- followed by cardiology    5. Obesity, diabetes, and hypertension syndrome  Stable- followed by PCP    6. Nonischemic congestive cardiomyopathy  Stable- followed by cardiology    7. Hypertension associated with diabetes  Stable- followed by cardiology    8. Hyperlipidemia associated with type 2 diabetes mellitus  Stable- followed by cardiology    9. History of CVA (cerebrovascular accident)  Stable- followed by cardiology, PCP    10. DM type 2 without retinopathy  Stable- followed by ophth    11. Coronary artery disease involving native coronary artery of native heart without angina pectoris  Stable- followed by cardiology    12. Chronic systolic (congestive) heart failure  Stable- followed by cardiology    13. Choroidal nevus of right eye  Stable- followed by ophth    14. Uncontrolled type 2 diabetes mellitus with hyperglycemia  Stable- followed by PCP    15. Risk for falls  Stable- followed by PCP    16. Screening for eye condition  Stable- followed by PCP  - Ambulatory referral/consult to Ophthalmology; Future    17. Encounter for preventive health examination  Here for Health Risk Assessment/Annual Wellness Visit.  Health maintenance reviewed and updated. Follow up in one year.         Provided Eliana with a 5-10 year written screening schedule and personal prevention plan. Recommendations were developed using the USPSTF age appropriate recommendations. Education, counseling, and referrals were provided as needed. After Visit Summary printed and given to patient which includes a list of additional screenings\tests needed. Does not monitor blood sugar. Recommended  Gabriela SAMAYOA for DM mgt- will send message to PCP to order. Pt admits to not being on strict DM diet. Handouts on diet given.Current w cardiology & device check. Urinary incontinence- urine done today by PCP- referral to urogyn. Has been seen by external dentist for poor dentition- scheduled for dental implant sx. Referral for annual eye exam.      Follow up in about 1 year (around 3/7/2023).    Sirena Long NP I offered to discuss advanced care planning, including how to pick a person who would make decisions for you if you were unable to make them for yourself, called a health care power of , and what kind of decisions you might make such as use of life sustaining treatments such as ventilators and tube feeding when faced with a life limiting illness recorded on a living will that they will need to know. (How you want to be cared for as you near the end of your natural life)     X  Patient has advanced directives written and agrees to provide copies to the institution.

## 2022-03-10 ENCOUNTER — TELEPHONE (OUTPATIENT)
Dept: PRIMARY CARE CLINIC | Facility: CLINIC | Age: 87
End: 2022-03-10
Payer: MEDICARE

## 2022-03-10 DIAGNOSIS — N30.01 ACUTE CYSTITIS WITH HEMATURIA: ICD-10-CM

## 2022-03-10 DIAGNOSIS — N30.01 ACUTE CYSTITIS WITH HEMATURIA: Primary | ICD-10-CM

## 2022-03-10 RX ORDER — CIPROFLOXACIN 500 MG/1
500 TABLET ORAL EVERY 12 HOURS
Qty: 20 TABLET | Refills: 0 | Status: SHIPPED | OUTPATIENT
Start: 2022-03-10 | End: 2022-03-10

## 2022-03-10 RX ORDER — CIPROFLOXACIN 500 MG/1
500 TABLET ORAL EVERY 12 HOURS
Qty: 20 TABLET | Refills: 0 | Status: SHIPPED | OUTPATIENT
Start: 2022-03-10 | End: 2022-12-07

## 2022-03-10 NOTE — TELEPHONE ENCOUNTER
----- Message from Anna Heard sent at 3/10/2022 10:09 AM CST -----  Contact: Self/[phone]  Pt said that she is calling in regards to needing to speak with the nurse to get an explanation of  her Urine test results. Please advise

## 2022-03-15 ENCOUNTER — TELEPHONE (OUTPATIENT)
Dept: UROGYNECOLOGY | Facility: CLINIC | Age: 87
End: 2022-03-15
Payer: MEDICARE

## 2022-03-21 ENCOUNTER — TELEPHONE (OUTPATIENT)
Dept: UROGYNECOLOGY | Facility: CLINIC | Age: 87
End: 2022-03-21
Payer: MEDICARE

## 2022-03-21 NOTE — TELEPHONE ENCOUNTER
----- Message from Carly Heredia MA sent at 3/21/2022  1:53 PM CDT -----  Regarding: pt requesting a call back  Name of Who is Calling: DERIC HERNÁNDEZ [[id number]]           What is the request in detail: pt requesting a call back  in regards to appt on 3/24           Can the clinic reply by [facility]: n           [facility]: [phone]

## 2022-04-07 ENCOUNTER — OFFICE VISIT (OUTPATIENT)
Dept: UROGYNECOLOGY | Facility: CLINIC | Age: 87
End: 2022-04-07
Payer: MEDICARE

## 2022-04-07 VITALS — BODY MASS INDEX: 31.33 KG/M2 | HEIGHT: 62 IN

## 2022-04-07 DIAGNOSIS — N39.42 URINARY INCONTINENCE WITHOUT SENSORY AWARENESS: ICD-10-CM

## 2022-04-07 DIAGNOSIS — N39.46 MIXED INCONTINENCE URGE AND STRESS (MALE)(FEMALE): Primary | ICD-10-CM

## 2022-04-07 DIAGNOSIS — Z87.440 HISTORY OF RECURRENT UTIS: ICD-10-CM

## 2022-04-07 DIAGNOSIS — N95.2 POSTMENOPAUSE ATROPHIC VAGINITIS: ICD-10-CM

## 2022-04-07 DIAGNOSIS — N81.84 PELVIC MUSCLE WASTING: ICD-10-CM

## 2022-04-07 PROCEDURE — 99214 PR OFFICE/OUTPT VISIT, EST, LEVL IV, 30-39 MIN: ICD-10-PCS | Mod: S$PBB,,, | Performed by: OBSTETRICS & GYNECOLOGY

## 2022-04-07 PROCEDURE — 99214 OFFICE O/P EST MOD 30 MIN: CPT | Mod: S$PBB,,, | Performed by: OBSTETRICS & GYNECOLOGY

## 2022-04-07 PROCEDURE — 99999 PR PBB SHADOW E&M-EST. PATIENT-LVL III: ICD-10-PCS | Mod: PBBFAC,,, | Performed by: OBSTETRICS & GYNECOLOGY

## 2022-04-07 PROCEDURE — 99213 OFFICE O/P EST LOW 20 MIN: CPT | Mod: PBBFAC | Performed by: OBSTETRICS & GYNECOLOGY

## 2022-04-07 PROCEDURE — 99999 PR PBB SHADOW E&M-EST. PATIENT-LVL III: CPT | Mod: PBBFAC,,, | Performed by: OBSTETRICS & GYNECOLOGY

## 2022-04-07 RX ORDER — CONJUGATED ESTROGENS 0.62 MG/G
CREAM VAGINAL
Qty: 42.5 G | Refills: 12 | Status: SHIPPED | OUTPATIENT
Start: 2022-04-07

## 2022-04-07 RX ORDER — MIRABEGRON 25 MG/1
25 TABLET, FILM COATED, EXTENDED RELEASE ORAL DAILY
Qty: 30 TABLET | Refills: 11 | Status: SHIPPED | OUTPATIENT
Start: 2022-04-07 | End: 2022-12-07 | Stop reason: SDUPTHER

## 2022-04-07 NOTE — PROGRESS NOTES
Subjective:      Patient ID: Eliana Wagner is a 87 y.o. female.    Chief Complaint:  Consult and Urinary Incontinence      History of Present Illness  HPI        Past Medical History:   Diagnosis Date    Cervical polyp 2015    Diabetes mellitus, type 2     HTN (hypertension)     Hyperlipidemia     Retinal detachment     od     Stroke     tia x 3   ( last in )    Urinary incontinence without sensory awareness 2021       Past Surgical History:   Procedure Laterality Date    APPENDECTOMY      CARDIAC CATHETERIZATION      CATARACT EXTRACTION Bilateral     EYE SURGERY Bilateral     cataract    INSERT / REPLACE / REMOVE PACEMAKER      INSERTION OF PACEMAKER      2019     Orestes Kim MD    TONSILLECTOMY         GYN & OB History  No LMP recorded. Patient is postmenopausal.   Date of Last Pap: No result found    OB History    Para Term  AB Living   4 4 4     4   SAB IAB Ectopic Multiple Live Births                  # Outcome Date GA Lbr Tremayne/2nd Weight Sex Delivery Anes PTL Lv   4 Term            3 Term            2 Term            1 Term                Health Maintenance       Date Due Completion Date    Diabetes Urine Screening 2022 3/16/2021    Hemoglobin A1c 2022    Foot Exam 01/10/2023 1/10/2022    Override on 10/11/2019: Done    Override on 2018: Done (diabetes)    Override on 2016: Done    Lipid Panel 2023    Aspirin/Antiplatelet Therapy 2023 3/7/2022    DEXA Scan 2026 3/9/2021    Override on 2018: Done (ordered today)    TETANUS VACCINE 10/11/2029 10/11/2019          Family History   Problem Relation Age of Onset    Cataracts Mother     Cataracts Father     Glaucoma Maternal Grandfather     No Known Problems Sister     No Known Problems Brother     No Known Problems Maternal Aunt     No Known Problems Maternal Uncle     No Known Problems Paternal Aunt     No Known Problems Paternal  "Uncle     No Known Problems Maternal Grandmother     No Known Problems Paternal Grandmother     No Known Problems Paternal Grandfather     Cancer Son         prostate    Liver disease Son         liver transplant    Breast cancer Neg Hx     Colon cancer Neg Hx     Ovarian cancer Neg Hx     Amblyopia Neg Hx     Blindness Neg Hx     Diabetes Neg Hx     Hypertension Neg Hx     Macular degeneration Neg Hx     Retinal detachment Neg Hx     Strabismus Neg Hx     Stroke Neg Hx     Thyroid disease Neg Hx        Social History     Socioeconomic History    Marital status:    Tobacco Use    Smoking status: Never Smoker    Smokeless tobacco: Never Used   Substance and Sexual Activity    Alcohol use: Yes     Alcohol/week: 4.0 standard drinks     Types: 4 Cans of beer per week     Comment: socially    Drug use: No    Sexual activity: Not Currently     Social Determinants of Health     Financial Resource Strain: Low Risk     Difficulty of Paying Living Expenses: Not hard at all   Food Insecurity: Unknown    Worried About Running Out of Food in the Last Year: Never true   Transportation Needs: Unknown    Lack of Transportation (Medical): No   Stress: No Stress Concern Present    Feeling of Stress : Not at all   Social Connections: Unknown    Frequency of Communication with Friends and Family: More than three times a week   Housing Stability: Unknown    Unable to Pay for Housing in the Last Year: No       Review of Systems  Review of Systems       Objective:   Ht 5' 2" (1.575 m)   BMI 31.33 kg/m²     Physical Exam   Constitutional: She is oriented to person, place, and time. She appears well-developed and well-nourished.   HENT:   Head: Normocephalic and atraumatic.   Abdominal: Soft. Bowel sounds are normal.   Genitourinary: Pelvic exam was performed with patient supine. Rectum normal, vagina normal, uterus normal, cervix normal, skenes normal, bartholins normal and vaginal cuff normal. Right " labia normal and left labia normal. Urethra exhibits no urethral caruncle, no urethral diverticulum, no urethral mass and no hypermobility. There is atrophy in the vagina. Kegel: 1/5    POP-Q  Aa: -3 Ba: -11 C: -9   GH: 2 PB: 2 TVL: 12   Ap: -3 Bp: -8 D: -10                     Musculoskeletal:      Cervical back: Normal range of motion and neck supple.   Neurological: She is alert and oriented to person, place, and time.   Psychiatric: She has a normal mood and affect. Her behavior is normal. Thought content normal.     Pelvic floor strength on left is 1/5.  On right 0/5     Assessment:     1. 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 Postmenopause atrophic vaginitis    3. Pelvic muscle wasting    4. History of recurrent UTIs            Plan:     1. 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 Postmenopause atrophic vaginitis    3. Pelvic muscle wasting    4. History of recurrent UTIs        Long discussion with patient regarding my findings.  Were going to immediately go after the urge component of the mixed incontinence picture with more bed trick at 25.  For the pelvic muscle wasting we are going to start on physical therapy which will her gist which will help the urge component and set this stage to move forward with the management of the JASPER component.  But that will be down the line.  Patient also is concerned about repetitive UTI we will be using a cranberry supplement UT the as well as hormone supplementation transvaginal which will also help with the atrophic vaginitis.    Patient will send me a message in 30 days to see how she is doing the medicine if we have not achieved 50% reduction in incontinence we will go to a higher dose at 50 mg I have discussed this with her at great length and discussed differences between anticholinergics versus beta 3 agonist patient and patient's daughter noticed understood appreciated the    There are no Patient Instructions on file for this  visit.

## 2022-04-12 ENCOUNTER — HOSPITAL ENCOUNTER (OUTPATIENT)
Dept: RADIOLOGY | Facility: HOSPITAL | Age: 87
Discharge: HOME | End: 2022-04-12
Attending: OBSTETRICS & GYNECOLOGY
Payer: MEDICARE

## 2022-04-12 DIAGNOSIS — N95.2 POSTMENOPAUSE ATROPHIC VAGINITIS: ICD-10-CM

## 2022-04-12 PROCEDURE — 76856 US EXAM PELVIC COMPLETE: CPT | Mod: 26,,, | Performed by: RADIOLOGY

## 2022-04-12 PROCEDURE — 76830 US PELVIS COMP WITH TRANSVAG NON-OB (XPD): ICD-10-PCS | Mod: 26,,, | Performed by: RADIOLOGY

## 2022-04-12 PROCEDURE — 76830 TRANSVAGINAL US NON-OB: CPT | Mod: 26,,, | Performed by: RADIOLOGY

## 2022-04-12 PROCEDURE — 76856 US PELVIS COMP WITH TRANSVAG NON-OB (XPD): ICD-10-PCS | Mod: 26,,, | Performed by: RADIOLOGY

## 2022-04-12 PROCEDURE — 76830 TRANSVAGINAL US NON-OB: CPT | Mod: TC

## 2022-05-09 ENCOUNTER — CLINICAL SUPPORT (OUTPATIENT)
Dept: REHABILITATION | Facility: HOSPITAL | Age: 87
End: 2022-05-09
Attending: OBSTETRICS & GYNECOLOGY
Payer: MEDICARE

## 2022-05-09 DIAGNOSIS — N39.46 MIXED INCONTINENCE: ICD-10-CM

## 2022-05-09 DIAGNOSIS — N81.84 PELVIC MUSCLE WASTING: ICD-10-CM

## 2022-05-09 DIAGNOSIS — M62.89 PELVIC FLOOR DYSFUNCTION: ICD-10-CM

## 2022-05-09 DIAGNOSIS — N39.46 MIXED INCONTINENCE URGE AND STRESS (MALE)(FEMALE): ICD-10-CM

## 2022-05-09 PROCEDURE — 97162 PT EVAL MOD COMPLEX 30 MIN: CPT | Mod: PO

## 2022-05-09 PROCEDURE — 97530 THERAPEUTIC ACTIVITIES: CPT | Mod: PO

## 2022-05-09 NOTE — PLAN OF CARE
" [facility]   Pelvic Health Physical Therapy Initial Evaluation      Date: 5/9/2022   Name: Eliana Wagner  Clinic Number: [id number]    Therapy Diagnosis:   Encounter Diagnoses   Name Primary?    Mixed incontinence     Pelvic muscle wasting     Pelvic floor dysfunction     Mixed incontinence urge and stress (male)(female)      Physician: Gopi Lewis MD    Physician Orders: PT Eval and Treat- Pelvic PT   Medical Diagnosis from Referral:   N39.46 (ICD-10-CM) - Mixed incontinence   N81.84 (ICD-10-CM) - Pelvic muscle wasting     Evaluation Date: 5/9/2022    Authorization Period Expiration: 5/9/2023  Plan of Care Expiration: 8/9/2022  Progress Note Due: 6/9/2022  Visit #: 1 Visits authorized: 1/ 16   FOTO: Issued Visit #: NA    Precautions: Standard    Time In: 9:25 AM  Time Out: 10:40 AM  Total Appointment Time (timed & untimed codes): 75 minutes    SUBJECTIVE     Date of onset: chronic 10-20 years     History of current condition - Eliana comes in today with complaints of mixed stress and urinary incontinence worsening. She has medication prescribed to her for urinary urge incontinence and notes less leakage. She was using 10 pad a day and now she is down to 4 pads a day. Did experience no sensation to void and now she experiences improved sensation. Pt notes before medication she had "no control of my bladder".    Eliana goals: less leakage and stop wearing pads, improve pelvic strength       Bladder History:    Hx: recurrent bladder infections (pt reports constantly living with UTIs- notes whiping backward to front- pt using cranberrry supplement per MD, childhood bladder problems ("pus in the kidney")    Frequency of urination:   Daytime:   7            Nocturia:   3 times    Bladder leakage: pt performs just in case pees   Stress:  Yes    Urge:  Yes    Triggers:   o Cough, sneeze, laugh:   yes  o Walk, run, or with exercise/exertion: No   o Sit <>stand:    Yes, a " "little bit  o During sleep:      Yes   o When you have an urge to go/on the way to the toilet? Yes   - Urinary Urgency triggers include: no known triggers   Frequency of incidents:     Throughout the day- all day so difficult to give a number   Amount leaked (urine):     When pt changes her pad it is fully soaked    Leak/Incontinence protection pad and baby diapers   o How many each day/24 hours? Now 4 Overnight? 3 pads a night  o Are they damp, wet, or soaked when changed during day: Yes  Night: Yes   Trouble feeling bladder urge/fullness:  Yes- improved with medication  Difficulty initiating urine stream:   No  Urinary Urgency:     No  Urine stream:     normal  Able to completely empty:    Yes- since she has medication  Dribbling after urine stream:   No       Types of fluid intake:  4 8 ounces glasses of water per day; mild 3 8 ounce glasses; 2 cups of coffee in the AM, beer occasionally       Bowel History:    Fecal Incontinence:        no     Frequency of bowel movements:   once a day    Rectal Urge Present (sensation loss)   Yes   Difficulty initiating BM:        Yes (pt will drink orange juice or prunes to assist with this)  o % of the time straining with BM: 10%   Pain with BM:           No   Difficulty holding gas in:         No   Wipe a lot:          No- pt notes for many years she was wiping back to front so had UTIs   Feeling completely empty after BM:     Yes   Strong sense of urgency then rush to the bathroom (EAS lose)No   Hx of bowel issues as a child:  No    Quality/Shape of BM: [place] Stool Chart: type 3  Diet: pt reports supposed to be on a "diabetic diet" but needs to get back on it   Fiber Supplements or Laxative Use? No       Support: none      Obstetric History:     # of children:   4    Vaginal or Caesarean:  Vaginally  (Forces-assisted delivery: yes)   Tearing/perineal laceration/episiotomy: yes; 4 episiotomy     Pelvic hx:    yes endometriosis, chronic yeast infections- " "unsure, hemorrhoids hx    Menstruation/Sexual Health:   Last Period (approx.):   Age 67   LBP, abdominal, or hip pain:  Lsp 20 years ago "sciatica"    Pain/difficulty with tampon insertion/vaginal exams? No    Contraceptive/HRT Use?  Yes; vaginal hormone cream   Sexually active?   No 12-14 years   Pain with any sexual activity:   No    Surgical History: Eliana Wagner  has a past surgical history that includes Appendectomy; Eye surgery (Bilateral); Tonsillectomy; Insertion of pacemaker; Cataract extraction (Bilateral); Insert / replace / remove pacemaker; and Cardiac catheterization.  Abdominal Surgeries/Procedures: appendectomy   Memorable Falls: Yes, 1.5 weeks ago pt was walking with her  shoes and hit her toe causing her to land forward (B) knee pain and (b) wrist pain- improving pain (no LOC)- pt reports falling always while wearing  shoes (instructed to stop wearing  shoes)    Red Flags:  No new or previous changes to bowel/bladder, saddle anesthesia, unexplained weight loss, and unexplained changes to balance or gait.      Pain:  Location:   Posterior thigh Left "feels like a pepe horse"  Current   0/10    Best   0/10   Worst    7/10  Description:   Aching and Throbbing  Aggravated by:  getting up in the AM (lasts <10 seconds), sitting playing cards (2 hours)   Eased by:  changing positions and walking       Prior Treatment/therapy:  medication only for urinary incontinence     Has pt ever performed kegel exercises:   No    Social History:    lives alone  Current exercise routine:  Pt reports wanting to go back to the gym soon after mouth surgery and walking around house  Occupation:    retired    Medical History: Eliana  has a past medical history of Cervical polyp (5/28/2015), Diabetes mellitus, type 2, HTN (hypertension), Hyperlipidemia, Retinal detachment, Stroke, and Urinary incontinence without sensory awareness (5/12/2021).   Imaging: none  Medications: Eliana " "has a current medication list which includes the following prescription(s): amlodipine, aspirin, atorvastatin, blood sugar diagnostic, blood-glucose meter, canagliflozin, ciprofloxacin hcl, premarin, fluorouracil, irbesartan, lancets, magnesium oxide, metoprolol succinate, myrbetriq, mv-min-fa-iu-rp-ogdoqlz-lutein, oxycodone-acetaminophen, and vitamin d, and the following Facility-Administered Medications: sodium chloride 0.9%.    Allergies:   Review of patient's allergies indicates:   Allergen Reactions    Penicillins      Caused uti        OBJECTIVE     FUNCTIONAL MOVEMENT  General Postural Observation: increased kyphosis  Lumbar ROM: Not tested today  SLS:    Not tested today  Squat:   poor mechanics with no pain;  Sit to stand:   Increase left posterior thigh pain that subsides immediately     Hip ROM:   Not tested     PELVIC FLOOR EXAM    See EMR under MEDIA for written consent provided 5/9/2022  Chaperone: declined    Pt hooklying for examination     EXTERNAL ASSESSMENT  Introitus:    gaping  Skin condition:   redness noted all around labia and minora with hypersensitivity noted  Scarring:    none   Sensation:    WNL  Resting position perineum:  normal    Voluntary contraction: visible lift; moderate cueing required as pt reports "never done a kegel before"  Voluntary relaxation: visible drop  Bearing down:  bulge  Cough:  nil  Knack:   visible lift    Pelvic Floor Tenderness to Palpation:    External palpation:  Yes tender superficially at skin all around urogenital triangle    Internal palpation:  Yes TTP layer 3 levator ani (B) Right>left       INTERNAL ASSESSMENT  Sensation:   able to sense generalized pressure, unable to identify location   Vaginal vault:  roomy   Muscle Bulk:   atrophy     Muscular strength (PERF)  Power:    3/5  Endurance:    5 sec  # Reps To Fatigue:   5    Fast Contractions in 10 seconds: 5  PERF SCORE:     3/5/5/5      Quality of contraction:  decreased hold   Specificity: "    patient contracts: abs when fatigued   Coordination:   tends to hold breath during PFM contration     Prolapse check:  Grade 2 cystocele    TREATMENT     Total Treatment time (time-based codes) separate from Evaluation: 25 minutes     Therapeutic Activity to improve dynamic functional performance for 25 minutes including:  Bladder diary education, kegel 2 times per day 5 reps x 5 second hold 2 times per day; education anatomy, POC, diagnosis, prognosis      PATIENT EDUCATION AND HOME EXERCISES     Education provided:   general anatomy/physiology of urinary/ bowel  system, benefits of treatment and risks of treatment were discussed with the patient. Additionally, kegels was reviewed.     Written Home Exercises provided: yes.  Exercises were reviewed and Eliana was able to demonstrate them prior to the end of the session. Eliana demonstrated good  understanding of the education provided. See EMR under Patient Instructions for exercises provided during therapy sessions.    ASSESSMENT     Eliana is a 87 y.o. female referred to outpatient Physical Therapy with a medical diagnosis of mixed incontinence. Pt reports much improvement in medication for her urge incontinence as well as increased awareness of sensation that she has to void. The patient reports chronic and worsening urinary incontinence that is affecting ADLs and social participation. Examination reveals pelvic floor dysfunction including: pelvic musclse weakness, decreased endurance and coordination; lumbopelvic dysfunction, and poor bladder habits. The patient is expected to benefit from skilled intervention to work towards elimination of urinary incontinence needed to improve quality of life and ADLs participation and return towards prior level of function. .     Patient prognosis is Good.   Patient will benefit from skilled outpatient Physical Therapy to address the deficits stated above and in the chart below, provide patient/family education, and  to maximize patient's level of independence.       Plan of care discussed with patient: Yes  Patient's spiritual, cultural and educational needs considered and patient is agreeable to the plan of care and goals as stated below:     Anticipated Barriers for therapy: chronicity of issue    Medical Necessity is demonstrated by the following:  History  Co-morbidities and personal factors that may impact the plan of care Co-morbidities   advanced age, consumption of bladder irritants, diabetes, difficulty sleeping, high BMI and prior abdominal surgery    Personal Factors  age  lifestyle     moderate   Examination  Body structures and functions, activity limitations and participation restrictions that may impact the plan of care Body Regions/Systems/Functions:  pelvic asymmetry, poor knowledge of body mechanics and posture, poor trunk stability, pelvic floor tenderness, decreased pelvic muscle strength, decreased endurance of the pelvic muscles, poor quality of pelvic muscle contraction, increased frequency of urination, increased nocturia, poor diet, poor knowledge of vulvar irritants and chronic UTI due to dysfunctional voiding     Activity Limitations:  urgency , incontinence with ADLs and bathroom mapping    Participation Restrictions:  all ADLs/iADLs uninterrupted by urinary incontinence/urgency/frequency, social activities with friends/family and exercise restrictions due to incontinence     Activity limitations:   Learning and applying knowledge  no deficits    General Tasks and Commands  no deficits    Communication  no deficits    Mobility  lifting and carrying objects  walking  driving (bike, car, motorcycle)    Self care  no deficits    Domestic Life  No deficits    Interactions/Relationships  no deficits    Life Areas  no deficits    Community and Social Life  no deficits       moderate   Clinical Presentation stable and uncomplicated low   Decision Making/ Complexity Score: moderate       Goals:  Short Term  Goals: 6 weeks   1. Pt will report a reduction in symptoms by atleast 40% since initial evaluation in order to demonstrate progression towards long-term goals.  2. Pt will demonstrate ability to perform knack in order to stop urinary leakage with coughing/sneezing/laughing.  3. Pt will decrease pad usage from 4 pads to 2 pads per day fully soaked.  4. Pt will decrease voiding to <6 times per day in order to participate in social activities.       Long Term Goals: 12 weeks   1. Pt to be discharged from therapy and feel confident in HEP for incontinence management.   2. Pt will report feeling atleast 80% improved since starting therapy.  3. Pt will transition from 4 pads fully soaked per day to <1 in order to demonstrate improving pelvic floor muscle controls as evidenced by decreased episodes of incontinence needed to improve confidence in social situations.  4. Pt will decrease frequency of leakage to <2 times per day with improved awareness of voiding.   5. Pt will decrease nocturia from 3 times per night to <1 time per night.      PLAN     Plan of care Certification: 5/9/2022 to 8/9/2022.    Outpatient Physical Therapy 1 time(s) per week every 1 week for 12 weeks to include the following interventions: Gait Training, Manual Therapy, Neuromuscular Re-ed, Patient Education, Therapeutic Activities, and Therapeutic Exercise.     Next Visit: review bladder diary, kegel/PF awareness     Roxana Garcia, PT, DPT, OCS      I CERTIFY THE NEED FOR THESE SERVICES FURNISHED UNDER THIS PLAN OF TREATMENT AND WHILE UNDER MY CARE   Physician's comments:     Physician's Signature: ___________________________________________________

## 2022-05-09 NOTE — PROGRESS NOTES
See treatment section for updated POC.    Roxana Garcia (Evin), PT, DPT, OCS  Board Certified Orthopedic Specialist  5/9/2022

## 2022-05-09 NOTE — PATIENT INSTRUCTIONS
"Bladder diary     Treatment:    Kegels  Do the following exercise in the laying down or sitting position. Laying down is easier than sitting, as you have less weight pushing down on your internal organs that you have to pull up against.    Squeeze and pull pelvic floor towards the belly button.  Goal is to squeeze in and pull up.   Try to avoid using glutes, legs or abdominals to assist. This may require trying a kegel in a variety of position (on your stomach, lying down on your back with your knees bent of straight, etc.)      If you are having a hard time feeling the contraction think about any of the following:  Pretend there is a marble or a tampon in at your vaginal opening and try to pull it up into you.  Imagine a zipper from your tailbone to your pubic bone- sip up the zipper so you feel the muscles starting around your anus then up to the front around the vaginal opening  [place] your anus (you will feel this more in the posterior muscles around your anus. This is especially a good way to imagine it if you experience fecal leakage.)  Move the clitoris/penis  Pull your underwear up and in  Bring your "SIT bones" together to lift your perineum (tissue between your anus and vagina) off the chair  Lift up and in as if you are trying to close your bladder opening to stop the flow of urine or hold back gas    Endurance hold:  Hold for 4 seconds.  Rest for 5-10 seconds.  Repeat reps 5 times      Do this atleast 2 times a day.     Make sure to:   Focus on good quality contractions. Stop if you are fatigued and not able to hold a good, strong contraction. This just means you progressed your muscles to fatigue, which is how we build strength.  Do not hold your breath. Blow out through your mouth if needed and contract on the exhale.   Make sure you are taking the time to rest in between each rep, as it's important to allow the pelvic floor muscles to lower back to their relaxed state to allow for full strength of the " muscles.      Visualize your pelvic floor rising off the seat of the chair.  BEST TIME TO EXERCISE:  Sitting on toilet, AFTER you have finished voiding.      Progress duration of the hold as able (for example- increase hold by 1 second).

## 2022-05-30 ENCOUNTER — CLINICAL SUPPORT (OUTPATIENT)
Dept: REHABILITATION | Facility: HOSPITAL | Age: 87
End: 2022-05-30
Attending: OBSTETRICS & GYNECOLOGY
Payer: MEDICARE

## 2022-05-30 DIAGNOSIS — N81.84 PELVIC MUSCLE WASTING: ICD-10-CM

## 2022-05-30 DIAGNOSIS — N39.46 MIXED INCONTINENCE: Primary | ICD-10-CM

## 2022-05-30 DIAGNOSIS — N39.42 URINARY INCONTINENCE WITHOUT SENSORY AWARENESS: ICD-10-CM

## 2022-05-30 DIAGNOSIS — M62.89 PELVIC FLOOR DYSFUNCTION: ICD-10-CM

## 2022-05-30 PROCEDURE — 97530 THERAPEUTIC ACTIVITIES: CPT | Mod: PO

## 2022-05-30 PROCEDURE — 97110 THERAPEUTIC EXERCISES: CPT | Mod: PO

## 2022-05-30 NOTE — PROGRESS NOTES
"  Pelvic Health Physical Therapy   Treatment Note     Name: Eliana Wagner; [id number]  Visit Date: 5/30/2022  Visit 1 of 20 Auth. Expiration: 5/9/2023 POC Exp.: 8/9/2022   Eval Date: 5/9//2022 Next PN: 6/9/2022 - Cancels    FOTO: Issued Visit #: NA Last PN: - - No Shows    Physician: Gopi Lewis MD  Physician Orders: PT Eval and Treat- Pelvic PT  N39.46 (ICD-10-CM) - Mixed incontinence   N81.84 (ICD-10-CM) - Pelvic muscle wasting       Therapy Diagnosis:   Encounter Diagnoses   Name Primary?    Mixed incontinence Yes    Pelvic muscle wasting     Urinary incontinence without sensory awareness     Pelvic floor dysfunction        Precautions: Standard    Time In/Out: 10:34 AM/ 11:28 AM  Total Billable Time: 54 minutes    Subjective     Pt reports: she had mouth surgery since last visit so lapse in care noted. She has decreased her pad use-using 4 a day with less wetness. She did the bowel/bladder diary. She feels the medication continues to improve her urge incontinence and gives her improvement in her awareness of sensation that she has to void.    She was compliant with home exercise program which included: kegels 4'' x 4 reps 2 times per day performing after she urinates.     Functional change/Response to previous treatment: improvement    Pain/Complaint: 0/10  Location: posterior left thigh "feels like a pepe horse" (comes and goes)     Objective   Measurements from evaluation:   PERF score: 3/5/5/5 ; Grade 2 cystocele; TTP throughout PT upon palpation around urogenital triangle and layer 3; moderate cueing required for kegel; Hx: 4 births all episiotomies; 10-20 year issues    Bladder/bowel diary: pt performed incorrectly and only documented her voids and times. She had 2 days of bladder/bowel diary. Pt voided: 3, 6, 4, 4, and 4 times per day, nocturia 1-2 times. Bladder irritants pt reports: alcohol, carbonated beverages. Incontinence noted 4 times per day occurring possibly due to pt relying on her " pads.    Goals: no pad use    Treatment       Today's Treatment: 54 minutes       2 units of TherAct: 30 minutes    Done in order to:    Review bladder/bowel diary- norms and behavioral changes (see AVS) Decrease constipation, improve bowel/bladder habits    Log roll practice  To decrease shoulder and lumbar pain    Bladder irritant handout               2 units of There ex .: 24 minutes     Done in order to:    Kegel seated (tendency to hold breath so trialed with inhalation/exhalation) 4'' x 10 Improve timing and quality of muscle recruitment and improve motor control and sequencing    Bridge with tripod foot x10> with adduction x10 To PF co-contraction    TA activation 5'' x10 Increase difficulty with coordination                Eliana received therapeutic exercises for 24 minutes     Eliana received the following manual therapy techniques: for 0 minutes     Eliana participated in neuromuscular re-education for 0 minutes     Eliana participated in dynamic functional therapeutic activities for 30  minutes,       Home Exercises Provided and Patient Education Provided     Education provided:   - bladder irritants, anatomy/physiology of pelvic floor, bladder retraining and kegels  Discussed progression of plan of care with patient; educated pt in activity modification; reviewed HEP with pt. Pt demonstrated and verbalized understanding of all instruction and was provided with a handout of HEP (see Patient Instructions).    Written Home Exercises Provided: yes.  Exercises were reviewed and Eliana was able to demonstrate them prior to the end of the session.  Eliana demonstrated good  understanding of the education provided.   See EMR under Patient Instructions for exercises provided at today's visit    Assessment   Pt demonstrates poor bladder habits with need to increase awareness of her bladder habits, as she tends to rely on her pads. Provided pt with norms regarding bladder habits and thorough eduction  on urge incontinence. Progressed to PF co-contraction to improve PF activation to assist with stress incontinence. Increase difficulty with activating PF initially with use of breath holding but improved with cueing.     Eliana Is progressing well towards her goals.   Pt prognosis is Good.   Pt will continue to benefit from skilled outpatient physical therapy to address the deficits listed in the problem list box on initial evaluation, provide pt/family education and to maximize pt's level of independence in the home and community environment.   Pt's spiritual, cultural and educational needs considered and pt agreeable to plan of care and goals.     Anticipated barriers to physical therapy: none      Goals:  Short Term Goals: 6 weeks   1. Pt will report a reduction in symptoms by atleast 40% since initial evaluation in order to demonstrate progression towards long-term goals.  2. Pt will demonstrate ability to perform knack in order to stop urinary leakage with coughing/sneezing/laughing.  3. Pt will decrease pad usage from 4 pads to 2 pads per day fully soaked.  4. Pt will decrease voiding to <6 times per day in order to participate in social activities.         Long Term Goals: 12 weeks   1. Pt to be discharged from therapy and feel confident in HEP for incontinence management.   2. Pt will report feeling atleast 80% improved since starting therapy.  3. Pt will transition from 4 pads fully soaked per day to <1 in order to demonstrate improving pelvic floor muscle controls as evidenced by decreased episodes of incontinence needed to improve confidence in social situations.  4. Pt will decrease frequency of leakage to <2 times per day with improved awareness of voiding.   5. Pt will decrease nocturia from 3 times per night to <1 time per night.      Plan   Next Visit: 2 more visits?, progress core strength, Lumbar roll, knack review, assess response to behavioral changes    Roxana Garcia, PT

## 2022-05-30 NOTE — PATIENT INSTRUCTIONS
Sitting or lying down kegels: 4 seconds x 5. 20 total       Document: # of times you     Try to go to the bathroom at level 2 urgencies     [place]: every 2-3 hours  Goal: going to >6 seconds voiding       Night time: try to stop drinking 2 hours before you go to bed     Bladder irritants: see chart and try to find correlations between urgencies     Add the strengthening exercises : bridge (bottom lift; tripod foot with ball squeeze) 10 reps x 2 sets   Tabitha bracing (core bracing) 10-15 seconds x

## 2022-06-06 ENCOUNTER — CLINICAL SUPPORT (OUTPATIENT)
Dept: REHABILITATION | Facility: HOSPITAL | Age: 87
End: 2022-06-06
Attending: OBSTETRICS & GYNECOLOGY
Payer: MEDICARE

## 2022-06-06 DIAGNOSIS — N81.84 PELVIC MUSCLE WASTING: ICD-10-CM

## 2022-06-06 DIAGNOSIS — N39.42 URINARY INCONTINENCE WITHOUT SENSORY AWARENESS: ICD-10-CM

## 2022-06-06 DIAGNOSIS — N39.46 MIXED INCONTINENCE: Primary | ICD-10-CM

## 2022-06-06 DIAGNOSIS — M62.89 PELVIC FLOOR DYSFUNCTION: ICD-10-CM

## 2022-06-06 PROCEDURE — 97530 THERAPEUTIC ACTIVITIES: CPT | Mod: PO

## 2022-06-06 PROCEDURE — 97110 THERAPEUTIC EXERCISES: CPT | Mod: PO

## 2022-06-06 NOTE — PATIENT INSTRUCTIONS
[employer]:    Log roll   Quick contractions:   X5 in a row x 3-4 times a day.      X3-5 submax 7-% effort when you get a strong sensation to go to the bathroom. Do this before you stand up to go to the bathroom.    Smoothie recommendations; water consumption 8 glasses       Core activation:   -in through your nose> exhale through your mouth as you bring your belly into your spine (stomach hollows inward) x3-5 second hold 5-10 in a row 2 times a day sitting. Can add kegel as well sitting with core for extra credit but if you are fatigued stop.      Kegel with core activation lying down: kegel 1st then core activation (draw belly into spine) x 5 seconds x 10 reps 5 seconds. 2 times a day.    Continue bridge: no kegel.Add clamshell (sidelying knee raise). Pull the stomach in first.           To Decrease Urinary frequency:  Stop drinking and eating atleast 2 hours before you go to bed at night  Bladder diary: keep a notepad for # of time you go to the bathroom (write down the level of urgency)  Start being aware of bladder irritants: see charge    Stop the just in case pees.  You get the first sensation to go when your bladder is only half way full. Acknowedge the sensation you have to go then set a timer and try to hold it as long as you can. Write down this number and each time you have to go set the timer for this amount  When you do get the urge, acknowedge the sensation you have to go, then think about when you last went. Was it 30 minutes ago? If it wasn't long (<1 hour atleast) then this is likely the bladder telling the brain for the first time that it has to go, and you can likely hold it longer. Acknowledge the sensation then try to following:  Set a timer for a certain amount of minutes. Start out with 5 minutes. If you are nervous about leaking then initially start by doing this while sitting on the toilet.  Distraction: think of a time when you had to go to the bathroom really bad in the past and then  forgot about it and then went hours later. Distraction is a great technique.    How do I tell if I am progressing?  The best way to show progress is writing down how many times a day you are going (keep track in your phone or right next to the sink and jem it each time) Keep a chart each day and you will notice a slow decrease in the number of times you are going.     Normal values:  Voiding every 2-3 hours (6-8 total times per day)  Time: 6-8 seconds each time you void of a steady stream

## 2022-06-06 NOTE — PROGRESS NOTES
"  Pelvic Health Physical Therapy   Treatment Note     Name: Eliana Wagner; [id number]  Visit Date: 6/6/2022  Visit 2 of 20 Auth. Expiration: 5/9/2023 POC Exp.: 8/9/2022   Eval Date: 5/9//2022 Next PN: 6/9/2022 - Cancels    FOTO: Issued Visit #: NA Last PN: - - No Shows    Physician: Gopi Lewis MD  Physician Orders: PT Eval and Treat- Pelvic PT  N39.46 (ICD-10-CM) - Mixed incontinence   N81.84 (ICD-10-CM) - Pelvic muscle wasting       Therapy Diagnosis:   Encounter Diagnoses   Name Primary?    Mixed incontinence Yes    Pelvic muscle wasting     Urinary incontinence without sensory awareness     Pelvic floor dysfunction        Precautions: Standard    Time In/Out: 9:30 AM/ 10:40 AM  Total Billable Time: 70 minutes    Subjective     Pt reports: pt is improving overall. Pt going through 5-6 pads per day with less wetness. Pt reports with is aware of her leakage 75% of the time. She reports when she gets the urge she goes straight to the bathroom. She is voiding 15-20 times. Exercises went "OK", she performed the kegels doing x10 after she voids x 4'' holds. Pt would like to see demonstration for log roll. She experiences left posterior thigh pain. Pt reports not wetting on herself at all after she came here for the first visit.       She was compliant with home exercise program which included: kegels 4'' x 4 reps 2 times per day performing after she urinates    Functional change/Response to previous treatment: improvement    Pain/Complaint: 0/10  Location: posterior left thigh "feels like a pepe horse" (comes and goes)     Objective   Measurements from evaluation:   PERF score: 3/5/5/5 ; Grade 2 cystocele; TTP throughout PT upon palpation around urogenital triangle and layer 3; moderate cueing required for kegel; Hx: 4 births all episiotomies; 10-20 year issues     Goals: no pad use    Treatment     Today's Treatment: 70 minutes       23 units of TherAct: 40 minutes    Done in order to:    Quick flicks x5 " "seated    Education- bladder irritants and smoothie education     Knack education     Log roll practice x10 to decrease Lsp pain To decrease shoulder and lumbar pain    Submax contraction x 5 before walking towards the bathroom  To decrease leakage due to inhibitory reflex   Edin horse education     HEP: focus on strong holds      2 units of There ex .: 30 minutes     Done in order to:    Kegel seated (inhale> exhale with kegel) 4'' x 10 Improve timing and quality of muscle recruitment and improve motor control and sequencing    Kegel x 5 5''> Kegel with TA 5'' x5 (increase difficulty "more concentration) To PF co-contraction    SL clam x10 (B) with TA Increase difficulty with coordination    TA with bridge x10             Eliana received therapeutic exercises for 40 minutes     Eliana received the following manual therapy techniques: for 0 minutes     Eliana participated in neuromuscular re-education for 0 minutes     Eliana participated in dynamic functional therapeutic activities for 30 minutes,       Home Exercises Provided and Patient Education Provided     Education provided:   -see AVS   Discussed progression of plan of care with patient; educated pt in activity modification; reviewed HEP with pt. Pt demonstrated and verbalized understanding of all instruction and was provided with a handout of HEP (see Patient Instructions).    Written Home Exercises Provided: yes.  Exercises were reviewed and Eliana was able to demonstrate them prior to the end of the session.  Eliana demonstrated good  understanding of the education provided.   See EMR under Patient Instructions for exercises provided at today's visit    Assessment   Pt went from voiding 3-6 times a day to 15-20 minutes we day since last visit. Continued poor voiding habits but pt demonstrating verbal undernstanding when she left of norms. Progressed to PF co-contraction with increase difficulty but good form. Use of breath initially " required for proper TA activation. Less pain noted with log roll           Eliana Is progressing well towards her goals.   Pt prognosis is Good.   Pt will continue to benefit from skilled outpatient physical therapy to address the deficits listed in the problem list box on initial evaluation, provide pt/family education and to maximize pt's level of independence in the home and community environment.   Pt's spiritual, cultural and educational needs considered and pt agreeable to plan of care and goals.     Anticipated barriers to physical therapy: none      Goals:  Short Term Goals: 6 weeks   1. Pt will report a reduction in symptoms by atleast 40% since initial evaluation in order to demonstrate progression towards long-term goals.  2. Pt will demonstrate ability to perform knack in order to stop urinary leakage with coughing/sneezing/laughing.  3. Pt will decrease pad usage from 4 pads to 2 pads per day fully soaked.  4. Pt will decrease voiding to <6 times per day in order to participate in social activities.         Long Term Goals: 12 weeks   1. Pt to be discharged from therapy and feel confident in HEP for incontinence management.   2. Pt will report feeling atleast 80% improved since starting therapy.  3. Pt will transition from 4 pads fully soaked per day to <1 in order to demonstrate improving pelvic floor muscle controls as evidenced by decreased episodes of incontinence needed to improve confidence in social situations.  4. Pt will decrease frequency of leakage to <2 times per day with improved awareness of voiding.   5. Pt will decrease nocturia from 3 times per night to <1 time per night.      Plan   Next Visit: 1 more visit then pt transferred to another therapist. Progress contreras Garcia, PT

## 2022-06-15 ENCOUNTER — CLINICAL SUPPORT (OUTPATIENT)
Dept: REHABILITATION | Facility: HOSPITAL | Age: 87
End: 2022-06-15
Attending: OBSTETRICS & GYNECOLOGY
Payer: MEDICARE

## 2022-06-15 DIAGNOSIS — M62.89 PELVIC FLOOR DYSFUNCTION: ICD-10-CM

## 2022-06-15 DIAGNOSIS — N39.42 URINARY INCONTINENCE WITHOUT SENSORY AWARENESS: ICD-10-CM

## 2022-06-15 DIAGNOSIS — N39.46 MIXED INCONTINENCE: Primary | ICD-10-CM

## 2022-06-15 DIAGNOSIS — N81.84 PELVIC MUSCLE WASTING: ICD-10-CM

## 2022-06-15 PROCEDURE — 97110 THERAPEUTIC EXERCISES: CPT | Mod: PO

## 2022-06-15 PROCEDURE — 97530 THERAPEUTIC ACTIVITIES: CPT | Mod: PO

## 2022-06-15 NOTE — PATIENT INSTRUCTIONS
"Continue to perform a kegel before you cough/sneeze     Exhale on difficult moments when you want to brace your abd     When you get the strong sensation to go to the bathroom- don't jump up. Perform quick contractions (1-2 second holds a strong) x5. Then take a breath in and calmly walk to the bathroom. This helps decrease chance of leakage.   If leakage is occurring try performing a kegel. Does this stop the pee?       Continue to perform the following exercises:     Kegel 4-5 seconds holds. Relax for 4-5 seconds x 5 in a row (instead of 2). 3 times a day (total 15 reps)   2. Quick contraction kegels x 5 in a row 3 times a day (total 15)       Core activation (drawing belly into spine) with exhale 5 second hold x5  2. Core activation with exhale with knee fall out (1 knee falls out at a time- don't the the pelvis or lowback rotate) x10 in a row (5 on each side). 2 sets of these. 20 second rest.  3. Core with ball squeeze 5 second squeeze repeat 10 reps.   4. Sidelying core with knee raise raise (I.e. Clamshell) x10 each side      Series of exercises: 2 times a day    Log roll         Diastasis Recti (DR)     So what is a diastasis recti? A diastasis recti is defined as more than a 2 finger width separation OR softness in the middle of your abdomen, at the area of called the linea alba. The linea alba is comprised of the aponeurosis of all 3 lateral abdominal muscles (external obliques, internal obliques, and transversus abdominus). The aponeurosis is a sheet of pearly-white fibrous tissues that takes the place of a tendon for flat muscles that have a wide area of attachment. Additionally, the rectus abdominus (aka your "six pack muscles") are encased in a rectus sheath from the aponeurosis). If one of these muscles is not working correctly it can have the potential to make the entire system off balance.         So why do we care about our abdominal healing correctly? We want this tissue to heal correctly to assist " "in our ability to transfer load, such as picking up our baby. We also want our core muscles to be strong to decrease the possibility of lowback. Think of these muscles acting like a lumbar brace or corset, aiding in the stability of our spine.      So what is the goal? If you want an official assessment of this tissue to ensure you are healing/transferring loading correctly a physical therapist pelvic health specialist can do a formal assessment on this tissue. They will measure the distance between the tissue, as well as the firmness of the skin, and your ability to perform tasks/exercises without a doming, or bulging of your lower abs". The goal is for the skin to feel like a trampoline, somewhat firm, (not too squishy) and to decrease the width to <2 finger widths. However, do not worry if you have a slight increase greater than 2 finger width and your linea alba is firm and you are loading appropriately not letting your abs bulge out. You are doing this correctly. (should I have this last sentence?)     Treatment:   1st- you want to improve your movement strategies to allow the tissue to heal. This includes avoiding holding your breath or bearing down/tightly bracing your abs when are lifting or during transfers. Instead you want to lightly engage your core and draw your belly in as instructed in the exercises below.   Core exercises- activating all layers of the muscles that attach into the linea alba are important, not just your lower abs. We have provided a list of exercises to start with later on in the packet, ensuring you are activing these muscles correctly and getting the foundation of your core working well.     Common questions regarding diastasis?  Is abdominal binding for a diastasis a good idea?   It can be needed immediately postpartum for a small percentage of people but you want to ditch it as soon as possible. The fascia needs some stress on it to heal, so keeping in bound up may not give it " enough stress. Also, this can create more pressure down on the pelvic floor. Ask you doctor or physical therapist regarding this.  Is taping the help with the diastasis helpful? It can actually be a better solution to help give the body a gentle reminder on alignment without increasing the pressure too much. However, if we can do it without the cueing that is even better.      What exercises are INITIALLY unsafe and can make a diastasis worse?  Exercises that directly stress your anterior core: Front planks, push-ups, V-sits, roll downs- rolling like a ball, burpees, pikes on a ball, ab wheel rollouts, crunches or sit ups, etc. These are great exercises, but during the first few months post-partum you want to get a good foundation and make sure you are loading the tissue correctly before we put increased stress on this area and potentially cause a bulging of your abdominals.    You also want to be careful and make sure your abdominals are not bulging out with the following movements, as these indirectly stress the anterior core: transitions in yoga, exercises at gym including pull-ups, squats, standing arm work (chest flies, press, rows on a cable), deadlift, overhead medball throws; running/swimming.     In conclusion   Always look for the doming of the six pack muscles and the bulging of the linea alba. If you see this, you need to modify or eliminate the exercise then come back to it when you have improved control.   Try not to get fixated on the distance, but rather the goal is good loading strategies and firming up that tissue.

## 2022-06-15 NOTE — PROGRESS NOTES
"  Pelvic Health Physical Therapy   Treatment Note     Name: Eliana Wagner; [id number]  Visit Date: 6/15/2022  Visit 3 of 20 Auth. Expiration: 5/9/2023 POC Exp.: 8/9/2022   Eval Date: 5/9//2022 Next PN: 7/15/2022 - Cancels    FOTO: Issued Visit #: NA Last PN: 6/15/2022 - No Shows    Physician: Gopi Lewis MD  Physician Orders: PT Eval and Treat- Pelvic PT  N39.46 (ICD-10-CM) - Mixed incontinence   N81.84 (ICD-10-CM) - Pelvic muscle wasting       Therapy Diagnosis:   Encounter Diagnoses   Name Primary?    Mixed incontinence Yes    Pelvic muscle wasting     Urinary incontinence without sensory awareness     Pelvic floor dysfunction        Precautions: Standard    Time In/Out: 10:30 AM/ 11:25 AM  Total Billable Time: 55 minutes    Subjective     Pt reports: pt reports feeling atleast 50% improvement overall.  She reports change in her urinary stream to strong vs. Dribbling. She reports urinating every 2-3 hours. She reports still some burning, unsure if it's due to pad usage. The skin irritation will decrease with ointment given by MD. She is using pads #5 and will try to decrease to #4. Pads are less soaked but still moderately soaked. She reports most of the time she is aware of the leakage but she hasn't tried quick contractions or kegel ever. Just rushes to the bathroom. Pt reports she is now down to 3 pads which she is excited about. Pt reports noticing bulging to her abs when getting out of bed.        She was compliant with home exercise program which included: kegels 4'' x 2 reps 3-4 times per day performing after she urinates or sitting.    Functional change/Response to previous treatment: improvement    Pain/Complaint: 0/10  Location: posterior left thigh "feels like a pepe horse" (comes and goes)     Objective   Measurements from evaluation:   PERF score: 3/5/5/5 ; Grade 2 cystocele; TTP throughout PT upon palpation around urogenital triangle and layer 3; moderate cueing required for kegel; Hx: " 4 births all episiotomies; 10-20 year issues     Goals: no pad use    Treatment     Today's Treatment: 55 minutes      1 units of TherAct: 15 minutes    Done in order to:    Quick flicks x5 seated    Knack education     Log roll practice x5 to decrease Lsp pain To decrease shoulder and lumbar pain    Submax contraction x 5 before walking towards the bathroom  To decrease leakage due to inhibitory reflex             2 units of There ex .: 40 minutes     Done in order to: strength PF and PF co-contraction    TA 5'' hold x10  TA with KFO x10 (exhale on the activation and reset each rep)  Pt requires exhale to not valsalva;   TA with adduction 5'' exhale on activation    Kegel supine 4'' x 5 (5'' rest in between)  Improve timing and quality of muscle recruitment and improve motor control and sequencing         SL clam x10 (B) with TA 2 sets Increase difficulty with coordination                Eliana received therapeutic exercises for 40 minutes     Eliana received the following manual therapy techniques: for 0 minutes     Eliana participated in neuromuscular re-education for 0 minutes     Eliana participated in dynamic functional therapeutic activities for 15 minutes,       Home Exercises Provided and Patient Education Provided     Education provided:   -see AVS   Discussed progression of plan of care with patient; educated pt in activity modification; reviewed HEP with pt. Pt demonstrated and verbalized understanding of all instruction and was provided with a handout of HEP (see Patient Instructions).    Written Home Exercises Provided: yes.  Exercises were reviewed and Eliana was able to demonstrate them prior to the end of the session.  Eliana demonstrated good  understanding of the education provided.   See EMR under Patient Instructions for exercises provided at today's visit    Assessment   Pt has been seen a total of 4 visits including the evaluation and reports feeling atleast 50% improved. She has  transitioned to 2-3 pads per day with moderate amount of soaking vs. Fully soaked. She has decreased her urgency and now voiding every 2-3 hours with a more normalized stream. Lastly, she feels more confident in her ability to control her leakage. Pt would benefit from continued therapy to address recent diastasis recti, improve her loading strategies and lumbopelvic stability and ultimately decrease pad usage.       Eliana Is progressing well towards her goals.   Pt prognosis is Good.   Pt will continue to benefit from skilled outpatient physical therapy to address the deficits listed in the problem list box on initial evaluation, provide pt/family education and to maximize pt's level of independence in the home and community environment.   Pt's spiritual, cultural and educational needs considered and pt agreeable to plan of care and goals.     Anticipated barriers to physical therapy: none      Goals:  Short Term Goals: 6 weeks reviewed on 6/15/2022   1. Pt will report a reduction in symptoms by atleast 40% since initial evaluation in order to demonstrate progression towards long-term goals.met  2. Pt will demonstrate ability to perform knack in order to stop urinary leakage with coughing/sneezing/laughing. met  3. Pt will decrease pad usage from 4 pads to 2 pads per day fully soaked. Partially met; not fully soaked and now 2-3 pads per day   4. Pt will decrease voiding to <6 times per day in order to participate in social activities. met        Long Term Goals: 12 weeks   1. Pt to be discharged from therapy and feel confident in HEP for incontinence management.   2. Pt will report feeling atleast 80% improved since starting therapy.  3. Pt will transition from 4 pads fully soaked per day to <1 in order to demonstrate improving pelvic floor muscle controls as evidenced by decreased episodes of incontinence needed to improve confidence in social situations.  4. Pt will decrease frequency of leakage to <2 times  per day with improved awareness of voiding.   5. Pt will decrease nocturia from 3 times per night to <1 time per night.      Plan   Next Visit: review PF co contraction there ex; pt may benefit from biofeedback; continue to work on PF strength and co contraction         Roxana Garcia, PT

## 2022-06-22 ENCOUNTER — TELEPHONE (OUTPATIENT)
Dept: PRIMARY CARE CLINIC | Facility: CLINIC | Age: 87
End: 2022-06-22
Payer: MEDICARE

## 2022-06-22 DIAGNOSIS — E11.9 DIABETES MELLITUS CASE MANAGEMENT PATIENT: Primary | ICD-10-CM

## 2022-06-22 NOTE — TELEPHONE ENCOUNTER
LOV 01/07/2022 dental caries  LAV 12/16/2020     Spoke with patient, the invokana price is - humana $458, local $600 and she cuts the Irbesartan in half, (1/2 tablet daily) - can you change to 150 mg or something different?

## 2022-06-22 NOTE — TELEPHONE ENCOUNTER
LVM for patient, need to schedule an appointment for diabetic follow up.  My call back is [phone].

## 2022-06-22 NOTE — TELEPHONE ENCOUNTER
She was due for a diabetic follow up and labs in may 2022 make appt. Schedule labs. We will discuss at her appt.

## 2022-06-22 NOTE — TELEPHONE ENCOUNTER
----- Message from Greta Leal sent at 6/22/2022  9:28 AM CDT -----  Contact: [phone]  Pt is calling for the nurse she needs to speak to you in regards to a prescription please advise and give return call

## 2022-06-24 ENCOUNTER — HOSPITAL ENCOUNTER (OUTPATIENT)
Dept: RADIOLOGY | Facility: HOSPITAL | Age: 87
Discharge: HOME | End: 2022-06-24
Attending: FAMILY MEDICINE
Payer: MEDICARE

## 2022-06-24 ENCOUNTER — OFFICE VISIT (OUTPATIENT)
Dept: PRIMARY CARE CLINIC | Facility: CLINIC | Age: 87
End: 2022-06-24
Payer: MEDICARE

## 2022-06-24 VITALS
BODY MASS INDEX: 31.4 KG/M2 | DIASTOLIC BLOOD PRESSURE: 62 MMHG | HEIGHT: 62 IN | OXYGEN SATURATION: 98 % | SYSTOLIC BLOOD PRESSURE: 110 MMHG | HEART RATE: 78 BPM | TEMPERATURE: 99 F | WEIGHT: 170.63 LBS

## 2022-06-24 DIAGNOSIS — Z79.4 DIABETES MELLITUS DUE TO UNDERLYING CONDITION WITH BOTH EYES AFFECTED BY PROLIFERATIVE RETINOPATHY AND MACULAR EDEMA, WITH LONG-TERM CURRENT USE OF INSULIN: Primary | ICD-10-CM

## 2022-06-24 DIAGNOSIS — E78.5 HYPERLIPIDEMIA, UNSPECIFIED HYPERLIPIDEMIA TYPE: ICD-10-CM

## 2022-06-24 DIAGNOSIS — E11.9 TYPE 2 DIABETES MELLITUS WITHOUT COMPLICATION, WITH LONG-TERM CURRENT USE OF INSULIN: ICD-10-CM

## 2022-06-24 DIAGNOSIS — I50.22 CHRONIC SYSTOLIC (CONGESTIVE) HEART FAILURE: Chronic | ICD-10-CM

## 2022-06-24 DIAGNOSIS — M79.604 PAIN OF RIGHT LOWER EXTREMITY: ICD-10-CM

## 2022-06-24 DIAGNOSIS — I25.10 CORONARY ARTERY DISEASE INVOLVING NATIVE CORONARY ARTERY OF NATIVE HEART WITHOUT ANGINA PECTORIS: ICD-10-CM

## 2022-06-24 DIAGNOSIS — E08.3513 DIABETES MELLITUS DUE TO UNDERLYING CONDITION WITH BOTH EYES AFFECTED BY PROLIFERATIVE RETINOPATHY AND MACULAR EDEMA, WITH LONG-TERM CURRENT USE OF INSULIN: Primary | ICD-10-CM

## 2022-06-24 DIAGNOSIS — I10 ESSENTIAL HYPERTENSION: ICD-10-CM

## 2022-06-24 DIAGNOSIS — Z79.4 TYPE 2 DIABETES MELLITUS WITHOUT COMPLICATION, WITH LONG-TERM CURRENT USE OF INSULIN: ICD-10-CM

## 2022-06-24 LAB
ALBUMIN/CREAT UR: NORMAL UG/MG (ref 0–30)
BILIRUB SERPL-MCNC: NORMAL MG/DL
BLOOD URINE, POC: NORMAL
CLARITY, POC UA: CLEAR
COLOR, POC UA: YELLOW
CREAT UR-MCNC: 29 MG/DL (ref 15–325)
GLUCOSE UR QL STRIP: 1000
KETONES UR QL STRIP: NORMAL
LEUKOCYTE ESTERASE URINE, POC: NORMAL
MICROALBUMIN UR DL<=1MG/L-MCNC: <5 UG/ML
NITRITE, POC UA: NORMAL
PH, POC UA: 5
PROTEIN, POC: NORMAL
SPECIFIC GRAVITY, POC UA: 1.01
UROBILINOGEN, POC UA: NORMAL

## 2022-06-24 PROCEDURE — 81002 URINALYSIS NONAUTO W/O SCOPE: CPT | Mod: PBBFAC,PN | Performed by: FAMILY MEDICINE

## 2022-06-24 PROCEDURE — 99214 OFFICE O/P EST MOD 30 MIN: CPT | Mod: PBBFAC,PN | Performed by: FAMILY MEDICINE

## 2022-06-24 PROCEDURE — 99214 PR OFFICE/OUTPT VISIT, EST, LEVL IV, 30-39 MIN: ICD-10-PCS | Mod: S$PBB,,, | Performed by: FAMILY MEDICINE

## 2022-06-24 PROCEDURE — 82043 UR ALBUMIN QUANTITATIVE: CPT | Performed by: FAMILY MEDICINE

## 2022-06-24 PROCEDURE — 82570 ASSAY OF URINE CREATININE: CPT | Performed by: FAMILY MEDICINE

## 2022-06-24 PROCEDURE — 72170 XR PELVIS ROUTINE AP: ICD-10-PCS | Mod: 26,,, | Performed by: RADIOLOGY

## 2022-06-24 PROCEDURE — 99999 PR PBB SHADOW E&M-EST. PATIENT-LVL IV: CPT | Mod: PBBFAC,,, | Performed by: FAMILY MEDICINE

## 2022-06-24 PROCEDURE — 72170 X-RAY EXAM OF PELVIS: CPT | Mod: TC,PN

## 2022-06-24 PROCEDURE — 72170 X-RAY EXAM OF PELVIS: CPT | Mod: 26,,, | Performed by: RADIOLOGY

## 2022-06-24 PROCEDURE — 99214 OFFICE O/P EST MOD 30 MIN: CPT | Mod: S$PBB,,, | Performed by: FAMILY MEDICINE

## 2022-06-24 PROCEDURE — 99999 PR PBB SHADOW E&M-EST. PATIENT-LVL IV: ICD-10-PCS | Mod: PBBFAC,,, | Performed by: FAMILY MEDICINE

## 2022-06-24 RX ORDER — DULAGLUTIDE 0.75 MG/.5ML
0.75 INJECTION, SOLUTION SUBCUTANEOUS
Qty: 12 PEN | Refills: 3 | Status: SHIPPED | OUTPATIENT
Start: 2022-06-24 | End: 2022-12-07 | Stop reason: SDUPTHER

## 2022-06-24 RX ORDER — METOPROLOL SUCCINATE 50 MG/1
50 TABLET, EXTENDED RELEASE ORAL DAILY
Qty: 90 TABLET | Refills: 3 | Status: SHIPPED | OUTPATIENT
Start: 2022-06-24 | End: 2022-12-07 | Stop reason: SDUPTHER

## 2022-06-24 RX ORDER — AMLODIPINE BESYLATE 2.5 MG/1
2.5 TABLET ORAL NIGHTLY
Qty: 90 TABLET | Refills: 3 | Status: SHIPPED | OUTPATIENT
Start: 2022-06-24 | End: 2022-12-07

## 2022-06-24 RX ORDER — DULAGLUTIDE 0.75 MG/.5ML
0.75 INJECTION, SOLUTION SUBCUTANEOUS
Qty: 12 PEN | Refills: 3 | Status: SHIPPED | OUTPATIENT
Start: 2022-06-24 | End: 2022-06-24 | Stop reason: SDUPTHER

## 2022-06-24 RX ORDER — IRBESARTAN 300 MG/1
300 TABLET ORAL DAILY
Qty: 90 TABLET | Refills: 1 | Status: SHIPPED | OUTPATIENT
Start: 2022-06-24 | End: 2022-12-07 | Stop reason: SDUPTHER

## 2022-06-24 RX ORDER — ATORVASTATIN CALCIUM 40 MG/1
40 TABLET, FILM COATED ORAL DAILY
Qty: 90 TABLET | Refills: 3 | Status: SHIPPED | OUTPATIENT
Start: 2022-06-24 | End: 2022-12-07 | Stop reason: SDUPTHER

## 2022-06-27 NOTE — PROGRESS NOTES
Subjective:       Patient ID: Eliana Wagner is a 87 y.o. female.    Chief Complaint: Diabetes and Leg Pain (L- back thigh region)  Patient has lost several teeth due to uncontrolled diabetes  Patient has been taking her medicine but feels that the [employer] is too expensive  HPI see above  Review of Systems   Constitutional: Positive for fatigue.   HENT: Negative.    Eyes: Negative.    Respiratory: Negative.    Cardiovascular: Negative.    Gastrointestinal: Negative.    Endocrine: Negative.    Genitourinary: Negative.    Musculoskeletal: Positive for arthralgias and leg pain.   Integumentary:  Negative.   Allergic/Immunologic: Negative.    Neurological: Negative.    Hematological: Negative.    Psychiatric/Behavioral: Negative.          Objective:      Physical Exam  Vitals and nursing note reviewed.   Constitutional:       General: She is not in acute distress.     Appearance: Normal appearance. She is normal weight.   HENT:      Head: Normocephalic and atraumatic.      Right Ear: Tympanic membrane, ear canal and external ear normal.      Left Ear: Tympanic membrane, ear canal and external ear normal.      Nose: Nose normal. No congestion or rhinorrhea.      Mouth/Throat:      Mouth: Mucous membranes are moist.      Pharynx: Oropharynx is clear. No oropharyngeal exudate or posterior oropharyngeal erythema.   Eyes:      Extraocular Movements: Extraocular movements intact.      Conjunctiva/sclera: Conjunctivae normal.      Pupils: Pupils are equal, round, and reactive to light.   Cardiovascular:      Rate and Rhythm: Normal rate and regular rhythm.      Pulses: Normal pulses.      Heart sounds: Normal heart sounds. No murmur heard.  Pulmonary:      Effort: Pulmonary effort is normal. No respiratory distress.      Breath sounds: Normal breath sounds. No wheezing.   Abdominal:      General: Abdomen is flat. Bowel sounds are normal. There is no distension.      Palpations: Abdomen is soft.      Tenderness: There is  no abdominal tenderness.      Hernia: No hernia is present.   Musculoskeletal:         General: No swelling or tenderness. Normal range of motion.      Cervical back: Normal range of motion and neck supple. No rigidity or tenderness.   Skin:     General: Skin is warm and dry.      Capillary Refill: Capillary refill takes less than 2 seconds.      Findings: No bruising or erythema.   Neurological:      General: No focal deficit present.      Mental Status: She is alert and oriented to person, place, and time.      Cranial Nerves: No cranial nerve deficit.      Sensory: No sensory deficit.      Motor: No weakness.      Coordination: Coordination normal.      Gait: Gait normal.      Deep Tendon Reflexes: Reflexes abnormal.   Psychiatric:         Mood and Affect: Mood normal.         Behavior: Behavior normal.         Thought Content: Thought content normal.         Judgment: Judgment normal.         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, with long-term current use of insulin    -  Primary    Relevant Medications    dulaglutide (TRULICITY) 0.75 mg/0.5 mL pen injector    Other Relevant Orders    POCT URINE DIPSTICK WITHOUT MICROSCOPE (Completed)    Microalbumin/Creatinine Ratio, Urine (Completed)    CBC Auto Differential    Comprehensive Metabolic Panel    Lipid Panel (Completed)    Hyperlipidemia, unspecified hyperlipidemia type        Relevant Medications    atorvastatin (LIPITOR) 40 MG tablet    Essential hypertension        Relevant Medications    amLODIPine  (NORVASC) 2.5 MG tablet    Type 2 diabetes mellitus without complication, with long-term current use of insulin        Relevant Medications    dulaglutide (TRULICITY) 0.75 mg/0.5 mL pen injector    Pain of right lower extremity        Relevant Orders    X-Ray Pelvis Routine AP (Completed)          Plan:     1. 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  Dispense: 12 pen; Refill: 3  - CBC Auto Differential; Future  - Comprehensive Metabolic Panel; Future  - Lipid Panel; Future    2. Coronary artery disease involving native coronary artery of native heart without angina pectoris  - irbesartan (AVAPRO) 300 MG tablet; Take 1 tablet (300 mg total) by mouth once daily.  Dispense: 90 tablet; Refill: 1    3. Hyperlipidemia, unspecified hyperlipidemia type  - atorvastatin (LIPITOR) 40 MG tablet; Take 1 tablet (40 mg total) by mouth once daily.  Dispense: 90 tablet; Refill: 3    4. Chronic systolic (congestive) heart failure  - metoprolol succinate (TOPROL-XL) 50 MG 24 hr tablet; Take 1 tablet (50 mg total) by mouth once daily.  Dispense: 90 tablet; Refill: 3    5. Essential hypertension  - amLODIPine (NORVASC) 2.5 MG tablet; Take 1 tablet (2.5 mg total) by mouth nightly.  Dispense: 90 tablet; Refill: 3    6. Type 2 diabetes mellitus without complication, with long-term current use of insulin    7. Pain of right lower extremity  - X-Ray Pelvis Routine AP; Future  Will contact patient with results of pending test when available

## 2022-07-07 ENCOUNTER — OFFICE VISIT (OUTPATIENT)
Dept: OPTOMETRY | Facility: CLINIC | Age: 87
End: 2022-07-07
Payer: MEDICARE

## 2022-07-07 ENCOUNTER — CLINICAL SUPPORT (OUTPATIENT)
Dept: REHABILITATION | Facility: HOSPITAL | Age: 87
End: 2022-07-07
Attending: OBSTETRICS & GYNECOLOGY
Payer: MEDICARE

## 2022-07-07 DIAGNOSIS — D31.31 CHOROIDAL NEVUS OF RIGHT EYE: ICD-10-CM

## 2022-07-07 DIAGNOSIS — Z13.5 SCREENING FOR EYE CONDITION: ICD-10-CM

## 2022-07-07 DIAGNOSIS — M62.89 PELVIC FLOOR DYSFUNCTION: ICD-10-CM

## 2022-07-07 DIAGNOSIS — N39.46 MIXED INCONTINENCE: Primary | ICD-10-CM

## 2022-07-07 DIAGNOSIS — N81.84 PELVIC MUSCLE WASTING: ICD-10-CM

## 2022-07-07 DIAGNOSIS — N39.42 URINARY INCONTINENCE WITHOUT SENSORY AWARENESS: ICD-10-CM

## 2022-07-07 DIAGNOSIS — E11.9 TYPE 2 DIABETES MELLITUS WITHOUT COMPLICATION, WITH LONG-TERM CURRENT USE OF INSULIN: Primary | ICD-10-CM

## 2022-07-07 DIAGNOSIS — Z79.4 TYPE 2 DIABETES MELLITUS WITHOUT COMPLICATION, WITH LONG-TERM CURRENT USE OF INSULIN: Primary | ICD-10-CM

## 2022-07-07 DIAGNOSIS — Z96.1 PSEUDOPHAKIA: ICD-10-CM

## 2022-07-07 PROCEDURE — 97112 NEUROMUSCULAR REEDUCATION: CPT | Mod: PO

## 2022-07-07 PROCEDURE — 92014 PR EYE EXAM, EST PATIENT,COMPREHESV: ICD-10-PCS | Mod: S$PBB,,, | Performed by: OPTOMETRIST

## 2022-07-07 PROCEDURE — 99999 PR PBB SHADOW E&M-EST. PATIENT-LVL III: ICD-10-PCS | Mod: PBBFAC,,, | Performed by: OPTOMETRIST

## 2022-07-07 PROCEDURE — 97110 THERAPEUTIC EXERCISES: CPT | Mod: PO

## 2022-07-07 PROCEDURE — 99999 PR PBB SHADOW E&M-EST. PATIENT-LVL III: CPT | Mod: PBBFAC,,, | Performed by: OPTOMETRIST

## 2022-07-07 PROCEDURE — 92014 COMPRE OPH EXAM EST PT 1/>: CPT | Mod: S$PBB,,, | Performed by: OPTOMETRIST

## 2022-07-07 PROCEDURE — 99213 OFFICE O/P EST LOW 20 MIN: CPT | Mod: PBBFAC,PO | Performed by: OPTOMETRIST

## 2022-07-07 NOTE — PROGRESS NOTES
Subjective:       Patient ID: Eliana Wagner is a 87 y.o. female      Chief Complaint   Patient presents with    Diabetic Eye Exam     History of Present Illness  Dls: 12/01/2020  Dr. Hoffman     86 y/o female presents today for diabetic eye exam.   Pt states no changes in vision. Pt does not wear any glasses.      x 3 days     No tearing  No itching  No burning  No pain  No ha's  + od floaters  No flashes    Eye meds  None    Hemoglobin A1C       Date                     Value               Ref Range             Status                11/10/2020               7.7 (H)             4.0 - 5.6 %           Final                  11/12/2019               8.0 (H)             4.0 - 5.6 %           Final                 10/10/2019               7.8 (H)             4.0 - 5.6 %           Final                 Assessment/Plan:     1. Type 2 diabetes mellitus without complication, with long-term current use of insulin  No diabetic retinopathy. Discussed with pt the effects of diabetes on vision, importance of good blood sugar control, compliance with meds, and follow up care with PCP. Return in 1 year for dilated eye exam, sooner PRN.    2. Choroidal nevus of right eye  Stable. Monitor.    3. Pseudophakia  MFIOL OU. Doing well. Clear. Centered.     4. Screening for eye condition    - Ambulatory referral/consult to Ophthalmology    Follow up in about 1 year (around 7/7/2023) for Diabetic Eye Exam.

## 2022-07-07 NOTE — PROGRESS NOTES
"  Pelvic Health Physical Therapy   Treatment Note     Name: Eliana Wagner; [id number]  Visit Date: 7/7/2022  Visit 4 of 20 Auth. Expiration: 5/9/2023 POC Exp.: 8/9/2022   Eval Date: 5/9//2022 Next PN: 7/15/2022 - Cancels    FOTO: Issued Visit #: NA Last PN: 6/15/2022 - No Shows    Physician: Gopi Lewis MD  Physician Orders: PT Eval and Treat- Pelvic PT  N39.46 (ICD-10-CM) - Mixed incontinence   N81.84 (ICD-10-CM) - Pelvic muscle wasting       Therapy Diagnosis:   Encounter Diagnoses   Name Primary?    Mixed incontinence Yes    Pelvic muscle wasting     Urinary incontinence without sensory awareness     Pelvic floor dysfunction        Precautions: Standard    Time In/Out: 8:00 AM/ 8:55 AM  Total Billable Time: 55 minutes    Subjective     Pt reports: She has been consistent with her exercises and usingestrogen cream. She was wearing 10 pads per day, now she is doing much better, about 4-5 pads that are not totally saturated. About 50% better overall. She thinks she has to "be more aware of her problem." She has not noticed any abdominal doming lately. She is joining the wellness center soon to get in better shape.     She was compliant with home exercise program which included: kegels 4'' x 2 reps 3-4 times per day performing after she urinates or sitting.    Functional change/Response to previous treatment: improvement    Pain/Complaint: 0/10  Location: none    Objective   Measurements from previous evaluation:   PERF score: 3/5/5/5 ; Grade 2 cystocele; TTP throughout PT upon palpation around urogenital triangle and layer 3; moderate cueing required for kegel; Hx: 4 births all episiotomies; 10-20 year issues     Goals: no pad use    Treatment     Today's Treatment: 55 minutes     2 Units of Neuro Re-ed:30 minutes   Biofeedback for baseline resting Done in order to: improve coordination and proprioception of PFM   Biofeedback [id number] seated    Use of exhale with kegel                       2 units of " There ex .: 25 minutes     Done in order to: strength PF and PF co-contraction    TA 5'' hold x10 Pt requires exhale to not valsalva   TA with adduction 5'' exhale on activation    Mini squats with exhale on ascent        Standing TA marches Increase difficulty with coordination                Eliana received therapeutic exercises for 25 minutes     Eliana received the following manual therapy techniques: for 0 minutes     Eliana participated in neuromuscular re-education for 30 minutes     Eliana participated in dynamic functional therapeutic activities for 0 minutes,       Home Exercises Provided and Patient Education Provided     Education provided:   -see AVS   Discussed progression of plan of care with patient; educated pt in activity modification; reviewed HEP with pt. Pt demonstrated and verbalized understanding of all instruction and was provided with a handout of HEP (see Patient Instructions).    Written Home Exercises Provided: yes.  Exercises were reviewed and Eliana was able to demonstrate them prior to the end of the session.  Eliana demonstrated good  understanding of the education provided.   See EMR under Patient Instructions for exercises provided at today's visit    Assessment   Pt has been seen a total of 5 visits including the evaluation and reports feeling atleast 50% improved. She has transitioned to 4-5 pads per day with minimal amount of soaking vs. Fully soaked. She has decreased her urgency and now voiding every 2-3 hours with a more normalized stream. Lastly, she feels more confident in her ability to control her leakage. Pt would benefit from continued therapy to address weakness and poor coordination, improve her loading strategies and lumbopelvic stability and ultimately decrease pad usage.       Eliana Is progressing well towards her goals.   Pt prognosis is Good.   Pt will continue to benefit from skilled outpatient physical therapy to address the deficits listed in the  problem list box on initial evaluation, provide pt/family education and to maximize pt's level of independence in the home and community environment.   Pt's spiritual, cultural and educational needs considered and pt agreeable to plan of care and goals.     Anticipated barriers to physical therapy: none      Goals:  Short Term Goals: 6 weeks reviewed on 6/15/2022   1. Pt will report a reduction in symptoms by atleast 40% since initial evaluation in order to demonstrate progression towards long-term goals.met  2. Pt will demonstrate ability to perform knack in order to stop urinary leakage with coughing/sneezing/laughing. met  3. Pt will decrease pad usage from 4 pads to 2 pads per day fully soaked. Partially met; not fully soaked and now 2-3 pads per day   4. Pt will decrease voiding to <6 times per day in order to participate in social activities. met        Long Term Goals: 12 weeks   1. Pt to be discharged from therapy and feel confident in HEP for incontinence management.   2. Pt will report feeling atleast 80% improved since starting therapy.  3. Pt will transition from 4 pads fully soaked per day to <1 in order to demonstrate improving pelvic floor muscle controls as evidenced by decreased episodes of incontinence needed to improve confidence in social situations.  4. Pt will decrease frequency of leakage to <2 times per day with improved awareness of voiding.   5. Pt will decrease nocturia from 3 times per night to <1 time per night.      Plan   Next Visit: review PF co contraction there ex; pt may continue to benefit from biofeedback; continue to work on PF strength and co contraction         Magali Coates, PT, DPT

## 2022-07-11 ENCOUNTER — PATIENT MESSAGE (OUTPATIENT)
Dept: ADMINISTRATIVE | Facility: HOSPITAL | Age: 87
End: 2022-07-11
Payer: MEDICARE

## 2022-07-12 ENCOUNTER — CLINICAL SUPPORT (OUTPATIENT)
Dept: REHABILITATION | Facility: HOSPITAL | Age: 87
End: 2022-07-12
Attending: OBSTETRICS & GYNECOLOGY
Payer: MEDICARE

## 2022-07-12 DIAGNOSIS — M62.89 PELVIC FLOOR DYSFUNCTION: ICD-10-CM

## 2022-07-12 DIAGNOSIS — N39.46 MIXED INCONTINENCE: Primary | ICD-10-CM

## 2022-07-12 DIAGNOSIS — N81.84 PELVIC MUSCLE WASTING: ICD-10-CM

## 2022-07-12 DIAGNOSIS — N39.42 URINARY INCONTINENCE WITHOUT SENSORY AWARENESS: ICD-10-CM

## 2022-07-12 PROCEDURE — 97110 THERAPEUTIC EXERCISES: CPT | Mod: PO

## 2022-07-12 PROCEDURE — 97112 NEUROMUSCULAR REEDUCATION: CPT | Mod: PO

## 2022-07-12 NOTE — PROGRESS NOTES
Pelvic Health Physical Therapy   Treatment/Discharge Note     Name: Eliana Wagner; [id number]  Visit Date: 7/12/2022  Visit5 of 20 Auth. Expiration: 5/9/2023 POC Exp.: 8/9/2022   Eval Date: 5/9//2022 Next PN: 7/15/2022 - Cancels    FOTO: Issued Visit #: NA Last PN: 6/15/2022 - No Shows    Physician: Gopi Lewis MD  Physician Orders: PT Eval and Treat- Pelvic PT  N39.46 (ICD-10-CM) - Mixed incontinence   N81.84 (ICD-10-CM) - Pelvic muscle wasting       Therapy Diagnosis:   Encounter Diagnoses   Name Primary?    Mixed incontinence Yes    Pelvic muscle wasting     Urinary incontinence without sensory awareness     Pelvic floor dysfunction        Precautions: Standard    Time In/Out: 9:58 AM/ 10:51 AM  Total Billable Time: 53 minutes    Subjective     Pt reports: She feels 60% improved. She has been laying down and doing her exercises, as well as doing Kegels on the commode. She is using 4 pads per day, and one pull up at night. They are not saturated very often!    She was compliant with home exercise program which included: kegels 4'' x 2 reps 3-4 times per day performing after she urinates or sitting.    Functional change/Response to previous treatment: improvement    Pain/Complaint: 0/10  Location: none    Objective   Measurements from previous evaluation:   PERF score: 3/5/5/5 ; Grade 2 cystocele; TTP throughout PT upon palpation around urogenital triangle and layer 3; moderate cueing required for kegel; Hx: 4 births all episiotomies; 10-20 year issues     Goals: no pad use    Treatment     Today's Treatment: 55 minutes     3 Units of Neuro Re-ed:43 minutes   Biofeedback for baseline resting Done in order to: improve coordination and proprioception of PFM   Biofeedback [id number], [id number], [id number] protocols, all in seated    Use of exhale with kegel. Practiced isolating PF from breath by speaking during exercise                      1 units of There ex .: 10 minutes     Done in order to: strength PF  and PF co-contraction    TA 5'' hold x10 Pt requires exhale to not valsalva   TA with adduction 5'' exhale on activation    Mini squats with exhale on ascent        Standing TA marches Increase difficulty with coordination                Eliana received therapeutic exercises for 10 minutes     Eliana received the following manual therapy techniques: for 0 minutes     Eliana participated in neuromuscular re-education for 43 minutes     Eliana participated in dynamic functional therapeutic activities for 0 minutes,       Home Exercises Provided and Patient Education Provided     Education provided:   -see AVS   Discussed progression of plan of care with patient; educated pt in activity modification; reviewed HEP with pt. Pt demonstrated and verbalized understanding of all instruction and was provided with a handout of HEP (see Patient Instructions).    Written Home Exercises Provided: yes.  Exercises were reviewed and Eliana was able to demonstrate them prior to the end of the session.  Eliana demonstrated good  understanding of the education provided.   See EMR under Patient Instructions for exercises provided at today's visit    Assessment   Pt has been seen a total of 6 visits including the evaluation and reports feeling atleast 60% improved. She is agreeable to DC with HEP for maintenance and further strengthening. She has transitioned to 4 pads per day with minimal amount of soaking vs. Fully soaked. She feels more confident in her ability to control her leakage. Pt is appropriate for DC with HEP today, and demonstrated independence with HEP.  Eliana Is progressing well towards her goals.   Pt prognosis is Good.   Pt will continue to benefit from skilled outpatient physical therapy to address the deficits listed in the problem list box on initial evaluation, provide pt/family education and to maximize pt's level of independence in the home and community environment.   Pt's spiritual, cultural and  educational needs considered and pt agreeable to plan of care and goals.     Anticipated barriers to physical therapy: none      Goals:  Short Term Goals: 6 weeks reviewed on 6/15/2022   1. Pt will report a reduction in symptoms by atleast 40% since initial evaluation in order to demonstrate progression towards long-term goals.met  2. Pt will demonstrate ability to perform knack in order to stop urinary leakage with coughing/sneezing/laughing. met  3. Pt will decrease pad usage from 4 pads to 2 pads per day fully soaked. Partially met; not fully soaked and now 2-3 pads per day   4. Pt will decrease voiding to <6 times per day in order to participate in social activities. met        Long Term Goals: 12 weeks   1. Pt to be discharged from therapy and feel confident in HEP for incontinence management. met  2. Pt will report feeling atleast 80% improved since starting therapy. unmet  3. Pt will transition from 4 pads fully soaked per day to <1 in order to demonstrate improving pelvic floor muscle controls as evidenced by decreased episodes of incontinence needed to improve confidence in social situations. unmet  4. Pt will decrease frequency of leakage to <2 times per day with improved awareness of voiding. met  5. Pt will decrease nocturia from 3 times per night to <1 time per night.       Plan   Discharge from pelvic health PT services.    Magali Coates, PT, DPT

## 2022-07-19 ENCOUNTER — PATIENT MESSAGE (OUTPATIENT)
Dept: PRIMARY CARE CLINIC | Facility: CLINIC | Age: 87
End: 2022-07-19
Payer: MEDICARE

## 2022-07-19 DIAGNOSIS — U07.1 COVID-19: Primary | ICD-10-CM

## 2022-09-20 ENCOUNTER — PATIENT MESSAGE (OUTPATIENT)
Dept: ELECTROPHYSIOLOGY | Facility: CLINIC | Age: 87
End: 2022-09-20
Payer: MEDICARE

## 2022-10-17 ENCOUNTER — PATIENT MESSAGE (OUTPATIENT)
Dept: ELECTROPHYSIOLOGY | Facility: CLINIC | Age: 87
End: 2022-10-17
Payer: MEDICARE

## 2022-11-18 ENCOUNTER — TELEPHONE (OUTPATIENT)
Dept: ELECTROPHYSIOLOGY | Facility: CLINIC | Age: 87
End: 2022-11-18
Payer: MEDICARE

## 2022-11-18 NOTE — TELEPHONE ENCOUNTER
Patient returned phone call from [place] earlier today. I assisted with patient trying to send in remote transmission using [employer] monitor. Monitor reader will not keep a charge. New monitor ordered for patient.

## 2022-11-18 NOTE — TELEPHONE ENCOUNTER
Spoke with pt regarding her [employer] home monitoring showing disconnection.  She suspects her outlet is not working.    She will try to relocate her monitor to another plug but states it is an older house with a few outlets.    She will contact our clinic if she needs any other assistance.

## 2022-12-07 ENCOUNTER — LAB VISIT (OUTPATIENT)
Dept: LAB | Facility: HOSPITAL | Age: 87
End: 2022-12-07
Attending: FAMILY MEDICINE
Payer: MEDICARE

## 2022-12-07 ENCOUNTER — OFFICE VISIT (OUTPATIENT)
Dept: PRIMARY CARE CLINIC | Facility: CLINIC | Age: 87
End: 2022-12-07
Payer: MEDICARE

## 2022-12-07 VITALS
BODY MASS INDEX: 32.37 KG/M2 | WEIGHT: 175.94 LBS | HEART RATE: 84 BPM | SYSTOLIC BLOOD PRESSURE: 124 MMHG | OXYGEN SATURATION: 95 % | DIASTOLIC BLOOD PRESSURE: 48 MMHG | TEMPERATURE: 98 F | HEIGHT: 62 IN

## 2022-12-07 DIAGNOSIS — E08.3513 DIABETES MELLITUS DUE TO UNDERLYING CONDITION WITH BOTH EYES AFFECTED BY PROLIFERATIVE RETINOPATHY AND MACULAR EDEMA, WITH LONG-TERM CURRENT USE OF INSULIN: ICD-10-CM

## 2022-12-07 DIAGNOSIS — Z79.4 DIABETES MELLITUS DUE TO UNDERLYING CONDITION WITH BOTH EYES AFFECTED BY PROLIFERATIVE RETINOPATHY AND MACULAR EDEMA, WITH LONG-TERM CURRENT USE OF INSULIN: ICD-10-CM

## 2022-12-07 DIAGNOSIS — E78.5 HYPERLIPIDEMIA, UNSPECIFIED HYPERLIPIDEMIA TYPE: ICD-10-CM

## 2022-12-07 DIAGNOSIS — Z23 NEED FOR INFLUENZA VACCINATION: ICD-10-CM

## 2022-12-07 DIAGNOSIS — I50.22 CHRONIC SYSTOLIC (CONGESTIVE) HEART FAILURE: Chronic | ICD-10-CM

## 2022-12-07 DIAGNOSIS — Z00.00 WELL ADULT EXAM: Primary | ICD-10-CM

## 2022-12-07 DIAGNOSIS — I25.10 CORONARY ARTERY DISEASE INVOLVING NATIVE CORONARY ARTERY OF NATIVE HEART WITHOUT ANGINA PECTORIS: ICD-10-CM

## 2022-12-07 DIAGNOSIS — Z95.810 BIVENTRICULAR ICD (IMPLANTABLE CARDIOVERTER-DEFIBRILLATOR) IN PLACE: ICD-10-CM

## 2022-12-07 DIAGNOSIS — Z86.73 HISTORY OF CVA (CEREBROVASCULAR ACCIDENT): ICD-10-CM

## 2022-12-07 LAB
ALBUMIN SERPL BCP-MCNC: 3.8 G/DL (ref 3.5–5.2)
ALP SERPL-CCNC: 77 U/L (ref 55–135)
ALT SERPL W/O P-5'-P-CCNC: 35 U/L (ref 10–44)
ANION GAP SERPL CALC-SCNC: 10 MMOL/L (ref 8–16)
AST SERPL-CCNC: 33 U/L (ref 10–40)
BILIRUB SERPL-MCNC: 0.5 MG/DL (ref 0.1–1)
BUN SERPL-MCNC: 13 MG/DL (ref 8–23)
CALCIUM SERPL-MCNC: 9.5 MG/DL (ref 8.7–10.5)
CHLORIDE SERPL-SCNC: 103 MMOL/L (ref 95–110)
CO2 SERPL-SCNC: 26 MMOL/L (ref 23–29)
CREAT SERPL-MCNC: 0.7 MG/DL (ref 0.5–1.4)
ERYTHROCYTE [DISTWIDTH] IN BLOOD BY AUTOMATED COUNT: 13.4 % (ref 11.5–14.5)
EST. GFR  (NO RACE VARIABLE): >60 ML/MIN/1.73 M^2
ESTIMATED AVG GLUCOSE: 186 MG/DL (ref 68–131)
GLUCOSE SERPL-MCNC: 181 MG/DL (ref 70–110)
HBA1C MFR BLD: 8.1 % (ref 4–5.6)
HCT VFR BLD AUTO: 43.5 % (ref 37–48.5)
HGB BLD-MCNC: 13.6 G/DL (ref 12–16)
MCH RBC QN AUTO: 29.2 PG (ref 27–31)
MCHC RBC AUTO-ENTMCNC: 31.3 G/DL (ref 32–36)
MCV RBC AUTO: 94 FL (ref 82–98)
PLATELET # BLD AUTO: 155 K/UL (ref 150–450)
PMV BLD AUTO: 11.2 FL (ref 9.2–12.9)
POTASSIUM SERPL-SCNC: 4.5 MMOL/L (ref 3.5–5.1)
PROT SERPL-MCNC: 6.9 G/DL (ref 6–8.4)
RBC # BLD AUTO: 4.65 M/UL (ref 4–5.4)
SODIUM SERPL-SCNC: 139 MMOL/L (ref 136–145)
TSH SERPL DL<=0.005 MIU/L-ACNC: 1.69 UIU/ML (ref 0.4–4)
WBC # BLD AUTO: 8.59 K/UL (ref 3.9–12.7)

## 2022-12-07 PROCEDURE — 99214 OFFICE O/P EST MOD 30 MIN: CPT | Mod: PBBFAC,PN | Performed by: FAMILY MEDICINE

## 2022-12-07 PROCEDURE — 99214 OFFICE O/P EST MOD 30 MIN: CPT | Mod: S$PBB,,, | Performed by: FAMILY MEDICINE

## 2022-12-07 PROCEDURE — 99999 PR PBB SHADOW E&M-EST. PATIENT-LVL IV: ICD-10-PCS | Mod: PBBFAC,,, | Performed by: FAMILY MEDICINE

## 2022-12-07 PROCEDURE — 80053 COMPREHEN METABOLIC PANEL: CPT | Performed by: FAMILY MEDICINE

## 2022-12-07 PROCEDURE — 99999 PR PBB SHADOW E&M-EST. PATIENT-LVL IV: CPT | Mod: PBBFAC,,, | Performed by: FAMILY MEDICINE

## 2022-12-07 PROCEDURE — 83036 HEMOGLOBIN GLYCOSYLATED A1C: CPT | Performed by: FAMILY MEDICINE

## 2022-12-07 PROCEDURE — G0008 ADMIN INFLUENZA VIRUS VAC: HCPCS | Mod: PBBFAC,PN

## 2022-12-07 PROCEDURE — 85027 COMPLETE CBC AUTOMATED: CPT | Performed by: FAMILY MEDICINE

## 2022-12-07 PROCEDURE — 84443 ASSAY THYROID STIM HORMONE: CPT | Performed by: FAMILY MEDICINE

## 2022-12-07 PROCEDURE — 36415 COLL VENOUS BLD VENIPUNCTURE: CPT | Mod: PN | Performed by: FAMILY MEDICINE

## 2022-12-07 PROCEDURE — 90694 VACC AIIV4 NO PRSRV 0.5ML IM: CPT | Mod: PBBFAC,PN

## 2022-12-07 PROCEDURE — 99214 PR OFFICE/OUTPT VISIT, EST, LEVL IV, 30-39 MIN: ICD-10-PCS | Mod: S$PBB,,, | Performed by: FAMILY MEDICINE

## 2022-12-07 RX ORDER — IRBESARTAN 150 MG/1
150 TABLET ORAL DAILY
Qty: 90 TABLET | Refills: 1 | Status: SHIPPED | OUTPATIENT
Start: 2022-12-07 | End: 2022-12-07 | Stop reason: SDUPTHER

## 2022-12-07 RX ORDER — DULAGLUTIDE 0.75 MG/.5ML
0.75 INJECTION, SOLUTION SUBCUTANEOUS
Qty: 12 PEN | Refills: 1 | Status: SHIPPED | OUTPATIENT
Start: 2022-12-07 | End: 2023-04-18 | Stop reason: SDUPTHER

## 2022-12-07 RX ORDER — ATORVASTATIN CALCIUM 40 MG/1
40 TABLET, FILM COATED ORAL DAILY
Qty: 90 TABLET | Refills: 1 | Status: SHIPPED | OUTPATIENT
Start: 2022-12-07 | End: 2022-12-07 | Stop reason: SDUPTHER

## 2022-12-07 RX ORDER — ATORVASTATIN CALCIUM 40 MG/1
40 TABLET, FILM COATED ORAL DAILY
Qty: 90 TABLET | Refills: 1 | Status: SHIPPED | OUTPATIENT
Start: 2022-12-07 | End: 2023-04-18 | Stop reason: SDUPTHER

## 2022-12-07 RX ORDER — IRBESARTAN 150 MG/1
150 TABLET ORAL DAILY
Qty: 90 TABLET | Refills: 1 | Status: SHIPPED | OUTPATIENT
Start: 2022-12-07 | End: 2023-04-18 | Stop reason: SDUPTHER

## 2022-12-07 RX ORDER — METOPROLOL SUCCINATE 50 MG/1
50 TABLET, EXTENDED RELEASE ORAL DAILY
Qty: 90 TABLET | Refills: 3 | Status: SHIPPED | OUTPATIENT
Start: 2022-12-07 | End: 2022-12-07 | Stop reason: SDUPTHER

## 2022-12-07 RX ORDER — METOPROLOL SUCCINATE 50 MG/1
50 TABLET, EXTENDED RELEASE ORAL DAILY
Qty: 90 TABLET | Refills: 3 | Status: SHIPPED | OUTPATIENT
Start: 2022-12-07 | End: 2023-04-18 | Stop reason: SDUPTHER

## 2022-12-07 RX ORDER — MIRABEGRON 25 MG/1
25 TABLET, FILM COATED, EXTENDED RELEASE ORAL DAILY
Qty: 30 TABLET | Refills: 11 | Status: SHIPPED | OUTPATIENT
Start: 2022-12-07 | End: 2023-04-18 | Stop reason: SDUPTHER

## 2022-12-07 NOTE — PROGRESS NOTES
Two patient identifiers verified.  Allergies reviewed.  High dose Flu  IM administered to Left Deltoid per MD order.  Patient tolerated injection well; no redness, bleeding, or bruising noted to injection site.  Patient instructed to remain in clinic setting for 15 minutes.  Verbalizes understanding.

## 2022-12-07 NOTE — PROGRESS NOTES
"    BP (!) 124/48 (BP Location: Left arm, Patient Position: Sitting, BP Method: Medium (Manual))   Pulse 84   Temp 97.9 °F (36.6 °C) (Oral)   Ht 5' 2" (1.575 m)   Wt 79.8 kg (175 lb 14.8 oz)   SpO2 95%   BMI 32.18 kg/m²       ===========    Chief Complaint: No chief complaint on file.        Eliana Wagner is a 87 y.o. female here for    HPI    Annual Exam.  Health maintenance reviewed with pt in detail inc screening studies such as lab studies and vaccines    Patient queried and denies any further complaints    SURGICAL AND MEDICAL HISTORY: updated and reviewed.  ALLERGIES updated and reviewed.  Review of patient's allergies indicates:   Allergen Reactions    Penicillins      Caused uti     CURRENT OUTPATIENT MEDICATIONS updated and reviewed    Current Outpatient Medications:     aspirin (ECOTRIN) 81 MG EC tablet, Take 81 mg by mouth once daily., Disp: , Rfl:     blood sugar diagnostic Strp, 1 each by Misc.(Non-Drug; Combo Route) route once daily., Disp: 100 each, Rfl: 3    conjugated estrogens (PREMARIN) vaginal cream, 1 g with applicator or dime-sized amt with finger in vagina nightly x 2 weeks, then twice a week thereafter, Disp: 42.5 g, Rfl: 12    fluorouracil (EFUDEX) 5 % cream, Apply topically Daily. , Disp: , Rfl:     lancets Misc, 1 each by Misc.(Non-Drug; Combo Route) route once daily., Disp: 100 each, Rfl: 3    magnesium oxide (MAG-OX) 400 mg (241.3 mg magnesium) tablet, Take 1 tablet (400 mg total) by mouth once daily., Disp: , Rfl: 0    mv-min-FA-Qb-Wq-tzkypsn-lutein 0.4-162-18 mg Tab, Take by mouth. 1 Tablet Oral Every day, Disp: , Rfl:     vitamin D (VITAMIN D3) 1000 units Tab, Take 1,000 Units by mouth once daily., Disp: , Rfl:     atorvastatin (LIPITOR) 40 MG tablet, Take 1 tablet (40 mg total) by mouth once daily. For cholesterol and heart protection, Disp: 90 tablet, Rfl: 1    blood-glucose meter kit, Use as instructed, Disp: 1 each, Rfl: 0    dulaglutide (TRULICITY) 0.75 mg/0.5 mL pen " injector, Inject 0.75 mg into the skin every 7 days., Disp: 12 pen, Rfl: 1    irbesartan (AVAPRO) 150 MG tablet, Take 1 tablet (150 mg total) by mouth once daily. For heart and blood pressure, Disp: 90 tablet, Rfl: 1    metoprolol succinate (TOPROL-XL) 50 MG 24 hr tablet, Take 1 tablet (50 mg total) by mouth once daily. For heart and blood pressure, Disp: 90 tablet, Rfl: 3    mirabegron (MYRBETRIQ) 25 mg Tb24 ER tablet, Take 1 tablet (25 mg total) by mouth once daily. For bladder, Disp: 30 tablet, Rfl: 11    Review of Systems   Constitutional:  Negative for activity change, appetite change, chills, diaphoresis, fatigue, fever and unexpected weight change.   HENT:  Negative for congestion, ear discharge, ear pain, facial swelling, hearing loss, nosebleeds, postnasal drip, rhinorrhea, sinus pressure, sneezing, sore throat, tinnitus, trouble swallowing and voice change.    Eyes:  Negative for photophobia, pain, discharge, redness, itching and visual disturbance.   Respiratory:  Negative for cough, chest tightness, shortness of breath and wheezing.    Cardiovascular:  Negative for chest pain, palpitations and leg swelling.   Gastrointestinal:  Negative for abdominal distention, abdominal pain, anal bleeding, blood in stool, constipation, diarrhea, nausea, rectal pain and vomiting.   Endocrine: Negative for cold intolerance, heat intolerance, polydipsia, polyphagia and polyuria.   Genitourinary:  Negative for difficulty urinating, dysuria and flank pain.   Musculoskeletal:  Negative for arthralgias, back pain, joint swelling, myalgias and neck pain.   Skin:  Negative for rash.   Neurological:  Negative for dizziness, tremors, seizures, syncope, speech difficulty, weakness, light-headedness, numbness and headaches.   Psychiatric/Behavioral:  Negative for behavioral problems, confusion, decreased concentration, dysphoric mood, sleep disturbance and suicidal ideas. The patient is not nervous/anxious and is not hyperactive.   "    BP (!) 124/48 (BP Location: Left arm, Patient Position: Sitting, BP Method: Medium (Manual))   Pulse 84   Temp 97.9 °F (36.6 °C) (Oral)   Ht 5' 2" (1.575 m)   Wt 79.8 kg (175 lb 14.8 oz)   SpO2 95%   BMI 32.18 kg/m²   Physical Exam  Vitals and nursing note reviewed.   Constitutional:       General: She is not in acute distress.     Appearance: Normal appearance. She is well-developed. She is not ill-appearing or toxic-appearing.   HENT:      Head: Normocephalic and atraumatic.      Right Ear: Tympanic membrane, ear canal and external ear normal.      Left Ear: Tympanic membrane, ear canal and external ear normal.      Nose: Nose normal.      Mouth/Throat:      Lips: Pink.      Mouth: Mucous membranes are moist.      Pharynx: No oropharyngeal exudate or posterior oropharyngeal erythema.   Eyes:      General: No scleral icterus.        Right eye: No discharge.         Left eye: No discharge.      Extraocular Movements: Extraocular movements intact.      Conjunctiva/sclera: Conjunctivae normal.   Cardiovascular:      Rate and Rhythm: Normal rate and regular rhythm.      Pulses: Normal pulses.      Heart sounds: Normal heart sounds. No murmur heard.  Pulmonary:      Effort: Pulmonary effort is normal. No respiratory distress.      Breath sounds: Normal breath sounds. No wheezing or rales.   Abdominal:      General: Bowel sounds are normal. There is no distension.      Palpations: Abdomen is soft. There is no mass.      Tenderness: There is no abdominal tenderness. There is no right CVA tenderness, left CVA tenderness, guarding or rebound.      Hernia: No hernia is present.   Musculoskeletal:      Cervical back: Normal range of motion and neck supple. No rigidity or tenderness.   Lymphadenopathy:      Cervical: No cervical adenopathy.   Skin:     General: Skin is warm and dry.   Neurological:      General: No focal deficit present.      Mental Status: She is alert. Mental status is at baseline.   Psychiatric:   "       Mood and Affect: Mood normal.         Behavior: Behavior normal. Behavior is cooperative.       Diagnoses and all orders for this visit:    Well adult exam    Hyperlipidemia, unspecified hyperlipidemia type  -     Discontinue: atorvastatin (LIPITOR) 40 MG tablet; Take 1 tablet (40 mg total) by mouth once daily.  -     CBC Without Differential; Future  -     Comprehensive Metabolic Panel; Future  -     Hemoglobin A1C; Future  -     atorvastatin (LIPITOR) 40 MG tablet; Take 1 tablet (40 mg total) by mouth once daily. For cholesterol and heart protection    Chronic systolic (congestive) heart failure  Comments:  Currently asymptomatic  Orders:  -     Discontinue: metoprolol succinate (TOPROL-XL) 50 MG 24 hr tablet; Take 1 tablet (50 mg total) by mouth once daily.  -     metoprolol succinate (TOPROL-XL) 50 MG 24 hr tablet; Take 1 tablet (50 mg total) by mouth once daily. 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  -     Comprehensive Metabolic Panel; Future  -     TSH; Future  -     Hemoglobin A1C; Future    Coronary artery disease involving native coronary artery of native heart without angina pectoris  -     Discontinue: irbesartan (AVAPRO) 150 MG tablet; Take 1 tablet (150 mg total) by mouth once daily.  -     irbesartan (AVAPRO) 150 MG tablet; Take 1 tablet (150 mg total) by mouth once daily. For heart and blood pressure    Need for influenza vaccination  -     Influenza (FLUAD) - Quadrivalent (Adjuvanted) *Preferred* (65+) (PF)    History of CVA (cerebrovascular accident)    Biventricular ICD (implantable cardioverter-defibrillator) in place    Other orders  -     mirabegron (MYRBETRIQ) 25 mg Tb24 ER tablet; Take 1 tablet (25 mg total) by mouth once daily. For  bladder          Reviewed old pertinent medical records available.     All lab results personally reviewed and interpreted by me including last year, if available.        Duy Cheng MD        Est  Time spent in the evaluation and management of this patient exceeded 30min and greater than 50% of this time was in face-to-face time with the patient.    Total time including the following:  -preparing to see the patient (e.g., obtaining and/or reviewing old records such as old primary care notes, specialists notes, hospital notes, review of laboratory tests, radiographic and/or cardiology studies)  -orders which can include medications, laboratory studies, radiographic studies, procedures, referrals etcetera   --communicating with the patient and/or family member/caregiver regarding a detailed explanation of the treatment/care plan  -arranging possible referrals and follow-up plan  -documentation of the visit in the electronic health record          As of April 1, 2021, the federal Cures Act was passed which requires that all doctors progress notes, lab results, radiology reports, etc be released IMMEDIATELY to the patient in the patient portal. That means that the results are released to you at the EXACT same time they are released to me. Therefore, with all of the patients that I have I am not able to reply to each patient exactly when the results come in. Therefore, there will be a delay from when you see the results to when I see them and have time to send an interpretation for you. Also, I only see these results when I am on the computer at work. So if the results come in over the weekend or holiday or after 5 pm of a workday, I will not see them until the next business day. As you can tell, this is a challenge for us as physicians to give every patient the quick response they hope for; so please be patient and thank you for understanding.

## 2022-12-21 ENCOUNTER — PATIENT MESSAGE (OUTPATIENT)
Dept: PRIMARY CARE CLINIC | Facility: CLINIC | Age: 87
End: 2022-12-21
Payer: MEDICARE

## 2023-01-17 ENCOUNTER — OFFICE VISIT (OUTPATIENT)
Dept: PODIATRY | Facility: CLINIC | Age: 88
End: 2023-01-17
Payer: MEDICARE

## 2023-01-17 VITALS
BODY MASS INDEX: 32.37 KG/M2 | WEIGHT: 175.94 LBS | HEIGHT: 62 IN | SYSTOLIC BLOOD PRESSURE: 141 MMHG | DIASTOLIC BLOOD PRESSURE: 67 MMHG | HEART RATE: 84 BPM

## 2023-01-17 DIAGNOSIS — L84 CORN OR CALLUS: ICD-10-CM

## 2023-01-17 DIAGNOSIS — B35.1 ONYCHOMYCOSIS DUE TO DERMATOPHYTE: ICD-10-CM

## 2023-01-17 DIAGNOSIS — E11.42 DIABETIC POLYNEUROPATHY ASSOCIATED WITH TYPE 2 DIABETES MELLITUS: Primary | ICD-10-CM

## 2023-01-17 DIAGNOSIS — E11.9 ENCOUNTER FOR DIABETIC FOOT EXAM: ICD-10-CM

## 2023-01-17 PROCEDURE — 99999 PR PBB SHADOW E&M-EST. PATIENT-LVL III: ICD-10-PCS | Mod: PBBFAC,,, | Performed by: PODIATRIST

## 2023-01-17 PROCEDURE — 99999 PR PBB SHADOW E&M-EST. PATIENT-LVL III: CPT | Mod: PBBFAC,,, | Performed by: PODIATRIST

## 2023-01-17 PROCEDURE — 99213 OFFICE O/P EST LOW 20 MIN: CPT | Mod: S$PBB,,, | Performed by: PODIATRIST

## 2023-01-17 PROCEDURE — 99213 PR OFFICE/OUTPT VISIT, EST, LEVL III, 20-29 MIN: ICD-10-PCS | Mod: S$PBB,,, | Performed by: PODIATRIST

## 2023-01-17 PROCEDURE — 99213 OFFICE O/P EST LOW 20 MIN: CPT | Mod: PBBFAC,PN | Performed by: PODIATRIST

## 2023-01-17 NOTE — PROGRESS NOTES
Subjective:      Patient ID: Eliana Wagner is a 87 y.o. female.    Chief Complaint:   Diabetic Foot Exam ([facility] 12/7/22)    Eliana is a 87 y.o. female who presents to the clinic upon referral from Dr. Colette green. provider found  for evaluation and treatment of diabetic feet. Eliana has a past medical history of Cervical polyp (5/28/2015), Diabetes mellitus, type 2, HTN (hypertension), Hyperlipidemia, Retinal detachment, Stroke, and Urinary incontinence without sensory awareness (5/12/2021).      Patient relates she is doing well she is excited to celebrate her upcoming birthday  She was concerned about a left big toenail discoloration.  She noticed it when she removed her Polish.  She would not like much done today because she is going on a cruise for her birthday  She does have water shoes and proper shoes to bring     Last visit 1/22    -  nails  and callus care  - continue proper width shoes and check feet daily  - Caution with cat bites.  Continue topical antibiotic ointment no indication for orals    - pedicure precautions; re recommend taking a Polish holiday and avoiding nail Polish on toenails.  Patient did not get Penlac.  Patient likes to paint her nails in likely would not use the Penlac at this time.      PCP: Duy Cheng MD    Date Last Seen by PCP:12/7/22    Current shoe gear: Casual shoes proper fitting    Hemoglobin A1C   Date Value Ref Range Status   12/07/2022 8.1 (H) 4.0 - 5.6 % Final     Comment:     ADA Screening Guidelines:  5.7-6.4%  Consistent with prediabetes  >or=6.5%  Consistent with diabetes    High levels of fetal hemoglobin interfere with the HbA1C  assay. Heterozygous hemoglobin variants (HbS, HgC, etc)do  not significantly interfere with this assay.   However, presence of multiple variants may affect accuracy.     01/12/2022 9.2 (H) 4.0 - 5.6 % Final     Comment:     ADA Screening Guidelines:  5.7-6.4%  Consistent with prediabetes  >or=6.5%  Consistent with  diabetes    High levels of fetal hemoglobin interfere with the HbA1C  assay. Heterozygous hemoglobin variants (HbS, HgC, etc)do  not significantly interfere with this assay.   However, presence of multiple variants may affect accuracy.     03/16/2021 7.8 (H) 4.0 - 5.6 % Final     Comment:     ADA Screening Guidelines:  5.7-6.4%  Consistent with prediabetes  >or=6.5%  Consistent with diabetes    High levels of fetal hemoglobin interfere with the HbA1C  assay. Heterozygous hemoglobin variants (HbS, HgC, etc)do  not significantly interfere with this assay.   However, presence of multiple variants may affect accuracy.     03/16/2021 7.8 (H) 4.0 - 5.6 % Final     Comment:     ADA Screening Guidelines:  5.7-6.4%  Consistent with prediabetes  >or=6.5%  Consistent with diabetes    High levels of fetal hemoglobin interfere with the HbA1C  assay. Heterozygous hemoglobin variants (HbS, HgC, etc)do  not significantly interfere with this assay.   However, presence of multiple variants may affect accuracy.              Past Medical History:   Diagnosis Date    Cervical polyp 5/28/2015    Diabetes mellitus, type 2     HTN (hypertension)     Hyperlipidemia     Retinal detachment     od     Stroke     tia x 3   ( last in 2005)    Urinary incontinence without sensory awareness 5/12/2021     Past Surgical History:   Procedure Laterality Date    APPENDECTOMY      CARDIAC CATHETERIZATION      CATARACT EXTRACTION Bilateral     EYE SURGERY Bilateral     cataract    INSERT / REPLACE / REMOVE PACEMAKER      INSERTION OF PACEMAKER      july 22 2019     Orestes Kim MD    TONSILLECTOMY       Current Outpatient Medications on File Prior to Visit   Medication Sig Dispense Refill    aspirin (ECOTRIN) 81 MG EC tablet Take 81 mg by mouth once daily.      atorvastatin (LIPITOR) 40 MG tablet Take 1 tablet (40 mg total) by mouth once daily. For cholesterol and heart protection 90 tablet 1    blood sugar diagnostic Strp 1 each by Misc.(Non-Drug;  Combo Route) route once daily. 100 each 3    conjugated estrogens (PREMARIN) vaginal cream 1 g with applicator or dime-sized amt with finger in vagina nightly x 2 weeks, then twice a week thereafter 42.5 g 12    dulaglutide (TRULICITY) 0.75 mg/0.5 mL pen injector Inject 0.75 mg into the skin every 7 days. 12 pen 1    fluorouracil (EFUDEX) 5 % cream Apply topically Daily.       irbesartan (AVAPRO) 150 MG tablet Take 1 tablet (150 mg total) by mouth once daily. For heart and blood pressure 90 tablet 1    lancets Misc 1 each by Misc.(Non-Drug; Combo Route) route once daily. 100 each 3    magnesium oxide (MAG-OX) 400 mg (241.3 mg magnesium) tablet Take 1 tablet (400 mg total) by mouth once daily.  0    metoprolol succinate (TOPROL-XL) 50 MG 24 hr tablet Take 1 tablet (50 mg total) by mouth once daily. For heart and blood pressure 90 tablet 3    mirabegron (MYRBETRIQ) 25 mg Tb24 ER tablet Take 1 tablet (25 mg total) by mouth once daily. For bladder 30 tablet 11    mv-min-FA-Fv-Vn-rjqppub-lutein 0.4-162-18 mg Tab Take by mouth. 1 Tablet Oral Every day      vitamin D (VITAMIN D3) 1000 units Tab Take 1,000 Units by mouth once daily.      blood-glucose meter kit Use as instructed 1 each 0     No current facility-administered medications on file prior to visit.     Review of patient's allergies indicates:   Allergen Reactions    Penicillins      Caused uti       Review of Systems   Constitutional: Negative for chills, decreased appetite, fever, malaise/fatigue, night sweats, weight gain and weight loss.   Cardiovascular:  Positive for leg swelling. Negative for chest pain, claudication, dyspnea on exertion, palpitations and syncope.   Respiratory:  Negative for cough and shortness of breath.    Endocrine: Negative for cold intolerance and heat intolerance.   Hematologic/Lymphatic: Negative for bleeding problem. Does not bruise/bleed easily.   Skin:  Positive for nail changes. Negative for color change, dry skin, flushing,  "itching, poor wound healing, rash, skin cancer, suspicious lesions and unusual hair distribution.   Musculoskeletal:  Positive for arthritis. Negative for back pain, falls, gout, joint pain, joint swelling, muscle cramps, muscle weakness, myalgias, neck pain and stiffness.   Gastrointestinal:  Negative for diarrhea, nausea and vomiting.   Neurological:  Positive for numbness. Negative for dizziness, focal weakness, light-headedness, paresthesias, tremors, vertigo and weakness.   Psychiatric/Behavioral:  Negative for altered mental status and depression. The patient does not have insomnia.    Allergic/Immunologic: Negative.          Objective:       Vitals:    23 0917   BP: (!) 141/67   Pulse: 84   Weight: 79.8 kg (175 lb 14.8 oz)   Height: 5' 2" (1.575 m)   PainSc: 0-No pain   79.8 kg (175 lb 14.8 oz)     Physical Exam  Vitals reviewed.   Constitutional:       General: She is not in acute distress.     Appearance: She is well-developed. She is not ill-appearing, toxic-appearing or diaphoretic.      Comments: Proper supportive shoegear   Cardiovascular:      Pulses:           Dorsalis pedis pulses are 1+ on the right side and 1+ on the left side.        Posterior tibial pulses are 1+ on the right side and 1+ on the left side.      Comments: Decreased hair growth to lower shins with evidence of chronic venous stasis dz.   Musculoskeletal:         General: No tenderness.      Right lower le+ Edema present.      Left lower le+ Edema present.      Right foot: Decreased range of motion. Deformity and prominent metatarsal heads present. No tenderness or bony tenderness.      Left foot: Decreased range of motion. Deformity and prominent metatarsal heads present. No tenderness or bony tenderness.      Comments: Flexible pes planus foot type w/ medial arch collapse and mild gastroc equinus      Reducible extensor and flexor contractures at the MTPJ and PIPJ of toes 2-5, bilat.      1st MPJ exostosis w/ lateral " deviation of hallux, non trackbound.     No pop    Feet:      Right foot:      Protective Sensation: 10 sites tested.  10 sites sensed.      Skin integrity: Dry skin present. No ulcer, blister, skin breakdown, erythema, warmth or callus.      Toenail Condition: Right toenails are abnormally thick. Fungal disease present.     Left foot:      Protective Sensation: 10 sites tested.  10 sites sensed.      Skin integrity: Dry skin present. No ulcer, blister, skin breakdown, erythema, warmth or callus.      Toenail Condition: Fungal disease present.     Comments: Toenails 1-5 bilaterally are elongated, proximal clearing, distal have thickening discolored no acute signs of infection  Left hallux distal aspect is lysed with subungual debris upon debridement nail bed intact     no HPK      Skin:     General: Skin is warm.      Capillary Refill: Capillary refill takes 2 to 3 seconds.      Coloration: Skin is not pale.      Findings: No erythema or rash.   Neurological:      Mental Status: She is alert and oriented to person, place, and time.      Gait: Gait abnormal.   Psychiatric:         Attention and Perception: Attention normal.         Mood and Affect: Mood normal.         Speech: Speech normal.         Behavior: Behavior normal.         Thought Content: Thought content normal.         Cognition and Memory: Cognition normal.         Judgment: Judgment normal.             Assessment:       Encounter Diagnoses   Name Primary?    Diabetic polyneuropathy associated with type 2 diabetes mellitus Yes    Onychomycosis due to dermatophyte     Corn or callus     Encounter for diabetic foot exam            Plan:       Eliana was seen today for diabetic foot exam.    Diagnoses and all orders for this visit:    Diabetic polyneuropathy associated with type 2 diabetes mellitus    Onychomycosis due to dermatophyte    Corn or callus    Encounter for diabetic foot exam        I counseled the patient on her conditions, their  implications and medical management.        - Shoe inspection. Diabetic Foot Education. Patient reminded of the importance of good nutrition and blood sugar control to help prevent podiatric complications of diabetes. Patient instructed on proper foot hygeine. We discussed wearing proper shoe gear, daily foot inspections, never walking without protective shoe gear, never putting sharp instruments to feet, routine podiatric nail visits every 2-3 months.      - With patient's permission, the toenails mentioned above were aggressively reduced and debrided using a nail nipper, removing all offending nail and debris.    The patient will continue to monitor the areas daily, inspect the feet, wear protective shoe gear when ambulatory, and moisturizer to maintain skin integrity.     - continue proper shoe gear discussed proper water shoes on cruise as well as tennis shoes    - consider toenail procedure left however patient will wait till after returning from cruise    Follow-up at least yearly p.r.n.

## 2023-03-01 ENCOUNTER — PES CALL (OUTPATIENT)
Dept: ADMINISTRATIVE | Facility: CLINIC | Age: 88
End: 2023-03-01
Payer: MEDICARE

## 2023-04-14 ENCOUNTER — OFFICE VISIT (OUTPATIENT)
Dept: CARDIOLOGY | Facility: CLINIC | Age: 88
End: 2023-04-14
Payer: MEDICARE

## 2023-04-14 VITALS
SYSTOLIC BLOOD PRESSURE: 131 MMHG | HEIGHT: 62 IN | BODY MASS INDEX: 32.54 KG/M2 | HEART RATE: 75 BPM | WEIGHT: 176.81 LBS | DIASTOLIC BLOOD PRESSURE: 68 MMHG

## 2023-04-14 DIAGNOSIS — Z95.810 BIVENTRICULAR ICD (IMPLANTABLE CARDIOVERTER-DEFIBRILLATOR) IN PLACE: ICD-10-CM

## 2023-04-14 DIAGNOSIS — I49.3 PREMATURE VENTRICULAR CONTRACTIONS: ICD-10-CM

## 2023-04-14 DIAGNOSIS — E78.5 HYPERLIPIDEMIA ASSOCIATED WITH TYPE 2 DIABETES MELLITUS: ICD-10-CM

## 2023-04-14 DIAGNOSIS — E11.59 HYPERTENSION ASSOCIATED WITH DIABETES: ICD-10-CM

## 2023-04-14 DIAGNOSIS — I25.10 CORONARY ARTERY DISEASE INVOLVING NATIVE CORONARY ARTERY OF NATIVE HEART WITHOUT ANGINA PECTORIS: ICD-10-CM

## 2023-04-14 DIAGNOSIS — I15.2 HYPERTENSION ASSOCIATED WITH DIABETES: ICD-10-CM

## 2023-04-14 DIAGNOSIS — E11.69 HYPERLIPIDEMIA ASSOCIATED WITH TYPE 2 DIABETES MELLITUS: ICD-10-CM

## 2023-04-14 DIAGNOSIS — I50.22 CHRONIC SYSTOLIC (CONGESTIVE) HEART FAILURE: Primary | Chronic | ICD-10-CM

## 2023-04-14 DIAGNOSIS — I42.0 NONISCHEMIC CONGESTIVE CARDIOMYOPATHY: ICD-10-CM

## 2023-04-14 PROCEDURE — 99214 PR OFFICE/OUTPT VISIT, EST, LEVL IV, 30-39 MIN: ICD-10-PCS | Mod: S$PBB,,, | Performed by: INTERNAL MEDICINE

## 2023-04-14 PROCEDURE — 99214 OFFICE O/P EST MOD 30 MIN: CPT | Mod: PBBFAC | Performed by: INTERNAL MEDICINE

## 2023-04-14 PROCEDURE — 99999 PR PBB SHADOW E&M-EST. PATIENT-LVL IV: ICD-10-PCS | Mod: PBBFAC,,, | Performed by: INTERNAL MEDICINE

## 2023-04-14 PROCEDURE — 99214 OFFICE O/P EST MOD 30 MIN: CPT | Mod: S$PBB,,, | Performed by: INTERNAL MEDICINE

## 2023-04-14 PROCEDURE — 99999 PR PBB SHADOW E&M-EST. PATIENT-LVL IV: CPT | Mod: PBBFAC,,, | Performed by: INTERNAL MEDICINE

## 2023-04-14 NOTE — PROGRESS NOTES
Subjective:   04/14/2023     Patient ID:  Eliana Wagner is a 88 y.o. female who presents for evaulation of Follow-up      Comes in for regular follow-up.  She has a history of a dilated cardiomyopathy and congestive heart failure, this is improved since implantation of a biventricular defibrillator.  She is not having chest pains tightness or shortness of breath.    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  Her most recent ejection fraction was 45%.  She does have less than 93% pacing, possibly due to PVCs.      Hypertension is present, currently well controlled with guideline directed medical therapy for congestive heart failure.    Hypercholesterolemia is present, her LDL is well controlled on high-dose statin therapy.    Congestive Heart Failure  Associated symptoms include nocturia. Pertinent negatives include no abdominal pain, chest pain, claudication, near-syncope, palpitations or shortness of breath. Her past medical history is significant for CAD.   Coronary Artery Disease  Pertinent negatives include no chest pain, leg swelling, palpitations, shortness of breath or weight gain. Risk factors include hyperlipidemia. Her past medical history is significant for CHF.   Hyperlipidemia  Pertinent negatives include no chest pain, focal weakness, myalgias or shortness of breath.     Patient Active Problem List   Diagnosis    Hypertension associated with diabetes    Refractive error    Pseudophakia    Choroidal nevus of right eye    Chronic systolic (congestive) heart failure    Obesity, diabetes, and hypertension syndrome    Coronary artery disease involving native coronary artery of native heart without angina pectoris    Nonischemic congestive cardiomyopathy    Biventricular ICD (implantable cardioverter-defibrillator) in place    Hyperlipidemia associated with type 2 diabetes mellitus    DM type 2 without  retinopathy    History of CVA (cerebrovascular accident)    Uncontrolled type 2 diabetes mellitus with hyperglycemia    Class 1 obesity with serious comorbidity in adult    Urinary incontinence without sensory awareness    Bruising    Premature ventricular contractions    Risk for falls    Mixed incontinence    Pelvic muscle wasting    Pelvic floor dysfunction        Past Medical History:   Diagnosis Date    Cervical polyp 5/28/2015    Diabetes mellitus, type 2     Hyperlipidemia     Retinal detachment     od     Stroke     tia x 3   ( last in 2005)    Urinary incontinence without sensory awareness 5/12/2021       Past Surgical History:   Procedure Laterality Date    APPENDECTOMY      CARDIAC CATHETERIZATION      CATARACT EXTRACTION Bilateral     EYE SURGERY Bilateral     cataract    INSERT / REPLACE / REMOVE PACEMAKER      INSERTION OF PACEMAKER      july 22 2019     Orestes Kim MD    TONSILLECTOMY         Review of patient's allergies indicates:   Allergen Reactions    Penicillins      Caused uti         Current Outpatient Medications:     aspirin (ECOTRIN) 81 MG EC tablet, Take 81 mg by mouth once daily., Disp: , Rfl:     atorvastatin (LIPITOR) 40 MG tablet, Take 1 tablet (40 mg total) by mouth once daily. For cholesterol and heart protection, Disp: 90 tablet, Rfl: 1    blood sugar diagnostic Strp, 1 each by Misc.(Non-Drug; Combo Route) route once daily., Disp: 100 each, Rfl: 3    conjugated estrogens (PREMARIN) vaginal cream, 1 g with applicator or dime-sized amt with finger in vagina nightly x 2 weeks, then twice a week thereafter, Disp: 42.5 g, Rfl: 12    dulaglutide (TRULICITY) 0.75 mg/0.5 mL pen injector, Inject 0.75 mg into the skin every 7 days., Disp: 12 pen, Rfl: 1    fluorouracil (EFUDEX) 5 % cream, Apply topically Daily. , Disp: , Rfl:     irbesartan (AVAPRO) 150 MG tablet, Take 1 tablet (150 mg total) by mouth once daily. For heart and blood pressure, Disp: 90 tablet, Rfl: 1    lancets Misc, 1  each by Misc.(Non-Drug; Combo Route) route once daily., Disp: 100 each, Rfl: 3    magnesium oxide (MAG-OX) 400 mg (241.3 mg magnesium) tablet, Take 1 tablet (400 mg total) by mouth once daily., Disp: , Rfl: 0    metoprolol succinate (TOPROL-XL) 50 MG 24 hr tablet, Take 1 tablet (50 mg total) by mouth once daily. For heart and blood pressure, Disp: 90 tablet, Rfl: 3    mirabegron (MYRBETRIQ) 25 mg Tb24 ER tablet, Take 1 tablet (25 mg total) by mouth once daily. For bladder, Disp: 30 tablet, Rfl: 11    mv-min-FA-Cl-Ji-hedrwte-lutein 0.4-162-18 mg Tab, Take by mouth. 1 Tablet Oral Every day, Disp: , Rfl:     vitamin D (VITAMIN D3) 1000 units Tab, Take 1,000 Units by mouth once daily., Disp: , Rfl:     blood-glucose meter kit, Use as instructed, Disp: 1 each, Rfl: 0            Objective:   Review of Systems   Constitutional: Negative for chills, decreased appetite, diaphoresis, fever, malaise/fatigue, weight gain and weight loss.   HENT:  Negative for congestion, hearing loss, nosebleeds and sore throat.    Eyes:  Negative for double vision, vision loss in left eye and vision loss in right eye.   Cardiovascular:  Negative for chest pain, claudication, cyanosis, dyspnea on exertion, irregular heartbeat, leg swelling, near-syncope, orthopnea, palpitations, paroxysmal nocturnal dyspnea and syncope.   Respiratory:  Negative for cough, hemoptysis, shortness of breath, sleep disturbances due to breathing, snoring and wheezing.    Endocrine: Negative for cold intolerance and heat intolerance.   Hematologic/Lymphatic: Negative for adenopathy and bleeding problem. Bruises/bleeds easily.   Skin:  Negative for itching, nail changes and rash.   Musculoskeletal:  Positive for falls and neck pain. Negative for arthritis, back pain and myalgias.   Gastrointestinal:  Positive for constipation. Negative for bloating, abdominal pain, anorexia, diarrhea, hematochezia, melena, nausea and vomiting.   Genitourinary:  Positive for frequency  and nocturia. Negative for hematuria.   Neurological:  Positive for excessive daytime sleepiness, loss of balance and paresthesias. Negative for focal weakness, headaches, vertigo and weakness.   Psychiatric/Behavioral:  Negative for altered mental status, depression and memory loss.    Allergic/Immunologic: Negative for hives.     Vitals:    04/14/23 1555   BP: 131/68   Pulse: 75         Physical Exam  Vitals reviewed.   Constitutional:       General: She is not in acute distress.     Appearance: She is well-developed.   HENT:      Head: Normocephalic and atraumatic.      Nose: Nose normal.   Eyes:      Conjunctiva/sclera: Conjunctivae normal.      Pupils: Pupils are equal, round, and reactive to light.   Neck:      Vascular: No JVD.   Cardiovascular:      Rate and Rhythm: Normal rate and regular rhythm.      Pulses: Normal pulses and intact distal pulses.      Heart sounds: Normal heart sounds. No murmur heard.    No friction rub. No gallop.      Comments: Jugular venous pressure normal  Defibrillator site appears normal  Pulmonary:      Effort: Pulmonary effort is normal. No respiratory distress.      Breath sounds: Normal breath sounds. No wheezing or rales.   Chest:      Chest wall: No tenderness.   Abdominal:      General: Bowel sounds are normal. There is no distension.      Palpations: Abdomen is soft.      Tenderness: There is no abdominal tenderness.   Musculoskeletal:         General: No tenderness or deformity. Normal range of motion.      Cervical back: Normal range of motion and neck supple.      Right lower leg: No edema.      Left lower leg: No edema.   Skin:     General: Skin is warm and dry.      Findings: No erythema or rash.   Neurological:      Mental Status: She is alert and oriented to person, place, and time.      Cranial Nerves: No cranial nerve deficit.      Motor: No abnormal muscle tone.      Coordination: Coordination normal.   Psychiatric:         Behavior: Behavior normal.          Thought Content: Thought content normal.         Judgment: Judgment normal.            Assessment and Plan:     Chronic systolic (congestive) heart failure  Comments:  Currently compensated  Check echo  Orders:  -     Echo; Future; Expected date: 04/21/2023    Coronary artery disease involving native coronary artery of native heart without angina pectoris  Comments:  No angina    Biventricular ICD (implantable cardioverter-defibrillator) in place  Comments:  3 years left on battery    Hyperlipidemia associated with type 2 diabetes mellitus    Premature ventricular contractions  Comments:  On magnesium and metoprolol    Hypertension associated with diabetes  Comments:  Blood pressure well controlled    Nonischemic congestive cardiomyopathy           Follow up in about 6 months (around 10/14/2023).

## 2023-04-18 ENCOUNTER — OFFICE VISIT (OUTPATIENT)
Dept: PRIMARY CARE CLINIC | Facility: CLINIC | Age: 88
End: 2023-04-18
Payer: MEDICARE

## 2023-04-18 VITALS
HEIGHT: 62 IN | SYSTOLIC BLOOD PRESSURE: 124 MMHG | TEMPERATURE: 99 F | DIASTOLIC BLOOD PRESSURE: 64 MMHG | BODY MASS INDEX: 32.49 KG/M2 | OXYGEN SATURATION: 98 % | HEART RATE: 76 BPM | WEIGHT: 176.56 LBS

## 2023-04-18 DIAGNOSIS — E78.2 MIXED HYPERLIPIDEMIA: ICD-10-CM

## 2023-04-18 DIAGNOSIS — E66.9 OBESITY, DIABETES, AND HYPERTENSION SYNDROME: ICD-10-CM

## 2023-04-18 DIAGNOSIS — E11.69 OBESITY, DIABETES, AND HYPERTENSION SYNDROME: ICD-10-CM

## 2023-04-18 DIAGNOSIS — E11.59 HYPERTENSION ASSOCIATED WITH DIABETES: ICD-10-CM

## 2023-04-18 DIAGNOSIS — I49.3 PREMATURE VENTRICULAR CONTRACTIONS: ICD-10-CM

## 2023-04-18 DIAGNOSIS — Z86.73 HISTORY OF CVA (CEREBROVASCULAR ACCIDENT): Primary | ICD-10-CM

## 2023-04-18 DIAGNOSIS — I15.2 HYPERTENSION ASSOCIATED WITH DIABETES: ICD-10-CM

## 2023-04-18 DIAGNOSIS — I25.10 CORONARY ARTERY DISEASE INVOLVING NATIVE CORONARY ARTERY OF NATIVE HEART WITHOUT ANGINA PECTORIS: ICD-10-CM

## 2023-04-18 DIAGNOSIS — I15.2 OBESITY, DIABETES, AND HYPERTENSION SYNDROME: ICD-10-CM

## 2023-04-18 DIAGNOSIS — E11.69 HYPERLIPIDEMIA ASSOCIATED WITH TYPE 2 DIABETES MELLITUS: ICD-10-CM

## 2023-04-18 DIAGNOSIS — E11.9 DM TYPE 2 WITHOUT RETINOPATHY: ICD-10-CM

## 2023-04-18 DIAGNOSIS — E78.5 HYPERLIPIDEMIA ASSOCIATED WITH TYPE 2 DIABETES MELLITUS: ICD-10-CM

## 2023-04-18 DIAGNOSIS — E11.59 OBESITY, DIABETES, AND HYPERTENSION SYNDROME: ICD-10-CM

## 2023-04-18 DIAGNOSIS — I50.22 CHRONIC SYSTOLIC (CONGESTIVE) HEART FAILURE: Chronic | ICD-10-CM

## 2023-04-18 DIAGNOSIS — Z79.4 DIABETES MELLITUS DUE TO UNDERLYING CONDITION WITH BOTH EYES AFFECTED BY PROLIFERATIVE RETINOPATHY AND MACULAR EDEMA, WITH LONG-TERM CURRENT USE OF INSULIN: ICD-10-CM

## 2023-04-18 DIAGNOSIS — E66.09 CLASS 1 OBESITY DUE TO EXCESS CALORIES WITH SERIOUS COMORBIDITY AND BODY MASS INDEX (BMI) OF 30.0 TO 30.9 IN ADULT: ICD-10-CM

## 2023-04-18 DIAGNOSIS — Z95.810 BIVENTRICULAR ICD (IMPLANTABLE CARDIOVERTER-DEFIBRILLATOR) IN PLACE: ICD-10-CM

## 2023-04-18 DIAGNOSIS — N39.46 MIXED INCONTINENCE: ICD-10-CM

## 2023-04-18 DIAGNOSIS — E11.65 UNCONTROLLED TYPE 2 DIABETES MELLITUS WITH HYPERGLYCEMIA: ICD-10-CM

## 2023-04-18 DIAGNOSIS — E08.3513 DIABETES MELLITUS DUE TO UNDERLYING CONDITION WITH BOTH EYES AFFECTED BY PROLIFERATIVE RETINOPATHY AND MACULAR EDEMA, WITH LONG-TERM CURRENT USE OF INSULIN: ICD-10-CM

## 2023-04-18 PROCEDURE — 99999 PR PBB SHADOW E&M-EST. PATIENT-LVL IV: CPT | Mod: PBBFAC,,, | Performed by: FAMILY MEDICINE

## 2023-04-18 PROCEDURE — 99215 OFFICE O/P EST HI 40 MIN: CPT | Mod: S$PBB,,, | Performed by: FAMILY MEDICINE

## 2023-04-18 PROCEDURE — 99999 PR PBB SHADOW E&M-EST. PATIENT-LVL IV: ICD-10-PCS | Mod: PBBFAC,,, | Performed by: FAMILY MEDICINE

## 2023-04-18 PROCEDURE — 99215 PR OFFICE/OUTPT VISIT, EST, LEVL V, 40-54 MIN: ICD-10-PCS | Mod: S$PBB,,, | Performed by: FAMILY MEDICINE

## 2023-04-18 PROCEDURE — 99214 OFFICE O/P EST MOD 30 MIN: CPT | Mod: PBBFAC,PN | Performed by: FAMILY MEDICINE

## 2023-04-18 RX ORDER — IRBESARTAN 150 MG/1
150 TABLET ORAL DAILY
Qty: 90 TABLET | Refills: 3 | Status: SHIPPED | OUTPATIENT
Start: 2023-04-18 | End: 2023-05-23 | Stop reason: SDUPTHER

## 2023-04-18 RX ORDER — MIRABEGRON 25 MG/1
25 TABLET, FILM COATED, EXTENDED RELEASE ORAL DAILY
Qty: 30 TABLET | Refills: 11 | Status: SHIPPED | OUTPATIENT
Start: 2023-04-18 | End: 2024-04-17

## 2023-04-18 RX ORDER — DULAGLUTIDE 0.75 MG/.5ML
0.75 INJECTION, SOLUTION SUBCUTANEOUS
Qty: 12 PEN | Refills: 1 | Status: SHIPPED | OUTPATIENT
Start: 2023-04-18 | End: 2023-05-10 | Stop reason: DRUGHIGH

## 2023-04-18 RX ORDER — METOPROLOL SUCCINATE 50 MG/1
50 TABLET, EXTENDED RELEASE ORAL DAILY
Qty: 90 TABLET | Refills: 3 | Status: SHIPPED | OUTPATIENT
Start: 2023-04-18 | End: 2023-05-23 | Stop reason: SDUPTHER

## 2023-04-18 RX ORDER — ATORVASTATIN CALCIUM 40 MG/1
40 TABLET, FILM COATED ORAL DAILY
Qty: 90 TABLET | Refills: 3 | Status: ON HOLD | OUTPATIENT
Start: 2023-04-18 | End: 2023-10-15 | Stop reason: HOSPADM

## 2023-04-19 ENCOUNTER — TELEPHONE (OUTPATIENT)
Dept: DERMATOLOGY | Facility: CLINIC | Age: 88
End: 2023-04-19
Payer: MEDICARE

## 2023-04-19 NOTE — TELEPHONE ENCOUNTER
Pt was informed with bx proven SCC nasal dorsum per Yoel Beltrán. Pt requested a consult appt. Pt was scheduled for consult on 4/20/23 at 3:00. Pt verbally confirmed that this will only be a consult appt.

## 2023-04-20 ENCOUNTER — OFFICE VISIT (OUTPATIENT)
Dept: DERMATOLOGY | Facility: CLINIC | Age: 88
End: 2023-04-20
Payer: MEDICARE

## 2023-04-20 ENCOUNTER — TELEPHONE (OUTPATIENT)
Dept: DERMATOLOGY | Facility: CLINIC | Age: 88
End: 2023-04-20
Payer: MEDICARE

## 2023-04-20 VITALS
SYSTOLIC BLOOD PRESSURE: 148 MMHG | HEIGHT: 62 IN | WEIGHT: 176 LBS | BODY MASS INDEX: 32.39 KG/M2 | DIASTOLIC BLOOD PRESSURE: 71 MMHG | HEART RATE: 78 BPM

## 2023-04-20 DIAGNOSIS — C44.321 SQUAMOUS CELL CARCINOMA OF SKIN OF NOSE: Primary | ICD-10-CM

## 2023-04-20 PROCEDURE — 99203 OFFICE O/P NEW LOW 30 MIN: CPT | Mod: S$PBB,,, | Performed by: DERMATOLOGY

## 2023-04-20 PROCEDURE — 99203 PR OFFICE/OUTPT VISIT, NEW, LEVL III, 30-44 MIN: ICD-10-PCS | Mod: S$PBB,,, | Performed by: DERMATOLOGY

## 2023-04-20 PROCEDURE — 99213 OFFICE O/P EST LOW 20 MIN: CPT | Mod: PBBFAC | Performed by: DERMATOLOGY

## 2023-04-20 PROCEDURE — 99999 PR PBB SHADOW E&M-EST. PATIENT-LVL III: CPT | Mod: PBBFAC,,, | Performed by: DERMATOLOGY

## 2023-04-20 PROCEDURE — 99999 PR PBB SHADOW E&M-EST. PATIENT-LVL III: ICD-10-PCS | Mod: PBBFAC,,, | Performed by: DERMATOLOGY

## 2023-04-20 RX ORDER — DIAZEPAM 5 MG/1
5 TABLET ORAL EVERY 6 HOURS PRN
Qty: 2 TABLET | Refills: 0 | Status: ON HOLD | OUTPATIENT
Start: 2023-04-20 | End: 2023-08-05 | Stop reason: ALTCHOICE

## 2023-04-20 NOTE — TELEPHONE ENCOUNTER
----- Message from Orestes Kim Jr., MD sent at 4/20/2023  4:34 PM CDT -----  Regarding: RE: Magnet use?  Yes, okay to use magnet for defibrillator  ----- Message -----  From: Bonita Acosta MA  Sent: 4/20/2023   3:24 PM CDT  To: Orestes Kim Jr., MD, #  Subject: Magnet use?                                      Hi,    We are seeing pt in derm surgery clinic with Dr. Farris today for evaluation of SCC nasal dorsum. Pt does not know what type of defibrillator she has-- will it be OK to use a magnet for electrocautery during a procedure?    Thanks so much,  Bonita Acosta MA

## 2023-04-20 NOTE — PROGRESS NOTES
"REFERRING PROVIDER:  Yoel Beltrán M.D.    CHIEF COMPLAINT:  New patient being consulted for Mohs' surgery evaluation.    HISTORY OF PRESENT ILLNESS:  88 y.o. female presents with a 1 month(s) history of growth on the nasal dorsum. Patient states she first noticed it as a "little pimple" one month ago. Then it started growing. Denies bleeding, scabbing. No prior treatment.    Negative for scabbing.  Negative for crusting.  Negative for bleeding.  Negative for itching.    Biopsy consistent with squamous cell carcinoma.     No prior treatment.    Pacemaker: Yes  Defibrillator: Yes  Artificial joints: No  Artificial heart valves: No    PAST MEDICAL HISTORY:  Past Medical History:   Diagnosis Date    Cervical polyp 5/28/2015    Diabetes mellitus, type 2     Hyperlipidemia     Retinal detachment     od     Stroke     tia x 3   ( last in 2005)    Urinary incontinence without sensory awareness 5/12/2021       PAST SURGICAL HISTORY:    Past Surgical History:   Procedure Laterality Date    APPENDECTOMY      CARDIAC CATHETERIZATION      CATARACT EXTRACTION Bilateral     EYE SURGERY Bilateral     cataract    INSERT / REPLACE / REMOVE PACEMAKER      INSERTION OF PACEMAKER      july 22 2019     Orestes Kim MD    TONSILLECTOMY          SOCIAL HISTORY:  Dependencies:  never smoked    PERTINENT MEDICATIONS:  See medications list.  aspirin    ALLERGIES:  Penicillins    ROS:  Skin: See HPI  Constitutional: No fatigue, fever, malaise, weight loss, or night sweats.  Cardiovascular: No chest pain, palpitations, or edema.  Respiratory: No coughing, wheezing, SOB, or sputum production.    Physical Exam   HENT:   Nose:           General: Mood and affect normal. Alert and orient X3. Normal appearance.  Eyelids:  no suspicious lesions  Head/Face: L inferior nasal dorsum with a 7 x 9 mm crusted bx site located 3.5 cm inferiorly from the left medial canthus and 3.5 cm superiorly from the left alar base.  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    PLAN:  The diagnosis and the pathology report were discussed in detail with the patient. Treatment options were reviewed, including Mohs Micrographic Surgery, radiation, topical therapy, and standard excision.  After careful review of patient's history and physical exam, and after discussion of treatment options, the decision was made to perform Mohs micrographic surgery.    Scheduled patient for Mohs Micrographic Surgery. Risks, benefits, and alternatives of Mohs' surgery discussed with the patient. Discussed repair options including complex closure, skin flap, skin graft and second intention healing with the patient. Pre-operative instructions provided to the patient. Okay to stay on aspirin. Patient was given eRx for Valium 5 mg. Instructed patient to have someone drive them on day of surgery.      Contacted her cardiologist - Dr. Orestes Kim, who said okay to use magnet on her defibrillator for procedure.

## 2023-04-24 PROBLEM — E08.3513 DIABETES MELLITUS DUE TO UNDERLYING CONDITION WITH BOTH EYES AFFECTED BY PROLIFERATIVE RETINOPATHY AND MACULAR EDEMA, WITH LONG-TERM CURRENT USE OF INSULIN: Status: ACTIVE | Noted: 2023-04-24

## 2023-04-24 PROBLEM — Z79.4 DIABETES MELLITUS DUE TO UNDERLYING CONDITION WITH BOTH EYES AFFECTED BY PROLIFERATIVE RETINOPATHY AND MACULAR EDEMA, WITH LONG-TERM CURRENT USE OF INSULIN: Status: ACTIVE | Noted: 2023-04-24

## 2023-04-24 PROBLEM — E78.2 MIXED HYPERLIPIDEMIA: Status: ACTIVE | Noted: 2023-04-24

## 2023-04-24 NOTE — PROGRESS NOTES
"    /64 (BP Location: Right arm, Patient Position: Sitting)   Pulse 76   Temp 98.9 °F (37.2 °C)   Ht 5' 2" (1.575 m)   Wt 80.1 kg (176 lb 9.4 oz)   SpO2 98%   BMI 32.30 kg/m²       ===========    Chief Complaint: No chief complaint on file.          HPI    Eliana Wagner is a 88 y.o. female     With multiple medical problems including diabetes, obesity, hypertension, history of stroke here for follow-up.    Patient queried and denies any further complaints    Patient has MEDICAL HISTORY OF  Patient Active Problem List   Diagnosis    Hypertension associated with diabetes    Refractive error    Pseudophakia    Choroidal nevus of right eye    Chronic systolic (congestive) heart failure    Obesity, diabetes, and hypertension syndrome    Coronary artery disease involving native coronary artery of native heart without angina pectoris    Nonischemic congestive cardiomyopathy    Biventricular ICD (implantable cardioverter-defibrillator) in place    Hyperlipidemia associated with type 2 diabetes mellitus    DM type 2 without retinopathy    History of CVA (cerebrovascular accident)    Uncontrolled type 2 diabetes mellitus with hyperglycemia    Class 1 obesity with serious comorbidity in adult    Urinary incontinence without sensory awareness    Bruising    Premature ventricular contractions    Risk for falls    Mixed incontinence    Pelvic muscle wasting    Pelvic floor dysfunction    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  Past Surgical History:   Procedure Laterality Date    APPENDECTOMY      CARDIAC CATHETERIZATION      CATARACT EXTRACTION Bilateral     EYE SURGERY Bilateral     cataract    INSERT / REPLACE / REMOVE PACEMAKER      INSERTION OF PACEMAKER      july 22 2019     Orestes Kim MD    TONSILLECTOMY  "      ALLERGIES updated and reviewed.  Review of patient's allergies indicates:   Allergen Reactions    Penicillins      Caused uti       CURRENT OUTPATIENT MEDICATIONS updated and reviewed    Current Outpatient Medications:     aspirin (ECOTRIN) 81 MG EC tablet, Take 81 mg by mouth once daily., Disp: , Rfl:     conjugated estrogens (PREMARIN) vaginal cream, 1 g with applicator or dime-sized amt with finger in vagina nightly x 2 weeks, then twice a week thereafter, Disp: 42.5 g, Rfl: 12    lancets Misc, 1 each by Misc.(Non-Drug; Combo Route) route once daily., Disp: 100 each, Rfl: 3    magnesium oxide (MAG-OX) 400 mg (241.3 mg magnesium) tablet, Take 1 tablet (400 mg total) by mouth once daily., Disp: , Rfl: 0    mv-min-FA-Ja-Ni-lilcniy-lutein 0.4-162-18 mg Tab, Take by mouth. 1 Tablet Oral Every day, Disp: , Rfl:     vitamin D (VITAMIN D3) 1000 units Tab, Take 1,000 Units by mouth once daily., Disp: , Rfl:     atorvastatin (LIPITOR) 40 MG tablet, Take 1 tablet (40 mg total) by mouth once daily. For cholesterol and heart protection, Disp: 90 tablet, Rfl: 3    blood sugar diagnostic Strp, 1 each by Misc.(Non-Drug; Combo Route) route once daily., Disp: 100 each, Rfl: 3    blood-glucose meter kit, Use as instructed, Disp: 1 each, Rfl: 0    diazePAM (VALIUM) 5 MG tablet, Take 1 tablet (5 mg total) by mouth every 6 (six) hours as needed for Anxiety. Take 1 pill 30 minutes prior to surgery and bring remaining pill with you on day of surgery., Disp: 2 tablet, Rfl: 0    dulaglutide (TRULICITY) 0.75 mg/0.5 mL pen injector, Inject 0.75 mg into the skin every 7 days., Disp: 12 pen, Rfl: 1    fluorouracil (EFUDEX) 5 % cream, Apply topically Daily. , Disp: , Rfl:     irbesartan (AVAPRO) 150 MG tablet, Take 1 tablet (150 mg total) by mouth once daily. For heart and blood pressure, Disp: 90 tablet, Rfl: 3    metoprolol succinate (TOPROL-XL) 50 MG 24 hr tablet, Take 1 tablet (50 mg total) by mouth once daily. For heart and blood  "pressure, Disp: 90 tablet, Rfl: 3    mirabegron (MYRBETRIQ) 25 mg Tb24 ER tablet, Take 1 tablet (25 mg total) by mouth once daily. For bladder, Disp: 30 tablet, Rfl: 11    Review of Systems   Constitutional:  Negative for activity change, appetite change, chills, diaphoresis, fatigue, fever and unexpected weight change.   HENT:  Negative for congestion, ear discharge, ear pain, facial swelling, hearing loss, nosebleeds, postnasal drip, rhinorrhea, sinus pressure, sneezing, sore throat, tinnitus, trouble swallowing and voice change.    Eyes:  Negative for photophobia, pain, discharge, redness, itching and visual disturbance.   Respiratory:  Negative for cough, chest tightness, shortness of breath and wheezing.    Cardiovascular:  Negative for chest pain, palpitations and leg swelling.   Gastrointestinal:  Negative for abdominal distention, abdominal pain, anal bleeding, blood in stool, constipation, diarrhea, nausea, rectal pain and vomiting.   Endocrine: Negative for cold intolerance, heat intolerance, polydipsia, polyphagia and polyuria.   Genitourinary:  Negative for difficulty urinating, dysuria and flank pain.   Musculoskeletal:  Negative for arthralgias, back pain, joint swelling, myalgias and neck pain.   Skin:  Negative for rash.   Neurological:  Negative for dizziness, tremors, seizures, syncope, speech difficulty, weakness, light-headedness, numbness and headaches.   Psychiatric/Behavioral:  Negative for behavioral problems, confusion, decreased concentration, dysphoric mood, sleep disturbance and suicidal ideas. The patient is not nervous/anxious and is not hyperactive.      /64 (BP Location: Right arm, Patient Position: Sitting)   Pulse 76   Temp 98.9 °F (37.2 °C)   Ht 5' 2" (1.575 m)   Wt 80.1 kg (176 lb 9.4 oz)   SpO2 98%   BMI 32.30 kg/m²   Physical Exam  Vitals and nursing note reviewed.   Constitutional:       General: She is not in acute distress.     Appearance: Normal appearance. She " is well-developed. She is not ill-appearing or toxic-appearing.   HENT:      Head: Normocephalic and atraumatic.      Right Ear: Tympanic membrane, ear canal and external ear normal.      Left Ear: Tympanic membrane, ear canal and external ear normal.      Nose: Nose normal.      Mouth/Throat:      Lips: Pink.      Mouth: Mucous membranes are moist.      Pharynx: No oropharyngeal exudate or posterior oropharyngeal erythema.   Eyes:      General: No scleral icterus.        Right eye: No discharge.         Left eye: No discharge.      Extraocular Movements: Extraocular movements intact.      Conjunctiva/sclera: Conjunctivae normal.   Cardiovascular:      Rate and Rhythm: Normal rate and regular rhythm.      Pulses: Normal pulses.      Heart sounds: Normal heart sounds. No murmur heard.  Pulmonary:      Effort: Pulmonary effort is normal. No respiratory distress.      Breath sounds: Normal breath sounds. No wheezing or rales.   Abdominal:      General: Bowel sounds are normal. There is no distension.      Palpations: Abdomen is soft. There is no mass.      Tenderness: There is no abdominal tenderness. There is no right CVA tenderness, left CVA tenderness, guarding or rebound.      Hernia: No hernia is present.   Musculoskeletal:      Cervical back: Normal range of motion and neck supple. No rigidity or tenderness.   Lymphadenopathy:      Cervical: No cervical adenopathy.   Skin:     General: Skin is warm and dry.   Neurological:      General: No focal deficit present.      Mental Status: She is alert. Mental status is at baseline.   Psychiatric:         Mood and Affect: Mood normal.         Behavior: Behavior normal. Behavior is cooperative.       ASSESSMENT/PLAN  Diagnoses and all orders for this visit:    History of CVA (cerebrovascular accident)  -     Lipid Panel; Future    Coronary artery disease involving native coronary artery of native heart without angina pectoris  -     irbesartan (AVAPRO) 150 MG tablet; Take  1 tablet (150 mg total) by mouth once daily. 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    Mixed hyperlipidemia  -     atorvastatin (LIPITOR) 40 MG tablet; Take 1 tablet (40 mg total) by mouth once daily. For cholesterol and heart protection    Chronic systolic (congestive) heart failure  Comments:  Currently asymptomatic  Orders:  -     metoprolol succinate (TOPROL-XL) 50 MG 24 hr tablet; Take 1 tablet (50 mg total) by mouth once daily. For heart and blood pressure    Hypertension associated with diabetes    Biventricular ICD (implantable cardioverter-defibrillator) in place    Hyperlipidemia associated with type 2 diabetes mellitus    Premature ventricular contractions    Mixed incontinence    Obesity, diabetes, and hypertension syndrome    DM type 2 without retinopathy  -     Hemoglobin A1C; Future  -     Comprehensive Metabolic Panel; Future  -     Lipid Panel; Future  -     CBC Without Differential; Future  -     Microalbumin/Creatinine Ratio, Urine; Future    Uncontrolled type 2 diabetes mellitus with hyperglycemia    Class 1 obesity due to excess calories with serious comorbidity and body mass index (BMI) of 30.0 to 30.9 in adult    Other orders  -     mirabegron (MYRBETRIQ) 25 mg Tb24 ER tablet; Take 1 tablet (25 mg total) by mouth once daily. For bladder      Blood pressure stable.  Monitor.    Diabetes needs better control.  Check A1c again.  A1c in December was 8.1.  Continue medications as above.  Follow diabetic diet more closely Tiera graft follow-up with Cardiology as indicated regarding pacemaker CHF etcetera        Continue medication as above urinary incontinence.  Working relatively well.      All lab results over past 2 years available reviewed [employer]  and any cardiology or radiology studies.  Any new prescription medications gone over in detail including reason for taking the medication, the general mechanism of action, most common possible side effects and possible costs, etcetera.    Chronic conditions updated. Other than changes or additions as above, cont current medications and maintain follow-up with specialists if indicated.     Duy Cheng MD  A dictation device was used to produce this document. Use of such devices sometimes results in grammatical errors or replacement of words that sound similarly.    Est5  Time spent in the evaluation and management of this patient exceeded 40min and greater than 50% of this time was in face-to-face time with the patient on day of the clinic visit.   This includes face-to-face time and non face-to-face time and includes the following:  --preparing to see the patient (eg, obtaining and/or reviewing old records such as, when applicable, primary care notes, specialist notes, hospital notes, review of laboratory tests, and/or radiographic and/or cardiology or other studies)  --performing a medically appropriate review of systems and examination and/or evaluation  --reviewing and independently interpreting results (not separately reported; eg, lab results) and communicating results to the patient and/or family/caregiver  --placing orders and/or reviewing other physician's orders which can both include medications, laboratory studies, radiographic studies, procedures, referrals etcetera and   --counseling and educating the patient and/or family member/caregiver regarding the treatment plan  --documentation of the visit in the electronic health record  --communicating with other health care providers regarding referrals, studies, follow-up, etc

## 2023-05-09 ENCOUNTER — LAB VISIT (OUTPATIENT)
Dept: LAB | Facility: HOSPITAL | Age: 88
End: 2023-05-09
Attending: FAMILY MEDICINE
Payer: MEDICARE

## 2023-05-09 DIAGNOSIS — Z86.73 HISTORY OF CVA (CEREBROVASCULAR ACCIDENT): ICD-10-CM

## 2023-05-09 DIAGNOSIS — E11.9 DM TYPE 2 WITHOUT RETINOPATHY: ICD-10-CM

## 2023-05-09 LAB
ALBUMIN SERPL BCP-MCNC: 3.7 G/DL (ref 3.5–5.2)
ALP SERPL-CCNC: 84 U/L (ref 55–135)
ALT SERPL W/O P-5'-P-CCNC: 27 U/L (ref 10–44)
ANION GAP SERPL CALC-SCNC: 11 MMOL/L (ref 8–16)
AST SERPL-CCNC: 27 U/L (ref 10–40)
BILIRUB SERPL-MCNC: 0.4 MG/DL (ref 0.1–1)
BUN SERPL-MCNC: 14 MG/DL (ref 8–23)
CALCIUM SERPL-MCNC: 9.5 MG/DL (ref 8.7–10.5)
CHLORIDE SERPL-SCNC: 102 MMOL/L (ref 95–110)
CHOLEST SERPL-MCNC: 173 MG/DL (ref 120–199)
CHOLEST/HDLC SERPL: 3.1 {RATIO} (ref 2–5)
CO2 SERPL-SCNC: 26 MMOL/L (ref 23–29)
CREAT SERPL-MCNC: 0.8 MG/DL (ref 0.5–1.4)
ERYTHROCYTE [DISTWIDTH] IN BLOOD BY AUTOMATED COUNT: 13.6 % (ref 11.5–14.5)
EST. GFR  (NO RACE VARIABLE): >60 ML/MIN/1.73 M^2
ESTIMATED AVG GLUCOSE: 192 MG/DL (ref 68–131)
GLUCOSE SERPL-MCNC: 158 MG/DL (ref 70–110)
HBA1C MFR BLD: 8.3 % (ref 4–5.6)
HCT VFR BLD AUTO: 42.6 % (ref 37–48.5)
HDLC SERPL-MCNC: 55 MG/DL (ref 40–75)
HDLC SERPL: 31.8 % (ref 20–50)
HGB BLD-MCNC: 13.8 G/DL (ref 12–16)
LDLC SERPL CALC-MCNC: 88.2 MG/DL (ref 63–159)
MCH RBC QN AUTO: 29.6 PG (ref 27–31)
MCHC RBC AUTO-ENTMCNC: 32.4 G/DL (ref 32–36)
MCV RBC AUTO: 91 FL (ref 82–98)
NONHDLC SERPL-MCNC: 118 MG/DL
PLATELET # BLD AUTO: 159 K/UL (ref 150–450)
PMV BLD AUTO: 10.8 FL (ref 9.2–12.9)
POTASSIUM SERPL-SCNC: 4 MMOL/L (ref 3.5–5.1)
PROT SERPL-MCNC: 7 G/DL (ref 6–8.4)
RBC # BLD AUTO: 4.67 M/UL (ref 4–5.4)
SODIUM SERPL-SCNC: 139 MMOL/L (ref 136–145)
TRIGL SERPL-MCNC: 149 MG/DL (ref 30–150)
WBC # BLD AUTO: 7.7 K/UL (ref 3.9–12.7)

## 2023-05-09 PROCEDURE — 80061 LIPID PANEL: CPT | Performed by: FAMILY MEDICINE

## 2023-05-09 PROCEDURE — 85027 COMPLETE CBC AUTOMATED: CPT | Performed by: FAMILY MEDICINE

## 2023-05-09 PROCEDURE — 83036 HEMOGLOBIN GLYCOSYLATED A1C: CPT | Performed by: FAMILY MEDICINE

## 2023-05-09 PROCEDURE — 80053 COMPREHEN METABOLIC PANEL: CPT | Performed by: FAMILY MEDICINE

## 2023-05-09 PROCEDURE — 36415 COLL VENOUS BLD VENIPUNCTURE: CPT | Performed by: FAMILY MEDICINE

## 2023-05-10 ENCOUNTER — LAB VISIT (OUTPATIENT)
Dept: LAB | Facility: HOSPITAL | Age: 88
End: 2023-05-10
Attending: FAMILY MEDICINE
Payer: MEDICARE

## 2023-05-10 DIAGNOSIS — E11.9 DIABETES MELLITUS CASE MANAGEMENT PATIENT: ICD-10-CM

## 2023-05-10 LAB
ALBUMIN/CREAT UR: NORMAL UG/MG (ref 0–30)
CREAT UR-MCNC: 41 MG/DL (ref 15–325)
MICROALBUMIN UR DL<=1MG/L-MCNC: <5 UG/ML

## 2023-05-10 PROCEDURE — 82570 ASSAY OF URINE CREATININE: CPT | Performed by: FAMILY MEDICINE

## 2023-05-10 RX ORDER — DULAGLUTIDE 1.5 MG/.5ML
1.5 INJECTION, SOLUTION SUBCUTANEOUS
Qty: 4 PEN | Refills: 11 | Status: SHIPPED | OUTPATIENT
Start: 2023-05-10 | End: 2024-04-02 | Stop reason: SDUPTHER

## 2023-05-15 ENCOUNTER — PROCEDURE VISIT (OUTPATIENT)
Dept: DERMATOLOGY | Facility: CLINIC | Age: 88
End: 2023-05-15
Payer: MEDICARE

## 2023-05-15 VITALS
BODY MASS INDEX: 32.39 KG/M2 | HEART RATE: 66 BPM | SYSTOLIC BLOOD PRESSURE: 141 MMHG | WEIGHT: 176 LBS | DIASTOLIC BLOOD PRESSURE: 72 MMHG | HEIGHT: 62 IN

## 2023-05-15 DIAGNOSIS — C44.321 SQUAMOUS CELL CARCINOMA OF SKIN OF NOSE: Primary | ICD-10-CM

## 2023-05-15 PROCEDURE — 99499 UNLISTED E&M SERVICE: CPT | Mod: S$PBB,,, | Performed by: DERMATOLOGY

## 2023-05-15 PROCEDURE — 14060 TIS TRNFR E/N/E/L 10 SQ CM/<: CPT | Mod: S$PBB,51,, | Performed by: DERMATOLOGY

## 2023-05-15 PROCEDURE — 99499 NO LOS: ICD-10-PCS | Mod: S$PBB,,, | Performed by: DERMATOLOGY

## 2023-05-15 PROCEDURE — 14060 TIS TRNFR E/N/E/L 10 SQ CM/<: CPT | Mod: PBBFAC,51 | Performed by: DERMATOLOGY

## 2023-05-15 PROCEDURE — 14060 PR ADJ TISS XFER LID,NOS,EAR <10 SQCM: ICD-10-PCS | Mod: S$PBB,51,, | Performed by: DERMATOLOGY

## 2023-05-15 PROCEDURE — 17311 MOHS 1 STAGE H/N/HF/G: CPT | Mod: PBBFAC | Performed by: DERMATOLOGY

## 2023-05-15 PROCEDURE — 17311 MOHS 1 STAGE H/N/HF/G: CPT | Mod: S$PBB,,, | Performed by: DERMATOLOGY

## 2023-05-15 PROCEDURE — 17311: ICD-10-PCS | Mod: S$PBB,,, | Performed by: DERMATOLOGY

## 2023-05-15 RX ORDER — CEPHALEXIN 500 MG/1
500 CAPSULE ORAL 3 TIMES DAILY
Qty: 21 CAPSULE | Refills: 0 | Status: SHIPPED | OUTPATIENT
Start: 2023-05-15 | End: 2023-05-22

## 2023-05-15 RX ORDER — HYDROCODONE BITARTRATE AND ACETAMINOPHEN 5; 325 MG/1; MG/1
1 TABLET ORAL EVERY 6 HOURS PRN
Qty: 10 TABLET | Refills: 0 | Status: ON HOLD | OUTPATIENT
Start: 2023-05-15 | End: 2023-08-05

## 2023-05-15 NOTE — PROGRESS NOTES
PROCEDURE: Mohs' Micrographic Surgery    INDICATION: Location in mask areas of face including central face, nose, eyelids, eyebrows, lips, chin, preauricular, temple, and ear. Biopsy-proven skin cancer of cosmetically and functionally important areas, including head, neck, genital, hand, foot, or areas known for having difficulty in healing, such as the lower anterior legs. Tumor with ill-defined borders.    REFERRING PROVIDER: Yoel Beltrán M.D.    CASE NUMBER:     ANESTHETIC: 1 cc 1% Lidocaine, plain and 3 cc 0.5% Bupivacaine with Epi 1:200,000 mixed 1:1 with plain 1% Lidocaine    SURGICAL PREP: Hibiclens    SURGEON: Tigre Farris MD    ASSISTANTS: Francoise Villegas MA    PREOPERATIVE DIAGNOSIS: squamous cell carcinoma    POSTOPERATIVE DIAGNOSIS: squamous cell carcinoma    PATHOLOGIC DIAGNOSIS: squamous cell carcinoma    HISTOLOGY OF SPECIMENS IN FIRST STAGE:   Tumor Type:  No tumor seen.    STAGES OF MOHS' SURGERY PERFORMED: 1    TUMOR-FREE PLANE ACHIEVED: Yes    HEMOSTASIS: electrocoagulation after magnet placed over defibrillator without event     SPECIMENS: 2    LOCATION: nasal dorsum (left mid). Location verified with Dr. Beltrán's clinical photograph as well as my clinical photograph and measurements. Patient also verified location with hand held mirror.    INITIAL LESION SIZE: 0.4 x 0.4 cm    FINAL DEFECT SIZE: 0.8 x 0.9 cm    WOUND REPAIR/DISPOSITION: The patient tolerated Mohs' Micrographic Surgery for a squamous cell carcinoma very well. When the tumor was completely removed, a repair of the surgical defect was undertaken.    PROCEDURE: Burow's Advancement Flap (Adjacent Tissue Transfer)    INDICATION: Status post Mohs' Micrographic Surgery for squamous cell carcinoma.    CASE NUMBER:     ANESTHETIC: 2 cc 1% Lidocaine with Epinephrine 1:100,000    SURGICAL PREP: Hibiclens, prepped by Hallie Cuadra PA-C    SURGEON: Tigre Farris MD    ASSISTANTS: Hallie Cuadra PA-C and Francoise Villegas  "MA    LOCATION: nasal dorsum (left mid)    DEFECT SIZE: 0.8 x 0.9 cm    WOUND REPAIR/DISPOSITION:  After the patient's carcinoma had been completely removed with Mohs' Micrographic Surgery, a repair of the surgical defect was undertaken. The patient was returned to the operating suite where the area of left mid nasal dorsum was prepped, draped, and anesthetized in the usual sterile fashion. A Burow's advancement flap was designed in the inferior surrounding tissue of the left mid nasal dorsum. A Burow's triangle was drawn in superiorly to help with tissue movement. Then with a #15 blade the flap was incised and the Burow's triangle was excised, the area was widely undermined in the submuscular tissue plane. Then, electrocoagulation after magnet placed over defibrillator without event was used to obtain meticulous hemostasis. A 5-0 Vicryl pexing suture was performed by attaching the underside of the most medial aspect of the flap to the left mid nasal dorsum. The flap was then advanced in by a complex closure. Then, 5-0 Vicryl buried vertical mattress sutures were placed into the subcutaneous and dermal plane to close the wound and krystin the cutaneous wound edge. The flap was then trimmed to fit the defect. The cutaneous wound edges were closed using interrupted 5-0 Prolene sutures.    The patient tolerated the procedure well.    The area was cleaned and dressed appropriately and the patient was given wound care instructions, as well as an appointment for follow-up evaluation and suture removal in 7 days. Patient was placed on Norco 5-325 mg prn postop pain and Keflex 500 mg TID x 7 days.    SIZE OF FLAP: 6 cm squared    Vitals:    05/15/23 0957 05/15/23 1252   BP: (!) 145/76 (!) 141/72   BP Location: Left arm    Patient Position: Sitting    BP Method: Small (Automatic)    Pulse: 87 66   Weight: 79.8 kg (176 lb)    Height: 5' 2" (1.575 m)        "

## 2023-05-17 ENCOUNTER — HOSPITAL ENCOUNTER (OUTPATIENT)
Dept: CARDIOLOGY | Facility: HOSPITAL | Age: 88
Discharge: HOME | End: 2023-05-17
Attending: INTERNAL MEDICINE
Payer: MEDICARE

## 2023-05-17 VITALS
WEIGHT: 176 LBS | SYSTOLIC BLOOD PRESSURE: 160 MMHG | HEART RATE: 64 BPM | BODY MASS INDEX: 32.39 KG/M2 | DIASTOLIC BLOOD PRESSURE: 75 MMHG | HEIGHT: 62 IN

## 2023-05-17 DIAGNOSIS — I50.22 CHRONIC SYSTOLIC (CONGESTIVE) HEART FAILURE: ICD-10-CM

## 2023-05-17 LAB
ASCENDING AORTA: 3.38 CM
AV INDEX (PROSTH): 0.79
AV MEAN GRADIENT: 5 MMHG
AV PEAK GRADIENT: 8 MMHG
AV VALVE AREA: 2.08 CM2
AV VELOCITY RATIO: 0.84
BSA FOR ECHO PROCEDURE: 1.87 M2
CV ECHO LV RWT: 0.25 CM
DOP CALC AO PEAK VEL: 1.43 M/S
DOP CALC AO VTI: 33.35 CM
DOP CALC LVOT AREA: 2.6 CM2
DOP CALC LVOT DIAMETER: 1.83 CM
DOP CALC LVOT PEAK VEL: 1.2 M/S
DOP CALC LVOT STROKE VOLUME: 69.46 CM3
DOP CALC MV VTI: 29.25 CM
DOP CALCLVOT PEAK VEL VTI: 26.42 CM
E WAVE DECELERATION TIME: 178.94 MSEC
E/A RATIO: 0.53
E/E' RATIO: 12.4 M/S
ECHO LV POSTERIOR WALL: 0.77 CM (ref 0.6–1.1)
EJECTION FRACTION: 35 %
FRACTIONAL SHORTENING: 14 % (ref 28–44)
INTERVENTRICULAR SEPTUM: 0.66 CM (ref 0.6–1.1)
LA MAJOR: 4.92 CM
LA MINOR: 4.73 CM
LA WIDTH: 3.68 CM
LEFT ATRIUM SIZE: 4.11 CM
LEFT ATRIUM VOLUME INDEX MOD: 29.4 ML/M2
LEFT ATRIUM VOLUME INDEX: 34.3 ML/M2
LEFT ATRIUM VOLUME MOD: 53.2 CM3
LEFT ATRIUM VOLUME: 62.01 CM3
LEFT INTERNAL DIMENSION IN SYSTOLE: 5.19 CM (ref 2.1–4)
LEFT VENTRICLE DIASTOLIC VOLUME INDEX: 102.11 ML/M2
LEFT VENTRICLE DIASTOLIC VOLUME: 184.82 ML
LEFT VENTRICLE MASS INDEX: 91 G/M2
LEFT VENTRICLE SYSTOLIC VOLUME INDEX: 71.2 ML/M2
LEFT VENTRICLE SYSTOLIC VOLUME: 128.89 ML
LEFT VENTRICULAR INTERNAL DIMENSION IN DIASTOLE: 6.07 CM (ref 3.5–6)
LEFT VENTRICULAR MASS: 165.52 G
LV LATERAL E/E' RATIO: 12.4 M/S
LV SEPTAL E/E' RATIO: 12.4 M/S
MV A" WAVE DURATION": 11.7 MSEC
MV MEAN GRADIENT: 1 MMHG
MV PEAK A VEL: 1.16 M/S
MV PEAK E VEL: 0.62 M/S
MV PEAK GRADIENT: 5 MMHG
MV STENOSIS PRESSURE HALF TIME: 51.89 MS
MV VALVE AREA BY CONTINUITY EQUATION: 2.37 CM2
MV VALVE AREA P 1/2 METHOD: 4.24 CM2
PISA MRMAX VEL: 0.05 M/S
PISA TR MAX VEL: 2.48 M/S
PULM VEIN S/D RATIO: 1.63
PV PEAK D VEL: 0.27 M/S
PV PEAK S VEL: 0.44 M/S
QEF: 33 %
RA MAJOR: 4.74 CM
RA PRESSURE: 3 MMHG
RA WIDTH: 3.24 CM
RIGHT VENTRICULAR END-DIASTOLIC DIMENSION: 2.66 CM
SINUS: 2.5 CM
STJ: 2.77 CM
TDI LATERAL: 0.05 M/S
TDI SEPTAL: 0.05 M/S
TDI: 0.05 M/S
TR MAX PG: 25 MMHG
TRICUSPID ANNULAR PLANE SYSTOLIC EXCURSION: 2.19 CM
TV REST PULMONARY ARTERY PRESSURE: 28 MMHG

## 2023-05-17 PROCEDURE — 93306 TTE W/DOPPLER COMPLETE: CPT | Mod: 26,,, | Performed by: INTERNAL MEDICINE

## 2023-05-17 PROCEDURE — 93356 ECHO (CUPID ONLY): ICD-10-PCS | Mod: ,,, | Performed by: INTERNAL MEDICINE

## 2023-05-17 PROCEDURE — 93356 MYOCRD STRAIN IMG SPCKL TRCK: CPT | Mod: ,,, | Performed by: INTERNAL MEDICINE

## 2023-05-17 PROCEDURE — 93306 ECHO (CUPID ONLY): ICD-10-PCS | Mod: 26,,, | Performed by: INTERNAL MEDICINE

## 2023-05-17 PROCEDURE — 93356 MYOCRD STRAIN IMG SPCKL TRCK: CPT

## 2023-05-18 ENCOUNTER — TELEPHONE (OUTPATIENT)
Dept: DERMATOLOGY | Facility: CLINIC | Age: 88
End: 2023-05-18
Payer: MEDICARE

## 2023-05-18 ENCOUNTER — TELEPHONE (OUTPATIENT)
Dept: FAMILY MEDICINE | Facility: CLINIC | Age: 88
End: 2023-05-18
Payer: MEDICARE

## 2023-05-18 NOTE — TELEPHONE ENCOUNTER
Left a voicemail message to inform the Patient about the [facility] appointment and to schedule.  Requested the Patient to call the office back to be schedule.  Sent a detailed message thru [employer] as well.

## 2023-05-18 NOTE — TELEPHONE ENCOUNTER
Spoke to pt's daughter Ev. Informed her that it is best to have stitches removed at exact week appt 5/22 but we can try 5/23 if that works. She will call back to my direct line after speaking with pt.

## 2023-05-23 ENCOUNTER — OFFICE VISIT (OUTPATIENT)
Dept: DERMATOLOGY | Facility: CLINIC | Age: 88
End: 2023-05-23
Payer: MEDICARE

## 2023-05-23 DIAGNOSIS — I50.22 CHRONIC SYSTOLIC (CONGESTIVE) HEART FAILURE: Chronic | ICD-10-CM

## 2023-05-23 DIAGNOSIS — Z09 POSTOP CHECK: Primary | ICD-10-CM

## 2023-05-23 DIAGNOSIS — I25.10 CORONARY ARTERY DISEASE INVOLVING NATIVE CORONARY ARTERY OF NATIVE HEART WITHOUT ANGINA PECTORIS: ICD-10-CM

## 2023-05-23 PROCEDURE — 99999 PR PBB SHADOW E&M-EST. PATIENT-LVL III: ICD-10-PCS | Mod: PBBFAC,,, | Performed by: DERMATOLOGY

## 2023-05-23 PROCEDURE — 99213 OFFICE O/P EST LOW 20 MIN: CPT | Mod: PBBFAC | Performed by: DERMATOLOGY

## 2023-05-23 PROCEDURE — 99999 PR PBB SHADOW E&M-EST. PATIENT-LVL III: CPT | Mod: PBBFAC,,, | Performed by: DERMATOLOGY

## 2023-05-23 PROCEDURE — 99024 POSTOP FOLLOW-UP VISIT: CPT | Mod: POP,,, | Performed by: DERMATOLOGY

## 2023-05-23 PROCEDURE — 99024 PR POST-OP FOLLOW-UP VISIT: ICD-10-PCS | Mod: POP,,, | Performed by: DERMATOLOGY

## 2023-05-23 RX ORDER — METOPROLOL SUCCINATE 50 MG/1
50 TABLET, EXTENDED RELEASE ORAL DAILY
Qty: 90 TABLET | Refills: 1 | Status: SHIPPED | OUTPATIENT
Start: 2023-05-23 | End: 2023-09-08

## 2023-05-23 RX ORDER — IRBESARTAN 150 MG/1
150 TABLET ORAL DAILY
Qty: 90 TABLET | Refills: 1 | Status: SHIPPED | OUTPATIENT
Start: 2023-05-23 | End: 2023-08-25

## 2023-05-23 NOTE — PROGRESS NOTES
88 y.o. female patient is here for suture removal following Mohs' surgery.    Patient reports no problems.    WOUND PE:  The nasal dorsum sutures intact. Wound healing well. Good skin edges. No signs or symptoms of infection. Flap is pink and viable.     IMPRESSION:  Healing operative site from Mohs' surgery SCC, nasal dorsum s/p Mohs with Burows Advancement Flap, postop day #8.    PLAN:  Sutures removed today by  Bonita Acosta MA . Steri-strips applied.  Continue wound care.  Keep moist with Aquaphor.    RTC:  In 1 month.

## 2023-05-23 NOTE — TELEPHONE ENCOUNTER
No care due was identified.  [facility] Embedded Care Due Messages. Reference number: [id number].   5/23/2023 3:07:50 PM CDT

## 2023-05-23 NOTE — TELEPHONE ENCOUNTER
----- Message from Yumiko Briscoe sent at 5/23/2023  2:42 PM CDT -----  Contact: [phone]  Requesting an RX refill or new RX.  Is this a refill or new RX: refill  RX name and strength (copy/paste from chart):    irbesartan (AVAPRO) 150 MG tablet  Is this a 30 day or 90 day RX: 90 day   Pharmacy name and phone # (copy/paste from chart):    [facility] - [place] [id number] [facility]  [address] [id number]  Phone: [phone] Fax: [phone]  The doctors have asked that we provide their patients with the following 2 reminders -- prescription refills can take up to 72 hours, and a friendly reminder that in the future you can use your [facility] account to request refills: pharmacy call     Requesting an RX refill or new RX.  Is this a refill or new RX: refill  RX name and strength (copy/paste from chart):   metoprolol succinate (TOPROL-XL) 50 MG 24 hr tablet  Is this a 30 day or 90 day RX: 90 day   Pharmacy name and phone # (copy/paste from chart):    [facility] - [place] [id number] [facility]  [address] [id number]  Phone: [phone] Fax: [phone]  The doctors have asked that we provide their patients with the following 2 reminders -- prescription refills can take up to 72 hours, and a friendly reminder that in the future you can use your [facility] account to request refills: pharmacy call

## 2023-06-15 ENCOUNTER — PATIENT MESSAGE (OUTPATIENT)
Dept: CARDIOLOGY | Facility: HOSPITAL | Age: 88
End: 2023-06-15
Payer: MEDICARE

## 2023-06-27 ENCOUNTER — OFFICE VISIT (OUTPATIENT)
Dept: DERMATOLOGY | Facility: CLINIC | Age: 88
End: 2023-06-27
Payer: MEDICARE

## 2023-06-27 DIAGNOSIS — Z09 POSTOP CHECK: Primary | ICD-10-CM

## 2023-06-27 PROCEDURE — 99999 PR PBB SHADOW E&M-EST. PATIENT-LVL III: ICD-10-PCS | Mod: PBBFAC,,, | Performed by: DERMATOLOGY

## 2023-06-27 PROCEDURE — 99024 PR POST-OP FOLLOW-UP VISIT: ICD-10-PCS | Mod: POP,,, | Performed by: DERMATOLOGY

## 2023-06-27 PROCEDURE — 99024 POSTOP FOLLOW-UP VISIT: CPT | Mod: POP,,, | Performed by: DERMATOLOGY

## 2023-06-27 PROCEDURE — 99999 PR PBB SHADOW E&M-EST. PATIENT-LVL III: CPT | Mod: PBBFAC,,, | Performed by: DERMATOLOGY

## 2023-06-27 PROCEDURE — 99213 OFFICE O/P EST LOW 20 MIN: CPT | Mod: PBBFAC | Performed by: DERMATOLOGY

## 2023-06-27 NOTE — PROGRESS NOTES
88 y.o. female patient is here for wound check after surgery.    Patient reports no problems.    WOUND PE:  The nasal dorsum flap is well healed. Mild firmness and erythema of scar. No nodularity.      IMPRESSION:  Healing operative site from Mohs' surgery SCC nasal dorsum s/p Mohs with Burow's Advancement Flap, postop week #6, well healed and no evidence of recurrence.    PLAN:  Recommended massage daily, x 10-15 min per day.   Reassured patient that redness and firmness will fade with time.  Daily SPF.  Regular skin checks.    RTC:  In 3-6 months with Yoel Beltrán M.D. for skin check or sooner if new concern arises.

## 2023-07-03 ENCOUNTER — OFFICE VISIT (OUTPATIENT)
Dept: FAMILY MEDICINE | Facility: CLINIC | Age: 88
End: 2023-07-03
Payer: MEDICARE

## 2023-07-03 VITALS
SYSTOLIC BLOOD PRESSURE: 120 MMHG | HEIGHT: 62 IN | BODY MASS INDEX: 31.97 KG/M2 | DIASTOLIC BLOOD PRESSURE: 82 MMHG | WEIGHT: 173.75 LBS

## 2023-07-03 DIAGNOSIS — S13.4XXA WHIPLASH INJURY TO NECK, INITIAL ENCOUNTER: Primary | ICD-10-CM
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
## 2023-07-03 PROCEDURE — 99214 OFFICE O/P EST MOD 30 MIN: CPT | Mod: PBBFAC,PO | Performed by: NURSE PRACTITIONER

## 2023-07-03 PROCEDURE — 99999 PR PBB SHADOW E&M-EST. PATIENT-LVL IV: ICD-10-PCS | Mod: PBBFAC,,, | Performed by: NURSE PRACTITIONER

## 2023-07-03 PROCEDURE — 99214 PR OFFICE/OUTPT VISIT, EST, LEVL IV, 30-39 MIN: ICD-10-PCS | Mod: S$PBB,,, | Performed by: NURSE PRACTITIONER

## 2023-07-03 PROCEDURE — 99999 PR PBB SHADOW E&M-EST. PATIENT-LVL IV: CPT | Mod: PBBFAC,,, | Performed by: NURSE PRACTITIONER

## 2023-07-03 PROCEDURE — 99214 OFFICE O/P EST MOD 30 MIN: CPT | Mod: S$PBB,,, | Performed by: NURSE PRACTITIONER
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
## 2023-07-06 ENCOUNTER — HOSPITAL ENCOUNTER (OUTPATIENT)
Dept: RADIOLOGY | Facility: HOSPITAL | Age: 88
Discharge: HOME | End: 2023-07-06
Attending: NURSE PRACTITIONER
Payer: MEDICARE

## 2023-07-06 DIAGNOSIS — S13.4XXA WHIPLASH INJURY TO NECK, INITIAL ENCOUNTER: ICD-10-CM
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
## 2023-07-06 PROCEDURE — 72125 CT CERVICAL SPINE WITHOUT CONTRAST: ICD-10-PCS | Mod: 26,,, | Performed by: RADIOLOGY

## 2023-07-06 PROCEDURE — 72125 CT NECK SPINE W/O DYE: CPT | Mod: TC

## 2023-07-06 PROCEDURE — 72125 CT NECK SPINE W/O DYE: CPT | Mod: 26,,, | Performed by: RADIOLOGY

## 2023-07-11 ENCOUNTER — OFFICE VISIT (OUTPATIENT)
Dept: INTERNAL MEDICINE | Facility: CLINIC | Age: 88
End: 2023-07-11
Payer: MEDICARE

## 2023-07-11 VITALS
HEART RATE: 85 BPM | BODY MASS INDEX: 31.95 KG/M2 | HEIGHT: 62 IN | RESPIRATION RATE: 15 BRPM | WEIGHT: 173.63 LBS | DIASTOLIC BLOOD PRESSURE: 80 MMHG | SYSTOLIC BLOOD PRESSURE: 122 MMHG

## 2023-07-11 DIAGNOSIS — E11.69 OBESITY, DIABETES, AND HYPERTENSION SYNDROME: ICD-10-CM

## 2023-07-11 DIAGNOSIS — N39.46 MIXED INCONTINENCE: ICD-10-CM

## 2023-07-11 DIAGNOSIS — E11.9 DM TYPE 2 WITHOUT RETINOPATHY: ICD-10-CM

## 2023-07-11 DIAGNOSIS — Z79.4 DIABETES MELLITUS DUE TO UNDERLYING CONDITION WITH BOTH EYES AFFECTED BY PROLIFERATIVE RETINOPATHY AND MACULAR EDEMA, WITH LONG-TERM CURRENT USE OF INSULIN: ICD-10-CM

## 2023-07-11 DIAGNOSIS — I25.10 CORONARY ARTERY DISEASE INVOLVING NATIVE CORONARY ARTERY OF NATIVE HEART WITHOUT ANGINA PECTORIS: ICD-10-CM

## 2023-07-11 DIAGNOSIS — E11.69 HYPERLIPIDEMIA ASSOCIATED WITH TYPE 2 DIABETES MELLITUS: ICD-10-CM

## 2023-07-11 DIAGNOSIS — E11.59 OBESITY, DIABETES, AND HYPERTENSION SYNDROME: ICD-10-CM

## 2023-07-11 DIAGNOSIS — E66.9 OBESITY, DIABETES, AND HYPERTENSION SYNDROME: ICD-10-CM

## 2023-07-11 DIAGNOSIS — I15.2 HYPERTENSION ASSOCIATED WITH DIABETES: ICD-10-CM

## 2023-07-11 DIAGNOSIS — E66.09 CLASS 1 OBESITY DUE TO EXCESS CALORIES WITH SERIOUS COMORBIDITY AND BODY MASS INDEX (BMI) OF 31.0 TO 31.9 IN ADULT: ICD-10-CM

## 2023-07-11 DIAGNOSIS — E11.65 UNCONTROLLED TYPE 2 DIABETES MELLITUS WITH HYPERGLYCEMIA: ICD-10-CM

## 2023-07-11 DIAGNOSIS — Z95.810 BIVENTRICULAR ICD (IMPLANTABLE CARDIOVERTER-DEFIBRILLATOR) IN PLACE: ICD-10-CM

## 2023-07-11 DIAGNOSIS — E08.3513 DIABETES MELLITUS DUE TO UNDERLYING CONDITION WITH BOTH EYES AFFECTED BY PROLIFERATIVE RETINOPATHY AND MACULAR EDEMA, WITH LONG-TERM CURRENT USE OF INSULIN: ICD-10-CM

## 2023-07-11 DIAGNOSIS — Z00.00 ENCOUNTER FOR PREVENTIVE HEALTH EXAMINATION: Primary | ICD-10-CM

## 2023-07-11 DIAGNOSIS — E78.5 HYPERLIPIDEMIA ASSOCIATED WITH TYPE 2 DIABETES MELLITUS: ICD-10-CM

## 2023-07-11 DIAGNOSIS — I42.0 NONISCHEMIC CONGESTIVE CARDIOMYOPATHY: ICD-10-CM

## 2023-07-11 DIAGNOSIS — Z78.0 MENOPAUSE: ICD-10-CM

## 2023-07-11 DIAGNOSIS — I15.2 OBESITY, DIABETES, AND HYPERTENSION SYNDROME: ICD-10-CM

## 2023-07-11 DIAGNOSIS — E11.59 HYPERTENSION ASSOCIATED WITH DIABETES: ICD-10-CM

## 2023-07-11 DIAGNOSIS — Z13.820 SCREENING FOR OSTEOPOROSIS: ICD-10-CM

## 2023-07-11 PROBLEM — M62.89 PELVIC FLOOR DYSFUNCTION: Status: RESOLVED | Noted: 2022-05-09 | Resolved: 2023-07-11

## 2023-07-11 PROBLEM — T14.8XXA BRUISING: Status: RESOLVED | Noted: 2021-09-24 | Resolved: 2023-07-11

## 2023-07-11 PROBLEM — N39.42 URINARY INCONTINENCE WITHOUT SENSORY AWARENESS: Status: RESOLVED | Noted: 2021-05-12 | Resolved: 2023-07-11

## 2023-07-11 PROBLEM — N81.84 PELVIC MUSCLE WASTING: Status: RESOLVED | Noted: 2022-05-09 | Resolved: 2023-07-11

## 2023-07-11 PROBLEM — E78.2 MIXED HYPERLIPIDEMIA: Status: RESOLVED | Noted: 2023-04-24 | Resolved: 2023-07-11

## 2023-07-11 PROCEDURE — 99999 PR PBB SHADOW E&M-EST. PATIENT-LVL V: CPT | Mod: PBBFAC,,, | Performed by: NURSE PRACTITIONER

## 2023-07-11 PROCEDURE — G0439 PPPS, SUBSEQ VISIT: HCPCS | Mod: ,,, | Performed by: NURSE PRACTITIONER

## 2023-07-11 PROCEDURE — 99999 PR PBB SHADOW E&M-EST. PATIENT-LVL V: ICD-10-PCS | Mod: PBBFAC,,, | Performed by: NURSE PRACTITIONER

## 2023-07-11 PROCEDURE — G0439 PR MEDICARE ANNUAL WELLNESS SUBSEQUENT VISIT: ICD-10-PCS | Mod: ,,, | Performed by: NURSE PRACTITIONER

## 2023-07-11 PROCEDURE — 99215 OFFICE O/P EST HI 40 MIN: CPT | Mod: PBBFAC,PO | Performed by: NURSE PRACTITIONER

## 2023-07-11 NOTE — PATIENT INSTRUCTIONS
Counseling and Referral of Other Preventative  (Italic type indicates deductible and co-insurance are waived)    Patient Name: Eliana Wagner  Today's Date: 7/11/2023    Health Maintenance         Date Due Completion Date    COVID-19 Vaccine (4 - Pfizer series) Declined   10/19/2021    Hemoglobin A1c 08/09/2023 5/9/2023    Influenza Vaccine (1) 09/01/2023 12/7/2022    Lipid Panel 05/09/2024 5/9/2023    Diabetes Urine Screening 05/10/2024 5/10/2023    Aspirin/Antiplatelet Therapy 07/03/2024 7/3/2023    DEXA Scan Ordered-please schedule 3/9/2021        TETANUS VACCINE    Remote defibrillator check- August- please plug in machine by your bed. [employer] to ask if it can be used on same outlet as house alarm    Dr Kim follow up in October-please schedule    Annual eye exam due- please scheule    Abnormal whisper foor hearing- follow up with audiology in future for hearing test- small amount of wax needs to be removed before your hearing test 10/11/2029       10/11/2019          No orders of the defined types were placed in this encounter.    The following information is provided to all patients.  This information is to help you find resources for any of the problems found today that may be affecting your health:                Living healthy guide: www.[facility].[place].gov      Understanding Diabetes: www.diabetes.org      Eating healthy: www.cdc.gov/healthyweight      CDC home safety checklist: www.cdc.gov/steadi/patient.html      Agency on Aging: www.goea.[place].gov      Alcoholics anonymous (AA): www.aa.org      Physical Activity: www.leobardo.nih.gov/bj0fgez      Tobacco use: www.quitwithusla.org

## 2023-07-21 ENCOUNTER — HOSPITAL ENCOUNTER (OUTPATIENT)
Dept: RADIOLOGY | Facility: HOSPITAL | Age: 88
Discharge: HOME | End: 2023-07-21
Attending: NURSE PRACTITIONER
Payer: MEDICARE

## 2023-07-21 DIAGNOSIS — Z78.0 MENOPAUSE: ICD-10-CM

## 2023-07-21 DIAGNOSIS — Z13.820 SCREENING FOR OSTEOPOROSIS: ICD-10-CM

## 2023-07-21 PROCEDURE — 77080 DXA BONE DENSITY AXIAL SKELETON 1 OR MORE SITES: ICD-10-PCS | Mod: 26,,, | Performed by: INTERNAL MEDICINE

## 2023-07-21 PROCEDURE — 77080 DXA BONE DENSITY AXIAL: CPT | Mod: 26,,, | Performed by: INTERNAL MEDICINE

## 2023-07-21 PROCEDURE — 77080 DXA BONE DENSITY AXIAL: CPT | Mod: TC

## 2023-08-05 ENCOUNTER — HOSPITAL ENCOUNTER (INPATIENT)
Facility: HOSPITAL | Age: 88
LOS: 1 days | Discharge: HOME | DRG: 309 | End: 2023-08-08
Attending: EMERGENCY MEDICINE | Admitting: HOSPITALIST
Payer: MEDICARE

## 2023-08-05 DIAGNOSIS — I47.20 VT (VENTRICULAR TACHYCARDIA): ICD-10-CM

## 2023-08-05 DIAGNOSIS — R55 SYNCOPE: ICD-10-CM

## 2023-08-05 DIAGNOSIS — I47.20 VENTRICULAR TACHYCARDIA: ICD-10-CM

## 2023-08-05 DIAGNOSIS — I49.8 OTHER SPECIFIED CARDIAC ARRHYTHMIAS: ICD-10-CM

## 2023-08-05 DIAGNOSIS — R55 SYNCOPE, CARDIOGENIC: Primary | ICD-10-CM

## 2023-08-05 DIAGNOSIS — I49.01 VENTRICULAR FIBRILLATION: ICD-10-CM

## 2023-08-05 DIAGNOSIS — R07.9 CHEST PAIN: ICD-10-CM

## 2023-08-05 LAB
ALBUMIN SERPL BCP-MCNC: 3.9 G/DL (ref 3.5–5.2)
ALP SERPL-CCNC: 90 U/L (ref 55–135)
ALT SERPL W/O P-5'-P-CCNC: 29 U/L (ref 10–44)
ANION GAP SERPL CALC-SCNC: 12 MMOL/L (ref 8–16)
AST SERPL-CCNC: 25 U/L (ref 10–40)
BASOPHILS # BLD AUTO: 0.04 K/UL (ref 0–0.2)
BASOPHILS NFR BLD: 0.4 % (ref 0–1.9)
BILIRUB SERPL-MCNC: 0.5 MG/DL (ref 0.1–1)
BNP SERPL-MCNC: 137 PG/ML (ref 0–99)
BUN SERPL-MCNC: 12 MG/DL (ref 8–23)
CALCIUM SERPL-MCNC: 9.8 MG/DL (ref 8.7–10.5)
CHLORIDE SERPL-SCNC: 101 MMOL/L (ref 95–110)
CO2 SERPL-SCNC: 25 MMOL/L (ref 23–29)
CREAT SERPL-MCNC: 0.9 MG/DL (ref 0.5–1.4)
DIFFERENTIAL METHOD: ABNORMAL
EOSINOPHIL # BLD AUTO: 0.2 K/UL (ref 0–0.5)
EOSINOPHIL NFR BLD: 1.5 % (ref 0–8)
ERYTHROCYTE [DISTWIDTH] IN BLOOD BY AUTOMATED COUNT: 13.1 % (ref 11.5–14.5)
EST. GFR  (NO RACE VARIABLE): >60 ML/MIN/1.73 M^2
GLUCOSE SERPL-MCNC: 128 MG/DL (ref 70–110)
HCT VFR BLD AUTO: 41 % (ref 37–48.5)
HGB BLD-MCNC: 13.7 G/DL (ref 12–16)
IMM GRANULOCYTES # BLD AUTO: 0.05 K/UL (ref 0–0.04)
IMM GRANULOCYTES NFR BLD AUTO: 0.5 % (ref 0–0.5)
LYMPHOCYTES # BLD AUTO: 3.6 K/UL (ref 1–4.8)
LYMPHOCYTES NFR BLD: 34.6 % (ref 18–48)
MCH RBC QN AUTO: 29.7 PG (ref 27–31)
MCHC RBC AUTO-ENTMCNC: 33.4 G/DL (ref 32–36)
MCV RBC AUTO: 89 FL (ref 82–98)
MONOCYTES # BLD AUTO: 0.6 K/UL (ref 0.3–1)
MONOCYTES NFR BLD: 6 % (ref 4–15)
NEUTROPHILS # BLD AUTO: 5.9 K/UL (ref 1.8–7.7)
NEUTROPHILS NFR BLD: 57 % (ref 38–73)
NRBC BLD-RTO: 0 /100 WBC
PLATELET # BLD AUTO: 162 K/UL (ref 150–450)
PMV BLD AUTO: 10 FL (ref 9.2–12.9)
POCT GLUCOSE: 129 MG/DL (ref 70–110)
POCT GLUCOSE: 130 MG/DL (ref 70–110)
POTASSIUM SERPL-SCNC: 4 MMOL/L (ref 3.5–5.1)
PROT SERPL-MCNC: 7.3 G/DL (ref 6–8.4)
RBC # BLD AUTO: 4.61 M/UL (ref 4–5.4)
SODIUM SERPL-SCNC: 138 MMOL/L (ref 136–145)
TROPONIN I SERPL DL<=0.01 NG/ML-MCNC: 0.04 NG/ML (ref 0–0.03)
TROPONIN I SERPL DL<=0.01 NG/ML-MCNC: 0.09 NG/ML (ref 0–0.03)
WBC # BLD AUTO: 10.38 K/UL (ref 3.9–12.7)

## 2023-08-05 PROCEDURE — G0378 HOSPITAL OBSERVATION PER HR: HCPCS

## 2023-08-05 PROCEDURE — 93005 ELECTROCARDIOGRAM TRACING: CPT

## 2023-08-05 PROCEDURE — 84484 ASSAY OF TROPONIN QUANT: CPT | Mod: 91 | Performed by: NURSE PRACTITIONER

## 2023-08-05 PROCEDURE — 80053 COMPREHEN METABOLIC PANEL: CPT | Performed by: PHYSICIAN ASSISTANT

## 2023-08-05 PROCEDURE — 93010 ELECTROCARDIOGRAM REPORT: CPT | Mod: ,,, | Performed by: INTERNAL MEDICINE

## 2023-08-05 PROCEDURE — 84484 ASSAY OF TROPONIN QUANT: CPT | Performed by: PHYSICIAN ASSISTANT

## 2023-08-05 PROCEDURE — 99285 EMERGENCY DEPT VISIT HI MDM: CPT | Mod: 25

## 2023-08-05 PROCEDURE — 82962 GLUCOSE BLOOD TEST: CPT

## 2023-08-05 PROCEDURE — 96372 THER/PROPH/DIAG INJ SC/IM: CPT | Performed by: NURSE PRACTITIONER

## 2023-08-05 PROCEDURE — 93010 EKG 12-LEAD: ICD-10-PCS | Mod: ,,, | Performed by: INTERNAL MEDICINE

## 2023-08-05 PROCEDURE — 85025 COMPLETE CBC W/AUTO DIFF WBC: CPT | Performed by: PHYSICIAN ASSISTANT

## 2023-08-05 PROCEDURE — 63600175 PHARM REV CODE 636 W HCPCS: Performed by: NURSE PRACTITIONER

## 2023-08-05 PROCEDURE — 83880 ASSAY OF NATRIURETIC PEPTIDE: CPT | Performed by: PHYSICIAN ASSISTANT

## 2023-08-05 RX ORDER — AMLODIPINE BESYLATE 2.5 MG/1
TABLET ORAL
COMMUNITY
Start: 2023-05-18 | End: 2023-08-25

## 2023-08-05 RX ORDER — IBUPROFEN 200 MG
16 TABLET ORAL
Status: DISCONTINUED | OUTPATIENT
Start: 2023-08-05 | End: 2023-08-08 | Stop reason: HOSPADM

## 2023-08-05 RX ORDER — SODIUM CHLORIDE 0.9 % (FLUSH) 0.9 %
3 SYRINGE (ML) INJECTION EVERY 12 HOURS PRN
Status: DISCONTINUED | OUTPATIENT
Start: 2023-08-05 | End: 2023-08-08 | Stop reason: HOSPADM

## 2023-08-05 RX ORDER — ACETAMINOPHEN 325 MG/1
650 TABLET ORAL EVERY 4 HOURS PRN
Status: DISCONTINUED | OUTPATIENT
Start: 2023-08-05 | End: 2023-08-08 | Stop reason: HOSPADM

## 2023-08-05 RX ORDER — ASPIRIN 81 MG/1
81 TABLET ORAL DAILY
Status: DISCONTINUED | OUTPATIENT
Start: 2023-08-06 | End: 2023-08-08 | Stop reason: HOSPADM

## 2023-08-05 RX ORDER — IBUPROFEN 200 MG
24 TABLET ORAL
Status: DISCONTINUED | OUTPATIENT
Start: 2023-08-05 | End: 2023-08-08 | Stop reason: HOSPADM

## 2023-08-05 RX ORDER — GLUCAGON 1 MG
1 KIT INJECTION
Status: DISCONTINUED | OUTPATIENT
Start: 2023-08-05 | End: 2023-08-08 | Stop reason: HOSPADM

## 2023-08-05 RX ORDER — INSULIN ASPART 100 [IU]/ML
0-5 INJECTION, SOLUTION INTRAVENOUS; SUBCUTANEOUS
Status: DISCONTINUED | OUTPATIENT
Start: 2023-08-05 | End: 2023-08-08 | Stop reason: HOSPADM

## 2023-08-05 RX ORDER — LOSARTAN POTASSIUM 50 MG/1
50 TABLET ORAL DAILY
Status: DISCONTINUED | OUTPATIENT
Start: 2023-08-06 | End: 2023-08-08 | Stop reason: HOSPADM

## 2023-08-05 RX ORDER — ATORVASTATIN CALCIUM 40 MG/1
40 TABLET, FILM COATED ORAL DAILY
Status: DISCONTINUED | OUTPATIENT
Start: 2023-08-06 | End: 2023-08-08 | Stop reason: HOSPADM

## 2023-08-05 RX ORDER — METOPROLOL SUCCINATE 50 MG/1
50 TABLET, EXTENDED RELEASE ORAL DAILY
Status: DISCONTINUED | OUTPATIENT
Start: 2023-08-06 | End: 2023-08-08 | Stop reason: HOSPADM

## 2023-08-05 RX ORDER — AMLODIPINE BESYLATE 2.5 MG/1
2.5 TABLET ORAL DAILY
Status: DISCONTINUED | OUTPATIENT
Start: 2023-08-06 | End: 2023-08-08 | Stop reason: HOSPADM

## 2023-08-05 RX ORDER — NALOXONE HCL 0.4 MG/ML
0.02 VIAL (ML) INJECTION
Status: DISCONTINUED | OUTPATIENT
Start: 2023-08-05 | End: 2023-08-08 | Stop reason: HOSPADM

## 2023-08-05 RX ORDER — ONDANSETRON 2 MG/ML
4 INJECTION INTRAMUSCULAR; INTRAVENOUS EVERY 8 HOURS PRN
Status: DISCONTINUED | OUTPATIENT
Start: 2023-08-05 | End: 2023-08-08 | Stop reason: HOSPADM

## 2023-08-05 RX ORDER — TALC
6 POWDER (GRAM) TOPICAL NIGHTLY PRN
Status: DISCONTINUED | OUTPATIENT
Start: 2023-08-05 | End: 2023-08-08 | Stop reason: HOSPADM

## 2023-08-05 RX ORDER — ENOXAPARIN SODIUM 100 MG/ML
40 INJECTION SUBCUTANEOUS EVERY 24 HOURS
Status: DISCONTINUED | OUTPATIENT
Start: 2023-08-05 | End: 2023-08-08 | Stop reason: HOSPADM

## 2023-08-05 RX ADMIN — ENOXAPARIN SODIUM 40 MG: 40 INJECTION SUBCUTANEOUS at 09:08

## 2023-08-05 NOTE — ED NOTES
"Pt presents to ED for syncopal episode while driving. Pt states she realized she blacked out because she "jumped" and was disoriented, sat in car until could focus on what she was doing. This occurred around 1430, pt endorsed weakness but has gotten better as day progressed. Denies HA, CP, SOB, bilateral equal sensation to extremities. PERRLA. Pt has pacemaker/defibrillator, denies shocking sensation. Family at bs.   "

## 2023-08-05 NOTE — FIRST PROVIDER EVALUATION
Emergency Department TeleTriage Encounter Note      CHIEF COMPLAINT    Chief Complaint   Patient presents with    Loss of Consciousness     Pt states that she had a possible LOC when driving home. Pt did not wreck.        VITAL SIGNS   Initial Vitals [08/05/23 1601]   BP Pulse Resp Temp SpO2   (!) 177/77 74 16 -- 98 %      MAP       --            ALLERGIES    Review of patient's allergies indicates:   Allergen Reactions    Penicillins      Caused uti       PROVIDER TRIAGE NOTE  Patient reports a syncopal episode while backing out of a parking lot at [facility]. She woke up and then continued her drive home. She was feeling weak and momentarily confused. She reports this has happened in the past several times related to TIAs.       ORDERS  Labs Reviewed   POCT GLUCOSE - Abnormal; Notable for the following components:       Result Value    POCT Glucose 130 (*)     All other components within normal limits       ED Orders (720h ago, onward)      Start Ordered     Status Ordering Provider    08/05/23 1603 08/05/23 1603  POCT glucose  Once         Final result EMERGENCY, DEPT PHYSICIAN              Virtual Visit Note: The provider triage portion of this emergency department evaluation and documentation was performed via [employer], a HIPAA-compliant telemedicine application, in concert with a tele-presenter in the room. A face to face patient evaluation with one of my colleagues will occur once the patient is placed in an emergency department room.      DISCLAIMER: This note was prepared with [employer] voice recognition transcription software. Garbled syntax, mangled pronouns, and other bizarre constructions may be attributed to that software system.

## 2023-08-05 NOTE — ED PROVIDER NOTES
Emergency Department Encounter  Provider Note    Eliana Wagner  [id number]  8/5/2023    Evaluation:    History:     Chief Complaint   Patient presents with    Loss of Consciousness     Pt states that she had a possible LOC when driving home. Pt did not wreck.        History of Present Illness:  Eliana Wagner is a 88 y.o. 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    The patient presents to the ED due to after syncopal episode.  Patient states she went to the grocery store and walked to her car afterward. While backing out of the parking spot, she feels she must have passed out because she only remembers waking up with the car running.   She denies any prior similar episodes, and denies any associated CP, SOB, headache, or weakness.   She has history of CHF and pacemaker.  Currently she feels fine and has no complaints.       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, with long-term current use of insulin 04/24/2023    Diabetes mellitus, type 2     Hyperlipidemia     Retinal detachment     od     Squamous cell carcinoma of skin     Stroke     tia x 3   ( last in 2005)    Urinary incontinence without sensory awareness 05/12/2021     Past Surgical History:   Procedure Laterality Date    APPENDECTOMY      CARDIAC CATHETERIZATION      CATARACT EXTRACTION Bilateral     EYE SURGERY Bilateral     cataract    INSERT / REPLACE / REMOVE PACEMAKER      INSERTION OF PACEMAKER      july 22 2019     Orestes Kim MD     TONSILLECTOMY       Family History   Problem Relation Age of Onset    Cataracts Mother     Cataracts Father     No Known Problems Sister     No Known Problems Brother     Cancer Son         prostate    Liver disease Son         liver transplant    No Known Problems Maternal Aunt     No Known Problems Maternal Uncle     No Known Problems Paternal Aunt     No Known Problems Paternal Uncle     No Known Problems Maternal Grandmother     Glaucoma Maternal Grandfather     No Known Problems Paternal Grandmother     No Known Problems Paternal Grandfather     Breast cancer Neg Hx     Colon cancer Neg Hx     Ovarian cancer Neg Hx     Amblyopia Neg Hx     Blindness Neg Hx     Diabetes Neg Hx     Hypertension Neg Hx     Macular degeneration Neg Hx     Retinal detachment Neg Hx     Strabismus Neg Hx     Stroke Neg Hx     Thyroid disease Neg Hx      Social History     Socioeconomic History    Marital status:    Tobacco Use    Smoking status: Never    Smokeless tobacco: Never   Substance and Sexual Activity    Alcohol use: Not Currently     Comment: socially    Drug use: No    Sexual activity: Not Currently     Social Determinants of Health     Financial Resource Strain: Low Risk  (7/11/2023)    Overall Financial Resource Strain (CARDIA)     Difficulty of Paying Living Expenses: Not hard at all   Food Insecurity: No Food Insecurity (7/11/2023)    Hunger Vital Sign     Worried About Running Out of Food in the Last Year: Never true     Ran Out of Food in the Last Year: Never true   Transportation Needs: No Transportation Needs (7/11/2023)    PRAPARE - Transportation     Lack of Transportation (Medical): No     Lack of Transportation (Non-Medical): No   Stress: No Stress Concern Present (7/11/2023)    [employer] - Occupational Stress Questionnaire     Feeling of Stress : Only a little   Social Connections: Unknown (7/11/2023)    Social Connection and Isolation Panel [NHANES]     Marital Status:     Housing Stability: Unknown (7/11/2023)    Housing Stability Vital Sign     Unable to Pay for Housing in the Last Year: No     Unstable Housing in the Last Year: No     Review of patient's allergies indicates:   Allergen Reactions    Penicillins      Caused uti       Review of Systems   Respiratory:  Negative for shortness of breath.    Cardiovascular:  Negative for chest pain and palpitations.   Neurological:  Positive for syncope. Negative for headaches.       Physical Exam:     Initial Vitals   BP Pulse Resp Temp SpO2   08/05/23 1601 08/05/23 1601 08/05/23 1601 08/05/23 1811 08/05/23 1601   (!) 177/77 74 16 97.7 °F (36.5 °C) 98 %      MAP       --                Physical Exam    Nursing note and vitals reviewed.  Constitutional: She appears well-developed and well-nourished. She is not diaphoretic. No distress.   HENT:   Head: Normocephalic and atraumatic.   Mouth/Throat: Oropharynx is clear and moist.   Eyes: EOM are normal. Pupils are equal, round, and reactive to light.   Neck: No tracheal deviation present.   Cardiovascular:  Normal rate, regular rhythm, normal heart sounds and intact distal pulses.           Pulmonary/Chest: Breath sounds normal. No stridor. No respiratory distress. She has no wheezes.   Abdominal: Abdomen is soft. Bowel sounds are normal. She exhibits no distension and no mass. There is no abdominal tenderness.   Musculoskeletal:         General: No edema. Normal range of motion.     Neurological: She is alert and oriented to person, place, and time. She has normal strength. No cranial nerve deficit or sensory deficit.   Skin: Skin is warm and dry. Capillary refill takes less than 2 seconds. No pallor.   Psychiatric: She has a normal mood and affect. Her behavior is normal. Thought content normal.         ED Course:   Procedures    Medical Decision Making:    History Acquisition:   Additional historians utilized:  none    Prior medical records were reviewed:   IM visit 7/11 for f/u  incontinence, HTN, obesity, DM, CAD, CHF, HLD    The patient's list of active medical problems, social history, medications, and allergies as documented has been reviewed.     Differential Diagnoses:   Based on available information and initial assessment, Differential Diagnosis includes, but is not limited to:  Arrhythmia, aortic dissection, MI/unstable angina, PE, cardiogenic shock, CHF, CVA/TIA, intracranial lesion/mass, seizure, perforated viscous, ruptured AAA, orthostatic hypotension, vasovagal episode, anemia, dehydration, medication reaction, intentional overdose        EKG:   EKG interpretation by ED attending physician:  Paced rhythm, rate 68, no ST elevations or acute ischemia.   Compared with prior EKG dated 05/2015, grossly stable without significant change.      Labs:     Labs Reviewed   B-TYPE NATRIURETIC PEPTIDE - Abnormal; Notable for the following components:       Result Value     (*)     All other components within normal limits   CBC W/ AUTO DIFFERENTIAL - Abnormal; Notable for the following components:    Immature Grans (Abs) 0.05 (*)     All other components within normal limits   COMPREHENSIVE METABOLIC PANEL - Abnormal; Notable for the following components:    Glucose 128 (*)     All other components within normal limits   TROPONIN I - Abnormal; Notable for the following components:    Troponin I 0.036 (*)     All other components within normal limits   POCT GLUCOSE - Abnormal; Notable for the following components:    POCT Glucose 130 (*)     All other components within normal limits     Independent review of the labs ordered include:   See ED course    Imaging:     Imaging Results              CT Head Without Contrast (Final result)  Result time 08/05/23 17:18:49      Final result by Misti Ro MD (08/05/23 17:18:49)                   Impression:      No convincing acute intracranial hemorrhage, finding of major arterial infarct, mass or mass effect or fracture.    Moderate  deep white matter as seen with microvascular chronic ischemic changes    Senescent atrophic changes.      Electronically signed by: Misti Arredondotri  Date:    08/05/2023  Time:    17:18               Narrative:    EXAMINATION:  CT HEAD WITHOUT CONTRAST    CLINICAL HISTORY:  Syncope, recurrent;    TECHNIQUE:  Low dose axial images were obtained through the head.  Coronal and sagittal reformations were also performed. Contrast was not administered.    COMPARISON:  None.    FINDINGS:  There is no acute intra or extra-axial hemorrhage or hematoma.  Gray-white matter junction differentiation is intact with no evidence of acute major arterial infarct.  There is no focal mass or mass effect.  The ventricles, cisterns and sulci are prominent and consistent with patient age and senescent atrophic changes.    Mild to moderate confluent deep white matter low density in the periventricular white matter noted as seen with microvascular chronic ischemic changes.  The posterior fossa structures and pituitary gland are unremarkable allowing for skull base artifact.    The visualized paranasal sinuses, mastoid air cells and middle ears are clear.  The bony calvarium is intact.  Orbital structures appear symmetric and grossly intact.  Moderate vascular calcifications are noted in the imaged distal vertebral and intracranial internal carotid arteries bilaterally.                                         Additional Consideration:   Additional testing considered during clinical course: none    Social determinants of health considered during development of treatment plan include: poor access to care    Current co-morbidities considered which impacted clinical decision making: HTN, obesity, DM, CAD, CHF, HLD    Case discussed with additional provider: [facility]  service for admission and further management of cardiogenic syncope with cardiac arrhythmia on pacemaker/defibrillator check    Medications   amiodarone 360 mg/200 mL (1.8 mg/mL)  infusion (1 mg/min Intravenous New Bag 8/6/23 1429)   amiodarone in dextrose 150 mg/100 mL (1.5 mg/mL) loading dose 150 mg (0 mg Intravenous Stopped 8/6/23 1425)   regadenoson injection 0.4 mg (0.4 mg Intravenous Given by Other 8/7/23 1158)   amiodarone tablet 200 mg (200 mg Oral Given 8/8/23 1123)        ED Course as of 08/28/23 1022   Sat Aug 05, 2023   1900 SpO2: 98 % [SS]   1900 Resp: 16 [SS]   1900 Pulse: 74 [SS]   1900 Temp Source: Oral [SS]   1900 Temp: 97.7 °F (36.5 °C) [SS]   1900 BP(!): 177/77  Hypertensive on arrival, otherwise reassuring. [SS]   1900 POCT glucose(!)  Slightly elevated [SS]   1904 CBC auto differential(!)  Unremarkable  [SS]   1904 Brain natriuretic peptide(!)  Mildly elevated [SS]   1904 Comprehensive metabolic panel(!)  Unremarkable  [SS]   1904 Troponin I(!)  Slightly elevated. Patient without active CP, SOB, or EKG changes. Will trend.  [SS]   1905 BP: 130/78  BP improved without intervention.  [SS]   1905 Due to syncope without prodrome, [employer] pacemaker/defibrillator interrogated.   Kasey with [employer] states reading showed pt was tx today for VT/VF episode around 1500. Also had tx episode in April 2023 for sustained episodes of VT. She also has numerous non-sustained episodes from June and July 2023; hx of afib from April 2022. [SS]   1906 Due to syncope with VF/VT episode, will place in observation for telemetry and Cardiology consultation in AM.   [facility]  contacted for admission and further management.  [SS]      ED Course User Index  [SS] Shay Hsu MD       Medical Decision Making:   Initial Assessment:   89 yo F with CAD, CVA, CHF, HTN, DM, HLD, pacemaker presents to ED after syncopal episode without prodrome while sitting in her car.  Will obtain cardiac evaluation, labs, CT head, interrogate pacemaker, and reassess.   Independently Interpreted Test(s):   I have ordered and independently interpreted X-rays - see prior notes.  I have ordered and independently  interpreted EKG Reading(s) - see prior notes  Clinical Tests:   Lab Tests: Reviewed and Ordered  Radiological Study: Reviewed and Ordered  Medical Tests: Ordered and Reviewed    On re-evaluation, the patient's status has remained stable.  At this time, I believe the patient should be admitted to the hospital for further evaluation and management of cardiogenic syncope.  [facility] service was contacted and the case was discussed.   The consulting physician/team agrees with plan and will admit under their service.   The patient and family were updated with test results, overall impression, and further plan of care. All questions were answered. The patient expressed understanding and agrees with the current plan.                   Clinical Impression:       ICD-10-CM ICD-9-CM   1. Syncope, cardiogenic  R55 780.2   2. Syncope  R55 780.2   3. Chest pain  R07.9 786.50   4. Other specified cardiac arrhythmias  I49.8 427.89   5. Ventricular tachycardia  I47.20 427.1   6. Ventricular fibrillation  I49.01 427.41   7. VT (ventricular tachycardia)  I47.20 427.1         Follow-up Information       Follow up With Specialties Details Why Contact Info    Orestes Stanley Jr., MD Interventional Cardiology, Cardiology Follow up on 8/24/2023 1:20 pm  A MESSAGE HAS BEEN SENT TO DR. STANLEY' OFFICE REQUESTING A SOONER APT - PER  [address]  [address]  [facility] [address] [phone]      Duy Cheng MD Family Medicine Follow up A MESSAGE HAS BEEN SENT TO OFFICE -- THEY WILL CONTACT YOU WITH A HOSPITAL FOLLOW UP APT. PER  [address] [facility] [address] [phone]               ED Disposition Condition    Observation                Shay Hsu MD  08/06/23 1103       Shay Hsu MD  08/28/23 1026

## 2023-08-05 NOTE — ED NOTES
Kasey with [employer], states reading says pt was tx today for VT/VF episode around 1500. Also had tx episode in April 2023 for sustained episodes of VT. She also has numerous non-sustained episodes from June and July 2023;    hx of afib from April 2022.    To fax over findings.

## 2023-08-05 NOTE — ED NOTES
Review of patient's allergies indicates:   Allergen Reactions    Penicillins      Caused uti       APPEARANCE: Alert, oriented x 4, and in no acute distress.  NEURO: 5/5 strength major flexors/extensors bilaterally. Sensory intact to light touch bilaterally. [place] coma scale: eyes open spontaneously-4, oriented & converses-5, obeys commands-6. No neurological abnormalities. Denies HA, dizziness, photophobia.  CARDIAC: +pacemaker/defibrillator, paced rhythm S1 and S2 noted, denies CP.   RESPIRATORY:Normal rate and effort, breath sounds clear bilaterally throughout chest anterior and posterior. Respirations are equal and unlabored, no obvious signs of distress. No SOB reported.  PERIPHERAL VASCULAR: +2 peripheral pulses present. Normal cap refill <3sec. No edema. Warm to touch.   GASTRO: Abdomen soft, flat, bowel sounds normal and active in all 4 quadrants, no tenderness, no abdominal distention. Denies NVD.  MUSC: Full ROM. No bony tenderness or soft tissue tenderness. No obvious deformity.  SKIN: Skin is warm and dry, normal skin turgor, mucous membranes moist.   GENITOURINARY: Voids spontaneously, denies itching, burning, hematuria.    Patient changed into hospital gown with assistance. Side rails x 2, bed low and locked position, call light in reach and instructed how to use. Pt educated on fall risk and verbalized understanding. Cardiac monitor, pulse ox, and automatic BP cuff applied; alarms set and audible.

## 2023-08-06 PROBLEM — I47.20 VT (VENTRICULAR TACHYCARDIA): Status: ACTIVE | Noted: 2023-08-06

## 2023-08-06 LAB
ANION GAP SERPL CALC-SCNC: 12 MMOL/L (ref 8–16)
BASOPHILS # BLD AUTO: 0.04 K/UL (ref 0–0.2)
BASOPHILS NFR BLD: 0.4 % (ref 0–1.9)
BUN SERPL-MCNC: 12 MG/DL (ref 8–23)
CALCIUM SERPL-MCNC: 8.8 MG/DL (ref 8.7–10.5)
CHLORIDE SERPL-SCNC: 105 MMOL/L (ref 95–110)
CO2 SERPL-SCNC: 22 MMOL/L (ref 23–29)
CREAT SERPL-MCNC: 0.8 MG/DL (ref 0.5–1.4)
DIFFERENTIAL METHOD: ABNORMAL
EOSINOPHIL # BLD AUTO: 0.2 K/UL (ref 0–0.5)
EOSINOPHIL NFR BLD: 2.3 % (ref 0–8)
ERYTHROCYTE [DISTWIDTH] IN BLOOD BY AUTOMATED COUNT: 13.3 % (ref 11.5–14.5)
EST. GFR  (NO RACE VARIABLE): >60 ML/MIN/1.73 M^2
GLUCOSE SERPL-MCNC: 164 MG/DL (ref 70–110)
HCT VFR BLD AUTO: 38.8 % (ref 37–48.5)
HGB BLD-MCNC: 12.6 G/DL (ref 12–16)
IMM GRANULOCYTES # BLD AUTO: 0.04 K/UL (ref 0–0.04)
IMM GRANULOCYTES NFR BLD AUTO: 0.4 % (ref 0–0.5)
LYMPHOCYTES # BLD AUTO: 3.8 K/UL (ref 1–4.8)
LYMPHOCYTES NFR BLD: 41.1 % (ref 18–48)
MAGNESIUM SERPL-MCNC: 2 MG/DL (ref 1.6–2.6)
MCH RBC QN AUTO: 29.5 PG (ref 27–31)
MCHC RBC AUTO-ENTMCNC: 32.5 G/DL (ref 32–36)
MCV RBC AUTO: 91 FL (ref 82–98)
MONOCYTES # BLD AUTO: 0.5 K/UL (ref 0.3–1)
MONOCYTES NFR BLD: 5.4 % (ref 4–15)
NEUTROPHILS # BLD AUTO: 4.7 K/UL (ref 1.8–7.7)
NEUTROPHILS NFR BLD: 50.4 % (ref 38–73)
NRBC BLD-RTO: 0 /100 WBC
PLATELET # BLD AUTO: 143 K/UL (ref 150–450)
PMV BLD AUTO: 10.1 FL (ref 9.2–12.9)
POCT GLUCOSE: 153 MG/DL (ref 70–110)
POCT GLUCOSE: 190 MG/DL (ref 70–110)
POCT GLUCOSE: 196 MG/DL (ref 70–110)
POCT GLUCOSE: 245 MG/DL (ref 70–110)
POTASSIUM SERPL-SCNC: 4.4 MMOL/L (ref 3.5–5.1)
RBC # BLD AUTO: 4.27 M/UL (ref 4–5.4)
SODIUM SERPL-SCNC: 139 MMOL/L (ref 136–145)
TROPONIN I SERPL DL<=0.01 NG/ML-MCNC: 0.04 NG/ML (ref 0–0.03)
WBC # BLD AUTO: 9.29 K/UL (ref 3.9–12.7)

## 2023-08-06 PROCEDURE — 99233 PR SUBSEQUENT HOSPITAL CARE,LEVL III: ICD-10-PCS | Mod: ,,, | Performed by: INTERNAL MEDICINE

## 2023-08-06 PROCEDURE — 96372 THER/PROPH/DIAG INJ SC/IM: CPT | Performed by: NURSE PRACTITIONER

## 2023-08-06 PROCEDURE — 63600175 PHARM REV CODE 636 W HCPCS: Performed by: NURSE PRACTITIONER

## 2023-08-06 PROCEDURE — 80048 BASIC METABOLIC PNL TOTAL CA: CPT | Performed by: NURSE PRACTITIONER

## 2023-08-06 PROCEDURE — 83735 ASSAY OF MAGNESIUM: CPT | Performed by: NURSE PRACTITIONER

## 2023-08-06 PROCEDURE — G0378 HOSPITAL OBSERVATION PER HR: HCPCS

## 2023-08-06 PROCEDURE — 84484 ASSAY OF TROPONIN QUANT: CPT | Performed by: NURSE PRACTITIONER

## 2023-08-06 PROCEDURE — 85025 COMPLETE CBC W/AUTO DIFF WBC: CPT | Performed by: NURSE PRACTITIONER

## 2023-08-06 PROCEDURE — 36415 COLL VENOUS BLD VENIPUNCTURE: CPT | Performed by: NURSE PRACTITIONER

## 2023-08-06 PROCEDURE — 99233 SBSQ HOSP IP/OBS HIGH 50: CPT | Mod: ,,, | Performed by: INTERNAL MEDICINE

## 2023-08-06 PROCEDURE — 25000003 PHARM REV CODE 250: Performed by: NURSE PRACTITIONER

## 2023-08-06 RX ADMIN — LOSARTAN POTASSIUM 50 MG: 50 TABLET, FILM COATED ORAL at 08:08

## 2023-08-06 RX ADMIN — AMLODIPINE BESYLATE 2.5 MG: 2.5 TABLET ORAL at 08:08

## 2023-08-06 RX ADMIN — AMIODARONE HYDROCHLORIDE 0.5 MG/MIN: 1.8 INJECTION, SOLUTION INTRAVENOUS at 07:08

## 2023-08-06 RX ADMIN — AMIODARONE HYDROCHLORIDE 1 MG/MIN: 1.8 INJECTION, SOLUTION INTRAVENOUS at 02:08

## 2023-08-06 RX ADMIN — ASPIRIN 81 MG: 81 TABLET, COATED ORAL at 08:08

## 2023-08-06 RX ADMIN — METOPROLOL SUCCINATE 50 MG: 50 TABLET, EXTENDED RELEASE ORAL at 08:08

## 2023-08-06 RX ADMIN — ENOXAPARIN SODIUM 40 MG: 40 INJECTION SUBCUTANEOUS at 04:08

## 2023-08-06 RX ADMIN — ATORVASTATIN CALCIUM 40 MG: 40 TABLET, FILM COATED ORAL at 08:08

## 2023-08-06 RX ADMIN — AMIODARONE HYDROCHLORIDE 150 MG: 1.5 INJECTION, SOLUTION INTRAVENOUS at 02:08

## 2023-08-06 RX ADMIN — INSULIN ASPART 2 UNITS: 100 INJECTION, SOLUTION INTRAVENOUS; SUBCUTANEOUS at 04:08

## 2023-08-06 RX ADMIN — AMIODARONE HYDROCHLORIDE 0.5 MG/MIN: 1.8 INJECTION, SOLUTION INTRAVENOUS at 08:08

## 2023-08-06 NOTE — HOSPITAL COURSE
She was admitted to the hospital medicine service for further care for complaints of weakness and not feeling well.  While in the ED her PPM/defibrillator was interrogated by [employer] and reported a reading that patient was treated today for a VT/VF episode around 1500. Patient denies CP, SOB, palpitations, shocking sensations, dizziness, H/A, or visual disturbances.  Cardiology was consulted.  She was started on amnio drip as per Cardiology.  She is been NPO after midnight for Lexiscan can as per Cardiology.      8/8 transition to PO amiodarone today. Thuy scan negative for ischemia

## 2023-08-06 NOTE — ED NOTES
Assumed care of pt here after near syncope in vehicle. Per off going RN, RN was advised by device company that the data showed that the pt's defib activated/ shocked pt. Pt denies having any complaints at this time.     Review of patient's allergies indicates:   Allergen Reactions    Penicillins      Caused uti        Patient has verified the spelling of their name and  on armband.   APPEARANCE: Patient is alert, calm, oriented x 4, and does not appear distressed.  SKIN: Skin is normal for race, warm, and dry. Normal skin turgor and mucous membranes moist.  CARDIAC: Normal rate and rhythm, no murmur heard.   RESPIRATORY:Normal rate and effort. Breath sounds clear bilaterally throughout chest. Respirations are equal and unlabored.    GASTRO: Bowel sounds normal, abdomen is soft, no tenderness, and no abdominal distention.  MUSCLE: Full ROM. No bony tenderness or soft tissue tenderness. No obvious deformity.  PERIPHERAL VASCULAR: peripheral pulses present. Normal cap refill. No edema. Warm to touch.  NEURO: 5/5 strength major flexors/extensors bilaterally. Sensory intact to light touch bilaterally. [place] coma scale: eyes open spontaneously-4, oriented & converses-5, obeys commands-6. No neurological abnormalities.   MENTAL STATUS: awake, alert and aware of environment.  : Voids without complication    Admit orders in progress. Pt SR up x 2. Bed in lowest position with wheels locked. Call bell within reach of pt.

## 2023-08-06 NOTE — ASSESSMENT & PLAN NOTE
· Episode occurred in the car - unknown time down  · [employer] reading of treatment for VT/VF today at 1500  · Consult to cardiology ordered  · Hx of CVA  · Weakness and disorientation resolved after episode  · No other neuro symptoms  · Head CT negative for acute abnormalities  · Maintain telemetry  · Cardiology consulted

## 2023-08-06 NOTE — NURSING
Received patient AAOx4 from emergency room via wheelchair on stable conditions accompanied by her daughter. Orientation to room provided, care plan reviewed with patient, voices understanding. No respiratory distress noted, on room air. Paced 60's per cardiac monitor, no red alarm noted. Denies any pain of discomfort, education provided on medication effect and side effect, voices understanding. Call light within her reach, no apparent distress noted, bed in low position, bed alarm on, educated on the importance of calling as needed, voices understanding, stable at this time.

## 2023-08-06 NOTE — ASSESSMENT & PLAN NOTE
Patient is identified as having Systolic (HFrEF) heart failure that is Chronic. CHF is currently controlled. Latest ECHO performed and demonstrates- Results for orders placed during the hospital encounter of 05/17/23    Echo    Interpretation Summary  · The left ventricle is moderately enlarged with moderately decreased systolic function.  · The estimated ejection fraction is 35%.  · The quantitatively derived ejection fraction is 33%.  · The left ventricular global longitudinal strain is -9.9%.  · There are segmental left ventricular wall motion abnormalities. Septal akinesis  · Grade I left ventricular diastolic dysfunction.  · The estimated PA systolic pressure is 28 mmHg.  · Normal right ventricular size with normal right ventricular systolic function.  · Normal central venous pressure (3 mmHg).  · Mild aortic regurgitation.  · Mild mitral regurgitation.  · Mild tricuspid regurgitation.  · There is no pulmonary hypertension.  . Continue Beta Blocker and ACE/ARB and monitor clinical status closely. Monitor on telemetry. Patient is off CHF pathway.  Monitor strict Is&Os and daily weights.  Place on fluid restriction of 2 L. Cardiology has been consulted. Continue to stress to patient importance of self efficacy and  on diet for CHF. Last BNP reviewed- and noted below   Recent Labs   Lab 08/05/23  1637   *   .

## 2023-08-06 NOTE — ASSESSMENT & PLAN NOTE
Noted to have shock delivered during syncopal episode due to VT/VF.  Noted to have multiple episodes in past w/ additional shock 4/2023.    - start amiodarone as per VT section  - establish care w/ o/p electrophysiologist

## 2023-08-06 NOTE — ASSESSMENT & PLAN NOTE
· [employer]  Model [id number]   Serial [id number]   ICD  · Kasey with [employer], states reading says pt was tx today for VT/VF episode around 1500. Also had tx episode in April 2023 for sustained episodes of VT. She also has numerous non-sustained episodes from June and July 2023; hx of afib from April 2022

## 2023-08-06 NOTE — SUBJECTIVE & OBJECTIVE
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, with long-term current use of insulin 04/24/2023    Diabetes mellitus, type 2     Hyperlipidemia     Retinal detachment     od     Squamous cell carcinoma of skin     Stroke     tia x 3   ( last in 2005)    Urinary incontinence without sensory awareness 05/12/2021       Past Surgical History:   Procedure Laterality Date    APPENDECTOMY      CARDIAC CATHETERIZATION      CATARACT EXTRACTION Bilateral     EYE SURGERY Bilateral     cataract    INSERT / REPLACE / REMOVE PACEMAKER      INSERTION OF PACEMAKER      july 22 2019     Orestes Kim MD    TONSILLECTOMY         Review of patient's allergies indicates:   Allergen Reactions    Penicillins      Caused uti       No current facility-administered medications on file prior to encounter.     Current Outpatient Medications on File Prior to Encounter   Medication Sig    amLODIPine (NORVASC) 2.5 MG tablet     aspirin (ECOTRIN) 81 MG EC tablet Take 81 mg by mouth once daily.    atorvastatin (LIPITOR) 40 MG tablet Take 1 tablet (40 mg total) by mouth once daily. For cholesterol and heart protection    dulaglutide (TRULICITY) 1.5 mg/0.5 mL pen injector Inject 1.5 mg into the skin every 7 days.    irbesartan (AVAPRO) 150 MG tablet Take 1 tablet (150 mg total) by mouth once daily. For heart and blood pressure    magnesium oxide (MAG-OX) 400 mg (241.3 mg magnesium) tablet Take 1 tablet (400 mg total) by mouth once daily.    metoprolol succinate (TOPROL-XL) 50 MG 24 hr tablet Take 1 tablet (50 mg total) by mouth once daily. For heart and blood pressure    mirabegron (MYRBETRIQ) 25 mg Tb24 ER tablet Take 1 tablet (25 mg total) by mouth once daily. For bladder    mv-min-FA-Uq-Iz-lypsqye-lutein 0.4-162-18 mg Tab Take by mouth. 1 Tablet Oral Every day    vitamin D (VITAMIN D3) 1000 units Tab  Take 1,000 Units by mouth once daily.    blood sugar diagnostic Strp 1 each by Misc.(Non-Drug; Combo Route) route once daily.    blood-glucose meter kit Use as instructed    conjugated estrogens (PREMARIN) vaginal cream 1 g with applicator or dime-sized amt with finger in vagina nightly x 2 weeks, then twice a week thereafter    fluorouracil (EFUDEX) 5 % cream Apply topically Daily.     lancets Misc 1 each by Misc.(Non-Drug; Combo Route) route once daily.    [DISCONTINUED] diazePAM (VALIUM) 5 MG tablet Take 1 tablet (5 mg total) by mouth every 6 (six) hours as needed for Anxiety. Take 1 pill 30 minutes prior to surgery and bring remaining pill with you on day of surgery.    [DISCONTINUED] HYDROcodone-acetaminophen (NORCO) 5-325 mg per tablet Take 1 tablet by mouth every 6 (six) hours as needed for Pain.     Family History       Problem Relation (Age of Onset)    Cancer Son    Cataracts Mother, Father    Glaucoma Maternal Grandfather    Liver disease Son    No Known Problems Sister, Brother, Maternal Aunt, Maternal Uncle, Paternal Aunt, Paternal Uncle, Maternal Grandmother, Paternal Grandmother, Paternal Grandfather          Tobacco Use    Smoking status: Never    Smokeless tobacco: Never   Substance and Sexual Activity    Alcohol use: Not Currently     Comment: socially    Drug use: No    Sexual activity: Not Currently     Review of Systems   Constitutional:  Negative for chills, fatigue and fever.   HENT:  Negative for trouble swallowing.    Eyes:  Negative for visual disturbance.   Respiratory:  Negative for shortness of breath.    Cardiovascular:  Negative for chest pain.   Gastrointestinal:  Negative for nausea and vomiting.   Genitourinary:  Negative for difficulty urinating.   Musculoskeletal:  Negative for myalgias.   Neurological:  Positive for syncope and weakness. Negative for dizziness, facial asymmetry, speech difficulty, light-headedness and headaches.   Psychiatric/Behavioral:  Negative for agitation  and confusion. The patient is not nervous/anxious.      Objective:     Vital Signs (Most Recent):  Temp: 97.6 °F (36.4 °C) (23)  Pulse: 67 (23)  Resp: 18 (23)  BP: (!) 172/79 (23)  SpO2: 99 % (23) Vital Signs (24h Range):  Temp:  [97.6 °F (36.4 °C)-97.8 °F (36.6 °C)] 97.6 °F (36.4 °C)  Pulse:  [61-74] 67  Resp:  [16-22] 18  SpO2:  [98 %-100 %] 99 %  BP: (130-177)/(67-79) 172/79     Weight: 79.2 kg (174 lb 9.6 oz)  Body mass index is 31.93 kg/m².     Physical Exam  Vitals and nursing note reviewed.   Constitutional:       General: She is not in acute distress.  HENT:      Head: Normocephalic.      Mouth/Throat:      Mouth: Mucous membranes are moist.   Eyes:      Pupils: Pupils are equal, round, and reactive to light.   Cardiovascular:      Rate and Rhythm: Normal rate and regular rhythm.      Pulses: Normal pulses.      Heart sounds: Normal heart sounds.   Pulmonary:      Effort: Pulmonary effort is normal. No respiratory distress.      Breath sounds: Normal breath sounds. No wheezing, rhonchi or rales.   Abdominal:      General: Bowel sounds are normal. There is no distension.      Palpations: Abdomen is soft.      Tenderness: There is no abdominal tenderness. There is no guarding or rebound.   Musculoskeletal:         General: Normal range of motion.      Right lower le+ Edema present.      Left lower le+ Edema present.   Skin:     General: Skin is warm.   Neurological:      Mental Status: She is alert and oriented to person, place, and time.   Psychiatric:         Mood and Affect: Mood normal.         Behavior: Behavior normal.         Thought Content: Thought content normal.         Judgment: Judgment normal.              CRANIAL NERVES     CN III, IV, VI   Pupils are equal, round, and reactive to light.       Significant Labs: All pertinent labs within the past 24 hours have been reviewed.    Significant Imaging: I have reviewed all pertinent  imaging results/findings within the past 24 hours.

## 2023-08-06 NOTE — SUBJECTIVE & OBJECTIVE
Interval History:  Seen at the bedside this morning.  Daughter at the bedside.  No distress noted.  Awaiting Cardiology evaluation.    Review of Systems   Constitutional:  Negative for activity change, fatigue and fever.   HENT:  Negative for trouble swallowing.    Eyes:  Negative for visual disturbance.   Respiratory:  Negative for shortness of breath.    Cardiovascular:  Negative for chest pain, palpitations and leg swelling.   Psychiatric/Behavioral:  Negative for confusion.      Objective:     Vital Signs (Most Recent):  Temp: 96.3 °F (35.7 °C) (08/06/23 0705)  Pulse: 66 (08/06/23 0705)  Resp: 18 (08/06/23 0705)  BP: 139/64 (08/06/23 0705)  SpO2: 97 % (08/06/23 0705) Vital Signs (24h Range):  Temp:  [96.3 °F (35.7 °C)-97.8 °F (36.6 °C)] 96.3 °F (35.7 °C)  Pulse:  [60-74] 66  Resp:  [16-22] 18  SpO2:  [97 %-100 %] 97 %  BP: (130-177)/(62-79) 139/64     Weight: 79.2 kg (174 lb 9.6 oz)  Body mass index is 31.93 kg/m².  No intake or output data in the 24 hours ending 08/06/23 1003      Physical Exam  Vitals and nursing note reviewed.   Constitutional:       Appearance: She is obese.   HENT:      Head: Normocephalic and atraumatic.      Mouth/Throat:      Mouth: Mucous membranes are moist.   Eyes:      Extraocular Movements: Extraocular movements intact.   Cardiovascular:      Rate and Rhythm: Normal rate and regular rhythm.      Pulses: Normal pulses.   Pulmonary:      Effort: Pulmonary effort is normal.   Abdominal:      General: Bowel sounds are normal. There is no distension.      Palpations: Abdomen is soft.      Tenderness: There is no abdominal tenderness. There is no guarding.   Musculoskeletal:         General: Normal range of motion.      Cervical back: Normal range of motion and neck supple.   Skin:     General: Skin is warm and dry.      Capillary Refill: Capillary refill takes 2 to 3 seconds.   Neurological:      Mental Status: She is alert and oriented to person, place, and time.   Psychiatric:          Mood and Affect: Mood normal.         Behavior: Behavior normal.             Significant Labs: All pertinent labs within the past 24 hours have been reviewed.    Significant Imaging: I have reviewed all pertinent imaging results/findings within the past 24 hours.

## 2023-08-06 NOTE — ASSESSMENT & PLAN NOTE
Patient's FSGs are controlled on current medication regimen.  Last A1c reviewed-   Lab Results   Component Value Date    HGBA1C 8.4 (H) 07/21/2023     Most recent fingerstick glucose reviewed-   Recent Labs   Lab 08/05/23  1603 08/05/23 2128 08/06/23  0449   POCTGLUCOSE 130* 129* 153*     Current correctional scale  Low  Maintain anti-hyperglycemic dose as follows-   Antihyperglycemics (From admission, onward)    Start     Stop Route Frequency Ordered    08/05/23 2007  insulin aspart U-100 pen 0-5 Units         -- SubQ Before meals & nightly PRN 08/05/23 1911        Hold Oral hypoglycemics while patient is in the hospital.

## 2023-08-06 NOTE — ASSESSMENT & PLAN NOTE
· [employer]  Model [id number]   Serial [id number]   ICD  · Kasey with [employer], states reading says pt was tx today for VT/VF episode around 1500. Also had tx episode in April 2023 for sustained episodes of VT. She also has numerous non-sustained episodes from June and July 2023; hx of afib from April 2022       Patent

## 2023-08-06 NOTE — CONSULTS
[facility] - Telemetry  Cardiology  Consult Note    Patient Name: Eliana Wagner  MRN: [id number]  Admission Date: 8/5/2023  Hospital Length of Stay: 0 days  Code Status: Full Code   Attending Provider: Hakeem Gale,*   Consulting Provider: Kyle Miles III, MD  Primary Care Physician: Duy Cheng MD  Principal Problem:Syncope    Patient information was obtained from patient, past medical records and ER records.     Cardiology-[facility]  Consult performed by: Kyle Miles III, MD  Consult ordered by: Kely Celis NP        Subjective:     Chief Complaint:  LOC     HPI:   Pt is a 87 yo F w/ PMH of NICM w/ LVEF 45 s/p AICD, HTN, DM2, and HLD who presented to the ED following a syncopal episode while in her car.  She states that she was backing out and then had LOC and awoke following a jolt and then went home.  She did not feel well and thus presented to the ED.  She had an interrogation of her device which noted a shock was delivered around the time she had the episode for VT/VF and was also noted to have therapy delivered previously.  Cardiology was consulted for evaluation of VT/VF.  She denies cp, sob, edema, orthopnea, PND, presyncope, palpitations, and claudication.        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, with long-term current use of insulin 04/24/2023    Diabetes mellitus, type 2     Hyperlipidemia     Retinal detachment     od     Squamous cell carcinoma of skin     Stroke     tia x 3   ( last in 2005)    Urinary incontinence without sensory awareness 05/12/2021       Past Surgical History:   Procedure Laterality Date    APPENDECTOMY      CARDIAC CATHETERIZATION      CATARACT EXTRACTION Bilateral     EYE SURGERY Bilateral     cataract    INSERT / REPLACE / REMOVE PACEMAKER      INSERTION OF PACEMAKER      july 22  2019     Orestes Kim MD    TONSILLECTOMY         Review of patient's allergies indicates:   Allergen Reactions    Penicillins      Caused uti       No current facility-administered medications on file prior to encounter.     Current Outpatient Medications on File Prior to Encounter   Medication Sig    amLODIPine (NORVASC) 2.5 MG tablet     aspirin (ECOTRIN) 81 MG EC tablet Take 81 mg by mouth once daily.    atorvastatin (LIPITOR) 40 MG tablet Take 1 tablet (40 mg total) by mouth once daily. For cholesterol and heart protection    dulaglutide (TRULICITY) 1.5 mg/0.5 mL pen injector Inject 1.5 mg into the skin every 7 days.    irbesartan (AVAPRO) 150 MG tablet Take 1 tablet (150 mg total) by mouth once daily. For heart and blood pressure    magnesium oxide (MAG-OX) 400 mg (241.3 mg magnesium) tablet Take 1 tablet (400 mg total) by mouth once daily.    metoprolol succinate (TOPROL-XL) 50 MG 24 hr tablet Take 1 tablet (50 mg total) by mouth once daily. For heart and blood pressure    mirabegron (MYRBETRIQ) 25 mg Tb24 ER tablet Take 1 tablet (25 mg total) by mouth once daily. For bladder    mv-min-FA-Dc-Oo-rsgafob-lutein 0.4-162-18 mg Tab Take by mouth. 1 Tablet Oral Every day    vitamin D (VITAMIN D3) 1000 units Tab Take 1,000 Units by mouth once daily.    blood sugar diagnostic Strp 1 each by Misc.(Non-Drug; Combo Route) route once daily.    blood-glucose meter kit Use as instructed    conjugated estrogens (PREMARIN) vaginal cream 1 g with applicator or dime-sized amt with finger in vagina nightly x 2 weeks, then twice a week thereafter    fluorouracil (EFUDEX) 5 % cream Apply topically Daily.     lancets Misc 1 each by Misc.(Non-Drug; Combo Route) route once daily.     Family History       Problem Relation (Age of Onset)    Cancer Son    Cataracts Mother, Father    Glaucoma Maternal Grandfather    Liver disease Son    No Known Problems Sister, Brother, Maternal Aunt, Maternal Uncle, Paternal Aunt,  Paternal Uncle, Maternal Grandmother, Paternal Grandmother, Paternal Grandfather          Tobacco Use    Smoking status: Never    Smokeless tobacco: Never   Substance and Sexual Activity    Alcohol use: Not Currently     Comment: socially    Drug use: No    Sexual activity: Not Currently     Review of Systems   Constitutional: Negative for diaphoresis and fever.   HENT:  Negative for congestion and hearing loss.    Eyes:  Negative for blurred vision and pain.   Cardiovascular:  Positive for syncope. Negative for chest pain, claudication, dyspnea on exertion, leg swelling, near-syncope and palpitations.   Respiratory:  Negative for shortness of breath and sleep disturbances due to breathing.    Hematologic/Lymphatic: Negative for bleeding problem. Does not bruise/bleed easily.   Skin:  Negative for color change and poor wound healing.   Gastrointestinal:  Negative for abdominal pain and nausea.   Genitourinary:  Negative for bladder incontinence and flank pain.   Neurological:  Negative for focal weakness and light-headedness.     Objective:     Vital Signs (Most Recent):  Temp: 97.8 °F (36.6 °C) (08/06/23 1123)  Pulse: 68 (08/06/23 1123)  Resp: 18 (08/06/23 1123)  BP: (!) 143/74 (08/06/23 1123)  SpO2: 100 % (08/06/23 1123) Vital Signs (24h Range):  Temp:  [96.3 °F (35.7 °C)-97.8 °F (36.6 °C)] 97.8 °F (36.6 °C)  Pulse:  [60-74] 68  Resp:  [16-22] 18  SpO2:  [97 %-100 %] 100 %  BP: (130-177)/(62-79) 143/74     Weight: 79.2 kg (174 lb 9.6 oz)  Body mass index is 31.93 kg/m².    SpO2: 100 %       No intake or output data in the 24 hours ending 08/06/23 1151    Lines/Drains/Airways       Peripheral Intravenous Line  Duration                  Peripheral IV - Single Lumen 08/05/23 1634 22 G Right Antecubital <1 day                     Physical Exam  Constitutional:       Appearance: She is well-developed. She is not diaphoretic.   HENT:      Head: Normocephalic and atraumatic.   Eyes:      General: No scleral  "icterus.     Pupils: Pupils are equal, round, and reactive to light.   Neck:      Vascular: No JVD.   Cardiovascular:      Rate and Rhythm: Normal rate and regular rhythm.      Pulses: Intact distal pulses.      Heart sounds: S1 normal and S2 normal. No murmur heard.     No friction rub. No gallop.   Pulmonary:      Effort: Pulmonary effort is normal. No respiratory distress.      Breath sounds: Normal breath sounds. No wheezing or rales.   Chest:      Chest wall: No tenderness.   Abdominal:      General: Bowel sounds are normal. There is no distension.      Palpations: Abdomen is soft. There is no mass.      Tenderness: There is no abdominal tenderness. There is no rebound.   Musculoskeletal:         General: No tenderness. Normal range of motion.      Cervical back: Normal range of motion and neck supple.      Right lower leg: No edema.      Left lower leg: No edema.   Skin:     General: Skin is warm and dry.      Coloration: Skin is not pale.   Neurological:      Mental Status: She is alert and oriented to person, place, and time.      Coordination: Coordination normal.      Deep Tendon Reflexes: Reflexes normal.   Psychiatric:         Behavior: Behavior normal.         Judgment: Judgment normal.          Significant Labs: BMP:   Recent Labs   Lab 08/05/23  1637 08/06/23 0341   * 164*    139   K 4.0 4.4    105   CO2 25 22*   BUN 12 12   CREATININE 0.9 0.8   CALCIUM 9.8 8.8   MG  --  2.0   , CMP   Recent Labs   Lab 08/05/23 1637 08/06/23 0341    139   K 4.0 4.4    105   CO2 25 22*   * 164*   BUN 12 12   CREATININE 0.9 0.8   CALCIUM 9.8 8.8   PROT 7.3  --    ALBUMIN 3.9  --    BILITOT 0.5  --    ALKPHOS 90  --    AST 25  --    ALT 29  --    ANIONGAP 12 12   , CBC   Recent Labs   Lab 08/05/23  1637 08/06/23 0341   WBC 10.38 9.29   HGB 13.7 12.6   HCT 41.0 38.8    143*   , INR No results for input(s): "INR", "PROTIME" in the last 48 hours., Lipid Panel No results for " "input(s): "CHOL", "HDL", "LDLCALC", "TRIG", "CHOLHDL" in the last 48 hours., Troponin   Recent Labs   Lab 08/05/23  1637 08/05/23  2116 08/06/23  0341   TROPONINI 0.036* 0.092* 0.042*   , and All pertinent lab results from the last 24 hours have been reviewed.    Significant Imaging: Echocardiogram: 2D echo with color flow doppler: No results found for this or any previous visit. and Transthoracic echo (TTE) complete (Cupid Only):   Results for orders placed or performed during the hospital encounter of 05/17/23   Echo   Result Value Ref Range    Ascending aorta 3.38 cm    STJ 2.77 cm    AV mean gradient 5 mmHg    Ao peak fuentes 1.43 m/s    Ao VTI 33.35 cm    IVS 0.66 0.6 - 1.1 cm    LA size 4.11 cm    Left Atrium Major Axis 4.92 cm    Left Atrium Minor Axis 4.73 cm    LVIDd 6.07 (A) 3.5 - 6.0 cm    LVIDs 5.19 (A) 2.1 - 4.0 cm    LVOT diameter 1.83 cm    LVOT peak VTI 26.42 cm    Posterior Wall 0.77 0.6 - 1.1 cm    MV Peak A Fuentes 1.16 m/s    E wave deceleration time 178.94 msec    MV Peak E Fuentes 0.62 m/s    PV Peak D Fuentes 0.27 m/s    PV Peak S Fuentes 0.44 m/s    RA Major Axis 4.74 cm    RA Width 3.24 cm    RVDD 2.66 cm    Sinus 2.50 cm    TAPSE 2.19 cm    TR Max Fuentes 2.48 m/s    LA WIDTH 3.68 cm    MV stenosis pressure 1/2 time 51.89 ms    LV Diastolic Volume 184.82 mL    LV Systolic Volume 128.89 mL    LVOT peak fuentes 1.20 m/s    TDI LATERAL 0.05 m/s    TDI SEPTAL 0.05 m/s    Mr max fuentes 0.05 m/s    LA volume (mod) 53.20 cm3    MV "A" wave duration 11.70 msec    MV mean gradient 1 mmHg    MV peak gradient 5 mmHg    MV VTI 29.25 cm    LV LATERAL E/E' RATIO 12.40 m/s    LV SEPTAL E/E' RATIO 12.40 m/s    FS 14 %    LA volume 62.01 cm3    LV mass 165.52 g    Left Ventricle Relative Wall Thickness 0.25 cm    AV valve area 2.08 cm2    AV Velocity Ratio 0.84     AV index (prosthetic) 0.79     MV valve area p 1/2 method 4.24 cm2    MV valve area by continuity eq 2.37 cm2    E/A ratio 0.53     Mean e' 0.05 m/s    Pulm vein S/D ratio " 1.63     LVOT area 2.6 cm2    LVOT stroke volume 69.46 cm3    AV peak gradient 8 mmHg    E/E' ratio 12.40 m/s    Triscuspid Valve Regurgitation Peak Gradient 25 mmHg    BSA 1.87 m2    LV Systolic Volume Index 71.2 mL/m2    LV Diastolic Volume Index 102.11 mL/m2    LA Volume Index 34.3 mL/m2    LV Mass Index 91 g/m2    LA Volume Index (Mod) 29.4 mL/m2    Right Atrial Pressure (from IVC) 3 mmHg    QEF 33 %    EF 35 %    TV resting pulmonary artery pressure 28 mmHg    Narrative    · The left ventricle is moderately enlarged with moderately decreased   systolic function.  · The estimated ejection fraction is 35%.  · The quantitatively derived ejection fraction is 33%.  · The left ventricular global longitudinal strain is -9.9%.  · There are segmental left ventricular wall motion abnormalities. Septal   akinesis  · Grade I left ventricular diastolic dysfunction.  · The estimated PA systolic pressure is 28 mmHg.  · Normal right ventricular size with normal right ventricular systolic   function.  · Normal central venous pressure (3 mmHg).  · Mild aortic regurgitation.  · Mild mitral regurgitation.  · Mild tricuspid regurgitation.  · There is no pulmonary hypertension.       and EKG: reviewed.      Assessment and Plan:     VT (ventricular tachycardia)  Pt noted to have shock for VT/VF per BiV ICD interrogation.    - start amio gtt and following 24-48 hrs will plan to transition to PO amio load  - plan for ischemic eval w/ Lexiscan   - NPO at [facility]  - o/p EP eval     History of CVA (cerebrovascular accident)  Continue medical therapy.  Risk factor and lifestyle modifications.      Biventricular ICD (implantable cardioverter-defibrillator) in place  Noted to have shock delivered during syncopal episode due to VT/VF.  Noted to have multiple episodes in past w/ additional shock 4/2023.    - start amiodarone as per VT section  - establish care w/ o/p electrophysiologist    Coronary artery disease involving native coronary artery of  native heart without angina pectoris  Continue medical therapy.      Chronic systolic (congestive) heart failure  Patient is identified as having Combined Systolic and Diastolic heart failure that is Chronic. CHF is currently controlled. Latest ECHO performed and demonstrates- Results for orders placed during the hospital encounter of 05/17/23    Echo    Interpretation Summary  · The left ventricle is moderately enlarged with moderately decreased systolic function.  · The estimated ejection fraction is 35%.  · The quantitatively derived ejection fraction is 33%.  · The left ventricular global longitudinal strain is -9.9%.  · There are segmental left ventricular wall motion abnormalities. Septal akinesis  · Grade I left ventricular diastolic dysfunction.  · The estimated PA systolic pressure is 28 mmHg.  · Normal right ventricular size with normal right ventricular systolic function.  · Normal central venous pressure (3 mmHg).  · Mild aortic regurgitation.  · Mild mitral regurgitation.  · Mild tricuspid regurgitation.  · There is no pulmonary hypertension.  . Continue Beta Blocker and ACE/ARB and monitor clinical status closely. Monitor on telemetry. Patient is on CHF pathway.  Monitor strict Is&Os and daily weights.  Place on fluid restriction of 2 L. Cardiology has been consulted. Continue to stress to patient importance of self efficacy and  on diet for CHF. Last BNP reviewed- and noted below   Recent Labs   Lab 08/05/23  1637   *   .  Continue home medical therapy   Risk factor and lifestyle modifications         VTE Risk Mitigation (From admission, onward)         Ordered     enoxaparin injection 40 mg  Daily         08/05/23 1911     IP VTE HIGH RISK PATIENT  Once         08/05/23 1911     Place sequential compression device  Until discontinued         08/05/23 1911                Thank you for your consult. I will follow-up with patient. Please contact us if you have any additional  questions.    Kyle Miles III, MD  Cardiology   [facility]

## 2023-08-06 NOTE — ASSESSMENT & PLAN NOTE
Patient is identified as having Combined Systolic and Diastolic heart failure that is Chronic. CHF is currently controlled. Latest ECHO performed and demonstrates- Results for orders placed during the hospital encounter of 05/17/23    Echo    Interpretation Summary  · The left ventricle is moderately enlarged with moderately decreased systolic function.  · The estimated ejection fraction is 35%.  · The quantitatively derived ejection fraction is 33%.  · The left ventricular global longitudinal strain is -9.9%.  · There are segmental left ventricular wall motion abnormalities. Septal akinesis  · Grade I left ventricular diastolic dysfunction.  · The estimated PA systolic pressure is 28 mmHg.  · Normal right ventricular size with normal right ventricular systolic function.  · Normal central venous pressure (3 mmHg).  · Mild aortic regurgitation.  · Mild mitral regurgitation.  · Mild tricuspid regurgitation.  · There is no pulmonary hypertension.  . Continue Beta Blocker and ACE/ARB and monitor clinical status closely. Monitor on telemetry. Patient is on CHF pathway.  Monitor strict Is&Os and daily weights.  Place on fluid restriction of 2 L. Cardiology has been consulted. Continue to stress to patient importance of self efficacy and  on diet for CHF. Last BNP reviewed- and noted below   Recent Labs   Lab 08/05/23  1637   *   .  Continue home medical therapy   Risk factor and lifestyle modifications

## 2023-08-06 NOTE — SUBJECTIVE & OBJECTIVE
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, with long-term current use of insulin 04/24/2023    Diabetes mellitus, type 2     Hyperlipidemia     Retinal detachment     od     Squamous cell carcinoma of skin     Stroke     tia x 3   ( last in 2005)    Urinary incontinence without sensory awareness 05/12/2021       Past Surgical History:   Procedure Laterality Date    APPENDECTOMY      CARDIAC CATHETERIZATION      CATARACT EXTRACTION Bilateral     EYE SURGERY Bilateral     cataract    INSERT / REPLACE / REMOVE PACEMAKER      INSERTION OF PACEMAKER      july 22 2019     Orestes Kim MD    TONSILLECTOMY         Review of patient's allergies indicates:   Allergen Reactions    Penicillins      Caused uti       No current facility-administered medications on file prior to encounter.     Current Outpatient Medications on File Prior to Encounter   Medication Sig    amLODIPine (NORVASC) 2.5 MG tablet     aspirin (ECOTRIN) 81 MG EC tablet Take 81 mg by mouth once daily.    atorvastatin (LIPITOR) 40 MG tablet Take 1 tablet (40 mg total) by mouth once daily. For cholesterol and heart protection    dulaglutide (TRULICITY) 1.5 mg/0.5 mL pen injector Inject 1.5 mg into the skin every 7 days.    irbesartan (AVAPRO) 150 MG tablet Take 1 tablet (150 mg total) by mouth once daily. For heart and blood pressure    magnesium oxide (MAG-OX) 400 mg (241.3 mg magnesium) tablet Take 1 tablet (400 mg total) by mouth once daily.    metoprolol succinate (TOPROL-XL) 50 MG 24 hr tablet Take 1 tablet (50 mg total) by mouth once daily. For heart and blood pressure    mirabegron (MYRBETRIQ) 25 mg Tb24 ER tablet Take 1 tablet (25 mg total) by mouth once daily. For bladder    mv-min-FA-Aa-Po-allvoau-lutein 0.4-162-18 mg Tab Take by mouth. 1 Tablet Oral Every day    vitamin D (VITAMIN D3) 1000 units Tab  Take 1,000 Units by mouth once daily.    blood sugar diagnostic Strp 1 each by Misc.(Non-Drug; Combo Route) route once daily.    blood-glucose meter kit Use as instructed    conjugated estrogens (PREMARIN) vaginal cream 1 g with applicator or dime-sized amt with finger in vagina nightly x 2 weeks, then twice a week thereafter    fluorouracil (EFUDEX) 5 % cream Apply topically Daily.     lancets Misc 1 each by Misc.(Non-Drug; Combo Route) route once daily.     Family History       Problem Relation (Age of Onset)    Cancer Son    Cataracts Mother, Father    Glaucoma Maternal Grandfather    Liver disease Son    No Known Problems Sister, Brother, Maternal Aunt, Maternal Uncle, Paternal Aunt, Paternal Uncle, Maternal Grandmother, Paternal Grandmother, Paternal Grandfather          Tobacco Use    Smoking status: Never    Smokeless tobacco: Never   Substance and Sexual Activity    Alcohol use: Not Currently     Comment: socially    Drug use: No    Sexual activity: Not Currently     Review of Systems   Constitutional: Negative for diaphoresis and fever.   HENT:  Negative for congestion and hearing loss.    Eyes:  Negative for blurred vision and pain.   Cardiovascular:  Positive for syncope. Negative for chest pain, claudication, dyspnea on exertion, leg swelling, near-syncope and palpitations.   Respiratory:  Negative for shortness of breath and sleep disturbances due to breathing.    Hematologic/Lymphatic: Negative for bleeding problem. Does not bruise/bleed easily.   Skin:  Negative for color change and poor wound healing.   Gastrointestinal:  Negative for abdominal pain and nausea.   Genitourinary:  Negative for bladder incontinence and flank pain.   Neurological:  Negative for focal weakness and light-headedness.     Objective:     Vital Signs (Most Recent):  Temp: 97.8 °F (36.6 °C) (08/06/23 1123)  Pulse: 68 (08/06/23 1123)  Resp: 18 (08/06/23 1123)  BP: (!) 143/74 (08/06/23 1123)  SpO2: 100 % (08/06/23 1123) Vital  Signs (24h Range):  Temp:  [96.3 °F (35.7 °C)-97.8 °F (36.6 °C)] 97.8 °F (36.6 °C)  Pulse:  [60-74] 68  Resp:  [16-22] 18  SpO2:  [97 %-100 %] 100 %  BP: (130-177)/(62-79) 143/74     Weight: 79.2 kg (174 lb 9.6 oz)  Body mass index is 31.93 kg/m².    SpO2: 100 %       No intake or output data in the 24 hours ending 08/06/23 1151    Lines/Drains/Airways       Peripheral Intravenous Line  Duration                  Peripheral IV - Single Lumen 08/05/23 1634 22 G Right Antecubital <1 day                     Physical Exam  Constitutional:       Appearance: She is well-developed. She is not diaphoretic.   HENT:      Head: Normocephalic and atraumatic.   Eyes:      General: No scleral icterus.     Pupils: Pupils are equal, round, and reactive to light.   Neck:      Vascular: No JVD.   Cardiovascular:      Rate and Rhythm: Normal rate and regular rhythm.      Pulses: Intact distal pulses.      Heart sounds: S1 normal and S2 normal. No murmur heard.     No friction rub. No gallop.   Pulmonary:      Effort: Pulmonary effort is normal. No respiratory distress.      Breath sounds: Normal breath sounds. No wheezing or rales.   Chest:      Chest wall: No tenderness.   Abdominal:      General: Bowel sounds are normal. There is no distension.      Palpations: Abdomen is soft. There is no mass.      Tenderness: There is no abdominal tenderness. There is no rebound.   Musculoskeletal:         General: No tenderness. Normal range of motion.      Cervical back: Normal range of motion and neck supple.      Right lower leg: No edema.      Left lower leg: No edema.   Skin:     General: Skin is warm and dry.      Coloration: Skin is not pale.   Neurological:      Mental Status: She is alert and oriented to person, place, and time.      Coordination: Coordination normal.      Deep Tendon Reflexes: Reflexes normal.   Psychiatric:         Behavior: Behavior normal.         Judgment: Judgment normal.          Significant Labs: BMP:   Recent  "Labs   Lab 08/05/23  1637 08/06/23  0341   * 164*    139   K 4.0 4.4    105   CO2 25 22*   BUN 12 12   CREATININE 0.9 0.8   CALCIUM 9.8 8.8   MG  --  2.0   , CMP   Recent Labs   Lab 08/05/23  1637 08/06/23  0341    139   K 4.0 4.4    105   CO2 25 22*   * 164*   BUN 12 12   CREATININE 0.9 0.8   CALCIUM 9.8 8.8   PROT 7.3  --    ALBUMIN 3.9  --    BILITOT 0.5  --    ALKPHOS 90  --    AST 25  --    ALT 29  --    ANIONGAP 12 12   , CBC   Recent Labs   Lab 08/05/23  1637 08/06/23  0341   WBC 10.38 9.29   HGB 13.7 12.6   HCT 41.0 38.8    143*   , INR No results for input(s): "INR", "PROTIME" in the last 48 hours., Lipid Panel No results for input(s): "CHOL", "HDL", "LDLCALC", "TRIG", "CHOLHDL" in the last 48 hours., Troponin   Recent Labs   Lab 08/05/23  1637 08/05/23  2116 08/06/23  0341   TROPONINI 0.036* 0.092* 0.042*   , and All pertinent lab results from the last 24 hours have been reviewed.    Significant Imaging: Echocardiogram: 2D echo with color flow doppler: No results found for this or any previous visit. and Transthoracic echo (TTE) complete (Cupid Only):   Results for orders placed or performed during the hospital encounter of 05/17/23   Echo   Result Value Ref Range    Ascending aorta 3.38 cm    STJ 2.77 cm    AV mean gradient 5 mmHg    Ao peak fuentes 1.43 m/s    Ao VTI 33.35 cm    IVS 0.66 0.6 - 1.1 cm    LA size 4.11 cm    Left Atrium Major Axis 4.92 cm    Left Atrium Minor Axis 4.73 cm    LVIDd 6.07 (A) 3.5 - 6.0 cm    LVIDs 5.19 (A) 2.1 - 4.0 cm    LVOT diameter 1.83 cm    LVOT peak VTI 26.42 cm    Posterior Wall 0.77 0.6 - 1.1 cm    MV Peak A Fuentes 1.16 m/s    E wave deceleration time 178.94 msec    MV Peak E Fuentes 0.62 m/s    PV Peak D Fuentes 0.27 m/s    PV Peak S Fuentes 0.44 m/s    RA Major Axis 4.74 cm    RA Width 3.24 cm    RVDD 2.66 cm    Sinus 2.50 cm    TAPSE 2.19 cm    TR Max Fuentes 2.48 m/s    LA WIDTH 3.68 cm    MV stenosis pressure 1/2 time 51.89 ms    LV " "Diastolic Volume 184.82 mL    LV Systolic Volume 128.89 mL    LVOT peak narendra 1.20 m/s    TDI LATERAL 0.05 m/s    TDI SEPTAL 0.05 m/s    Mr max narendra 0.05 m/s    LA volume (mod) 53.20 cm3    MV "A" wave duration 11.70 msec    MV mean gradient 1 mmHg    MV peak gradient 5 mmHg    MV VTI 29.25 cm    LV LATERAL E/E' RATIO 12.40 m/s    LV SEPTAL E/E' RATIO 12.40 m/s    FS 14 %    LA volume 62.01 cm3    LV mass 165.52 g    Left Ventricle Relative Wall Thickness 0.25 cm    AV valve area 2.08 cm2    AV Velocity Ratio 0.84     AV index (prosthetic) 0.79     MV valve area p 1/2 method 4.24 cm2    MV valve area by continuity eq 2.37 cm2    E/A ratio 0.53     Mean e' 0.05 m/s    Pulm vein S/D ratio 1.63     LVOT area 2.6 cm2    LVOT stroke volume 69.46 cm3    AV peak gradient 8 mmHg    E/E' ratio 12.40 m/s    Triscuspid Valve Regurgitation Peak Gradient 25 mmHg    BSA 1.87 m2    LV Systolic Volume Index 71.2 mL/m2    LV Diastolic Volume Index 102.11 mL/m2    LA Volume Index 34.3 mL/m2    LV Mass Index 91 g/m2    LA Volume Index (Mod) 29.4 mL/m2    Right Atrial Pressure (from IVC) 3 mmHg    QEF 33 %    EF 35 %    TV resting pulmonary artery pressure 28 mmHg    Narrative    · The left ventricle is moderately enlarged with moderately decreased   systolic function.  · The estimated ejection fraction is 35%.  · The quantitatively derived ejection fraction is 33%.  · The left ventricular global longitudinal strain is -9.9%.  · There are segmental left ventricular wall motion abnormalities. Septal   akinesis  · Grade I left ventricular diastolic dysfunction.  · The estimated PA systolic pressure is 28 mmHg.  · Normal right ventricular size with normal right ventricular systolic   function.  · Normal central venous pressure (3 mmHg).  · Mild aortic regurgitation.  · Mild mitral regurgitation.  · Mild tricuspid regurgitation.  · There is no pulmonary hypertension.       and EKG: reviewed.    "

## 2023-08-06 NOTE — ASSESSMENT & PLAN NOTE
· Episode occurred in the car - unknown time down  · [employer] reading of treatment for VT/VF today at 1500  · Consult to cardiology ordered  · Hx of CVA  · Weakness and disorientation resolved after episode  · No other neuro symptoms  · Head CT negative for acute abnormalities  · Maintain telemetry

## 2023-08-06 NOTE — PLAN OF CARE
Problem: Adult Inpatient Plan of Care  Goal: Plan of Care Review  Outcome: Ongoing, Progressing    VN cued into room to complete admit assessment. VIP model introduced; VN working alongside bedside treatment team.  Plan of care reviewed with patient. Patient informed of fall risk, fall precautions, call light within reach, side rails x2 elevated. Patient notified to ask staff for assistance. Patient verbalized complete understanding. Time allowed for questions. Will continue to monitor and intervene as needed. Chart reviewed.

## 2023-08-06 NOTE — HPI
"Eliana Wagner is an 88 year old female with PMH CAD, CVA, CHF, HTN, DMT2, HLD and ICD placement. She presented to the ED after a syncopal episode this afternoon. She reports that she was at [facility] earlier today and had just gotten into her car (parked in the 2nd parking spot from the entrance, so not a long walk) and the next thing she remembers is "jolting" awake. She states that she had put her car in reverse and was prison out of her parking spot when she woke up. She denies any prodromal symptoms or symptoms afterwards. She states she was slightly disoriented afterwards so she sat in her car until she could focus. She did not have her phone with her so she proceeded to drive home. Later she was c/o weakness and not feeling well so her family brought her into the ED. Her PPM/defibrillator was interrogated by [employer] and reported a reading that patient was treated today for a VT/VF episode around 1500. Patient denies CP, SOB, palpitations, shocking sensations, dizziness, H/A, or visual disturbances.  "

## 2023-08-06 NOTE — PLAN OF CARE
Problem: Adult Inpatient Plan of Care  Goal: Plan of Care Review  Outcome: Ongoing, Progressing  Goal: Patient-Specific Goal (Individualized)  Outcome: Ongoing, Progressing  Goal: Absence of Hospital-Acquired Illness or Injury  Outcome: Ongoing, Progressing  Goal: Optimal Comfort and Wellbeing  Outcome: Ongoing, Progressing  Goal: Readiness for Transition of Care  Outcome: Ongoing, Progressing     Problem: Syncope  Goal: Absence of Syncopal Symptoms  Outcome: Ongoing, Progressing     Problem: Fall Injury Risk  Goal: Absence of Fall and Fall-Related Injury  Outcome: Ongoing, Progressing

## 2023-08-06 NOTE — PROGRESS NOTES
"[facility]  Progress Note    Patient Name: Eliana Wagner  MRN: [id number]  Patient Class: OP- Observation   Admission Date: 8/5/2023  Length of Stay: 0 days  Attending Physician: Hakeem Gale,*  Primary Care Provider: Duy Cheng MD        Subjective:     Principal Problem:Syncope        HPI:  Eliana Wagner is an 88 year old female with PMH CAD, CVA, CHF, HTN, DMT2, HLD and ICD placement. She presented to the ED after a syncopal episode this afternoon. She reports that she was at [facility] earlier today and had just gotten into her car (parked in the 2nd parking spot from the entrance, so not a long walk) and the next thing she remembers is "jolting" awake. She states that she had put her car in reverse and was half-way out of her parking spot when she woke up. She denies any prodromal symptoms or symptoms afterwards. She states she was slightly disoriented afterwards so she sat in her car until she could focus. She did not have her phone with her so she proceeded to drive home. Later she was c/o weakness and not feeling well so her family brought her into the ED. Her PPM/defibrillator was interrogated by [employer] and reported a reading that patient was treated today for a VT/VF episode around 1500. Patient denies CP, SOB, palpitations, shocking sensations, dizziness, H/A, or visual disturbances.      Overview/Hospital Course:  She was admitted to the hospital medicine service for further care for complaints of weakness and not feeling well.  While in the ED her PPM/defibrillator was interrogated by [employer] and reported a reading that patient was treated today for a VT/VF episode around 1500. Patient denies CP, SOB, palpitations, shocking sensations, dizziness, H/A, or visual disturbances.  Cardiology was consulted.      Interval History:  Seen at the bedside this morning.  Daughter at the bedside.  No distress noted.  Awaiting Cardiology evaluation.    Review of " Systems   Constitutional:  Negative for activity change, fatigue and fever.   HENT:  Negative for trouble swallowing.    Eyes:  Negative for visual disturbance.   Respiratory:  Negative for shortness of breath.    Cardiovascular:  Negative for chest pain, palpitations and leg swelling.   Psychiatric/Behavioral:  Negative for confusion.      Objective:     Vital Signs (Most Recent):  Temp: 96.3 °F (35.7 °C) (08/06/23 0705)  Pulse: 66 (08/06/23 0705)  Resp: 18 (08/06/23 0705)  BP: 139/64 (08/06/23 0705)  SpO2: 97 % (08/06/23 0705) Vital Signs (24h Range):  Temp:  [96.3 °F (35.7 °C)-97.8 °F (36.6 °C)] 96.3 °F (35.7 °C)  Pulse:  [60-74] 66  Resp:  [16-22] 18  SpO2:  [97 %-100 %] 97 %  BP: (130-177)/(62-79) 139/64     Weight: 79.2 kg (174 lb 9.6 oz)  Body mass index is 31.93 kg/m².  No intake or output data in the 24 hours ending 08/06/23 1003      Physical Exam  Vitals and nursing note reviewed.   Constitutional:       Appearance: She is obese.   HENT:      Head: Normocephalic and atraumatic.      Mouth/Throat:      Mouth: Mucous membranes are moist.   Eyes:      Extraocular Movements: Extraocular movements intact.   Cardiovascular:      Rate and Rhythm: Normal rate and regular rhythm.      Pulses: Normal pulses.   Pulmonary:      Effort: Pulmonary effort is normal.   Abdominal:      General: Bowel sounds are normal. There is no distension.      Palpations: Abdomen is soft.      Tenderness: There is no abdominal tenderness. There is no guarding.   Musculoskeletal:         General: Normal range of motion.      Cervical back: Normal range of motion and neck supple.   Skin:     General: Skin is warm and dry.      Capillary Refill: Capillary refill takes 2 to 3 seconds.   Neurological:      Mental Status: She is alert and oriented to person, place, and time.   Psychiatric:         Mood and Affect: Mood normal.         Behavior: Behavior normal.             Significant Labs: All pertinent labs within the past 24 hours have  been reviewed.    Significant Imaging: I have reviewed all pertinent imaging results/findings within the past 24 hours.      Assessment/Plan:      * Syncope  · Episode occurred in the car - unknown time down  · [employer] reading of treatment for VT/VF today at 1500  · Consult to cardiology ordered  · Hx of CVA  · Weakness and disorientation resolved after episode  · No other neuro symptoms  · Head CT negative for acute abnormalities  · Maintain telemetry  · Cardiology consulted        History of CVA (cerebrovascular accident)  · Continue ASA, statin, and BP meds        DM type 2 without retinopathy  Patient's FSGs are controlled on current medication regimen.  Last A1c reviewed-   Lab Results   Component Value Date    HGBA1C 8.4 (H) 07/21/2023     Most recent fingerstick glucose reviewed-   Recent Labs   Lab 08/05/23  1603 08/05/23  2128 08/06/23  0449   POCTGLUCOSE 130* 129* 153*     Current correctional scale  Low  Maintain anti-hyperglycemic dose as follows-   Antihyperglycemics (From admission, onward)    Start     Stop Route Frequency Ordered    08/05/23 2007  insulin aspart U-100 pen 0-5 Units         -- SubQ Before meals & nightly PRN 08/05/23 1911        Hold Oral hypoglycemics while patient is in the hospital.    Biventricular ICD (implantable cardioverter-defibrillator) in place  · [employer]  Model [id number]   Serial [id number]   ICD  · Kasey with [employer], states reading says pt was tx today for VT/VF episode around 1500. Also had tx episode in April 2023 for sustained episodes of VT. She also has numerous non-sustained episodes from June and July 2023; hx of afib from April 2022        Coronary artery disease involving native coronary artery of native heart without angina pectoris  · Continue ASA, statin, ARB, and BB      Chronic systolic (congestive) heart failure  Patient is identified as having Systolic (HFrEF) heart failure that is Chronic. CHF is currently controlled. Latest ECHO performed and  demonstrates- Results for orders placed during the hospital encounter of 05/17/23    Echo    Interpretation Summary  · The left ventricle is moderately enlarged with moderately decreased systolic function.  · The estimated ejection fraction is 35%.  · The quantitatively derived ejection fraction is 33%.  · The left ventricular global longitudinal strain is -9.9%.  · There are segmental left ventricular wall motion abnormalities. Septal akinesis  · Grade I left ventricular diastolic dysfunction.  · The estimated PA systolic pressure is 28 mmHg.  · Normal right ventricular size with normal right ventricular systolic function.  · Normal central venous pressure (3 mmHg).  · Mild aortic regurgitation.  · Mild mitral regurgitation.  · Mild tricuspid regurgitation.  · There is no pulmonary hypertension.  . Continue Beta Blocker and ACE/ARB and monitor clinical status closely. Monitor on telemetry. Patient is off CHF pathway.  Monitor strict Is&Os and daily weights.  Place on fluid restriction of 2 L. Cardiology has been consulted. Continue to stress to patient importance of self efficacy and  on diet for CHF. Last BNP reviewed- and noted below   Recent Labs   Lab 08/05/23  1637   *   .      VTE Risk Mitigation (From admission, onward)         Ordered     enoxaparin injection 40 mg  Daily         08/05/23 1911     IP VTE HIGH RISK PATIENT  Once         08/05/23 1911     Place sequential compression device  Until discontinued         08/05/23 1911                Discharge Planning   KAR:      Code Status: Full Code   Is the patient medically ready for discharge?:     Reason for patient still in hospital (select all that apply): Laboratory test, Treatment, Imaging and Consult recommendations                     Erick Mckinley NP  Department of Hospital Medicine   [facility]

## 2023-08-06 NOTE — CARE UPDATE
Placed a call to Dr. Miles regarding the following recs:    Pt noted to have shock for VT/VF per BiV ICD interrogation.    - starting amio gtt and following 24-48 hrs will plan to transition to PO amio load--MD rooney'd ordering amiodarone load of 150 mg and then amiodarone gtt  - planning for ischemic eval w/ Lexiscan   - NPO at MN  - o/p EP eval--ambulatory referral ordered    Erick Mckinley NP

## 2023-08-06 NOTE — ASSESSMENT & PLAN NOTE
Patient's FSGs are controlled on current medication regimen.  Last A1c reviewed-   Lab Results   Component Value Date    HGBA1C 8.4 (H) 07/21/2023     Most recent fingerstick glucose reviewed-   Recent Labs   Lab 08/05/23  1603 08/05/23 2128   POCTGLUCOSE 130* 129*     Current correctional scale  Low  Maintain anti-hyperglycemic dose as follows-   Antihyperglycemics (From admission, onward)    Start     Stop Route Frequency Ordered    08/05/23 2007  insulin aspart U-100 pen 0-5 Units         -- SubQ Before meals & nightly PRN 08/05/23 1911        Hold Oral hypoglycemics while patient is in the hospital.

## 2023-08-06 NOTE — HPI
Pt is a 89 yo F w/ PMH of NICM w/ LVEF 45 s/p AICD, HTN, DM2, and HLD who presented to the ED following a syncopal episode while in her car.  She states that she was backing out and then had LOC and awoke following a jolt and then went home.  She did not feel well and thus presented to the ED.  She had an interrogation of her device which noted a shock was delivered around the time she had the episode for VT/VF and was also noted to have therapy delivered previously.  Cardiology was consulted for evaluation of VT/VF.  She denies cp, sob, edema, orthopnea, PND, presyncope, palpitations, and claudication.
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
## 2023-08-06 NOTE — ASSESSMENT & PLAN NOTE
Pt noted to have shock for VT/VF per BiV ICD interrogation.    - start amio gtt and following 24-48 hrs will plan to transition to PO amio load  - plan for ischemic eval w/ Lexiscan   - NPO at [facility]  - o/p EP eval

## 2023-08-07 LAB
ANION GAP SERPL CALC-SCNC: 11 MMOL/L (ref 8–16)
BASOPHILS # BLD AUTO: 0.04 K/UL (ref 0–0.2)
BASOPHILS NFR BLD: 0.4 % (ref 0–1.9)
BUN SERPL-MCNC: 13 MG/DL (ref 8–23)
CALCIUM SERPL-MCNC: 9 MG/DL (ref 8.7–10.5)
CHLORIDE SERPL-SCNC: 104 MMOL/L (ref 95–110)
CO2 SERPL-SCNC: 24 MMOL/L (ref 23–29)
CREAT SERPL-MCNC: 0.8 MG/DL (ref 0.5–1.4)
CV PHARM DOSE: 0.4 MG
CV STRESS BASE HR: 59 BPM
DIASTOLIC BLOOD PRESSURE: 70 MMHG
DIFFERENTIAL METHOD: ABNORMAL
EOSINOPHIL # BLD AUTO: 0.3 K/UL (ref 0–0.5)
EOSINOPHIL NFR BLD: 2.5 % (ref 0–8)
ERYTHROCYTE [DISTWIDTH] IN BLOOD BY AUTOMATED COUNT: 13.1 % (ref 11.5–14.5)
EST. GFR  (NO RACE VARIABLE): >60 ML/MIN/1.73 M^2
GLUCOSE SERPL-MCNC: 165 MG/DL (ref 70–110)
HCT VFR BLD AUTO: 41.7 % (ref 37–48.5)
HGB BLD-MCNC: 13.7 G/DL (ref 12–16)
IMM GRANULOCYTES # BLD AUTO: 0.1 K/UL (ref 0–0.04)
IMM GRANULOCYTES NFR BLD AUTO: 1 % (ref 0–0.5)
LYMPHOCYTES # BLD AUTO: 4.6 K/UL (ref 1–4.8)
LYMPHOCYTES NFR BLD: 43.8 % (ref 18–48)
MAGNESIUM SERPL-MCNC: 2 MG/DL (ref 1.6–2.6)
MCH RBC QN AUTO: 29.4 PG (ref 27–31)
MCHC RBC AUTO-ENTMCNC: 32.9 G/DL (ref 32–36)
MCV RBC AUTO: 90 FL (ref 82–98)
MONOCYTES # BLD AUTO: 0.6 K/UL (ref 0.3–1)
MONOCYTES NFR BLD: 5.9 % (ref 4–15)
NEUTROPHILS # BLD AUTO: 4.8 K/UL (ref 1.8–7.7)
NEUTROPHILS NFR BLD: 46.4 % (ref 38–73)
NRBC BLD-RTO: 0 /100 WBC
OHS CV CPX 85 PERCENT MAX PREDICTED HEART RATE MALE: 109
OHS CV CPX MAX PREDICTED HEART RATE: 129
OHS CV CPX PATIENT IS FEMALE: 1
OHS CV CPX PATIENT IS MALE: 0
OHS CV CPX PEAK DIASTOLIC BLOOD PRESSURE: 61 MMHG
OHS CV CPX PEAK HEAR RATE: 81 BPM
OHS CV CPX PEAK RATE PRESSURE PRODUCT: NORMAL
OHS CV CPX PEAK SYSTOLIC BLOOD PRESSURE: 139 MMHG
OHS CV CPX PERCENT MAX PREDICTED HEART RATE ACHIEVED: 63
OHS CV CPX RATE PRESSURE PRODUCT PRESENTING: 7965
PLATELET # BLD AUTO: 155 K/UL (ref 150–450)
PMV BLD AUTO: 10.4 FL (ref 9.2–12.9)
POCT GLUCOSE: 149 MG/DL (ref 70–110)
POCT GLUCOSE: 173 MG/DL (ref 70–110)
POCT GLUCOSE: 182 MG/DL (ref 70–110)
POCT GLUCOSE: 191 MG/DL (ref 70–110)
POTASSIUM SERPL-SCNC: 4.6 MMOL/L (ref 3.5–5.1)
RBC # BLD AUTO: 4.66 M/UL (ref 4–5.4)
SODIUM SERPL-SCNC: 139 MMOL/L (ref 136–145)
SYSTOLIC BLOOD PRESSURE: 135 MMHG
WBC # BLD AUTO: 10.39 K/UL (ref 3.9–12.7)

## 2023-08-07 PROCEDURE — 36415 COLL VENOUS BLD VENIPUNCTURE: CPT | Performed by: NURSE PRACTITIONER

## 2023-08-07 PROCEDURE — 11000001 HC ACUTE MED/SURG PRIVATE ROOM

## 2023-08-07 PROCEDURE — 63600175 PHARM REV CODE 636 W HCPCS: Performed by: NURSE PRACTITIONER

## 2023-08-07 PROCEDURE — 80048 BASIC METABOLIC PNL TOTAL CA: CPT | Performed by: NURSE PRACTITIONER

## 2023-08-07 PROCEDURE — 99233 PR SUBSEQUENT HOSPITAL CARE,LEVL III: ICD-10-PCS | Mod: 25,,, | Performed by: NURSE PRACTITIONER

## 2023-08-07 PROCEDURE — 99233 SBSQ HOSP IP/OBS HIGH 50: CPT | Mod: 25,,, | Performed by: NURSE PRACTITIONER

## 2023-08-07 PROCEDURE — 83735 ASSAY OF MAGNESIUM: CPT | Performed by: NURSE PRACTITIONER

## 2023-08-07 PROCEDURE — 63600175 PHARM REV CODE 636 W HCPCS: Performed by: HOSPITALIST

## 2023-08-07 PROCEDURE — 85025 COMPLETE CBC W/AUTO DIFF WBC: CPT | Performed by: NURSE PRACTITIONER

## 2023-08-07 PROCEDURE — 25000003 PHARM REV CODE 250: Performed by: NURSE PRACTITIONER

## 2023-08-07 RX ORDER — REGADENOSON 0.08 MG/ML
0.4 INJECTION, SOLUTION INTRAVENOUS ONCE
Status: COMPLETED | OUTPATIENT
Start: 2023-08-07 | End: 2023-08-07

## 2023-08-07 RX ADMIN — REGADENOSON 0.4 MG: 0.08 INJECTION, SOLUTION INTRAVENOUS at 11:08

## 2023-08-07 RX ADMIN — ACETAMINOPHEN 650 MG: 325 TABLET ORAL at 10:08

## 2023-08-07 RX ADMIN — AMIODARONE HYDROCHLORIDE 0.5 MG/MIN: 1.8 INJECTION, SOLUTION INTRAVENOUS at 11:08

## 2023-08-07 RX ADMIN — LOSARTAN POTASSIUM 50 MG: 50 TABLET, FILM COATED ORAL at 08:08

## 2023-08-07 RX ADMIN — ATORVASTATIN CALCIUM 40 MG: 40 TABLET, FILM COATED ORAL at 08:08

## 2023-08-07 RX ADMIN — METOPROLOL SUCCINATE 50 MG: 50 TABLET, EXTENDED RELEASE ORAL at 08:08

## 2023-08-07 RX ADMIN — ASPIRIN 81 MG: 81 TABLET, COATED ORAL at 08:08

## 2023-08-07 RX ADMIN — AMLODIPINE BESYLATE 2.5 MG: 2.5 TABLET ORAL at 08:08

## 2023-08-07 RX ADMIN — ENOXAPARIN SODIUM 40 MG: 40 INJECTION SUBCUTANEOUS at 05:08

## 2023-08-07 NOTE — ASSESSMENT & PLAN NOTE
· [employer]  Model [id number]   Serial [id number]   ICD  · Kasey with [employer], states reading says pt was tx today for VT/VF episode around 1500. Also had tx episode in April 2023 for sustained episodes of VT. She also has numerous non-sustained episodes from June and July 2023; hx of afib from April 2022  · Start amnio drip  · Will need to establish care with EP outpatient

## 2023-08-07 NOTE — PROGRESS NOTES
[place] - Telemetry  Cardiology  Progress Note    Patient Name: Eliana Wagner  MRN: [id number]  Admission Date: 8/5/2023  Hospital Length of Stay: 0 days  Code Status: Full Code   Attending Physician: Hakeem Gale,*   Primary Care Physician: Duy Cheng MD  Expected Discharge Date:   Principal Problem:Syncope    Subjective:     Hospital Course:   08/07/2023 No recurrent VT overnight. Stress test pending this AM. No chest pain. Lytes WNL.       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, with long-term current use of insulin 04/24/2023    Diabetes mellitus, type 2     Hyperlipidemia     Retinal detachment     od     Squamous cell carcinoma of skin     Stroke     tia x 3   ( last in 2005)    Urinary incontinence without sensory awareness 05/12/2021       Past Surgical History:   Procedure Laterality Date    APPENDECTOMY      CARDIAC CATHETERIZATION      CATARACT EXTRACTION Bilateral     EYE SURGERY Bilateral     cataract    INSERT / REPLACE / REMOVE PACEMAKER      INSERTION OF PACEMAKER      july 22 2019     Orestes Kim MD    TONSILLECTOMY         Review of patient's allergies indicates:   Allergen Reactions    Penicillins      Caused uti       No current facility-administered medications on file prior to encounter.     Current Outpatient Medications on File Prior to Encounter   Medication Sig    amLODIPine (NORVASC) 2.5 MG tablet     aspirin (ECOTRIN) 81 MG EC tablet Take 81 mg by mouth once daily.    atorvastatin (LIPITOR) 40 MG tablet Take 1 tablet (40 mg total) by mouth once daily. For cholesterol and heart protection    dulaglutide (TRULICITY) 1.5 mg/0.5 mL pen injector Inject 1.5 mg into the skin every 7 days.    irbesartan (AVAPRO) 150 MG tablet Take 1 tablet (150 mg total) by mouth once daily. For heart and blood pressure     magnesium oxide (MAG-OX) 400 mg (241.3 mg magnesium) tablet Take 1 tablet (400 mg total) by mouth once daily.    metoprolol succinate (TOPROL-XL) 50 MG 24 hr tablet Take 1 tablet (50 mg total) by mouth once daily. For heart and blood pressure    mirabegron (MYRBETRIQ) 25 mg Tb24 ER tablet Take 1 tablet (25 mg total) by mouth once daily. For bladder    mv-min-FA-Ys-Hy-exlpnmb-lutein 0.4-162-18 mg Tab Take by mouth. 1 Tablet Oral Every day    vitamin D (VITAMIN D3) 1000 units Tab Take 1,000 Units by mouth once daily.    blood sugar diagnostic Strp 1 each by Misc.(Non-Drug; Combo Route) route once daily.    blood-glucose meter kit Use as instructed    conjugated estrogens (PREMARIN) vaginal cream 1 g with applicator or dime-sized amt with finger in vagina nightly x 2 weeks, then twice a week thereafter    fluorouracil (EFUDEX) 5 % cream Apply topically Daily.     lancets Misc 1 each by Misc.(Non-Drug; Combo Route) route once daily.     Family History       Problem Relation (Age of Onset)    Cancer Son    Cataracts Mother, Father    Glaucoma Maternal Grandfather    Liver disease Son    No Known Problems Sister, Brother, Maternal Aunt, Maternal Uncle, Paternal Aunt, Paternal Uncle, Maternal Grandmother, Paternal Grandmother, Paternal Grandfather          Tobacco Use    Smoking status: Never    Smokeless tobacco: Never   Substance and Sexual Activity    Alcohol use: Not Currently     Comment: socially    Drug use: No    Sexual activity: Not Currently     Review of Systems   Constitutional: Negative for diaphoresis and fever.   HENT:  Negative for congestion and hearing loss.    Eyes:  Negative for blurred vision and pain.   Cardiovascular:  Negative for chest pain, claudication, dyspnea on exertion, leg swelling, near-syncope and palpitations.   Respiratory:  Negative for shortness of breath and sleep disturbances due to breathing.    Hematologic/Lymphatic: Negative for bleeding problem. Does not  bruise/bleed easily.   Skin:  Negative for color change and poor wound healing.   Gastrointestinal:  Negative for abdominal pain and nausea.   Genitourinary:  Negative for bladder incontinence and flank pain.   Neurological:  Negative for focal weakness and light-headedness.     Objective:     Vital Signs (Most Recent):  Temp: 97.6 °F (36.4 °C) (08/07/23 0702)  Pulse: 69 (08/07/23 0733)  Resp: 18 (08/07/23 0702)  BP: (!) 121/57 (08/07/23 0702)  SpO2: 96 % (08/07/23 0702) Vital Signs (24h Range):  Temp:  [96.1 °F (35.6 °C)-97.8 °F (36.6 °C)] 97.6 °F (36.4 °C)  Pulse:  [59-69] 69  Resp:  [16-18] 18  SpO2:  [96 %-100 %] 96 %  BP: (121-143)/(57-74) 121/57     Weight: 78.9 kg (174 lb)  Body mass index is 31.83 kg/m².    SpO2: 96 %       No intake or output data in the 24 hours ending 08/07/23 1054    Lines/Drains/Airways       Peripheral Intravenous Line  Duration                  Peripheral IV - Single Lumen 08/05/23 1634 22 G Right Antecubital 1 day                     Physical Exam  Constitutional:       Appearance: She is well-developed. She is not diaphoretic.   HENT:      Head: Normocephalic and atraumatic.   Eyes:      General: No scleral icterus.     Pupils: Pupils are equal, round, and reactive to light.   Neck:      Vascular: No JVD.   Cardiovascular:      Rate and Rhythm: Normal rate and regular rhythm.      Pulses: Intact distal pulses.      Heart sounds: S1 normal and S2 normal. No murmur heard.     No friction rub. No gallop.   Pulmonary:      Effort: Pulmonary effort is normal. No respiratory distress.      Breath sounds: Normal breath sounds. No wheezing or rales.   Chest:      Chest wall: No tenderness.   Abdominal:      General: Bowel sounds are normal. There is no distension.      Palpations: Abdomen is soft. There is no mass.      Tenderness: There is no abdominal tenderness. There is no rebound.   Musculoskeletal:         General: No tenderness. Normal range of motion.      Cervical back: Normal  "range of motion and neck supple.      Right lower leg: No edema.      Left lower leg: No edema.   Skin:     General: Skin is warm and dry.      Coloration: Skin is not pale.   Neurological:      Mental Status: She is alert and oriented to person, place, and time.      Coordination: Coordination normal.      Deep Tendon Reflexes: Reflexes normal.   Psychiatric:         Behavior: Behavior normal.         Judgment: Judgment normal.          Significant Labs: BMP:   Recent Labs   Lab 08/05/23 1637 08/06/23 0341 08/07/23  0310   * 164* 165*    139 139   K 4.0 4.4 4.6    105 104   CO2 25 22* 24   BUN 12 12 13   CREATININE 0.9 0.8 0.8   CALCIUM 9.8 8.8 9.0   MG  --  2.0 2.0     , CMP   Recent Labs   Lab 08/05/23 1637 08/06/23 0341 08/07/23  0310    139 139   K 4.0 4.4 4.6    105 104   CO2 25 22* 24   * 164* 165*   BUN 12 12 13   CREATININE 0.9 0.8 0.8   CALCIUM 9.8 8.8 9.0   PROT 7.3  --   --    ALBUMIN 3.9  --   --    BILITOT 0.5  --   --    ALKPHOS 90  --   --    AST 25  --   --    ALT 29  --   --    ANIONGAP 12 12 11     , CBC   Recent Labs   Lab 08/05/23 1637 08/06/23 0341 08/07/23  0310   WBC 10.38 9.29 10.39   HGB 13.7 12.6 13.7   HCT 41.0 38.8 41.7    143* 155     , INR No results for input(s): "INR", "PROTIME" in the last 48 hours., Lipid Panel No results for input(s): "CHOL", "HDL", "LDLCALC", "TRIG", "CHOLHDL" in the last 48 hours., Troponin   Recent Labs   Lab 08/05/23 1637 08/05/23 2116 08/06/23 0341   TROPONINI 0.036* 0.092* 0.042*     , and All pertinent lab results from the last 24 hours have been reviewed.    Significant Imaging: Echocardiogram: 2D echo with color flow doppler: No results found for this or any previous visit. and Transthoracic echo (TTE) complete (Cupid Only):   Results for orders placed or performed during the hospital encounter of 05/17/23   Echo   Result Value Ref Range    Ascending aorta 3.38 cm    STJ 2.77 cm    AV mean gradient 5 " "mmHg    Ao peak fuentes 1.43 m/s    Ao VTI 33.35 cm    IVS 0.66 0.6 - 1.1 cm    LA size 4.11 cm    Left Atrium Major Axis 4.92 cm    Left Atrium Minor Axis 4.73 cm    LVIDd 6.07 (A) 3.5 - 6.0 cm    LVIDs 5.19 (A) 2.1 - 4.0 cm    LVOT diameter 1.83 cm    LVOT peak VTI 26.42 cm    Posterior Wall 0.77 0.6 - 1.1 cm    MV Peak A Fuentes 1.16 m/s    E wave deceleration time 178.94 msec    MV Peak E Fuentes 0.62 m/s    PV Peak D Fuentes 0.27 m/s    PV Peak S Fuentes 0.44 m/s    RA Major Axis 4.74 cm    RA Width 3.24 cm    RVDD 2.66 cm    Sinus 2.50 cm    TAPSE 2.19 cm    TR Max Fuentes 2.48 m/s    LA WIDTH 3.68 cm    MV stenosis pressure 1/2 time 51.89 ms    LV Diastolic Volume 184.82 mL    LV Systolic Volume 128.89 mL    LVOT peak fuentes 1.20 m/s    TDI LATERAL 0.05 m/s    TDI SEPTAL 0.05 m/s    Mr max fuentes 0.05 m/s    LA volume (mod) 53.20 cm3    MV "A" wave duration 11.70 msec    MV mean gradient 1 mmHg    MV peak gradient 5 mmHg    MV VTI 29.25 cm    LV LATERAL E/E' RATIO 12.40 m/s    LV SEPTAL E/E' RATIO 12.40 m/s    FS 14 %    LA volume 62.01 cm3    LV mass 165.52 g    Left Ventricle Relative Wall Thickness 0.25 cm    AV valve area 2.08 cm2    AV Velocity Ratio 0.84     AV index (prosthetic) 0.79     MV valve area p 1/2 method 4.24 cm2    MV valve area by continuity eq 2.37 cm2    E/A ratio 0.53     Mean e' 0.05 m/s    Pulm vein S/D ratio 1.63     LVOT area 2.6 cm2    LVOT stroke volume 69.46 cm3    AV peak gradient 8 mmHg    E/E' ratio 12.40 m/s    Triscuspid Valve Regurgitation Peak Gradient 25 mmHg    BSA 1.87 m2    LV Systolic Volume Index 71.2 mL/m2    LV Diastolic Volume Index 102.11 mL/m2    LA Volume Index 34.3 mL/m2    LV Mass Index 91 g/m2    LA Volume Index (Mod) 29.4 mL/m2    Right Atrial Pressure (from IVC) 3 mmHg    QEF 33 %    EF 35 %    TV resting pulmonary artery pressure 28 mmHg    Narrative    · The left ventricle is moderately enlarged with moderately decreased   systolic function.  · The estimated ejection fraction is " 35%.  · The quantitatively derived ejection fraction is 33%.  · The left ventricular global longitudinal strain is -9.9%.  · There are segmental left ventricular wall motion abnormalities. Septal   akinesis  · Grade I left ventricular diastolic dysfunction.  · The estimated PA systolic pressure is 28 mmHg.  · Normal right ventricular size with normal right ventricular systolic   function.  · Normal central venous pressure (3 mmHg).  · Mild aortic regurgitation.  · Mild mitral regurgitation.  · Mild tricuspid regurgitation.  · There is no pulmonary hypertension.       and EKG: reviewed.      Assessment and Plan:     Brief HPI: Patient seen this morning on rounds without CV complaint. NPO for stress test. No VT on tele review.     VT (ventricular tachycardia)  Pt noted to have shock for VT/VF per BiV ICD interrogation.    - On amio gtt, will transition to p.o. on 08/08/2023 400 mg BID x 2 weeks then 400 mg daily thereafter   - Lexiscan pending today   - o/p EP eval     History of CVA (cerebrovascular accident)  Continue medical therapy.  Risk factor and lifestyle modifications.      Biventricular ICD (implantable cardioverter-defibrillator) in place  Noted to have shock delivered during syncopal episode due to VT/VF.  Noted to have multiple episodes in past w/ additional shock 4/2023.    - start amiodarone as per VT section  - establish care w/ o/p electrophysiologist    Coronary artery disease involving native coronary artery of native heart without angina pectoris  Continue medical therapy.      Chronic systolic (congestive) heart failure  05/17/23    Echo    Interpretation Summary  · The left ventricle is moderately enlarged with moderately decreased systolic function.  · The estimated ejection fraction is 35%.  · The quantitatively derived ejection fraction is 33%.  · The left ventricular global longitudinal strain is -9.9%.  · There are segmental left ventricular wall motion abnormalities. Septal akinesis  ·  Grade I left ventricular diastolic dysfunction.  · The estimated PA systolic pressure is 28 mmHg.  · Normal right ventricular size with normal right ventricular systolic function.  · Normal central venous pressure (3 mmHg).  · Mild aortic regurgitation.  · Mild mitral regurgitation.  · Mild tricuspid regurgitation.  · There is no pulmonary hypertension.  . Continue Beta Blocker and ACE/ARB and monitor clinical status closely. Monitor on telemetry. Patient is on CHF pathway.  Monitor strict Is&Os and daily weights.  Place on fluid restriction of 2 L. Cardiology has been consulted. Continue to stress to patient importance of self efficacy and  on diet for CHF. Last BNP reviewed- and noted below   Recent Labs   Lab 08/05/23  1637   *   .  Continue BB, ARB        VTE Risk Mitigation (From admission, onward)         Ordered     enoxaparin injection 40 mg  Daily         08/05/23 1911     IP VTE HIGH RISK PATIENT  Once         08/05/23 1911     Place sequential compression device  Until discontinued         08/05/23 1911                Germain Duckworth NP  Cardiology  [place] - Telemetry

## 2023-08-07 NOTE — PLAN OF CARE
Problem: Adult Inpatient Plan of Care  Goal: Plan of Care Review  Outcome: Ongoing, Progressing  Goal: Patient-Specific Goal (Individualized)  Outcome: Ongoing, Progressing  Goal: Absence of Hospital-Acquired Illness or Injury  Outcome: Ongoing, Progressing  Goal: Optimal Comfort and Wellbeing  Outcome: Ongoing, Progressing  Goal: Readiness for Transition of Care  Outcome: Ongoing, Progressing     Problem: Diabetes Comorbidity  Goal: Blood Glucose Level Within Targeted Range  Outcome: Ongoing, Progressing     Problem: Syncope  Goal: Absence of Syncopal Symptoms  Outcome: Ongoing, Progressing     Problem: Fall Injury Risk  Goal: Absence of Fall and Fall-Related Injury  Outcome: Ongoing, Progressing     Problem: Dysrhythmia  Goal: Normalized Cardiac Rhythm  Outcome: Ongoing, Progressing     Problem: Heart Failure Comorbidity  Goal: Maintenance of Heart Failure Symptom Control  Outcome: Ongoing, Progressing

## 2023-08-07 NOTE — SUBJECTIVE & OBJECTIVE
Interval History:  Seen at the bedside this morning.  She is been NPO past midnight for Lexiscan on today.  Amnio drip still infusing.    Review of Systems   Constitutional:  Negative for activity change, fatigue and fever.   HENT:  Negative for trouble swallowing.    Eyes:  Negative for visual disturbance.   Respiratory:  Negative for shortness of breath.    Cardiovascular:  Negative for chest pain, palpitations and leg swelling.   Psychiatric/Behavioral:  Negative for confusion.      Objective:     Vital Signs (Most Recent):  Temp: 97.6 °F (36.4 °C) (08/07/23 0702)  Pulse: 63 (08/07/23 0702)  Resp: 18 (08/07/23 0702)  BP: (!) 121/57 (08/07/23 0702)  SpO2: 96 % (08/07/23 0702) Vital Signs (24h Range):  Temp:  [96.1 °F (35.6 °C)-97.8 °F (36.6 °C)] 97.6 °F (36.4 °C)  Pulse:  [59-68] 63  Resp:  [16-18] 18  SpO2:  [96 %-100 %] 96 %  BP: (121-143)/(57-74) 121/57     Weight: 79.2 kg (174 lb 9.6 oz)  Body mass index is 31.93 kg/m².  No intake or output data in the 24 hours ending 08/07/23 1010      Physical Exam  Vitals and nursing note reviewed.   Constitutional:       Appearance: She is obese.   HENT:      Head: Normocephalic and atraumatic.      Mouth/Throat:      Mouth: Mucous membranes are moist.   Eyes:      Extraocular Movements: Extraocular movements intact.   Cardiovascular:      Rate and Rhythm: Normal rate and regular rhythm.      Pulses: Normal pulses.   Pulmonary:      Effort: Pulmonary effort is normal.   Abdominal:      General: Bowel sounds are normal. There is no distension.      Palpations: Abdomen is soft.      Tenderness: There is no abdominal tenderness. There is no guarding.   Musculoskeletal:         General: Normal range of motion.      Cervical back: Normal range of motion and neck supple.   Skin:     General: Skin is warm and dry.      Capillary Refill: Capillary refill takes 2 to 3 seconds.   Neurological:      Mental Status: She is alert and oriented to person, place, and time.   Psychiatric:          Mood and Affect: Mood normal.         Behavior: Behavior normal.             Significant Labs: All pertinent labs within the past 24 hours have been reviewed.    Significant Imaging: I have reviewed all pertinent imaging results/findings within the past 24 hours.

## 2023-08-07 NOTE — PROGRESS NOTES
"[facility] Medicine  Progress Note    Patient Name: Eliana Wagner  MRN: [id number]  Patient Class: IP- Inpatient   Admission Date: 8/5/2023  Length of Stay: 0 days  Attending Physician: Hakeem Gale,*  Primary Care Provider: Duy Cheng MD        Subjective:     Principal Problem:Syncope        HPI:  Eliana Wagner is an 88 year old female with PMH CAD, CVA, CHF, HTN, DMT2, HLD and ICD placement. She presented to the ED after a syncopal episode this afternoon. She reports that she was at [facility] earlier today and had just gotten into her car (parked in the 2nd parking spot from the entrance, so not a long walk) and the next thing she remembers is "jolting" awake. She states that she had put her car in reverse and was senior living out of her parking spot when she woke up. She denies any prodromal symptoms or symptoms afterwards. She states she was slightly disoriented afterwards so she sat in her car until she could focus. She did not have her phone with her so she proceeded to drive home. Later she was c/o weakness and not feeling well so her family brought her into the ED. Her PPM/defibrillator was interrogated by [employer] and reported a reading that patient was treated today for a VT/VF episode around 1500. Patient denies CP, SOB, palpitations, shocking sensations, dizziness, H/A, or visual disturbances.      Overview/Hospital Course:  She was admitted to the hospital medicine service for further care for complaints of weakness and not feeling well.  While in the ED her PPM/defibrillator was interrogated by [employer] and reported a reading that patient was treated today for a VT/VF episode around 1500. Patient denies CP, SOB, palpitations, shocking sensations, dizziness, H/A, or visual disturbances.  Cardiology was consulted.  She was started on amnio drip as per Cardiology.  She is been NPO after midnight for Lexiscan as per Cardiology.      Interval History:  Seen at the " bedside this morning.  She is been NPO past midnight for Lexiscan on today.  Amnio drip still infusing.    Review of Systems   Constitutional:  Negative for activity change, fatigue and fever.   HENT:  Negative for trouble swallowing.    Eyes:  Negative for visual disturbance.   Respiratory:  Negative for shortness of breath.    Cardiovascular:  Negative for chest pain, palpitations and leg swelling.   Psychiatric/Behavioral:  Negative for confusion.      Objective:     Vital Signs (Most Recent):  Temp: 97.6 °F (36.4 °C) (08/07/23 0702)  Pulse: 63 (08/07/23 0702)  Resp: 18 (08/07/23 0702)  BP: (!) 121/57 (08/07/23 0702)  SpO2: 96 % (08/07/23 0702) Vital Signs (24h Range):  Temp:  [96.1 °F (35.6 °C)-97.8 °F (36.6 °C)] 97.6 °F (36.4 °C)  Pulse:  [59-68] 63  Resp:  [16-18] 18  SpO2:  [96 %-100 %] 96 %  BP: (121-143)/(57-74) 121/57     Weight: 79.2 kg (174 lb 9.6 oz)  Body mass index is 31.93 kg/m².  No intake or output data in the 24 hours ending 08/07/23 1010      Physical Exam  Vitals and nursing note reviewed.   Constitutional:       Appearance: She is obese.   HENT:      Head: Normocephalic and atraumatic.      Mouth/Throat:      Mouth: Mucous membranes are moist.   Eyes:      Extraocular Movements: Extraocular movements intact.   Cardiovascular:      Rate and Rhythm: Normal rate and regular rhythm.      Pulses: Normal pulses.   Pulmonary:      Effort: Pulmonary effort is normal.   Abdominal:      General: Bowel sounds are normal. There is no distension.      Palpations: Abdomen is soft.      Tenderness: There is no abdominal tenderness. There is no guarding.   Musculoskeletal:         General: Normal range of motion.      Cervical back: Normal range of motion and neck supple.   Skin:     General: Skin is warm and dry.      Capillary Refill: Capillary refill takes 2 to 3 seconds.   Neurological:      Mental Status: She is alert and oriented to person, place, and time.   Psychiatric:         Mood and Affect: Mood  normal.         Behavior: Behavior normal.             Significant Labs: All pertinent labs within the past 24 hours have been reviewed.    Significant Imaging: I have reviewed all pertinent imaging results/findings within the past 24 hours.      Assessment/Plan:      * Syncope  Episode occurred in the car - unknown time down  [employer] reading of treatment for VT/VF today at 1500  Consult to cardiology ordered  Hx of CVA  Weakness and disorientation resolved after episode  No other neuro symptoms  Head CT negative for acute abnormalities  Maintain telemetry  Cardiology consulted        VT (ventricular tachycardia)  -on admission she was noted to have a shock for VT/VF per ICD interrogation  -as per Cardiology will start amiodarone drip and follow for 24-48 hours with plans to transition to oral and nasal.  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  Last A1c reviewed-   Lab Results   Component Value Date    HGBA1C 8.4 (H) 07/21/2023     Most recent fingerstick glucose reviewed-   Recent Labs   Lab 08/06/23  1124 08/06/23  1548 08/06/23  2040 08/07/23  0459   POCTGLUCOSE 190* 245* 196* 182*     Current correctional scale  Low  Maintain anti-hyperglycemic dose as follows-   Antihyperglycemics (From admission, onward)      Start     Stop Route Frequency Ordered    08/05/23 2007  insulin aspart U-100 pen 0-5 Units         -- SubQ Before meals & nightly PRN 08/05/23 1911          Hold Oral hypoglycemics while patient is in the hospital.    Biventricular ICD (implantable cardioverter-defibrillator) in place  [employer]  Model [id number]   Serial [id number]   ICD  Kasey with [employer], states reading says pt was tx today for VT/VF episode around 1500. Also had tx episode in April 2023 for sustained  episodes of VT. She also has numerous non-sustained episodes from June and July 2023; hx of afib from April 2022  Start amnio drip  Will need to establish care with EP outpatient        Coronary artery disease involving native coronary artery of native heart without angina pectoris  Continue ASA, statin, ARB, and BB      Chronic systolic (congestive) heart failure  Patient is identified as having Systolic (HFrEF) heart failure that is Chronic. CHF is currently controlled. Latest ECHO performed and demonstrates- Results for orders placed during the hospital encounter of 05/17/23    Echo    Interpretation Summary  · The left ventricle is moderately enlarged with moderately decreased systolic function.  · The estimated ejection fraction is 35%.  · The quantitatively derived ejection fraction is 33%.  · The left ventricular global longitudinal strain is -9.9%.  · There are segmental left ventricular wall motion abnormalities. Septal akinesis  · Grade I left ventricular diastolic dysfunction.  · The estimated PA systolic pressure is 28 mmHg.  · Normal right ventricular size with normal right ventricular systolic function.  · Normal central venous pressure (3 mmHg).  · Mild aortic regurgitation.  · Mild mitral regurgitation.  · Mild tricuspid regurgitation.  · There is no pulmonary hypertension.  . Continue Beta Blocker and ACE/ARB and monitor clinical status closely. Monitor on telemetry. Patient is off CHF pathway.  Monitor strict Is&Os and daily weights.  Place on fluid restriction of 2 L. Cardiology has been consulted. Continue to stress to patient importance of self efficacy and  on diet for CHF. Last BNP reviewed- and noted below   Recent Labs   Lab 08/05/23  1637   *   .      VTE Risk Mitigation (From admission, onward)           Ordered     enoxaparin injection 40 mg  Daily         08/05/23 1911     IP VTE HIGH RISK PATIENT  Once         08/05/23 1911     Place sequential compression device  Until  discontinued         08/05/23 1911                    Discharge Planning   KAR:      Code Status: Full Code   Is the patient medically ready for discharge?:     Reason for patient still in hospital (select all that apply): Patient trending condition, Laboratory test, Treatment, Imaging and Consult recommendations                     Erick Mckinley NP  Department of [facility] Medicine   [facility]

## 2023-08-07 NOTE — PLAN OF CARE
SW met with pt at bedside to complete assessment. Pt is AxO x3 and able to verbally answer assessment questions. Pt is supported at bedside by her adult daughter. Pt reports to live at home with her grand daughter who is able to offer support and/or call for assistance if needed. Pt confirmed PCP. Pt reports while at home being independent of ADL's and having her former husbands DME for support if needed. Pt reports no home health and no active dialysis. Pt family to transport home. SW updated whiteboard with [facility] name and contact information. SW confirmed pt understanding of Observation unit and expected discharge plan. SW will continue to follow pt throughout care and assist with any discharge needs.         08/07/23 1441   Discharge Planning   Assessment Type Discharge Planning Brief Assessment   Resource/Environmental Concerns none   Support Systems Children;Family members   Equipment Currently Used at Home none   Current Living Arrangements home   Patient/Family Anticipates Transition to home with family   Patient/Family Anticipated Services at Transition none   DME Needed Upon Discharge  none   Discharge Plan A Home with family       Future Appointments   Date Time Provider Department Center   8/16/2023 11:00 AM Marga Basurto NP [facility] ENT [facility]   8/21/2023 10:00 AM Lizz Guy AU.D [facility] AUDIO [facility]   8/26/2023 10:00 AM HOME MONITOR DEVICE CHECK, [facility]   10/25/2023 10:00 AM Orestes Kim Jr., MD [facility] CARDIO [place]     [facility]  [facility]  [phone]

## 2023-08-07 NOTE — ASSESSMENT & PLAN NOTE
Patient's FSGs are controlled on current medication regimen.  Last A1c reviewed-   Lab Results   Component Value Date    HGBA1C 8.4 (H) 07/21/2023     Most recent fingerstick glucose reviewed-   Recent Labs   Lab 08/06/23  1124 08/06/23  1548 08/06/23  2040 08/07/23  0459   POCTGLUCOSE 190* 245* 196* 182*     Current correctional scale  Low  Maintain anti-hyperglycemic dose as follows-   Antihyperglycemics (From admission, onward)    Start     Stop Route Frequency Ordered    08/05/23 2007  insulin aspart U-100 pen 0-5 Units         -- SubQ Before meals & nightly PRN 08/05/23 1911        Hold Oral hypoglycemics while patient is in the hospital.

## 2023-08-07 NOTE — ASSESSMENT & PLAN NOTE
Pt noted to have shock for VT/VF per [employer] ICD interrogation.    - On amio gtt, will transition to p.o. on 08/08/2023 400 mg BID x 2 weeks then 400 mg daily thereafter   - Lexiscan pending today   - o/p EP eval

## 2023-08-07 NOTE — HOSPITAL COURSE
08/07/2023 No recurrent VT overnight. Stress test pending this AM. No chest pain. [place] WNL.   08/08/2023 No VT on tele. Patient transitioned to p.o. Amiodarone.

## 2023-08-07 NOTE — NURSING
Patient HR on the monitor ranging from 30's-60's provider notified, new order to pause Amiodarone gtt. Cardiology contacted per NP re-start the amiodarone gtt, and patient has a CRT-D. Her lower HR is set at 60 bpm. NP extensively reviewed tele and her HR has not gone below 60. Continue the drip. Check the monitor and march out the HR. Amiodarone gtt restarted. Patient has no c/o of SOB or chest pains, no distress, vitals WNL.

## 2023-08-07 NOTE — ASSESSMENT & PLAN NOTE
-on admission she was noted to have a shock for VT/VF per ICD interrogation  -as per Cardiology will start amiodarone drip and follow for 24-48 hours with plans to transition to oral and nasal.  -She will be NPO after midnight for Lexiscan on today  -will need outpatient EP evaluation

## 2023-08-07 NOTE — ASSESSMENT & PLAN NOTE
05/17/23    Echo    Interpretation Summary  · The left ventricle is moderately enlarged with moderately decreased systolic function.  · The estimated ejection fraction is 35%.  · The quantitatively derived ejection fraction is 33%.  · The left ventricular global longitudinal strain is -9.9%.  · There are segmental left ventricular wall motion abnormalities. Septal akinesis  · Grade I left ventricular diastolic dysfunction.  · The estimated PA systolic pressure is 28 mmHg.  · Normal right ventricular size with normal right ventricular systolic function.  · Normal central venous pressure (3 mmHg).  · Mild aortic regurgitation.  · Mild mitral regurgitation.  · Mild tricuspid regurgitation.  · There is no pulmonary hypertension.  . Continue Beta Blocker and ACE/ARB and monitor clinical status closely. Monitor on telemetry. Patient is on CHF pathway.  Monitor strict Is&Os and daily weights.  Place on fluid restriction of 2 L. Cardiology has been consulted. Continue to stress to patient importance of self efficacy and  on diet for CHF. Last BNP reviewed- and noted below   Recent Labs   Lab 08/05/23  1637   *   .  Continue BB, ARB

## 2023-08-07 NOTE — NURSING
Care plan reviewed with patient, AAOx4, no respiratory distress noted, on room air. Paced 60's per cardiac monitor, no red alarm noted. Denies any pain of discomfort; amiodarone drip infusing as ordered, education provided on medication effect and side effect, voices understanding. Call light within her reach, no apparent distress noted, bed in low position, bed alarm on, educated on the importance of calling as needed, voices understanding, stable at this time.

## 2023-08-07 NOTE — SUBJECTIVE & OBJECTIVE
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, with long-term current use of insulin 04/24/2023    Diabetes mellitus, type 2     Hyperlipidemia     Retinal detachment     od     Squamous cell carcinoma of skin     Stroke     tia x 3   ( last in 2005)    Urinary incontinence without sensory awareness 05/12/2021       Past Surgical History:   Procedure Laterality Date    APPENDECTOMY      CARDIAC CATHETERIZATION      CATARACT EXTRACTION Bilateral     EYE SURGERY Bilateral     cataract    INSERT / REPLACE / REMOVE PACEMAKER      INSERTION OF PACEMAKER      july 22 2019     Orestes Kim MD    TONSILLECTOMY         Review of patient's allergies indicates:   Allergen Reactions    Penicillins      Caused uti       No current facility-administered medications on file prior to encounter.     Current Outpatient Medications on File Prior to Encounter   Medication Sig    amLODIPine (NORVASC) 2.5 MG tablet     aspirin (ECOTRIN) 81 MG EC tablet Take 81 mg by mouth once daily.    atorvastatin (LIPITOR) 40 MG tablet Take 1 tablet (40 mg total) by mouth once daily. For cholesterol and heart protection    dulaglutide (TRULICITY) 1.5 mg/0.5 mL pen injector Inject 1.5 mg into the skin every 7 days.    irbesartan (AVAPRO) 150 MG tablet Take 1 tablet (150 mg total) by mouth once daily. For heart and blood pressure    magnesium oxide (MAG-OX) 400 mg (241.3 mg magnesium) tablet Take 1 tablet (400 mg total) by mouth once daily.    metoprolol succinate (TOPROL-XL) 50 MG 24 hr tablet Take 1 tablet (50 mg total) by mouth once daily. For heart and blood pressure    mirabegron (MYRBETRIQ) 25 mg Tb24 ER tablet Take 1 tablet (25 mg total) by mouth once daily. For bladder    mv-min-FA-Mi-Dz-zjkujya-lutein 0.4-162-18 mg Tab Take by mouth. 1 Tablet Oral Every day    vitamin D (VITAMIN D3) 1000 units Tab  Take 1,000 Units by mouth once daily.    blood sugar diagnostic Strp 1 each by Misc.(Non-Drug; Combo Route) route once daily.    blood-glucose meter kit Use as instructed    conjugated estrogens (PREMARIN) vaginal cream 1 g with applicator or dime-sized amt with finger in vagina nightly x 2 weeks, then twice a week thereafter    fluorouracil (EFUDEX) 5 % cream Apply topically Daily.     lancets Misc 1 each by Misc.(Non-Drug; Combo Route) route once daily.     Family History       Problem Relation (Age of Onset)    Cancer Son    Cataracts Mother, Father    Glaucoma Maternal Grandfather    Liver disease Son    No Known Problems Sister, Brother, Maternal Aunt, Maternal Uncle, Paternal Aunt, Paternal Uncle, Maternal Grandmother, Paternal Grandmother, Paternal Grandfather          Tobacco Use    Smoking status: Never    Smokeless tobacco: Never   Substance and Sexual Activity    Alcohol use: Not Currently     Comment: socially    Drug use: No    Sexual activity: Not Currently     Review of Systems   Constitutional: Negative for diaphoresis and fever.   HENT:  Negative for congestion and hearing loss.    Eyes:  Negative for blurred vision and pain.   Cardiovascular:  Negative for chest pain, claudication, dyspnea on exertion, leg swelling, near-syncope and palpitations.   Respiratory:  Negative for shortness of breath and sleep disturbances due to breathing.    Hematologic/Lymphatic: Negative for bleeding problem. Does not bruise/bleed easily.   Skin:  Negative for color change and poor wound healing.   Gastrointestinal:  Negative for abdominal pain and nausea.   Genitourinary:  Negative for bladder incontinence and flank pain.   Neurological:  Negative for focal weakness and light-headedness.     Objective:     Vital Signs (Most Recent):  Temp: 97.6 °F (36.4 °C) (08/07/23 0702)  Pulse: 69 (08/07/23 0733)  Resp: 18 (08/07/23 0702)  BP: (!) 121/57 (08/07/23 0702)  SpO2: 96 % (08/07/23 0702) Vital Signs (24h Range):  Temp:   [96.1 °F (35.6 °C)-97.8 °F (36.6 °C)] 97.6 °F (36.4 °C)  Pulse:  [59-69] 69  Resp:  [16-18] 18  SpO2:  [96 %-100 %] 96 %  BP: (121-143)/(57-74) 121/57     Weight: 78.9 kg (174 lb)  Body mass index is 31.83 kg/m².    SpO2: 96 %       No intake or output data in the 24 hours ending 08/07/23 1054    Lines/Drains/Airways       Peripheral Intravenous Line  Duration                  Peripheral IV - Single Lumen 08/05/23 1634 22 G Right Antecubital 1 day                     Physical Exam  Constitutional:       Appearance: She is well-developed. She is not diaphoretic.   HENT:      Head: Normocephalic and atraumatic.   Eyes:      General: No scleral icterus.     Pupils: Pupils are equal, round, and reactive to light.   Neck:      Vascular: No JVD.   Cardiovascular:      Rate and Rhythm: Normal rate and regular rhythm.      Pulses: Intact distal pulses.      Heart sounds: S1 normal and S2 normal. No murmur heard.     No friction rub. No gallop.   Pulmonary:      Effort: Pulmonary effort is normal. No respiratory distress.      Breath sounds: Normal breath sounds. No wheezing or rales.   Chest:      Chest wall: No tenderness.   Abdominal:      General: Bowel sounds are normal. There is no distension.      Palpations: Abdomen is soft. There is no mass.      Tenderness: There is no abdominal tenderness. There is no rebound.   Musculoskeletal:         General: No tenderness. Normal range of motion.      Cervical back: Normal range of motion and neck supple.      Right lower leg: No edema.      Left lower leg: No edema.   Skin:     General: Skin is warm and dry.      Coloration: Skin is not pale.   Neurological:      Mental Status: She is alert and oriented to person, place, and time.      Coordination: Coordination normal.      Deep Tendon Reflexes: Reflexes normal.   Psychiatric:         Behavior: Behavior normal.         Judgment: Judgment normal.          Significant Labs: BMP:   Recent Labs   Lab 08/05/23  6583  "08/06/23  0341 08/07/23  0310   * 164* 165*    139 139   K 4.0 4.4 4.6    105 104   CO2 25 22* 24   BUN 12 12 13   CREATININE 0.9 0.8 0.8   CALCIUM 9.8 8.8 9.0   MG  --  2.0 2.0     , CMP   Recent Labs   Lab 08/05/23  1637 08/06/23  0341 08/07/23  0310    139 139   K 4.0 4.4 4.6    105 104   CO2 25 22* 24   * 164* 165*   BUN 12 12 13   CREATININE 0.9 0.8 0.8   CALCIUM 9.8 8.8 9.0   PROT 7.3  --   --    ALBUMIN 3.9  --   --    BILITOT 0.5  --   --    ALKPHOS 90  --   --    AST 25  --   --    ALT 29  --   --    ANIONGAP 12 12 11     , CBC   Recent Labs   Lab 08/05/23  1637 08/06/23  0341 08/07/23  0310   WBC 10.38 9.29 10.39   HGB 13.7 12.6 13.7   HCT 41.0 38.8 41.7    143* 155     , INR No results for input(s): "INR", "PROTIME" in the last 48 hours., Lipid Panel No results for input(s): "CHOL", "HDL", "LDLCALC", "TRIG", "CHOLHDL" in the last 48 hours., Troponin   Recent Labs   Lab 08/05/23  1637 08/05/23  2116 08/06/23  0341   TROPONINI 0.036* 0.092* 0.042*     , and All pertinent lab results from the last 24 hours have been reviewed.    Significant Imaging: Echocardiogram: 2D echo with color flow doppler: No results found for this or any previous visit. and Transthoracic echo (TTE) complete (Cupid Only):   Results for orders placed or performed during the hospital encounter of 05/17/23   Echo   Result Value Ref Range    Ascending aorta 3.38 cm    STJ 2.77 cm    AV mean gradient 5 mmHg    Ao peak fuentes 1.43 m/s    Ao VTI 33.35 cm    IVS 0.66 0.6 - 1.1 cm    LA size 4.11 cm    Left Atrium Major Axis 4.92 cm    Left Atrium Minor Axis 4.73 cm    LVIDd 6.07 (A) 3.5 - 6.0 cm    LVIDs 5.19 (A) 2.1 - 4.0 cm    LVOT diameter 1.83 cm    LVOT peak VTI 26.42 cm    Posterior Wall 0.77 0.6 - 1.1 cm    MV Peak A Fuentes 1.16 m/s    E wave deceleration time 178.94 msec    MV Peak E Fuentes 0.62 m/s    PV Peak D Fuentes 0.27 m/s    PV Peak S Fuentes 0.44 m/s    RA Major Axis 4.74 cm    RA Width 3.24 cm    " "RVDD 2.66 cm    Sinus 2.50 cm    TAPSE 2.19 cm    TR Max Fuentes 2.48 m/s    LA WIDTH 3.68 cm    MV stenosis pressure 1/2 time 51.89 ms    LV Diastolic Volume 184.82 mL    LV Systolic Volume 128.89 mL    LVOT peak fuentes 1.20 m/s    TDI LATERAL 0.05 m/s    TDI SEPTAL 0.05 m/s    Mr max fuentes 0.05 m/s    LA volume (mod) 53.20 cm3    MV "A" wave duration 11.70 msec    MV mean gradient 1 mmHg    MV peak gradient 5 mmHg    MV VTI 29.25 cm    LV LATERAL E/E' RATIO 12.40 m/s    LV SEPTAL E/E' RATIO 12.40 m/s    FS 14 %    LA volume 62.01 cm3    LV mass 165.52 g    Left Ventricle Relative Wall Thickness 0.25 cm    AV valve area 2.08 cm2    AV Velocity Ratio 0.84     AV index (prosthetic) 0.79     MV valve area p 1/2 method 4.24 cm2    MV valve area by continuity eq 2.37 cm2    E/A ratio 0.53     Mean e' 0.05 m/s    Pulm vein S/D ratio 1.63     LVOT area 2.6 cm2    LVOT stroke volume 69.46 cm3    AV peak gradient 8 mmHg    E/E' ratio 12.40 m/s    Triscuspid Valve Regurgitation Peak Gradient 25 mmHg    BSA 1.87 m2    LV Systolic Volume Index 71.2 mL/m2    LV Diastolic Volume Index 102.11 mL/m2    LA Volume Index 34.3 mL/m2    LV Mass Index 91 g/m2    LA Volume Index (Mod) 29.4 mL/m2    Right Atrial Pressure (from IVC) 3 mmHg    QEF 33 %    EF 35 %    TV resting pulmonary artery pressure 28 mmHg    Narrative    · The left ventricle is moderately enlarged with moderately decreased   systolic function.  · The estimated ejection fraction is 35%.  · The quantitatively derived ejection fraction is 33%.  · The left ventricular global longitudinal strain is -9.9%.  · There are segmental left ventricular wall motion abnormalities. Septal   akinesis  · Grade I left ventricular diastolic dysfunction.  · The estimated PA systolic pressure is 28 mmHg.  · Normal right ventricular size with normal right ventricular systolic   function.  · Normal central venous pressure (3 mmHg).  · Mild aortic regurgitation.  · Mild mitral regurgitation.  · Mild " tricuspid regurgitation.  · There is no pulmonary hypertension.       and EKG: reviewed.

## 2023-08-08 ENCOUNTER — TELEPHONE (OUTPATIENT)
Dept: CARDIOLOGY | Facility: HOSPITAL | Age: 88
End: 2023-08-08
Payer: MEDICARE

## 2023-08-08 ENCOUNTER — TELEPHONE (OUTPATIENT)
Dept: CARDIOLOGY | Facility: CLINIC | Age: 88
End: 2023-08-08
Payer: MEDICARE

## 2023-08-08 VITALS
SYSTOLIC BLOOD PRESSURE: 146 MMHG | BODY MASS INDEX: 32.02 KG/M2 | RESPIRATION RATE: 18 BRPM | DIASTOLIC BLOOD PRESSURE: 68 MMHG | OXYGEN SATURATION: 96 % | WEIGHT: 174 LBS | TEMPERATURE: 98 F | HEART RATE: 84 BPM | HEIGHT: 62 IN

## 2023-08-08 LAB
ANION GAP SERPL CALC-SCNC: 9 MMOL/L (ref 8–16)
BASOPHILS # BLD AUTO: 0.04 K/UL (ref 0–0.2)
BASOPHILS NFR BLD: 0.4 % (ref 0–1.9)
BUN SERPL-MCNC: 12 MG/DL (ref 8–23)
CALCIUM SERPL-MCNC: 8.7 MG/DL (ref 8.7–10.5)
CHLORIDE SERPL-SCNC: 105 MMOL/L (ref 95–110)
CO2 SERPL-SCNC: 23 MMOL/L (ref 23–29)
CREAT SERPL-MCNC: 0.8 MG/DL (ref 0.5–1.4)
DIFFERENTIAL METHOD: ABNORMAL
EOSINOPHIL # BLD AUTO: 0.5 K/UL (ref 0–0.5)
EOSINOPHIL NFR BLD: 5.4 % (ref 0–8)
ERYTHROCYTE [DISTWIDTH] IN BLOOD BY AUTOMATED COUNT: 13.1 % (ref 11.5–14.5)
EST. GFR  (NO RACE VARIABLE): >60 ML/MIN/1.73 M^2
GLUCOSE SERPL-MCNC: 158 MG/DL (ref 70–110)
HCT VFR BLD AUTO: 39.9 % (ref 37–48.5)
HGB BLD-MCNC: 13 G/DL (ref 12–16)
IMM GRANULOCYTES # BLD AUTO: 0.03 K/UL (ref 0–0.04)
IMM GRANULOCYTES NFR BLD AUTO: 0.3 % (ref 0–0.5)
LYMPHOCYTES # BLD AUTO: 3.5 K/UL (ref 1–4.8)
LYMPHOCYTES NFR BLD: 35.3 % (ref 18–48)
MAGNESIUM SERPL-MCNC: 2 MG/DL (ref 1.6–2.6)
MCH RBC QN AUTO: 29.4 PG (ref 27–31)
MCHC RBC AUTO-ENTMCNC: 32.6 G/DL (ref 32–36)
MCV RBC AUTO: 90 FL (ref 82–98)
MONOCYTES # BLD AUTO: 0.6 K/UL (ref 0.3–1)
MONOCYTES NFR BLD: 5.6 % (ref 4–15)
NEUTROPHILS # BLD AUTO: 5.2 K/UL (ref 1.8–7.7)
NEUTROPHILS NFR BLD: 53 % (ref 38–73)
NRBC BLD-RTO: 0 /100 WBC
PLATELET # BLD AUTO: 144 K/UL (ref 150–450)
PMV BLD AUTO: 10.9 FL (ref 9.2–12.9)
POTASSIUM SERPL-SCNC: 4.3 MMOL/L (ref 3.5–5.1)
RBC # BLD AUTO: 4.42 M/UL (ref 4–5.4)
SODIUM SERPL-SCNC: 137 MMOL/L (ref 136–145)
WBC # BLD AUTO: 9.84 K/UL (ref 3.9–12.7)

## 2023-08-08 PROCEDURE — 63600175 PHARM REV CODE 636 W HCPCS: Performed by: NURSE PRACTITIONER

## 2023-08-08 PROCEDURE — 83735 ASSAY OF MAGNESIUM: CPT | Performed by: NURSE PRACTITIONER

## 2023-08-08 PROCEDURE — 25000003 PHARM REV CODE 250: Performed by: NURSE PRACTITIONER

## 2023-08-08 PROCEDURE — 80048 BASIC METABOLIC PNL TOTAL CA: CPT | Performed by: NURSE PRACTITIONER

## 2023-08-08 PROCEDURE — 36415 COLL VENOUS BLD VENIPUNCTURE: CPT | Performed by: NURSE PRACTITIONER

## 2023-08-08 PROCEDURE — 99233 PR SUBSEQUENT HOSPITAL CARE,LEVL III: ICD-10-PCS | Mod: ,,, | Performed by: NURSE PRACTITIONER

## 2023-08-08 PROCEDURE — 85025 COMPLETE CBC W/AUTO DIFF WBC: CPT | Performed by: NURSE PRACTITIONER

## 2023-08-08 PROCEDURE — 99233 SBSQ HOSP IP/OBS HIGH 50: CPT | Mod: ,,, | Performed by: NURSE PRACTITIONER

## 2023-08-08 RX ORDER — AMIODARONE HYDROCHLORIDE 200 MG/1
200 TABLET ORAL ONCE
Status: COMPLETED | OUTPATIENT
Start: 2023-08-08 | End: 2023-08-08

## 2023-08-08 RX ORDER — AMIODARONE HYDROCHLORIDE 200 MG/1
400 TABLET ORAL 2 TIMES DAILY
Qty: 56 TABLET | Refills: 0 | Status: SHIPPED | OUTPATIENT
Start: 2023-08-08 | End: 2023-08-22

## 2023-08-08 RX ORDER — AMIODARONE HYDROCHLORIDE 200 MG/1
400 TABLET ORAL 2 TIMES DAILY
Status: DISCONTINUED | OUTPATIENT
Start: 2023-08-08 | End: 2023-08-08 | Stop reason: HOSPADM

## 2023-08-08 RX ORDER — AMIODARONE HYDROCHLORIDE 200 MG/1
400 TABLET ORAL DAILY
Qty: 60 TABLET | Refills: 11 | Status: SHIPPED | OUTPATIENT
Start: 2023-08-08 | End: 2023-08-25

## 2023-08-08 RX ORDER — AMIODARONE HYDROCHLORIDE 200 MG/1
200 TABLET ORAL 2 TIMES DAILY
Status: DISCONTINUED | OUTPATIENT
Start: 2023-08-08 | End: 2023-08-08

## 2023-08-08 RX ADMIN — AMIODARONE HYDROCHLORIDE 0.5 MG/MIN: 1.8 INJECTION, SOLUTION INTRAVENOUS at 02:08

## 2023-08-08 RX ADMIN — ASPIRIN 81 MG: 81 TABLET, COATED ORAL at 09:08

## 2023-08-08 RX ADMIN — LOSARTAN POTASSIUM 50 MG: 50 TABLET, FILM COATED ORAL at 09:08

## 2023-08-08 RX ADMIN — AMIODARONE HYDROCHLORIDE 200 MG: 200 TABLET ORAL at 08:08

## 2023-08-08 RX ADMIN — METOPROLOL SUCCINATE 50 MG: 50 TABLET, EXTENDED RELEASE ORAL at 09:08

## 2023-08-08 RX ADMIN — AMIODARONE HYDROCHLORIDE 200 MG: 200 TABLET ORAL at 11:08

## 2023-08-08 RX ADMIN — AMLODIPINE BESYLATE 2.5 MG: 2.5 TABLET ORAL at 09:08

## 2023-08-08 RX ADMIN — ATORVASTATIN CALCIUM 40 MG: 40 TABLET, FILM COATED ORAL at 09:08

## 2023-08-08 NOTE — SUBJECTIVE & OBJECTIVE
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, with long-term current use of insulin 04/24/2023    Diabetes mellitus, type 2     Hyperlipidemia     Retinal detachment     od     Squamous cell carcinoma of skin     Stroke     tia x 3   ( last in 2005)    Urinary incontinence without sensory awareness 05/12/2021       Past Surgical History:   Procedure Laterality Date    APPENDECTOMY      CARDIAC CATHETERIZATION      CATARACT EXTRACTION Bilateral     EYE SURGERY Bilateral     cataract    INSERT / REPLACE / REMOVE PACEMAKER      INSERTION OF PACEMAKER      july 22 2019     Orestes Kim MD    TONSILLECTOMY         Review of patient's allergies indicates:   Allergen Reactions    Penicillins      Caused uti       No current facility-administered medications on file prior to encounter.     Current Outpatient Medications on File Prior to Encounter   Medication Sig    amLODIPine (NORVASC) 2.5 MG tablet     aspirin (ECOTRIN) 81 MG EC tablet Take 81 mg by mouth once daily.    atorvastatin (LIPITOR) 40 MG tablet Take 1 tablet (40 mg total) by mouth once daily. For cholesterol and heart protection    dulaglutide (TRULICITY) 1.5 mg/0.5 mL pen injector Inject 1.5 mg into the skin every 7 days.    irbesartan (AVAPRO) 150 MG tablet Take 1 tablet (150 mg total) by mouth once daily. For heart and blood pressure    magnesium oxide (MAG-OX) 400 mg (241.3 mg magnesium) tablet Take 1 tablet (400 mg total) by mouth once daily.    metoprolol succinate (TOPROL-XL) 50 MG 24 hr tablet Take 1 tablet (50 mg total) by mouth once daily. For heart and blood pressure    mirabegron (MYRBETRIQ) 25 mg Tb24 ER tablet Take 1 tablet (25 mg total) by mouth once daily. For bladder    mv-min-FA-Gq-Kv-epudndt-lutein 0.4-162-18 mg Tab Take by mouth. 1 Tablet Oral Every day    vitamin D (VITAMIN D3) 1000 units Tab  Take 1,000 Units by mouth once daily.    blood sugar diagnostic Strp 1 each by Misc.(Non-Drug; Combo Route) route once daily.    blood-glucose meter kit Use as instructed    conjugated estrogens (PREMARIN) vaginal cream 1 g with applicator or dime-sized amt with finger in vagina nightly x 2 weeks, then twice a week thereafter    fluorouracil (EFUDEX) 5 % cream Apply topically Daily.     lancets Misc 1 each by Misc.(Non-Drug; Combo Route) route once daily.     Family History       Problem Relation (Age of Onset)    Cancer Son    Cataracts Mother, Father    Glaucoma Maternal Grandfather    Liver disease Son    No Known Problems Sister, Brother, Maternal Aunt, Maternal Uncle, Paternal Aunt, Paternal Uncle, Maternal Grandmother, Paternal Grandmother, Paternal Grandfather          Tobacco Use    Smoking status: Never    Smokeless tobacco: Never   Substance and Sexual Activity    Alcohol use: Not Currently     Comment: socially    Drug use: No    Sexual activity: Not Currently     Review of Systems   Constitutional: Negative for diaphoresis and fever.   HENT:  Negative for congestion and hearing loss.    Eyes:  Negative for blurred vision and pain.   Cardiovascular:  Negative for chest pain, claudication, dyspnea on exertion, leg swelling, near-syncope and palpitations.   Respiratory:  Negative for shortness of breath and sleep disturbances due to breathing.    Hematologic/Lymphatic: Negative for bleeding problem. Does not bruise/bleed easily.   Skin:  Negative for color change and poor wound healing.   Gastrointestinal:  Negative for abdominal pain and nausea.   Genitourinary:  Negative for bladder incontinence and flank pain.   Neurological:  Negative for focal weakness and light-headedness.     Objective:     Vital Signs (Most Recent):  Temp: 97.6 °F (36.4 °C) (08/08/23 0831)  Pulse: 65 (08/08/23 0831)  Resp: 18 (08/08/23 0831)  BP: (!) 146/68 (08/08/23 0831)  SpO2: 96 % (08/08/23 0831) Vital Signs (24h Range):  Temp:   [97.1 °F (36.2 °C)-98.6 °F (37 °C)] 97.6 °F (36.4 °C)  Pulse:  [60-65] 65  Resp:  [18] 18  SpO2:  [96 %-99 %] 96 %  BP: (138-162)/(63-71) 146/68     Weight: 78.9 kg (174 lb)  Body mass index is 31.83 kg/m².    SpO2: 96 %       No intake or output data in the 24 hours ending 08/08/23 1033    Lines/Drains/Airways       Peripheral Intravenous Line  Duration                  Peripheral IV - Single Lumen 08/05/23 1634 22 G Right Antecubital 2 days                     Physical Exam  Constitutional:       Appearance: She is well-developed. She is not diaphoretic.   HENT:      Head: Normocephalic and atraumatic.   Eyes:      General: No scleral icterus.     Pupils: Pupils are equal, round, and reactive to light.   Neck:      Vascular: No JVD.   Cardiovascular:      Rate and Rhythm: Normal rate and regular rhythm.      Pulses: Intact distal pulses.      Heart sounds: S1 normal and S2 normal. No murmur heard.     No friction rub. No gallop.   Pulmonary:      Effort: Pulmonary effort is normal. No respiratory distress.      Breath sounds: Normal breath sounds. No wheezing or rales.   Chest:      Chest wall: No tenderness.   Abdominal:      General: Bowel sounds are normal. There is no distension.      Palpations: Abdomen is soft. There is no mass.      Tenderness: There is no abdominal tenderness. There is no rebound.   Musculoskeletal:         General: No tenderness. Normal range of motion.      Cervical back: Normal range of motion and neck supple.      Right lower leg: No edema.      Left lower leg: No edema.   Skin:     General: Skin is warm and dry.      Coloration: Skin is not pale.   Neurological:      Mental Status: She is alert and oriented to person, place, and time.      Coordination: Coordination normal.      Deep Tendon Reflexes: Reflexes normal.   Psychiatric:         Behavior: Behavior normal.         Judgment: Judgment normal.          Significant Labs: BMP:   Recent Labs   Lab 08/07/23  0310 08/08/23  0389  "  * 158*    137   K 4.6 4.3    105   CO2 24 23   BUN 13 12   CREATININE 0.8 0.8   CALCIUM 9.0 8.7   MG 2.0 2.0     , CMP   Recent Labs   Lab 08/07/23  0310 08/08/23  0255    137   K 4.6 4.3    105   CO2 24 23   * 158*   BUN 13 12   CREATININE 0.8 0.8   CALCIUM 9.0 8.7   ANIONGAP 11 9     , CBC   Recent Labs   Lab 08/07/23  0310 08/08/23  0255   WBC 10.39 9.84   HGB 13.7 13.0   HCT 41.7 39.9    144*     , INR No results for input(s): "INR", "PROTIME" in the last 48 hours., Lipid Panel No results for input(s): "CHOL", "HDL", "LDLCALC", "TRIG", "CHOLHDL" in the last 48 hours., Troponin   No results for input(s): "TROPONINI" in the last 48 hours.  , and All pertinent lab results from the last 24 hours have been reviewed.    Significant Imaging: Echocardiogram: 2D echo with color flow doppler: No results found for this or any previous visit. and Transthoracic echo (TTE) complete (Cupid Only):   Results for orders placed or performed during the hospital encounter of 05/17/23   Echo   Result Value Ref Range    Ascending aorta 3.38 cm    STJ 2.77 cm    AV mean gradient 5 mmHg    Ao peak fuentes 1.43 m/s    Ao VTI 33.35 cm    IVS 0.66 0.6 - 1.1 cm    LA size 4.11 cm    Left Atrium Major Axis 4.92 cm    Left Atrium Minor Axis 4.73 cm    LVIDd 6.07 (A) 3.5 - 6.0 cm    LVIDs 5.19 (A) 2.1 - 4.0 cm    LVOT diameter 1.83 cm    LVOT peak VTI 26.42 cm    Posterior Wall 0.77 0.6 - 1.1 cm    MV Peak A Fuentes 1.16 m/s    E wave deceleration time 178.94 msec    MV Peak E Fuentes 0.62 m/s    PV Peak D Fuentes 0.27 m/s    PV Peak S Fuentes 0.44 m/s    RA Major Axis 4.74 cm    RA Width 3.24 cm    RVDD 2.66 cm    Sinus 2.50 cm    TAPSE 2.19 cm    TR Max Fuentes 2.48 m/s    LA WIDTH 3.68 cm    MV stenosis pressure 1/2 time 51.89 ms    LV Diastolic Volume 184.82 mL    LV Systolic Volume 128.89 mL    LVOT peak fuentes 1.20 m/s    TDI LATERAL 0.05 m/s    TDI SEPTAL 0.05 m/s    Mr max fuentes 0.05 m/s    LA volume (mod) 53.20 cm3 " "   MV "A" wave duration 11.70 msec    MV mean gradient 1 mmHg    MV peak gradient 5 mmHg    MV VTI 29.25 cm    LV LATERAL E/E' RATIO 12.40 m/s    LV SEPTAL E/E' RATIO 12.40 m/s    FS 14 %    LA volume 62.01 cm3    LV mass 165.52 g    Left Ventricle Relative Wall Thickness 0.25 cm    AV valve area 2.08 cm2    AV Velocity Ratio 0.84     AV index (prosthetic) 0.79     MV valve area p 1/2 method 4.24 cm2    MV valve area by continuity eq 2.37 cm2    E/A ratio 0.53     Mean e' 0.05 m/s    Pulm vein S/D ratio 1.63     LVOT area 2.6 cm2    LVOT stroke volume 69.46 cm3    AV peak gradient 8 mmHg    E/E' ratio 12.40 m/s    Triscuspid Valve Regurgitation Peak Gradient 25 mmHg    BSA 1.87 m2    LV Systolic Volume Index 71.2 mL/m2    LV Diastolic Volume Index 102.11 mL/m2    LA Volume Index 34.3 mL/m2    LV Mass Index 91 g/m2    LA Volume Index (Mod) 29.4 mL/m2    Right Atrial Pressure (from IVC) 3 mmHg    QEF 33 %    EF 35 %    TV resting pulmonary artery pressure 28 mmHg    Narrative    · The left ventricle is moderately enlarged with moderately decreased   systolic function.  · The estimated ejection fraction is 35%.  · The quantitatively derived ejection fraction is 33%.  · The left ventricular global longitudinal strain is -9.9%.  · There are segmental left ventricular wall motion abnormalities. Septal   akinesis  · Grade I left ventricular diastolic dysfunction.  · The estimated PA systolic pressure is 28 mmHg.  · Normal right ventricular size with normal right ventricular systolic   function.  · Normal central venous pressure (3 mmHg).  · Mild aortic regurgitation.  · Mild mitral regurgitation.  · Mild tricuspid regurgitation.  · There is no pulmonary hypertension.       and EKG: reviewed.    "

## 2023-08-08 NOTE — ASSESSMENT & PLAN NOTE
-on admission she was noted to have a shock for VT/VF per ICD interrogation  -as per Cardiology will start amiodarone drip and follow for 24-48 hours with plans to transition to oral and nasal.  -s/p robin scan as above   -will need outpatient EP evaluation

## 2023-08-08 NOTE — PROGRESS NOTES
"[facility] Medicine  Progress Note    Patient Name: Eliana Wagner  MRN: [id number]  Patient Class: IP- Inpatient   Admission Date: 8/5/2023  Length of Stay: 1 days  Attending Physician: Hakeem Gale,*  Primary Care Provider: Duy Cheng MD        Subjective:     Principal Problem:Syncope        HPI:  Eliana Wagner is an 88 year old female with PMH CAD, CVA, CHF, HTN, DMT2, HLD and ICD placement. She presented to the ED after a syncopal episode this afternoon. She reports that she was at [facility] earlier today and had just gotten into her car (parked in the 2nd parking spot from the entrance, so not a long walk) and the next thing she remembers is "jolting" awake. She states that she had put her car in reverse and was [employer] out of her parking spot when she woke up. She denies any prodromal symptoms or symptoms afterwards. She states she was slightly disoriented afterwards so she sat in her car until she could focus. She did not have her phone with her so she proceeded to drive home. Later she was c/o weakness and not feeling well so her family brought her into the ED. Her PPM/defibrillator was interrogated by [employer] and reported a reading that patient was treated today for a VT/VF episode around 1500. Patient denies CP, SOB, palpitations, shocking sensations, dizziness, H/A, or visual disturbances.      Overview/Hospital Course:  She was admitted to the hospital medicine service for further care for complaints of weakness and not feeling well.  While in the ED her PPM/defibrillator was interrogated by [employer] and reported a reading that patient was treated today for a VT/VF episode around 1500. Patient denies CP, SOB, palpitations, shocking sensations, dizziness, H/A, or visual disturbances.  Cardiology was consulted.  She was started on amnio drip as per Cardiology.  She is been NPO after midnight for Lexiscan can as per Cardiology.      8/8 transition to PO amiodarone " today. Thuy scan negative for ischemia       Interval History: Patient denies dizziness, lightheadedness, CP, SOB, palpitations.       Objective:     Vital Signs (Most Recent):  Temp: 97.6 °F (36.4 °C) (08/08/23 0831)  Pulse: 65 (08/08/23 0831)  Resp: 18 (08/08/23 0831)  BP: (!) 146/68 (08/08/23 0831)  SpO2: 96 % (08/08/23 0831) Vital Signs (24h Range):  Temp:  [97.1 °F (36.2 °C)-98.6 °F (37 °C)] 97.6 °F (36.4 °C)  Pulse:  [60-65] 65  Resp:  [18] 18  SpO2:  [96 %-99 %] 96 %  BP: (138-162)/(63-71) 146/68     Weight: 78.9 kg (174 lb)  Body mass index is 31.83 kg/m².  No intake or output data in the 24 hours ending 08/08/23 1018      Physical Exam  Vitals and nursing note reviewed.   Constitutional:       Appearance: She is obese.   HENT:      Head: Normocephalic and atraumatic.      Mouth/Throat:      Mouth: Mucous membranes are moist.   Eyes:      Extraocular Movements: Extraocular movements intact.   Cardiovascular:      Rate and Rhythm: Normal rate and regular rhythm.      Pulses: Normal pulses.   Pulmonary:      Effort: Pulmonary effort is normal.   Abdominal:      General: Bowel sounds are normal. There is no distension.      Palpations: Abdomen is soft.      Tenderness: There is no abdominal tenderness. There is no guarding.   Musculoskeletal:         General: Normal range of motion.      Cervical back: Normal range of motion and neck supple.   Skin:     General: Skin is warm and dry.      Capillary Refill: Capillary refill takes 2 to 3 seconds.   Neurological:      Mental Status: She is alert and oriented to person, place, and time.   Psychiatric:         Mood and Affect: Mood normal.         Behavior: Behavior normal.             Significant Labs: All pertinent labs within the past 24 hours have been reviewed.    Significant Imaging: I have reviewed all pertinent imaging results/findings within the past 24 hours.      Assessment/Plan:      * Syncope  · Episode occurred in the car - unknown time  down  · [employer] reading of treatment for VT/VF day of admission at 1500  · Consult to cardiology ordered  · Hx of CVA  · Weakness and disorientation resolved after episode  · No other neuro symptoms  · Head CT negative for acute abnormalities  · Maintain telemetry  · Cardiology consulted    Patient started on amiodarone gtt for vtach. Patient with no further episodes of syncope, dizziness, lightheadedness, palpitations, or CP.   Following cardiology recs: transition to p.o. on 08/08/2023 400 mg BID x 2 weeks then 400 mg daily thereafter   Will need to establish with OP EP cardiologist.   Thuy scan negative for ischemia. No arrhthymias during stress.     VT (ventricular tachycardia)  -on admission she was noted to have a shock for VT/VF per ICD interrogation  -as per Cardiology will start amiodarone drip and follow for 24-48 hours with plans to transition to oral and nasal.  -s/p thuy scan as above   -will need outpatient EP evaluation      History of CVA (cerebrovascular accident)  · Continue ASA, statin, and BP meds        DM type 2 without retinopathy  Patient's FSGs are controlled on current medication regimen.  Last A1c reviewed-   Lab Results   Component Value Date    HGBA1C 8.4 (H) 07/21/2023     Most recent fingerstick glucose reviewed-   Recent Labs   Lab 08/07/23  1132 08/07/23  1613 08/07/23  2201   POCTGLUCOSE 149* 173* 191*     Current correctional scale  Low  Maintain anti-hyperglycemic dose as follows-   Antihyperglycemics (From admission, onward)    Start     Stop Route Frequency Ordered    08/05/23 2007  insulin aspart U-100 pen 0-5 Units         -- SubQ Before meals & nightly PRN 08/05/23 1911        Hold Oral hypoglycemics while patient is in the hospital.    Biventricular ICD (implantable cardioverter-defibrillator) in place  · [employer]  Model K374RJZ   Serial [id number]   ICD  · Kasey with [employer], states reading says pt was tx today for VT/VF episode around 1500. Also had tx episode in  April 2023 for sustained episodes of VT. She also has numerous non-sustained episodes from June and July 2023; hx of afib from April 2022  · Start amnio drip- transition to PO amiodarone 8/8   · Will need to establish care with EP outpatient        Coronary artery disease involving native coronary artery of native heart without angina pectoris  · Continue ASA, statin, ARB, and BB      Chronic systolic (congestive) heart failure  Patient is identified as having Systolic (HFrEF) heart failure that is Chronic. CHF is currently controlled. Latest ECHO performed and demonstrates- Results for orders placed during the hospital encounter of 05/17/23    Echo    Interpretation Summary  · The left ventricle is moderately enlarged with moderately decreased systolic function.  · The estimated ejection fraction is 35%.  · The quantitatively derived ejection fraction is 33%.  · The left ventricular global longitudinal strain is -9.9%.  · There are segmental left ventricular wall motion abnormalities. Septal akinesis  · Grade I left ventricular diastolic dysfunction.  · The estimated PA systolic pressure is 28 mmHg.  · Normal right ventricular size with normal right ventricular systolic function.  · Normal central venous pressure (3 mmHg).  · Mild aortic regurgitation.  · Mild mitral regurgitation.  · Mild tricuspid regurgitation.  · There is no pulmonary hypertension.  . Continue Beta Blocker and ACE/ARB and monitor clinical status closely. Monitor on telemetry. Patient is off CHF pathway.  Monitor strict Is&Os and daily weights.  Place on fluid restriction of 2 L. Cardiology has been consulted. Continue to stress to patient importance of self efficacy and  on diet for CHF. Last BNP reviewed- and noted below   Recent Labs   Lab 08/05/23  1637   *   .      VTE Risk Mitigation (From admission, onward)         Ordered     enoxaparin injection 40 mg  Daily         08/05/23 1911     IP VTE HIGH RISK PATIENT  Once          08/05/23 1911     Place sequential compression device  Until discontinued         08/05/23 1911                Discharge Planning   KAR:      Code Status: Full Code   Is the patient medically ready for discharge?:     Reason for patient still in hospital (select all that apply): Patient trending condition  Discharge Plan A: Home with family                  Coral Carranza NP  Department of [facility]

## 2023-08-08 NOTE — SUBJECTIVE & OBJECTIVE
Interval History: Patient denies dizziness, lightheadedness, CP, SOB, palpitations.       Objective:     Vital Signs (Most Recent):  Temp: 97.6 °F (36.4 °C) (08/08/23 0831)  Pulse: 65 (08/08/23 0831)  Resp: 18 (08/08/23 0831)  BP: (!) 146/68 (08/08/23 0831)  SpO2: 96 % (08/08/23 0831) Vital Signs (24h Range):  Temp:  [97.1 °F (36.2 °C)-98.6 °F (37 °C)] 97.6 °F (36.4 °C)  Pulse:  [60-65] 65  Resp:  [18] 18  SpO2:  [96 %-99 %] 96 %  BP: (138-162)/(63-71) 146/68     Weight: 78.9 kg (174 lb)  Body mass index is 31.83 kg/m².  No intake or output data in the 24 hours ending 08/08/23 1018      Physical Exam  Vitals and nursing note reviewed.   Constitutional:       Appearance: She is obese.   HENT:      Head: Normocephalic and atraumatic.      Mouth/Throat:      Mouth: Mucous membranes are moist.   Eyes:      Extraocular Movements: Extraocular movements intact.   Cardiovascular:      Rate and Rhythm: Normal rate and regular rhythm.      Pulses: Normal pulses.   Pulmonary:      Effort: Pulmonary effort is normal.   Abdominal:      General: Bowel sounds are normal. There is no distension.      Palpations: Abdomen is soft.      Tenderness: There is no abdominal tenderness. There is no guarding.   Musculoskeletal:         General: Normal range of motion.      Cervical back: Normal range of motion and neck supple.   Skin:     General: Skin is warm and dry.      Capillary Refill: Capillary refill takes 2 to 3 seconds.   Neurological:      Mental Status: She is alert and oriented to person, place, and time.   Psychiatric:         Mood and Affect: Mood normal.         Behavior: Behavior normal.             Significant Labs: All pertinent labs within the past 24 hours have been reviewed.    Significant Imaging: I have reviewed all pertinent imaging results/findings within the past 24 hours.

## 2023-08-08 NOTE — ASSESSMENT & PLAN NOTE
Noted to have shock delivered during syncopal episode due to VT/VF.  Noted to have multiple episodes in past w/ additional shock 4/2023.    - amiodarone as per VT section  - establish care w/ o/p electrophysiologist

## 2023-08-08 NOTE — TELEPHONE ENCOUNTER
----- Message from Mariely Alcocer sent at 8/8/2023 12:36 PM CDT -----  Regarding: Reschedule  Patient going out of town and trying to see if her appt can be moved up. Please call back @ [phone]. Thank you Mariely

## 2023-08-08 NOTE — DISCHARGE SUMMARY
"[facility] Medicine  Discharge Summary      Patient Name: Eliana Wagner  MRN: [id number]  LILIANA: [id number]  Patient Class: IP- Inpatient  Admission Date: 8/5/2023  Hospital Length of Stay: 1 days  Discharge Date and Time:  08/08/2023 11:33 AM  Attending Physician: Hakeem Gale,*   Discharging Provider: Coral Carranza NP  Primary Care Provider: Duy Cheng MD    Primary Care Team: Networked reference to record PCT     HPI:   Eliana Wagner is an 88 year old female with PMH CAD, CVA, CHF, HTN, DMT2, HLD and ICD placement. She presented to the ED after a syncopal episode this afternoon. She reports that she was at [facility] earlier today and had just gotten into her car (parked in the 2nd parking spot from the entrance, so not a long walk) and the next thing she remembers is "jolting" awake. She states that she had put her car in reverse and was assisted out of her parking spot when she woke up. She denies any prodromal symptoms or symptoms afterwards. She states she was slightly disoriented afterwards so she sat in her car until she could focus. She did not have her phone with her so she proceeded to drive home. Later she was c/o weakness and not feeling well so her family brought her into the ED. Her PPM/defibrillator was interrogated by [employer] and reported a reading that patient was treated today for a VT/VF episode around 1500. Patient denies CP, SOB, palpitations, shocking sensations, dizziness, H/A, or visual disturbances.      * No surgery found *      Hospital Course:   She was admitted to the hospital medicine service for further care for complaints of weakness and not feeling well.  While in the ED her PPM/defibrillator was interrogated by [employer] and reported a reading that patient was treated today for a VT/VF episode around 1500. Patient denies CP, SOB, palpitations, shocking sensations, dizziness, H/A, or visual disturbances.  Cardiology was consulted.  She " was started on amnio drip as per Cardiology.  She is been NPO after midnight for Lexiscan can as per Cardiology.      8/8 transition to PO amiodarone today. Thuy scan negative for ischemia        Goals of Care Treatment Preferences:  Code Status: Full Code      Consults:   Consults (From admission, onward)        Status Ordering Provider     Cardiology-[facility]  Once        Provider:  (Not yet assigned)    GLEN Cerrato          Neuro  History of CVA (cerebrovascular accident)  · Continue ASA, statin, and BP meds        Cardiac/Vascular  VT (ventricular tachycardia)  -on admission she was noted to have a shock for VT/VF per ICD interrogation  -as per Cardiology will start amiodarone drip and follow for 24-48 hours with plans to transition to oral and nasal.  -s/p thuy scan as above   -will need outpatient EP evaluation      Biventricular ICD (implantable cardioverter-defibrillator) in place  · [employer]  Model [id number]   Serial [id number]   ICD  · Kasey with [employer], states reading says pt was tx today for VT/VF episode around 1500. Also had tx episode in April 2023 for sustained episodes of VT. She also has numerous non-sustained episodes from June and July 2023; hx of afib from April 2022  · Start amnio drip- transition to PO amiodarone 8/8   · Will need to establish care with EP outpatient        Coronary artery disease involving native coronary artery of native heart without angina pectoris  · Continue ASA, statin, ARB, and BB      Chronic systolic (congestive) heart failure  Patient is identified as having Systolic (HFrEF) heart failure that is Chronic. CHF is currently controlled. Latest ECHO performed and demonstrates- Results for orders placed during the hospital encounter of 05/17/23    Echo    Interpretation Summary  · The left ventricle is moderately enlarged with moderately decreased systolic function.  · The estimated ejection fraction is 35%.  · The quantitatively derived ejection fraction is  33%.  · The left ventricular global longitudinal strain is -9.9%.  · There are segmental left ventricular wall motion abnormalities. Septal akinesis  · Grade I left ventricular diastolic dysfunction.  · The estimated PA systolic pressure is 28 mmHg.  · Normal right ventricular size with normal right ventricular systolic function.  · Normal central venous pressure (3 mmHg).  · Mild aortic regurgitation.  · Mild mitral regurgitation.  · Mild tricuspid regurgitation.  · There is no pulmonary hypertension.  . Continue Beta Blocker and ACE/ARB and monitor clinical status closely. Monitor on telemetry. Patient is off CHF pathway.  Monitor strict Is&Os and daily weights.  Place on fluid restriction of 2 L. Cardiology has been consulted. Continue to stress to patient importance of self efficacy and  on diet for CHF. Last BNP reviewed- and noted below   Recent Labs   Lab 08/05/23  1637   *   .    Endocrine  DM type 2 without retinopathy  Patient's FSGs are controlled on current medication regimen.  Last A1c reviewed-   Lab Results   Component Value Date    HGBA1C 8.4 (H) 07/21/2023     Most recent fingerstick glucose reviewed-   Recent Labs   Lab 08/07/23  1132 08/07/23  1613 08/07/23  2201   POCTGLUCOSE 149* 173* 191*     Current correctional scale  Low  Maintain anti-hyperglycemic dose as follows-   Antihyperglycemics (From admission, onward)    Start     Stop Route Frequency Ordered    08/05/23 2007  insulin aspart U-100 pen 0-5 Units         -- SubQ Before meals & nightly PRN 08/05/23 1911        Hold Oral hypoglycemics while patient is in the hospital.    Other  * Syncope  · Episode occurred in the car - unknown time down  · [employer] reading of treatment for VT/VF day of admission at 1500  · Consult to cardiology ordered  · Hx of CVA  · Weakness and disorientation resolved after episode  · No other neuro symptoms  · Head CT negative for acute abnormalities  · Maintain telemetry  · Cardiology  consulted    Patient started on amiodarone gtt for vtach. Patient with no further episodes of syncope, dizziness, lightheadedness, or CP.   Following cardiology recs: transition to p.o. on 08/08/2023 400 mg BID x 2 weeks then 400 mg daily thereafter   Will need to establish with OP EP cardiologist.   Thuy scan negative for ischemia. No arrhthymias during stress.       Final Active Diagnoses:    Diagnosis Date Noted POA    PRINCIPAL PROBLEM:  Syncope [R55] 08/05/2023 Yes    VT (ventricular tachycardia) [I47.20] 08/06/2023 Yes    DM type 2 without retinopathy [E11.9] 02/23/2021 Yes    History of CVA (cerebrovascular accident) [Z86.73] 02/23/2021 Not Applicable    Chronic systolic (congestive) heart failure [I50.22] 02/21/2020 Yes    Coronary artery disease involving native coronary artery of native heart without angina pectoris [I25.10] 10/15/2013 Yes    Biventricular ICD (implantable cardioverter-defibrillator) in place [Z95.810] 10/2013 Yes      Problems Resolved During this Admission:       Discharged Condition: stable    Disposition: Home or Self Care    Follow Up:   Follow-up Information     Duy Cheng MD Follow up in 1 week(s).    Specialty: Family Medicine  Contact information:  [address] [facility] [address] [phone]                       Patient Instructions:      Ambulatory referral/consult to Cardiac Electrophysiology   Standing Status: Future   Referral Priority: Urgent Referral Type: Consultation   Referral Reason: Specialty Services Required   Requested Specialty: Cardiology   Number of Visits Requested: 1     Diet Cardiac     Diet diabetic     Notify your health care provider if you experience any of the following:  temperature >100.4     Notify your health care provider if you experience any of the following:  persistent nausea and vomiting or diarrhea     Notify your health care provider if you experience any of the following:  severe uncontrolled pain     Notify your  health care provider if you experience any of the following:  redness, tenderness, or signs of infection (pain, swelling, redness, odor or green/yellow discharge around incision site)     Notify your health care provider if you experience any of the following:  difficulty breathing or increased cough     Notify your health care provider if you experience any of the following:  severe persistent headache     Notify your health care provider if you experience any of the following:  worsening rash     Notify your health care provider if you experience any of the following:  persistent dizziness, light-headedness, or visual disturbances     Notify your health care provider if you experience any of the following:  increased confusion or weakness     Activity as tolerated       Significant Diagnostic Studies: Labs:   CMP   Recent Labs   Lab 08/07/23  0310 08/08/23  0255    137   K 4.6 4.3    105   CO2 24 23   * 158*   BUN 13 12   CREATININE 0.8 0.8   CALCIUM 9.0 8.7   ANIONGAP 11 9    and CBC   Recent Labs   Lab 08/07/23  0310 08/08/23  0255   WBC 10.39 9.84   HGB 13.7 13.0   HCT 41.7 39.9    144*       Pending Diagnostic Studies:     None         Medications:  Reconciled Home Medications:      Medication List      START taking these medications    * amiodarone 200 MG Tab  Commonly known as: PACERONE  Take 2 tablets (400 mg total) by mouth 2 (two) times daily. for 14 days     * amiodarone 200 MG Tab  Commonly known as: PACERONE  Take 2 tablets (400 mg total) by mouth once daily. TAKE AFTER 14 DAYS         * This list has 2 medication(s) that are the same as other medications prescribed for you. Read the directions carefully, and ask your doctor or other care provider to review them with you.            CONTINUE taking these medications    amLODIPine 2.5 MG tablet  Commonly known as: NORVASC     aspirin 81 MG EC tablet  Commonly known as: ECOTRIN  Take 81 mg by mouth once daily.     atorvastatin  40 MG tablet  Commonly known as: LIPITOR  Take 1 tablet (40 mg total) by mouth once daily. For cholesterol and heart protection     blood sugar diagnostic Strp  1 each by Misc.(Non-Drug; Combo Route) route once daily.     blood-glucose meter kit  Use as instructed     fluorouraciL 5 % cream  Commonly known as: EFUDEX  Apply topically Daily.     irbesartan 150 MG tablet  Commonly known as: AVAPRO  Take 1 tablet (150 mg total) by mouth once daily. For heart and blood pressure     lancets Misc  1 each by Misc.(Non-Drug; Combo Route) route once daily.     magnesium oxide 400 mg (241.3 mg magnesium) tablet  Commonly known as: MAG-OX  Take 1 tablet (400 mg total) by mouth once daily.     metoprolol succinate 50 MG 24 hr tablet  Commonly known as: TOPROL-XL  Take 1 tablet (50 mg total) by mouth once daily. For heart and blood pressure     mv-min-FA-Ic-Hs-cdooecm-lutein 0.4-162-18 mg Tab  Take by mouth. 1 Tablet Oral Every day     MYRBETRIQ 25 mg Tb24 ER tablet  Generic drug: mirabegron  Take 1 tablet (25 mg total) by mouth once daily. For bladder     PREMARIN vaginal cream  Generic drug: conjugated estrogens  1 g with applicator or dime-sized amt with finger in vagina nightly x 2 weeks, then twice a week thereafter     TRULICITY 1.5 mg/0.5 mL pen injector  Generic drug: dulaglutide  Inject 1.5 mg into the skin every 7 days.     vitamin D 1000 units Tab  Commonly known as: VITAMIN D3  Take 1,000 Units by mouth once daily.            Indwelling Lines/Drains at time of discharge:   Lines/Drains/Airways     None                 Time spent on the discharge of patient: 60 minutes         Coral Carranza NP  Department of Hospital Medicine  [facility]

## 2023-08-08 NOTE — PROGRESS NOTES
[facility]                    Pharmacy       Discharge Medication Education    Patient ACCEPTED medication education. Pharmacy has provided education on the name, indication, and possible side effects of the medication(s) prescribed, using teach-back method.     The following medications have also been discussed, during this admission.        Medication List        START taking these medications      * amiodarone 200 MG Tab  Commonly known as: PACERONE  Take 2 tablets (400 mg total) by mouth 2 (two) times daily. for 14 days     * amiodarone 200 MG Tab  Commonly known as: PACERONE  Take 2 tablets (400 mg total) by mouth once daily. TAKE AFTER 14 DAYS           * This list has 2 medication(s) that are the same as other medications prescribed for you. Read the directions carefully, and ask your doctor or other care provider to review them with you.                CONTINUE taking these medications      amLODIPine 2.5 MG tablet  Commonly known as: NORVASC     aspirin 81 MG EC tablet  Commonly known as: ECOTRIN     atorvastatin 40 MG tablet  Commonly known as: LIPITOR  Take 1 tablet (40 mg total) by mouth once daily. For cholesterol and heart protection     blood sugar diagnostic Strp  1 each by Misc.(Non-Drug; Combo Route) route once daily.     blood-glucose meter kit  Use as instructed     fluorouraciL 5 % cream  Commonly known as: EFUDEX     irbesartan 150 MG tablet  Commonly known as: AVAPRO  Take 1 tablet (150 mg total) by mouth once daily. For heart and blood pressure     lancets Misc  1 each by Misc.(Non-Drug; Combo Route) route once daily.     magnesium oxide 400 mg (241.3 mg magnesium) tablet  Commonly known as: MAG-OX  Take 1 tablet (400 mg total) by mouth once daily.     metoprolol succinate 50 MG 24 hr tablet  Commonly known as: TOPROL-XL  Take 1 tablet (50 mg total) by mouth once daily. For heart and blood pressure     mv-min-FA-Fq-By-ifcbhdi-lutein 0.4-162-18 mg Tab     MYRBETRIQ 25 mg  Tb24 ER tablet  Generic drug: mirabegron  Take 1 tablet (25 mg total) by mouth once daily. For bladder     PREMARIN vaginal cream  Generic drug: conjugated estrogens  1 g with applicator or dime-sized amt with finger in vagina nightly x 2 weeks, then twice a week thereafter     TRULICITY 1.5 mg/0.5 mL pen injector  Generic drug: dulaglutide  Inject 1.5 mg into the skin every 7 days.     vitamin D 1000 units Tab  Commonly known as: VITAMIN D3               Where to Get Your Medications        These medications were sent to [facility]  [address], [place] [address]      Hours: Mon-Fri, 8a-5:30p Phone: [phone]   amiodarone 200 MG Tab  amiodarone 200 MG Tab          Thank you  Bernard Cuadra, PharmD  [phone]

## 2023-08-08 NOTE — PROGRESS NOTES
[facility] - Telemetry  Cardiology  Progress Note    Patient Name: Eliana Wagner  MRN: [id number]  Admission Date: 8/5/2023  Hospital Length of Stay: 1 days  Code Status: Full Code   Attending Physician: Hakeem Gale,*   Primary Care Physician: Duy Cheng MD  Expected Discharge Date:   Principal Problem:Syncope    Subjective:     Hospital Course:   08/07/2023 No recurrent VT overnight. Stress test pending this AM. No chest pain. Lytes WNL.   08/08/2023 No VT on tele. Patient transitioned to p.o. Amiodarone.       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, with long-term current use of insulin 04/24/2023    Diabetes mellitus, type 2     Hyperlipidemia     Retinal detachment     od     Squamous cell carcinoma of skin     Stroke     tia x 3   ( last in 2005)    Urinary incontinence without sensory awareness 05/12/2021       Past Surgical History:   Procedure Laterality Date    APPENDECTOMY      CARDIAC CATHETERIZATION      CATARACT EXTRACTION Bilateral     EYE SURGERY Bilateral     cataract    INSERT / REPLACE / REMOVE PACEMAKER      INSERTION OF PACEMAKER      july 22 2019     Orestes Kim MD    TONSILLECTOMY         Review of patient's allergies indicates:   Allergen Reactions    Penicillins      Caused uti       No current facility-administered medications on file prior to encounter.     Current Outpatient Medications on File Prior to Encounter   Medication Sig    amLODIPine (NORVASC) 2.5 MG tablet     aspirin (ECOTRIN) 81 MG EC tablet Take 81 mg by mouth once daily.    atorvastatin (LIPITOR) 40 MG tablet Take 1 tablet (40 mg total) by mouth once daily. For cholesterol and heart protection    dulaglutide (TRULICITY) 1.5 mg/0.5 mL pen injector Inject 1.5 mg into the skin every 7 days.    irbesartan (AVAPRO) 150 MG tablet Take 1  tablet (150 mg total) by mouth once daily. For heart and blood pressure    magnesium oxide (MAG-OX) 400 mg (241.3 mg magnesium) tablet Take 1 tablet (400 mg total) by mouth once daily.    metoprolol succinate (TOPROL-XL) 50 MG 24 hr tablet Take 1 tablet (50 mg total) by mouth once daily. For heart and blood pressure    mirabegron (MYRBETRIQ) 25 mg Tb24 ER tablet Take 1 tablet (25 mg total) by mouth once daily. For bladder    mv-min-FA-Tq-Ka-waufrai-lutein 0.4-162-18 mg Tab Take by mouth. 1 Tablet Oral Every day    vitamin D (VITAMIN D3) 1000 units Tab Take 1,000 Units by mouth once daily.    blood sugar diagnostic Strp 1 each by Misc.(Non-Drug; Combo Route) route once daily.    blood-glucose meter kit Use as instructed    conjugated estrogens (PREMARIN) vaginal cream 1 g with applicator or dime-sized amt with finger in vagina nightly x 2 weeks, then twice a week thereafter    fluorouracil (EFUDEX) 5 % cream Apply topically Daily.     lancets Misc 1 each by Misc.(Non-Drug; Combo Route) route once daily.     Family History       Problem Relation (Age of Onset)    Cancer Son    Cataracts Mother, Father    Glaucoma Maternal Grandfather    Liver disease Son    No Known Problems Sister, Brother, Maternal Aunt, Maternal Uncle, Paternal Aunt, Paternal Uncle, Maternal Grandmother, Paternal Grandmother, Paternal Grandfather          Tobacco Use    Smoking status: Never    Smokeless tobacco: Never   Substance and Sexual Activity    Alcohol use: Not Currently     Comment: socially    Drug use: No    Sexual activity: Not Currently     Review of Systems   Constitutional: Negative for diaphoresis and fever.   HENT:  Negative for congestion and hearing loss.    Eyes:  Negative for blurred vision and pain.   Cardiovascular:  Negative for chest pain, claudication, dyspnea on exertion, leg swelling, near-syncope and palpitations.   Respiratory:  Negative for shortness of breath and sleep disturbances due to breathing.     Hematologic/Lymphatic: Negative for bleeding problem. Does not bruise/bleed easily.   Skin:  Negative for color change and poor wound healing.   Gastrointestinal:  Negative for abdominal pain and nausea.   Genitourinary:  Negative for bladder incontinence and flank pain.   Neurological:  Negative for focal weakness and light-headedness.     Objective:     Vital Signs (Most Recent):  Temp: 97.6 °F (36.4 °C) (08/08/23 0831)  Pulse: 65 (08/08/23 0831)  Resp: 18 (08/08/23 0831)  BP: (!) 146/68 (08/08/23 0831)  SpO2: 96 % (08/08/23 0831) Vital Signs (24h Range):  Temp:  [97.1 °F (36.2 °C)-98.6 °F (37 °C)] 97.6 °F (36.4 °C)  Pulse:  [60-65] 65  Resp:  [18] 18  SpO2:  [96 %-99 %] 96 %  BP: (138-162)/(63-71) 146/68     Weight: 78.9 kg (174 lb)  Body mass index is 31.83 kg/m².    SpO2: 96 %       No intake or output data in the 24 hours ending 08/08/23 1033    Lines/Drains/Airways       Peripheral Intravenous Line  Duration                  Peripheral IV - Single Lumen 08/05/23 1634 22 G Right Antecubital 2 days                     Physical Exam  Constitutional:       Appearance: She is well-developed. She is not diaphoretic.   HENT:      Head: Normocephalic and atraumatic.   Eyes:      General: No scleral icterus.     Pupils: Pupils are equal, round, and reactive to light.   Neck:      Vascular: No JVD.   Cardiovascular:      Rate and Rhythm: Normal rate and regular rhythm.      Pulses: Intact distal pulses.      Heart sounds: S1 normal and S2 normal. No murmur heard.     No friction rub. No gallop.   Pulmonary:      Effort: Pulmonary effort is normal. No respiratory distress.      Breath sounds: Normal breath sounds. No wheezing or rales.   Chest:      Chest wall: No tenderness.   Abdominal:      General: Bowel sounds are normal. There is no distension.      Palpations: Abdomen is soft. There is no mass.      Tenderness: There is no abdominal tenderness. There is no rebound.   Musculoskeletal:         General: No  "tenderness. Normal range of motion.      Cervical back: Normal range of motion and neck supple.      Right lower leg: No edema.      Left lower leg: No edema.   Skin:     General: Skin is warm and dry.      Coloration: Skin is not pale.   Neurological:      Mental Status: She is alert and oriented to person, place, and time.      Coordination: Coordination normal.      Deep Tendon Reflexes: Reflexes normal.   Psychiatric:         Behavior: Behavior normal.         Judgment: Judgment normal.          Significant Labs: BMP:   Recent Labs   Lab 08/07/23  0310 08/08/23  0255   * 158*    137   K 4.6 4.3    105   CO2 24 23   BUN 13 12   CREATININE 0.8 0.8   CALCIUM 9.0 8.7   MG 2.0 2.0     , CMP   Recent Labs   Lab 08/07/23  0310 08/08/23  0255    137   K 4.6 4.3    105   CO2 24 23   * 158*   BUN 13 12   CREATININE 0.8 0.8   CALCIUM 9.0 8.7   ANIONGAP 11 9     , CBC   Recent Labs   Lab 08/07/23  0310 08/08/23  0255   WBC 10.39 9.84   HGB 13.7 13.0   HCT 41.7 39.9    144*     , INR No results for input(s): "INR", "PROTIME" in the last 48 hours., Lipid Panel No results for input(s): "CHOL", "HDL", "LDLCALC", "TRIG", "CHOLHDL" in the last 48 hours., Troponin   No results for input(s): "TROPONINI" in the last 48 hours.  , and All pertinent lab results from the last 24 hours have been reviewed.    Significant Imaging: Echocardiogram: 2D echo with color flow doppler: No results found for this or any previous visit. and Transthoracic echo (TTE) complete (Cupid Only):   Results for orders placed or performed during the hospital encounter of 05/17/23   Echo   Result Value Ref Range    Ascending aorta 3.38 cm    STJ 2.77 cm    AV mean gradient 5 mmHg    Ao peak narendra 1.43 m/s    Ao VTI 33.35 cm    IVS 0.66 0.6 - 1.1 cm    LA size 4.11 cm    Left Atrium Major Axis 4.92 cm    Left Atrium Minor Axis 4.73 cm    LVIDd 6.07 (A) 3.5 - 6.0 cm    LVIDs 5.19 (A) 2.1 - 4.0 cm    LVOT diameter 1.83 cm " "   LVOT peak VTI 26.42 cm    Posterior Wall 0.77 0.6 - 1.1 cm    MV Peak A Fuentes 1.16 m/s    E wave deceleration time 178.94 msec    MV Peak E Fuentes 0.62 m/s    PV Peak D Fuentes 0.27 m/s    PV Peak S Fuentes 0.44 m/s    RA Major Axis 4.74 cm    RA Width 3.24 cm    RVDD 2.66 cm    Sinus 2.50 cm    TAPSE 2.19 cm    TR Max Fuentes 2.48 m/s    LA WIDTH 3.68 cm    MV stenosis pressure 1/2 time 51.89 ms    LV Diastolic Volume 184.82 mL    LV Systolic Volume 128.89 mL    LVOT peak fuentes 1.20 m/s    TDI LATERAL 0.05 m/s    TDI SEPTAL 0.05 m/s    Mr max fuentes 0.05 m/s    LA volume (mod) 53.20 cm3    MV "A" wave duration 11.70 msec    MV mean gradient 1 mmHg    MV peak gradient 5 mmHg    MV VTI 29.25 cm    LV LATERAL E/E' RATIO 12.40 m/s    LV SEPTAL E/E' RATIO 12.40 m/s    FS 14 %    LA volume 62.01 cm3    LV mass 165.52 g    Left Ventricle Relative Wall Thickness 0.25 cm    AV valve area 2.08 cm2    AV Velocity Ratio 0.84     AV index (prosthetic) 0.79     MV valve area p 1/2 method 4.24 cm2    MV valve area by continuity eq 2.37 cm2    E/A ratio 0.53     Mean e' 0.05 m/s    Pulm vein S/D ratio 1.63     LVOT area 2.6 cm2    LVOT stroke volume 69.46 cm3    AV peak gradient 8 mmHg    E/E' ratio 12.40 m/s    Triscuspid Valve Regurgitation Peak Gradient 25 mmHg    BSA 1.87 m2    LV Systolic Volume Index 71.2 mL/m2    LV Diastolic Volume Index 102.11 mL/m2    LA Volume Index 34.3 mL/m2    LV Mass Index 91 g/m2    LA Volume Index (Mod) 29.4 mL/m2    Right Atrial Pressure (from IVC) 3 mmHg    QEF 33 %    EF 35 %    TV resting pulmonary artery pressure 28 mmHg    Narrative    · The left ventricle is moderately enlarged with moderately decreased   systolic function.  · The estimated ejection fraction is 35%.  · The quantitatively derived ejection fraction is 33%.  · The left ventricular global longitudinal strain is -9.9%.  · There are segmental left ventricular wall motion abnormalities. Septal   akinesis  · Grade I left ventricular diastolic " dysfunction.  · The estimated PA systolic pressure is 28 mmHg.  · Normal right ventricular size with normal right ventricular systolic   function.  · Normal central venous pressure (3 mmHg).  · Mild aortic regurgitation.  · Mild mitral regurgitation.  · Mild tricuspid regurgitation.  · There is no pulmonary hypertension.       and EKG: reviewed.      Assessment and Plan:     Brief HPI: Patient seen this morning on rounds without cardiac complaint. Transitioned to p.o. Amiodarone.     VT (ventricular tachycardia)  Pt noted to have shock for VT/VF per BiV ICD interrogation.    - Transitioned to Amiodarone 400 mg BID x 2 weeks then 400 mg daily thereafter   -Continue BB  - Lexiscan negative    - o/p EP eval     History of CVA (cerebrovascular accident)  Continue medical therapy.  Risk factor and lifestyle modifications.      Biventricular ICD (implantable cardioverter-defibrillator) in place  Noted to have shock delivered during syncopal episode due to VT/VF.  Noted to have multiple episodes in past w/ additional shock 4/2023.    - amiodarone as per VT section  - establish care w/ o/p electrophysiologist    Coronary artery disease involving native coronary artery of native heart without angina pectoris  Continue medical therapy.      Chronic systolic (congestive) heart failure  05/17/23    Echo    Interpretation Summary  · The left ventricle is moderately enlarged with moderately decreased systolic function.  · The estimated ejection fraction is 35%.  · The quantitatively derived ejection fraction is 33%.  · The left ventricular global longitudinal strain is -9.9%.  · There are segmental left ventricular wall motion abnormalities. Septal akinesis  · Grade I left ventricular diastolic dysfunction.  · The estimated PA systolic pressure is 28 mmHg.  · Normal right ventricular size with normal right ventricular systolic function.  · Normal central venous pressure (3 mmHg).  · Mild aortic regurgitation.  · Mild mitral  regurgitation.  · Mild tricuspid regurgitation.  · There is no pulmonary hypertension.  . Continue Beta Blocker and ACE/ARB and monitor clinical status closely. Monitor on telemetry. Patient is on CHF pathway.  Monitor strict Is&Os and daily weights.  Place on fluid restriction of 2 L. Cardiology has been consulted. Continue to stress to patient importance of self efficacy and  on diet for CHF. Last BNP reviewed- and noted below   Recent Labs   Lab 08/05/23  1637   *   .  Continue BB, ARB        VTE Risk Mitigation (From admission, onward)         Ordered     enoxaparin injection 40 mg  Daily         08/05/23 1911     IP VTE HIGH RISK PATIENT  Once         08/05/23 1911     Place sequential compression device  Until discontinued         08/05/23 1911                Germain Duckworth NP  Cardiology  [facility] - Telemetry

## 2023-08-08 NOTE — ASSESSMENT & PLAN NOTE
Pt noted to have shock for VT/VF per BiV ICD interrogation.    - Transitioned to Amiodarone 400 mg BID x 2 weeks then 400 mg daily thereafter   -Continue BB  - Lexiscan negative    - o/p EP eval

## 2023-08-08 NOTE — ASSESSMENT & PLAN NOTE
Patient's FSGs are controlled on current medication regimen.  Last A1c reviewed-   Lab Results   Component Value Date    HGBA1C 8.4 (H) 07/21/2023     Most recent fingerstick glucose reviewed-   Recent Labs   Lab 08/07/23  1132 08/07/23  1613 08/07/23  2201   POCTGLUCOSE 149* 173* 191*     Current correctional scale  Low  Maintain anti-hyperglycemic dose as follows-   Antihyperglycemics (From admission, onward)    Start     Stop Route Frequency Ordered    08/05/23 2007  insulin aspart U-100 pen 0-5 Units         -- SubQ Before meals & nightly PRN 08/05/23 1911        Hold Oral hypoglycemics while patient is in the hospital.

## 2023-08-08 NOTE — PLAN OF CARE
Problem: Adult Inpatient Plan of Care  Goal: Plan of Care Review  Outcome: Ongoing, Progressing       Virtual Nurse:  Vital signs, labs, progress notes and care plan reviewed.

## 2023-08-08 NOTE — PROGRESS NOTES
Future Appointments   Date Time Provider Department Center   8/16/2023 11:00 AM Marga Basurto, NP [facility]   8/21/2023 10:00 AM Lizz Guy AU.D [facility] AUDIO [facility]   8/24/2023  1:20 PM Orestes Kim Jr., MD [facility] CARDIO [place]   8/26/2023 10:00 AM HOME MONITOR DEVICE CHECK, [facility]   10/25/2023 10:00 AM Orestes Kim Jr., MD [facility] CARD[facility] [place]

## 2023-08-08 NOTE — PLAN OF CARE
CM spoke with pt prior to d/c   Pt's daughter to transport pt to home - no dme, no hh ordered   Prefers to see pcp - message sent to office requesting apt at [address] office     Message sent to Cardiology requesting a sooner apt with Cardiologist as she is going out of town on 8/24     Discharging nurse to review all d/c meds/instructions        08/08/23 1251   Final Note   Assessment Type Final Discharge Note   Anticipated Discharge Disposition Home   What phone number can be called within the next 1-3 days to see how you are doing after discharge? [phone]   Hospital Resources/Appts/Education Provided Appointments scheduled and added to AVS   Post-Acute Status   Discharge Delays None known at this time

## 2023-08-08 NOTE — NURSING
VN cued into room and permission given to adjust the camera towards patient and daughter, Ev.  Reviewed AVS with both of them and they verbalized complete understanding on the discharge instructions.  Patient received medications from Outpatient Pharmacy.  Wheelchair per Transport placed.  Voiced no other concerns.

## 2023-08-08 NOTE — ASSESSMENT & PLAN NOTE
· [employer]  Model [id number]   Serial [id number]   ICD  · Kasey with [employer], states reading says pt was tx today for VT/VF episode around 1500. Also had tx episode in April 2023 for sustained episodes of VT. She also has numerous non-sustained episodes from June and July 2023; hx of afib from April 2022  · Start amnio drip- transition to PO amiodarone 8/8   · Will need to establish care with EP outpatient

## 2023-08-08 NOTE — ASSESSMENT & PLAN NOTE
· [employer]  Model [id number]   Serial [id number]   ICD  · Kasey with [employer], states reading says pt was tx today for VT/VF episode around 1500. Also had tx episode in April 2023 for sustained episodes of VT. She also has numerous non-sustained episodes from June and July 2023; hx of afib from April 2022  · Start amnio drip- transition to PO amiodarone 8/8   · Will need to establish care with EP outpatient

## 2023-08-08 NOTE — ASSESSMENT & PLAN NOTE
· Episode occurred in the car - unknown time down  · [employer] reading of treatment for VT/VF day of admission at 1500  · Consult to cardiology ordered  · Hx of CVA  · Weakness and disorientation resolved after episode  · No other neuro symptoms  · Head CT negative for acute abnormalities  · Maintain telemetry  · Cardiology consulted    Patient started on amiodarone gtt for vtach. Patient with no further episodes of syncope, dizziness, lightheadedness, or CP.   Following cardiology recs: transition to p.o. on 08/08/2023 400 mg BID x 2 weeks then 400 mg daily thereafter   Will need to establish with OP EP cardiologist.   Thuy scan negative for ischemia. No arrhthymias during stress.

## 2023-08-08 NOTE — TELEPHONE ENCOUNTER
[employer] received on 8/5/2023 for treated event for MMVT.   Pt was admitted to [facility] in [place].  Pt was discharged on Amiodarone.    Following Provider:Dr. Kim    Device Type:  ICD    Event 1  Date/Time:  8/5/203 @ 1509  Event Type: MMVT  Ventricular CL:   286 ms  Duration:  12 secs  Therapy Delivered:  ATP + Shock  Appropriate Therapy:  Yes  Successful:  Yes  Pt symptoms:  syncopal episode    Event 2  Date/Time:  4/24/23 at 10:21  Event Type: MMVT  Ventricular CL:   194 ms  Duration:  12 secs  Therapy Delivered:  ATP   Appropriate Therapy:  Yes  Successful:  Yes    Appt 8/24/23  Medication:  Started on Amiodarone, Metoprolol 25 mg QD  EF 35% on 5/17/2023      Event 1          Event 2

## 2023-08-09 ENCOUNTER — PATIENT OUTREACH (OUTPATIENT)
Dept: ADMINISTRATIVE | Facility: CLINIC | Age: 88
End: 2023-08-09
Payer: MEDICARE

## 2023-08-09 ENCOUNTER — TELEPHONE (OUTPATIENT)
Dept: PRIMARY CARE CLINIC | Facility: CLINIC | Age: 88
End: 2023-08-09
Payer: MEDICARE

## 2023-08-09 DIAGNOSIS — I47.20 VT (VENTRICULAR TACHYCARDIA): Primary | ICD-10-CM

## 2023-08-09 DIAGNOSIS — R55 SYNCOPE, UNSPECIFIED SYNCOPE TYPE: ICD-10-CM

## 2023-08-09 DIAGNOSIS — Z95.810 BIVENTRICULAR ICD (IMPLANTABLE CARDIOVERTER-DEFIBRILLATOR) IN PLACE: ICD-10-CM

## 2023-08-09 DIAGNOSIS — I50.22 CHRONIC SYSTOLIC (CONGESTIVE) HEART FAILURE: ICD-10-CM

## 2023-08-09 NOTE — TELEPHONE ENCOUNTER
----- Message from Duy Cheng MD sent at 8/9/2023  6:49 AM CDT -----  Contact: Josephine Dejesus [facility] @ [phone]  Please call patient again in ask her to schedule hospital discharge follow-up.  Follow-ups are tight.  We need to try to get her in by Tuesday.  ----- Message -----  From: Francoise James  Sent: 8/8/2023   1:21 PM CDT  To: Duy Cheng MD    Patient was not ready to schedule; states she will call back.  ----- Message -----  From: Duy Cheng MD  Sent: 8/8/2023   1:12 PM CDT  To: [facility] Referral Coordinator    Can you please assist?  Hospital discharge follow-up 30 minutes  ----- Message -----  From: Keyana Mireles  Sent: 8/8/2023  12:49 PM CDT  To: [facility] Staff     Patient needs a Hosp follow up appt with their PCP only.       When is the next available appointment:  8/21    Symptoms:  Uttered mental Status    Discharge date: 08/08    Needs to be seen by: within 7 days  by 8/15    Would you prefer an answer via [employer]?: Call patient to schedule     Comments:  Patient only want to see PCP for f/u

## 2023-08-09 NOTE — PROGRESS NOTES
C3 nurse spoke with Eliana Wagner  for a TCC post hospital discharge follow up call. The patient has a scheduled HOS appointment with Fina Deng NP on 8/14/2023 @ 0800.

## 2023-08-11 ENCOUNTER — NURSE TRIAGE (OUTPATIENT)
Dept: ADMINISTRATIVE | Facility: CLINIC | Age: 88
End: 2023-08-11
Payer: MEDICARE

## 2023-08-14 ENCOUNTER — OFFICE VISIT (OUTPATIENT)
Dept: ELECTROPHYSIOLOGY | Facility: CLINIC | Age: 88
End: 2023-08-14
Payer: MEDICARE

## 2023-08-14 VITALS
WEIGHT: 170.44 LBS | DIASTOLIC BLOOD PRESSURE: 69 MMHG | SYSTOLIC BLOOD PRESSURE: 146 MMHG | HEIGHT: 62 IN | HEART RATE: 82 BPM | BODY MASS INDEX: 31.36 KG/M2

## 2023-08-14 DIAGNOSIS — Z95.810 BIVENTRICULAR ICD (IMPLANTABLE CARDIOVERTER-DEFIBRILLATOR) IN PLACE: ICD-10-CM

## 2023-08-14 DIAGNOSIS — E66.9 CLASS 1 OBESITY WITH BODY MASS INDEX (BMI) OF 31.0 TO 31.9 IN ADULT, UNSPECIFIED OBESITY TYPE, UNSPECIFIED WHETHER SERIOUS COMORBIDITY PRESENT: ICD-10-CM

## 2023-08-14 DIAGNOSIS — E11.65 TYPE 2 DIABETES MELLITUS WITH HYPERGLYCEMIA, WITHOUT LONG-TERM CURRENT USE OF INSULIN: ICD-10-CM

## 2023-08-14 DIAGNOSIS — E11.59 OBESITY, DIABETES, AND HYPERTENSION SYNDROME: ICD-10-CM

## 2023-08-14 DIAGNOSIS — E78.5 HYPERLIPIDEMIA ASSOCIATED WITH TYPE 2 DIABETES MELLITUS: ICD-10-CM

## 2023-08-14 DIAGNOSIS — Z86.73 HISTORY OF CVA (CEREBROVASCULAR ACCIDENT): ICD-10-CM

## 2023-08-14 DIAGNOSIS — I10 PRIMARY HYPERTENSION: ICD-10-CM

## 2023-08-14 DIAGNOSIS — I47.20 VT (VENTRICULAR TACHYCARDIA): Primary | ICD-10-CM

## 2023-08-14 DIAGNOSIS — I42.8 NONISCHEMIC CARDIOMYOPATHY: ICD-10-CM

## 2023-08-14 DIAGNOSIS — I50.42 CHRONIC COMBINED SYSTOLIC AND DIASTOLIC HEART FAILURE: ICD-10-CM

## 2023-08-14 DIAGNOSIS — E11.59 HYPERTENSION ASSOCIATED WITH DIABETES: ICD-10-CM

## 2023-08-14 DIAGNOSIS — E78.2 MIXED HYPERLIPIDEMIA: ICD-10-CM

## 2023-08-14 DIAGNOSIS — I42.0 NONISCHEMIC CONGESTIVE CARDIOMYOPATHY: ICD-10-CM

## 2023-08-14 DIAGNOSIS — E11.69 OBESITY, DIABETES, AND HYPERTENSION SYNDROME: ICD-10-CM

## 2023-08-14 DIAGNOSIS — I25.10 CORONARY ARTERY DISEASE INVOLVING NATIVE CORONARY ARTERY OF NATIVE HEART WITHOUT ANGINA PECTORIS: ICD-10-CM

## 2023-08-14 DIAGNOSIS — I15.2 OBESITY, DIABETES, AND HYPERTENSION SYNDROME: ICD-10-CM

## 2023-08-14 DIAGNOSIS — E11.69 HYPERLIPIDEMIA ASSOCIATED WITH TYPE 2 DIABETES MELLITUS: ICD-10-CM

## 2023-08-14 DIAGNOSIS — I50.20 HFREF (HEART FAILURE WITH REDUCED EJECTION FRACTION): ICD-10-CM

## 2023-08-14 DIAGNOSIS — I49.3 PREMATURE VENTRICULAR CONTRACTIONS: ICD-10-CM

## 2023-08-14 DIAGNOSIS — Z95.810 PRESENCE OF CARDIAC RESYNCHRONIZATION THERAPY DEFIBRILLATOR (CRT-D): ICD-10-CM

## 2023-08-14 DIAGNOSIS — I15.2 HYPERTENSION ASSOCIATED WITH DIABETES: ICD-10-CM

## 2023-08-14 DIAGNOSIS — E66.9 OBESITY, DIABETES, AND HYPERTENSION SYNDROME: ICD-10-CM

## 2023-08-14 PROCEDURE — 93010 RHYTHM STRIP: ICD-10-PCS | Mod: S$PBB,,, | Performed by: INTERNAL MEDICINE

## 2023-08-14 PROCEDURE — 99205 OFFICE O/P NEW HI 60 MIN: CPT | Mod: S$PBB,,, | Performed by: STUDENT IN AN ORGANIZED HEALTH CARE EDUCATION/TRAINING PROGRAM

## 2023-08-14 PROCEDURE — 99205 PR OFFICE/OUTPT VISIT, NEW, LEVL V, 60-74 MIN: ICD-10-PCS | Mod: S$PBB,,, | Performed by: STUDENT IN AN ORGANIZED HEALTH CARE EDUCATION/TRAINING PROGRAM

## 2023-08-14 PROCEDURE — 99213 OFFICE O/P EST LOW 20 MIN: CPT | Mod: PBBFAC | Performed by: STUDENT IN AN ORGANIZED HEALTH CARE EDUCATION/TRAINING PROGRAM

## 2023-08-14 PROCEDURE — 93010 ELECTROCARDIOGRAM REPORT: CPT | Mod: S$PBB,,, | Performed by: INTERNAL MEDICINE

## 2023-08-14 PROCEDURE — 93005 ELECTROCARDIOGRAM TRACING: CPT | Mod: PBBFAC | Performed by: INTERNAL MEDICINE

## 2023-08-14 PROCEDURE — 99999 PR PBB SHADOW E&M-EST. PATIENT-LVL III: CPT | Mod: PBBFAC,,, | Performed by: STUDENT IN AN ORGANIZED HEALTH CARE EDUCATION/TRAINING PROGRAM

## 2023-08-14 PROCEDURE — 99999 PR PBB SHADOW E&M-EST. PATIENT-LVL III: ICD-10-PCS | Mod: PBBFAC,,, | Performed by: STUDENT IN AN ORGANIZED HEALTH CARE EDUCATION/TRAINING PROGRAM

## 2023-08-14 NOTE — PROGRESS NOTES
PCP - Duy Cheng MD  Subjective:     I had the pleasure today of seeing Eliana Wagner (88 y.o. female) at the request of Dr. Kim after recent ICD shock d/t VT/VF.     History:  DCM EF35%, CRT-D  HLD  LBBB  HTN       Background:   She has a history of a dilated cardiomyopathy due to left bundle branch block with some improvement after implantation of a cardiac resynchronization therapy.  She is not having chest pains tightness or shortness of breath. She has 94% Bi-V pacing. She had never required ICD therapy until recently on 8/5/23. Patient states she had just gotten into her car after doing groceries when she suddenly lost consciousness. No prodromal symptoms. She did feel weak for some time afterwards. She drove home shortly after and family prompted 911 call where she was taken to [facility] and underwent a NM stress test which was negative. She was subsequently started on Amiodarone and has not had any further events since. She denies any chest pain, shortness of breath, palpitations, LE swelling.     In clinic today:  Ms. Wagner is doing overall well and lives a very active lifestyle. She works out at the gym and travels. Plans to go on a cruise in September and another in December.     Today's ECG/rhythm strip shows AV paced, BiV pacemaker, rate 82bpm, TX 166ms, QRS 172ms, Qtc 577ms.    Device interrogation shows 16 months battery longevity. She had a 12sec VT/VF event on 8/5/23 that was successfully terminated with 20J. Interrogation also shows VT event on 4/24/23 lasting 12 sec that was successfully aborted with ATP therapy. Patient does not recall any symptoms during this event.       History:     Social History     Tobacco Use    Smoking status: Never    Smokeless tobacco: Never   Substance Use Topics    Alcohol use: Not Currently     Comment: socially     Family History   Problem Relation Age of Onset    Cataracts Mother     Cataracts Father     No Known Problems Sister     No  Known Problems Brother     Cancer Son         prostate    Liver disease Son         liver transplant    No Known Problems Maternal Aunt     No Known Problems Maternal Uncle     No Known Problems Paternal Aunt     No Known Problems Paternal Uncle     No Known Problems Maternal Grandmother     Glaucoma Maternal Grandfather     No Known Problems Paternal Grandmother     No Known Problems Paternal Grandfather     Breast cancer Neg Hx     Colon cancer Neg Hx     Ovarian cancer Neg Hx     Amblyopia Neg Hx     Blindness Neg Hx     Diabetes Neg Hx     Hypertension Neg Hx     Macular degeneration Neg Hx     Retinal detachment Neg Hx     Strabismus Neg Hx     Stroke Neg Hx     Thyroid disease Neg Hx        Meds:     Review of patient's allergies indicates:   Allergen Reactions    Penicillins      Caused uti       Current Outpatient Medications:     amiodarone (PACERONE) 200 MG Tab, Take 2 tablets (400 mg total) by mouth 2 (two) times daily. for 14 days, Disp: 56 tablet, Rfl: 0    amiodarone (PACERONE) 200 MG Tab, Take 2 tablets (400 mg total) by mouth once daily. TAKE AFTER 14 DAYS, Disp: 60 tablet, Rfl: 11    amLODIPine (NORVASC) 2.5 MG tablet, , Disp: , Rfl:     aspirin (ECOTRIN) 81 MG EC tablet, Take 81 mg by mouth once daily., Disp: , Rfl:     atorvastatin (LIPITOR) 40 MG tablet, Take 1 tablet (40 mg total) by mouth once daily. For cholesterol and heart protection, Disp: 90 tablet, Rfl: 3    blood sugar diagnostic Strp, 1 each by Misc.(Non-Drug; Combo Route) route once daily., Disp: 100 each, Rfl: 3    conjugated estrogens (PREMARIN) vaginal cream, 1 g with applicator or dime-sized amt with finger in vagina nightly x 2 weeks, then twice a week thereafter, Disp: 42.5 g, Rfl: 12    dulaglutide (TRULICITY) 1.5 mg/0.5 mL pen injector, Inject 1.5 mg into the skin every 7 days., Disp: 4 pen, Rfl: 11    fluorouracil (EFUDEX) 5 % cream, Apply topically Daily. , Disp: , Rfl:     irbesartan (AVAPRO) 150 MG tablet, Take 1 tablet  "(150 mg total) by mouth once daily. For heart and blood pressure, Disp: 90 tablet, Rfl: 1    lancets Misc, 1 each by Misc.(Non-Drug; Combo Route) route once daily., Disp: 100 each, Rfl: 3    magnesium oxide (MAG-OX) 400 mg (241.3 mg magnesium) tablet, Take 1 tablet (400 mg total) by mouth once daily., Disp: , Rfl: 0    metoprolol succinate (TOPROL-XL) 50 MG 24 hr tablet, Take 1 tablet (50 mg total) by mouth once daily. For heart and blood pressure, Disp: 90 tablet, Rfl: 1    mirabegron (MYRBETRIQ) 25 mg Tb24 ER tablet, Take 1 tablet (25 mg total) by mouth once daily. For bladder, Disp: 30 tablet, Rfl: 11    mv-min-FA-Ym-Zf-ilxsizu-lutein 0.4-162-18 mg Tab, Take by mouth. 1 Tablet Oral Every day, Disp: , Rfl:     vitamin D (VITAMIN D3) 1000 units Tab, Take 1,000 Units by mouth once daily., Disp: , Rfl:     blood-glucose meter kit, Use as instructed (Patient not taking: Reported on 8/9/2023), Disp: 1 each, Rfl: 0    Constitution: Negative for fever or chills. Negative for weight loss or gain.   HENT: Negative for sore throat or headaches. Negative for rhinorrhea.  Eyes: Negative for blurred or double vision.   Cardiovascular: See above  Pulmonary: Negative for SOB. Negative for cough.   Gastrointestinal: Negative for abdominal pain. Negative for nausea/ vomiting. Negative for diarrhea.   : Negative for dysuria.   Neurological: Negative for focal weakness or sensory changes.    Objective:   BP (!) 146/69   Pulse 82   Ht 5' 2" (1.575 m)   Wt 77.3 kg (170 lb 6.7 oz)   BMI 31.17 kg/m²     General: NAD. AAO.  HENT: No scleral icterus. Extraocular movements intact.  Neck: No JVD  Cardiovascular: Regular heart rate and rhythm. S1/S2 appreciated.  Respiratory: CTAB. No increased work of breathing.  Extremities: Warm. No edema.  Skin: no ulceration or wounds present    Labs & Imaging   Reviewed in clinic today  NM Stress test 8/7/23    The ECG portion of the study is negative for ischemia.    The patient reported no " chest pain during the stress test.    There were no arrhythmias during stress.    The nuclear portion of this study will be reported separately.    Echo 5/17/23  The left ventricle is moderately enlarged with moderately decreased systolic function.  The estimated ejection fraction is 35%.  The quantitatively derived ejection fraction is 33%.  The left ventricular global longitudinal strain is -9.9%.  There are segmental left ventricular wall motion abnormalities. Septal akinesis  Grade I left ventricular diastolic dysfunction.  The estimated PA systolic pressure is 28 mmHg.  Normal right ventricular size with normal right ventricular systolic function.  Normal central venous pressure (3 mmHg).  Mild aortic regurgitation.  Mild mitral regurgitation.  Mild tricuspid regurgitation.  There is no pulmonary hypertension.    Echo 1/13/22  The left ventricle is normal in size with mildly decreased systolic function.  The estimated ejection fraction is 45%.  There are segmental left ventricular wall motion abnormalities: akinetic inferolateral and apical inferior segements.  Grade I left ventricular diastolic dysfunction.  Mild aortic regurgitation.  Normal right ventricular size with normal right ventricular systolic function.  Normal central venous pressure (3 mmHg).  The estimated PA systolic pressure is 23 mmHg.    Assessment:   Eliana Wagner is a 88 y.o. female with HTN, HLD, NICM (EF 35%, CRT-D) who presented today for evaluation of VT/VF event that was successfully terminated with shock x1 (20J). Ms Wagner had an ischemic evaluation with a NM stress test that was negative. Labs during hospitalization were unremarkable. This is the first time she has required shock. No further events have been noted since.     1. Biventricular ICD (implantable cardioverter-defibrillator) in place        2. VT (ventricular tachycardia)  Ambulatory referral/consult to Cardiac Electrophysiology      3. Nonischemic congestive  cardiomyopathy        4. Chronic systolic (congestive) heart failure        5. Hypertension associated with diabetes        6. Hyperlipidemia associated with type 2 diabetes mellitus        7. History of CVA (cerebrovascular accident)        8. Obesity, diabetes, and hypertension syndrome             Plan:   - continue amiodarone as prescribed by Dr. Paige  - remote monitoring of ICD q 3 months until reach 12 months battery longevity, then will need monthly remote checks  - continue Toprol 50 daily    Follow up in 3 months      Ofelia Centeno MD, PGY7  Electrophysiology

## 2023-08-16 ENCOUNTER — OFFICE VISIT (OUTPATIENT)
Dept: OTOLARYNGOLOGY | Facility: CLINIC | Age: 88
End: 2023-08-16
Payer: MEDICARE

## 2023-08-16 ENCOUNTER — CLINICAL SUPPORT (OUTPATIENT)
Dept: AUDIOLOGY | Facility: CLINIC | Age: 88
End: 2023-08-16
Payer: MEDICARE

## 2023-08-16 DIAGNOSIS — H90.3 SENSORINEURAL HEARING LOSS (SNHL), BILATERAL: Primary | ICD-10-CM

## 2023-08-16 DIAGNOSIS — H90.3 SENSORINEURAL HEARING LOSS (SNHL) OF BOTH EARS: ICD-10-CM

## 2023-08-16 DIAGNOSIS — H61.22 LEFT EAR IMPACTED CERUMEN: Primary | ICD-10-CM

## 2023-08-16 PROCEDURE — 99999 PR PBB SHADOW E&M-EST. PATIENT-LVL III: CPT | Mod: PBBFAC,,,

## 2023-08-16 PROCEDURE — 69210 PR REMOVAL IMPACTED CERUMEN REQUIRING INSTRUMENTATION, UNILATERAL: ICD-10-PCS | Mod: S$PBB,,,

## 2023-08-16 PROCEDURE — 92557 COMPREHENSIVE HEARING TEST: CPT | Mod: PBBFAC

## 2023-08-16 PROCEDURE — 69210 REMOVE IMPACTED EAR WAX UNI: CPT | Mod: PBBFAC

## 2023-08-16 PROCEDURE — 99211 OFF/OP EST MAY X REQ PHY/QHP: CPT | Mod: PBBFAC,27

## 2023-08-16 PROCEDURE — 99999 PR PBB SHADOW E&M-EST. PATIENT-LVL III: ICD-10-PCS | Mod: PBBFAC,,,

## 2023-08-16 PROCEDURE — 99999 PR PBB SHADOW E&M-EST. PATIENT-LVL I: CPT | Mod: PBBFAC,,,

## 2023-08-16 PROCEDURE — 99214 PR OFFICE/OUTPT VISIT, EST, LEVL IV, 30-39 MIN: ICD-10-PCS | Mod: 25,S$PBB,,

## 2023-08-16 PROCEDURE — 99214 OFFICE O/P EST MOD 30 MIN: CPT | Mod: 25,S$PBB,,

## 2023-08-16 PROCEDURE — 99213 OFFICE O/P EST LOW 20 MIN: CPT | Mod: 25,PBBFAC

## 2023-08-16 PROCEDURE — 99999 PR PBB SHADOW E&M-EST. PATIENT-LVL I: ICD-10-PCS | Mod: PBBFAC,,,

## 2023-08-16 PROCEDURE — 69210 REMOVE IMPACTED EAR WAX UNI: CPT | Mod: S$PBB,,,

## 2023-08-16 PROCEDURE — 92567 TYMPANOMETRY: CPT | Mod: PBBFAC

## 2023-08-16 NOTE — PROGRESS NOTES
Subjective:   Eliana Wagner is a 88 y.o. female who presents for a hearing evaluation. She reports a gradual decrease in her hearing and reports both ears are equal in hearing. She reports having to ask others to repeat themselves and increasing the volume on television. She denies any otalgia, otorrhea, ear fullness/pressure, tinnitus or vertigo.    There is not a family history of hearing loss at a young age. There is not a prior history of ear surgery. There is not a prior history of ear infections. There is not a history of ear trauma. She denies a history of significant noise exposure.     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    Past Surgical History  She has a past surgical history that includes Appendectomy; Eye surgery (Bilateral); Tonsillectomy; Insertion of pacemaker; Cataract extraction (Bilateral); Insert / replace / remove pacemaker; and Cardiac catheterization.    Family History  Her family history includes Cancer in her son; Cataracts in her father and mother; Glaucoma in her maternal grandfather; Liver disease in her son; No Known Problems in her brother, maternal aunt, maternal grandmother, maternal uncle, paternal aunt, paternal grandfather, paternal grandmother, paternal uncle, and sister.    Social History  She reports that she has never smoked. She has never used smokeless tobacco. She reports that she does not currently use alcohol. She reports that she does not use drugs.    Allergies  She is allergic to penicillins.    Medications  She has a current medication list which includes the following prescription(s): amiodarone, amiodarone, amlodipine,  aspirin, atorvastatin, blood sugar diagnostic, premarin, trulicity, fluorouracil, irbesartan, lancets, magnesium oxide, metoprolol succinate, myrbetriq, mv-min-fa-tb-py-yozvxyd-lutein, vitamin d, and blood-glucose meter.  Review of Systems   HENT: Positive for hearing loss.  Negative for ear discharge, ear infection, ear pain, postnasal drip, ringing in the ears, runny nose, sinus infection, sinus pressure, sore throat and stuffy nose.      Respiratory:  Negative for shortness of breath.      Neurological: Negative for dizziness and headaches.          Objective:     Constitutional:   She is oriented to person, place, and time. She appears well-developed and well-nourished. She appears alert. She is cooperative.  Non-toxic appearance. She does not have a sickly appearance. She does not appear ill. Normal speech.      Head:  Normocephalic and atraumatic. Head is without right periorbital erythema, without left periorbital erythema and without TMJ tenderness. Salivary glands normal.  Facial strength is normal.      Ears:    Right Ear: No lacerations. No drainage, swelling or tenderness. No foreign bodies. No mastoid tenderness. Tympanic membrane is not injected, not scarred, not perforated, not erythematous, not retracted and not bulging. Tympanic membrane mobility is normal. No middle ear effusion. No PE tube. No hemotympanum. Decreased hearing is noted.   Left Ear: No lacerations. No drainage, swelling or tenderness. No foreign bodies. No mastoid tenderness. Tympanic membrane is not injected, not scarred, not perforated, not erythematous, not retracted and not bulging. Tympanic membrane mobility is normal.  No middle ear effusion.  No PE tube. No hemotympanum. Decreased hearing is noted.   Ears:      Nose:  No mucosal edema, rhinorrhea or sinus tenderness. No epistaxis. Turbinates normal.  Right sinus exhibits no maxillary sinus tenderness and no frontal sinus tenderness. Left sinus exhibits no maxillary sinus  tenderness and no frontal sinus tenderness.     Mouth/Throat  Oropharynx clear and moist without lesions or asymmetry and normal uvula midline. Normal dentition. No uvula swelling, oral lesions, trismus or mucous membrane lesions. No oropharyngeal exudate, posterior oropharyngeal edema or posterior oropharyngeal erythema.     Neck:  Trachea normal, phonation normal and no adenopathy. Thyroid tenderness is present. No edema, no erythema and no neck rigidity present. No thyroid mass and no thyromegaly present.     She has no cervical adenopathy.     Pulmonary/Chest:   Effort normal.     Psychiatric:   She has a normal mood and affect. Her speech is normal and behavior is normal.     Neurological:   She is alert and oriented to person, place, and time. She has neurological normal, alert and oriented.     Procedure  Procedure Note:    The patient was brought to the minor procedure room and placed under the operating microscope. Using a combination of suction, curettes and cup forceps the patient's cerumen impaction was removed. The tympanic membrane was evaluated and was unremarkable. The patient tolerated the procedure well. There were no complications.      Audiogram      I independently reviewed the tracings of the complete audiometric evaluation performed today. I reviewed the audiogram with the patient as well. Pertinent findings include left ear with downward sloping normal hearing from 250 to 1000 Hz with a mild SNHL at 1500 Hz to a moderate severe high frequency SNHL. Right ear with a mild to moderate severe high frequency SNHL. Bilateral ears with Type A tympanogram.   Assessment:     1. Left ear impacted cerumen    2. Sensorineural hearing loss (SNHL) of both ears      Plan:   Eliana was seen today for cerumen impaction.    Diagnoses and all orders for this visit:    Left ear impacted cerumen  Left cerumen impaction removed under microscopy.  Patient tolerated procedure well and noted improvement upon  impaction removal.  Otoscopic exam benign.  Education about normal ear hygiene and the avoidance of Q-tips was reviewed.     Sensorineural hearing loss (SNHL) of both ears  Audiometric testing interpretation consistent with sensorineural hearing loss.  Discussed the etiology of SNHL. Medically cleared for hearing amplification, and will follow-up with Audiology if interested. Hearing conservation in noisy environments. Return to clinic every year for audiometric testing.   One frequency asymmetry noted, year audiogram recommended to assess for any changes. Has not met the criteria for MRI IAC.   Xin Lgaos's card given this visit.       RTC as needed or if symptoms persist.  Questions answered.

## 2023-08-16 NOTE — PROGRESS NOTES
Eliana Wagner, a 88 y.o. female, was seen today in the clinic for an audiologic evaluation.  Ms. Wagner reported some difficulty understanding what people are saying.  She also reported she sometimes experiences some dizziness that she noted can be due to different medications she is taking.  The patient denied aural fullness, otalgia, and tinnitus.    Tympanometry revealed Type A in the right ear and Type A in the left ear.  Audiogram results revealed a normal sloping to moderately-severe sensorineural hearing loss (SNHL) in the right ear and a normal sloping to moderately-severe SNHL in the left ear; however, there is an asymmetry between ears at 1000 Hz.  Speech reception thresholds were noted at 30 dB in the right ear and 15 dB in the left ear.  Speech discrimination scores were 96% in the right ear and 88% in the left ear.    Recommendations:  Otologic evaluation  Hearing protection when in noise  Hearing aid consultation after medical clearance  Annual audiogram or sooner if change in hearing is perceived

## 2023-08-18 DIAGNOSIS — Z95.810 BIVENTRICULAR ICD (IMPLANTABLE CARDIOVERTER-DEFIBRILLATOR) IN PLACE: Primary | ICD-10-CM

## 2023-08-20 PROBLEM — I42.8 NONISCHEMIC CARDIOMYOPATHY: Status: ACTIVE | Noted: 2023-08-20

## 2023-08-20 PROBLEM — I50.20 HFREF (HEART FAILURE WITH REDUCED EJECTION FRACTION): Status: ACTIVE | Noted: 2023-08-20

## 2023-08-24 ENCOUNTER — PATIENT MESSAGE (OUTPATIENT)
Dept: PRIMARY CARE CLINIC | Facility: CLINIC | Age: 88
End: 2023-08-24

## 2023-08-24 ENCOUNTER — OFFICE VISIT (OUTPATIENT)
Dept: PRIMARY CARE CLINIC | Facility: CLINIC | Age: 88
End: 2023-08-24
Payer: MEDICARE

## 2023-08-24 VITALS
SYSTOLIC BLOOD PRESSURE: 136 MMHG | DIASTOLIC BLOOD PRESSURE: 70 MMHG | OXYGEN SATURATION: 97 % | BODY MASS INDEX: 31.64 KG/M2 | TEMPERATURE: 98 F | HEART RATE: 81 BPM | WEIGHT: 171.94 LBS | HEIGHT: 62 IN

## 2023-08-24 DIAGNOSIS — J06.9 VIRAL URI WITH COUGH: ICD-10-CM

## 2023-08-24 DIAGNOSIS — E11.69 HYPERLIPIDEMIA ASSOCIATED WITH TYPE 2 DIABETES MELLITUS: ICD-10-CM

## 2023-08-24 DIAGNOSIS — E66.09 CLASS 1 OBESITY DUE TO EXCESS CALORIES WITH SERIOUS COMORBIDITY AND BODY MASS INDEX (BMI) OF 31.0 TO 31.9 IN ADULT: ICD-10-CM

## 2023-08-24 DIAGNOSIS — E78.5 HYPERLIPIDEMIA ASSOCIATED WITH TYPE 2 DIABETES MELLITUS: ICD-10-CM

## 2023-08-24 DIAGNOSIS — R05.1 ACUTE COUGH: ICD-10-CM

## 2023-08-24 DIAGNOSIS — Z95.810 BIVENTRICULAR ICD (IMPLANTABLE CARDIOVERTER-DEFIBRILLATOR) IN PLACE: ICD-10-CM

## 2023-08-24 DIAGNOSIS — E11.65 TYPE 2 DIABETES MELLITUS WITH HYPERGLYCEMIA, WITHOUT LONG-TERM CURRENT USE OF INSULIN: ICD-10-CM

## 2023-08-24 DIAGNOSIS — I50.42 CHRONIC COMBINED SYSTOLIC (CONGESTIVE) AND DIASTOLIC (CONGESTIVE) HEART FAILURE: ICD-10-CM

## 2023-08-24 DIAGNOSIS — Z86.73 HISTORY OF CVA (CEREBROVASCULAR ACCIDENT): ICD-10-CM

## 2023-08-24 DIAGNOSIS — Z09 HOSPITAL DISCHARGE FOLLOW-UP: Primary | ICD-10-CM

## 2023-08-24 DIAGNOSIS — I42.0 NONISCHEMIC CONGESTIVE CARDIOMYOPATHY: ICD-10-CM

## 2023-08-24 DIAGNOSIS — I25.10 CORONARY ARTERY DISEASE INVOLVING NATIVE CORONARY ARTERY OF NATIVE HEART WITHOUT ANGINA PECTORIS: ICD-10-CM

## 2023-08-24 LAB
CTP QC/QA: YES
SARS-COV-2 RDRP RESP QL NAA+PROBE: NEGATIVE

## 2023-08-24 PROCEDURE — 99999 PR PBB SHADOW E&M-EST. PATIENT-LVL V: ICD-10-PCS | Mod: PBBFAC,,, | Performed by: FAMILY MEDICINE

## 2023-08-24 PROCEDURE — 87635 SARS-COV-2 COVID-19 AMP PRB: CPT | Mod: PBBFAC,PN | Performed by: FAMILY MEDICINE

## 2023-08-24 PROCEDURE — 99215 OFFICE O/P EST HI 40 MIN: CPT | Mod: PBBFAC,PN | Performed by: FAMILY MEDICINE

## 2023-08-24 PROCEDURE — 99214 OFFICE O/P EST MOD 30 MIN: CPT | Mod: S$PBB,,, | Performed by: FAMILY MEDICINE

## 2023-08-24 PROCEDURE — 99214 PR OFFICE/OUTPT VISIT, EST, LEVL IV, 30-39 MIN: ICD-10-PCS | Mod: S$PBB,,, | Performed by: FAMILY MEDICINE

## 2023-08-24 PROCEDURE — 99999PBSHW: ICD-10-PCS | Mod: PBBFAC,,,

## 2023-08-24 PROCEDURE — 99999PBSHW: Mod: PBBFAC,,,

## 2023-08-24 PROCEDURE — 99999 PR PBB SHADOW E&M-EST. PATIENT-LVL V: CPT | Mod: PBBFAC,,, | Performed by: FAMILY MEDICINE

## 2023-08-24 RX ORDER — PREDNISONE 10 MG/1
10 TABLET ORAL 2 TIMES DAILY
Qty: 10 TABLET | Refills: 0 | Status: SHIPPED | OUTPATIENT
Start: 2023-08-24 | End: 2023-08-29

## 2023-08-24 NOTE — PATIENT INSTRUCTIONS
Hydrate, rest, Mucinex Expectorant as directed for thinning out the mucus; or MucinexDM if with significant cough and mucus.  Zyrtec, Xyzal, Allegra or Claritin. (Would lean towards Allegra which may be a bit more effective than claritin and will not cause sedation as Xyzal or Zyrtec may.)  Nasal saline spray such as [place] brand as needed.  Flonase or Nasonex nasal spray after the nasal saline.  Warm salt water gargles and warm liquids may help soothe a sore throat better than cool liquids.  Tylenol as directed as needed for fever, body aches, headaches.   All are OTC  Most of all, stay well-hydrated.

## 2023-08-25 ENCOUNTER — HOSPITAL ENCOUNTER (OUTPATIENT)
Dept: RADIOLOGY | Facility: HOSPITAL | Age: 88
Discharge: HOME | End: 2023-08-25
Attending: INTERNAL MEDICINE
Payer: MEDICARE

## 2023-08-25 ENCOUNTER — OFFICE VISIT (OUTPATIENT)
Dept: CARDIOLOGY | Facility: CLINIC | Age: 88
End: 2023-08-25
Payer: MEDICARE

## 2023-08-25 VITALS
WEIGHT: 170 LBS | HEART RATE: 76 BPM | DIASTOLIC BLOOD PRESSURE: 77 MMHG | SYSTOLIC BLOOD PRESSURE: 127 MMHG | BODY MASS INDEX: 31.09 KG/M2

## 2023-08-25 DIAGNOSIS — Z95.810 BIVENTRICULAR ICD (IMPLANTABLE CARDIOVERTER-DEFIBRILLATOR) IN PLACE: ICD-10-CM

## 2023-08-25 DIAGNOSIS — I50.20 HFREF (HEART FAILURE WITH REDUCED EJECTION FRACTION): ICD-10-CM

## 2023-08-25 DIAGNOSIS — I49.3 PREMATURE VENTRICULAR CONTRACTIONS: ICD-10-CM

## 2023-08-25 DIAGNOSIS — I42.0 NONISCHEMIC CONGESTIVE CARDIOMYOPATHY: ICD-10-CM

## 2023-08-25 DIAGNOSIS — I47.20 VT (VENTRICULAR TACHYCARDIA): Primary | ICD-10-CM

## 2023-08-25 PROBLEM — L57.0 ACTINIC KERATOSIS: Status: ACTIVE | Noted: 2018-05-15

## 2023-08-25 PROBLEM — L71.8 OTHER ROSACEA: Status: ACTIVE | Noted: 2021-06-29

## 2023-08-25 PROCEDURE — 99214 OFFICE O/P EST MOD 30 MIN: CPT | Mod: S$PBB,,, | Performed by: INTERNAL MEDICINE

## 2023-08-25 PROCEDURE — 71046 X-RAY EXAM CHEST 2 VIEWS: CPT | Mod: 26,,, | Performed by: RADIOLOGY

## 2023-08-25 PROCEDURE — 99214 PR OFFICE/OUTPT VISIT, EST, LEVL IV, 30-39 MIN: ICD-10-PCS | Mod: S$PBB,,, | Performed by: INTERNAL MEDICINE

## 2023-08-25 PROCEDURE — 99999 PR PBB SHADOW E&M-EST. PATIENT-LVL IV: ICD-10-PCS | Mod: PBBFAC,,, | Performed by: INTERNAL MEDICINE

## 2023-08-25 PROCEDURE — 99999 PR PBB SHADOW E&M-EST. PATIENT-LVL IV: CPT | Mod: PBBFAC,,, | Performed by: INTERNAL MEDICINE

## 2023-08-25 PROCEDURE — 99214 OFFICE O/P EST MOD 30 MIN: CPT | Mod: PBBFAC | Performed by: INTERNAL MEDICINE

## 2023-08-25 PROCEDURE — 71046 X-RAY EXAM CHEST 2 VIEWS: CPT | Mod: TC

## 2023-08-25 PROCEDURE — 71046 XR CHEST PA AND LATERAL: ICD-10-PCS | Mod: 26,,, | Performed by: RADIOLOGY

## 2023-08-25 RX ORDER — SACUBITRIL AND VALSARTAN 97; 103 MG/1; MG/1
1 TABLET, FILM COATED ORAL 2 TIMES DAILY
Qty: 60 TABLET | Refills: 14 | Status: SHIPPED | OUTPATIENT
Start: 2023-08-25 | End: 2024-11-17

## 2023-08-25 RX ORDER — AMIODARONE HYDROCHLORIDE 200 MG/1
200 TABLET ORAL DAILY
Qty: 90 TABLET | Refills: 3 | Status: SHIPPED | OUTPATIENT
Start: 2023-08-25 | End: 2023-10-25

## 2023-08-25 NOTE — PATIENT INSTRUCTIONS
1. Discontinue amlodipine and irbesartan  2. Continue amiodarone 200 mg every day  3. Begin Entresto 97/103, 1 tablet twice a day with meals

## 2023-08-25 NOTE — PROGRESS NOTES
Subjective:   08/25/2023     Patient ID:  Eliana Wagner is a 88 y.o. female who presents for [facility] Follow Up      Comes in for evaluation after defibrillator discharge.  She has a history of a dilated cardiomyopathy and congestive heart failure, this had improved since implantation of a biventricular defibrillator, but a recent echo showed ejection fraction 35%.  She is not having chest pains tightness or shortness of breath now.  She did have a defibrillator discharge associated with syncope.  She was treated with amiodarone.  She had been admitted to [facility] for this.  A nuclear stress test did not show evidence for ischemia, ejection fraction was 45%.  She followed up with EP, amiodarone recommended to be continued and she will follow-up with them in November.  Her CRT-D device will need reimplantation in the next year to 2.    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  Her most recent ejection fraction was 45%.  She does have less than 93% pacing, possibly due to PVCs.      Hypertension is present, currently well controlled with guideline directed medical therapy for congestive heart failure.    Hypercholesterolemia is present, her LDL is well controlled on high-dose statin therapy.    Congestive Heart Failure  Associated symptoms include nocturia. Pertinent negatives include no abdominal pain, chest pain, claudication, near-syncope, palpitations or shortness of breath. Her past medical history is significant for CAD.   Coronary Artery Disease  Pertinent negatives include no chest pain, leg swelling, palpitations, shortness of breath or weight gain. Risk factors include hyperlipidemia. Her past medical history is significant for CHF.   Hyperlipidemia  Pertinent negatives include no chest pain, focal weakness, myalgias or shortness of breath.       Patient Active Problem List   Diagnosis     Hypertension associated with diabetes    Refractive error    Pseudophakia    Choroidal nevus of right eye    Chronic systolic (congestive) heart failure    Obesity, diabetes, and hypertension syndrome    Coronary artery disease involving native coronary artery of native heart without angina pectoris    Nonischemic congestive cardiomyopathy    Biventricular ICD (implantable cardioverter-defibrillator) in place    Hyperlipidemia associated with type 2 diabetes mellitus    DM type 2 without retinopathy    History of CVA (cerebrovascular accident)    Type 2 diabetes mellitus with hyperglycemia, without long-term current use of insulin    Class 1 obesity with serious comorbidity in adult    Premature ventricular contractions    Risk for falls    Mixed incontinence    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, with long-term current use of insulin    Encounter for preventive health examination    Syncope    VT (ventricular tachycardia)    HFrEF (heart failure with reduced ejection fraction)    Nonischemic cardiomyopathy    Actinic keratosis    Other rosacea        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, with long-term current use of insulin 04/24/2023    Diabetes mellitus, type 2     Hyperlipidemia     Retinal detachment     od     Squamous cell carcinoma of skin     Stroke     tia x 3   ( last in 2005)    Urinary incontinence without sensory awareness 05/12/2021       Past Surgical History:   Procedure Laterality Date    APPENDECTOMY      CARDIAC CATHETERIZATION      CATARACT EXTRACTION Bilateral     EYE SURGERY Bilateral     cataract    INSERT / REPLACE / REMOVE PACEMAKER      INSERTION OF PACEMAKER      july 22 2019     Orestes Kim MD    TONSILLECTOMY         Review of patient's allergies  indicates:   Allergen Reactions    Penicillins      Caused uti         Current Outpatient Medications:     aspirin (ECOTRIN) 81 MG EC tablet, Take 81 mg by mouth once daily., Disp: , Rfl:     atorvastatin (LIPITOR) 40 MG tablet, Take 1 tablet (40 mg total) by mouth once daily. For cholesterol and heart protection, Disp: 90 tablet, Rfl: 3    blood sugar diagnostic Strp, 1 each by Misc.(Non-Drug; Combo Route) route once daily., Disp: 100 each, Rfl: 3    conjugated estrogens (PREMARIN) vaginal cream, 1 g with applicator or dime-sized amt with finger in vagina nightly x 2 weeks, then twice a week thereafter, Disp: 42.5 g, Rfl: 12    dulaglutide (TRULICITY) 1.5 mg/0.5 mL pen injector, Inject 1.5 mg into the skin every 7 days., Disp: 4 pen, Rfl: 11    fluorouracil (EFUDEX) 5 % cream, Apply topically Daily. , Disp: , Rfl:     lancets Misc, 1 each by Misc.(Non-Drug; Combo Route) route once daily., Disp: 100 each, Rfl: 3    magnesium oxide (MAG-OX) 400 mg (241.3 mg magnesium) tablet, Take 1 tablet (400 mg total) by mouth once daily., Disp: , Rfl: 0    metoprolol succinate (TOPROL-XL) 50 MG 24 hr tablet, Take 1 tablet (50 mg total) by mouth once daily. For heart and blood pressure, Disp: 90 tablet, Rfl: 1    mirabegron (MYRBETRIQ) 25 mg Tb24 ER tablet, Take 1 tablet (25 mg total) by mouth once daily. For bladder, Disp: 30 tablet, Rfl: 11    mv-min-FA-Kr-Vf-ibqzqdw-lutein 0.4-162-18 mg Tab, Take by mouth. 1 Tablet Oral Every day, Disp: , Rfl:     predniSONE (DELTASONE) 10 MG tablet, Take 1 tablet (10 mg total) by mouth 2 (two) times daily. for 5 days, Disp: 10 tablet, Rfl: 0    vitamin D (VITAMIN D3) 1000 units Tab, Take 1,000 Units by mouth once daily., Disp: , Rfl:     amiodarone (PACERONE) 200 MG Tab, Take 1 tablet (200 mg total) by mouth once daily. TAKE AFTER 14 DAYS, Disp: 90 tablet, Rfl: 3    blood-glucose meter kit, Use as instructed (Patient not taking: Reported on 8/9/2023), Disp: 1 each, Rfl: 0     sacubitriL-valsartan (ENTRESTO)  mg per tablet, Take 1 tablet by mouth 2 (two) times daily., Disp: 60 tablet, Rfl: 14            Objective:   Review of Systems   Constitutional: Negative for chills, decreased appetite, diaphoresis, fever, malaise/fatigue, weight gain and weight loss.   HENT:  Negative for congestion, hearing loss, nosebleeds and sore throat.    Eyes:  Negative for double vision, vision loss in left eye and vision loss in right eye.   Cardiovascular:  Negative for chest pain, claudication, cyanosis, dyspnea on exertion, irregular heartbeat, leg swelling, near-syncope, orthopnea, palpitations, paroxysmal nocturnal dyspnea and syncope.   Respiratory:  Negative for cough, hemoptysis, shortness of breath, sleep disturbances due to breathing, snoring and wheezing.    Endocrine: Negative for cold intolerance and heat intolerance.   Hematologic/Lymphatic: Negative for adenopathy and bleeding problem. Bruises/bleeds easily.   Skin:  Negative for itching, nail changes and rash.   Musculoskeletal:  Positive for falls and neck pain. Negative for arthritis, back pain and myalgias.   Gastrointestinal:  Positive for constipation. Negative for bloating, abdominal pain, anorexia, diarrhea, hematochezia, melena, nausea and vomiting.   Genitourinary:  Positive for frequency and nocturia. Negative for hematuria.   Neurological:  Positive for excessive daytime sleepiness, loss of balance and paresthesias. Negative for focal weakness, headaches, vertigo and weakness.   Psychiatric/Behavioral:  Negative for altered mental status, depression and memory loss.    Allergic/Immunologic: Negative for hives.       Vitals:    08/25/23 1121   BP: 127/77   Pulse: 76         Physical Exam  Vitals reviewed.   Constitutional:       General: She is not in acute distress.     Appearance: She is well-developed.   HENT:      Head: Normocephalic and atraumatic.      Nose: Nose normal.   Eyes:      Conjunctiva/sclera: Conjunctivae  normal.      Pupils: Pupils are equal, round, and reactive to light.   Neck:      Vascular: No JVD.   Cardiovascular:      Rate and Rhythm: Normal rate and regular rhythm.      Pulses: Normal pulses and intact distal pulses.      Heart sounds: Normal heart sounds. No murmur heard.     No friction rub. No gallop.      Comments: Jugular venous pressure normal  Defibrillator site appears normal  Pulmonary:      Effort: Pulmonary effort is normal. No respiratory distress.      Breath sounds: Normal breath sounds. No wheezing or rales.   Chest:      Chest wall: No tenderness.   Abdominal:      General: Bowel sounds are normal. There is no distension.      Palpations: Abdomen is soft.      Tenderness: There is no abdominal tenderness.   Musculoskeletal:         General: No tenderness or deformity. Normal range of motion.      Cervical back: Normal range of motion and neck supple.      Right lower leg: No edema.      Left lower leg: No edema.   Skin:     General: Skin is warm and dry.      Findings: No erythema or rash.   Neurological:      Mental Status: She is alert and oriented to person, place, and time.      Cranial Nerves: No cranial nerve deficit.      Motor: No abnormal muscle tone.      Coordination: Coordination normal.   Psychiatric:         Behavior: Behavior normal.         Thought Content: Thought content normal.         Judgment: Judgment normal.              Assessment and Plan:     VT (ventricular tachycardia)  Comments:  Now on amiodarone post defibrillator discharge, followed with EP, continue amiodarone 200 mg daily until re-evaluated  Orders:  -     amiodarone (PACERONE) 200 MG Tab; Take 1 tablet (200 mg total) by mouth once daily. TAKE AFTER 14 DAYS  Dispense: 90 tablet; Refill: 3    HFrEF (heart failure with reduced ejection fraction)  Comments:  Will change to Entresto from amlodipine and irbesartan  Continue metoprolol succinate 50 mg daily  Orders:  -     X-Ray Chest PA And Lateral; Future;  Expected date: 08/25/2023  -     sacubitriL-valsartan (ENTRESTO)  mg per tablet; Take 1 tablet by mouth 2 (two) times daily.  Dispense: 60 tablet; Refill: 14  -     Basic Metabolic Panel; Future; Expected date: 09/08/2023    Premature ventricular contractions    Nonischemic congestive cardiomyopathy    Biventricular ICD (implantable cardioverter-defibrillator) in place  Comments:  Generator change in approximately 1 year for battery depletion, has had initial implantation of CRT D device almost 20 years ago, 1 battery change since             Follow up in about 3 weeks (around 9/15/2023).

## 2023-08-29 DIAGNOSIS — I47.20 VT (VENTRICULAR TACHYCARDIA): Primary | ICD-10-CM

## 2023-08-30 ENCOUNTER — TELEPHONE (OUTPATIENT)
Dept: PRIMARY CARE CLINIC | Facility: CLINIC | Age: 88
End: 2023-08-30
Payer: MEDICARE

## 2023-08-30 NOTE — TELEPHONE ENCOUNTER
----- Message from Brianne Heredia sent at 8/30/2023  2:08 PM CDT -----  Contact: [phone]  Patient is calling for Medical Advice regarding: Patient would like orders for a urine test for today. 8/30/23 Patient states her urine is cloudy and burning.     How long has patient had these symptoms: 2-3 days      Comments:Please call to confirm

## 2023-08-31 PROBLEM — I50.42 CHRONIC COMBINED SYSTOLIC (CONGESTIVE) AND DIASTOLIC (CONGESTIVE) HEART FAILURE: Status: ACTIVE | Noted: 2020-02-21

## 2023-08-31 NOTE — PROGRESS NOTES
"    /70 (BP Location: Right arm, Patient Position: Sitting, BP Method: Medium (Manual))   Pulse 81   Temp 98.4 °F (36.9 °C) (Oral)   Ht 5' 2" (1.575 m)   Wt 78 kg (171 lb 15.3 oz)   SpO2 97%   BMI 31.45 kg/m²       ===========    Chief Complaint: No chief complaint on file.          HPI    Eliana Wagner is a 88 y.o. female     here for  Hospital discharge follow-up.  She is complaining of  Cough for about 3 days.    She has heart failure with combined systolic and diastolic dysfunction.  May 2023 echocardiogram showed an EF of 35%.  Grade 1 left ventricular diastolic dysfunction.  She denies any increase in edema in her lower extremities.    Hospital discharge summary as follows:       [facility] Medicine  Discharge Summary        Patient Name: Eliana Wagner  MRN: [id number]  LILIANA: [id number]  Patient Class: IP- Inpatient  Admission Date: 8/5/2023  Hospital Length of Stay: 1 days  Discharge Date and Time:  08/08/2023 11:33 AM  Attending Physician: Hakeem Gale,*   Discharging Provider: Coral Carranza NP  Primary Care Provider: Duy Cheng MD     Primary Care Team: Networked reference to record PCT      HPI:   Eliana Wagner is an 88 year old female with PMH CAD, CVA, CHF, HTN, DMT2, HLD and ICD placement. She presented to the ED after a syncopal episode this afternoon. She reports that she was at [facility] earlier today and had just gotten into her car (parked in the 2nd parking spot from the entrance, so not a long walk) and the next thing she remembers is "jolting" awake. She states that she had put her car in reverse and was [facility] out of her parking spot when she woke up. She denies any prodromal symptoms or symptoms afterwards. She states she was slightly disoriented afterwards so she sat in her car until she could focus. She did not have her phone with her so she proceeded to drive home. Later she was c/o weakness and not feeling well so her family " brought her into the ED. Her PPM/defibrillator was interrogated by [employer] and reported a reading that patient was treated today for a VT/VF episode around 1500. Patient denies CP, SOB, palpitations, shocking sensations, dizziness, H/A, or visual disturbances.        * No surgery found *       Hospital Course:   She was admitted to the hospital medicine service for further care for complaints of weakness and not feeling well.  While in the ED her PPM/defibrillator was interrogated by [employer] and reported a reading that patient was treated today for a VT/VF episode around 1500. Patient denies CP, SOB, palpitations, shocking sensations, dizziness, H/A, or visual disturbances.  Cardiology was consulted.  She was started on amnio drip as per Cardiology.  She is been NPO after midnight for Lexiscan can as per Cardiology.        8/8 transition to PO amiodarone today. Thuy scan negative for ischemia         Goals of Care Treatment Preferences:  Code Status: Full Code        Consults:          Consults (From admission, onward)         Status Ordering Provider       Cardiology-[facility]  Once        Provider:  (Not yet assigned)    GLEN Cerrato             Neuro  History of CVA (cerebrovascular accident)  Continue ASA, statin, and BP meds           Cardiac/Vascular  VT (ventricular tachycardia)  -on admission she was noted to have a shock for VT/VF per ICD interrogation  -as per Cardiology will start amiodarone drip and follow for 24-48 hours with plans to transition to oral and nasal.  -s/p thuy scan as above   -will need outpatient EP evaluation        Biventricular ICD (implantable cardioverter-defibrillator) in place  [employer]  Model [id number]   Serial [id number]   ICD  Kasey with [employer], states reading says pt was tx today for VT/VF episode around 1500. Also had tx episode in April 2023 for sustained episodes of VT. She also has numerous non-sustained episodes from June and July 2023; hx of afib from  April 2022  Start amnio drip- transition to PO amiodarone 8/8   Will need to establish care with EP outpatient           Coronary artery disease involving native coronary artery of native heart without angina pectoris  Continue ASA, statin, ARB, and BB        Chronic systolic (congestive) heart failure  Patient is identified as having Systolic (HFrEF) heart failure that is Chronic. CHF is currently controlled. Latest ECHO performed and demonstrates- Results for orders placed during the hospital encounter of 05/17/23     Echo     Interpretation Summary  · The left ventricle is moderately enlarged with moderately decreased systolic function.  · The estimated ejection fraction is 35%.  · The quantitatively derived ejection fraction is 33%.  · The left ventricular global longitudinal strain is -9.9%.  · There are segmental left ventricular wall motion abnormalities. Septal akinesis  · Grade I left ventricular diastolic dysfunction.  · The estimated PA systolic pressure is 28 mmHg.  · Normal right ventricular size with normal right ventricular systolic function.  · Normal central venous pressure (3 mmHg).  · Mild aortic regurgitation.  · Mild mitral regurgitation.  · Mild tricuspid regurgitation.  · There is no pulmonary hypertension.  . Continue Beta Blocker and ACE/ARB and monitor clinical status closely. Monitor on telemetry. Patient is off CHF pathway.  Monitor strict Is&Os and daily weights.  Place on fluid restriction of 2 L. Cardiology has been consulted. Continue to stress to patient importance of self efficacy and  on diet for CHF. Last BNP reviewed- and noted below       Recent Labs   Lab 08/05/23  1637   *   .     Endocrine  DM type 2 without retinopathy  Patient's FSGs are controlled on current medication regimen.  Last A1c reviewed-         Lab Results   Component Value Date     HGBA1C 8.4 (H) 07/21/2023      Most recent fingerstick glucose reviewed-         Recent Labs   Lab 08/07/23  1132  08/07/23  1613 08/07/23  2201   POCTGLUCOSE 149* 173* 191*      Current correctional scale  Low  Maintain anti-hyperglycemic dose as follows-             Antihyperglycemics (From admission, onward)     Start     Stop Route Frequency Ordered     08/05/23 2007   insulin aspart U-100 pen 0-5 Units         -- SubQ Before meals & nightly PRN 08/05/23 1911          Hold Oral hypoglycemics while patient is in the hospital.     Other  * Syncope  Episode occurred in the car - unknown time down  [employer] reading of treatment for VT/VF day of admission at 1500  Consult to cardiology ordered  Hx of CVA  Weakness and disorientation resolved after episode  No other neuro symptoms  Head CT negative for acute abnormalities  Maintain telemetry  Cardiology consulted     Patient started on amiodarone gtt for vtach. Patient with no further episodes of syncope, dizziness, lightheadedness, or CP.   Following cardiology recs: transition to p.o. on 08/08/2023 400 mg BID x 2 weeks then 400 mg daily thereafter   Will need to establish with OP EP cardiologist.   Thuy scan negative for ischemia. No arrhthymias during stress.               Final Active Diagnoses:     Diagnosis Date Noted POA    PRINCIPAL PROBLEM:  Syncope [R55] 08/05/2023 Yes    VT (ventricular tachycardia) [I47.20] 08/06/2023 Yes    DM type 2 without retinopathy [E11.9] 02/23/2021 Yes    History of CVA (cerebrovascular accident) [Z86.73] 02/23/2021 Not Applicable    Chronic systolic (congestive) heart failure [I50.22] 02/21/2020 Yes    Coronary artery disease involving native coronary artery of native heart without angina pectoris [I25.10] 10/15/2013 Yes    Biventricular ICD (implantable cardioverter-defibrillator) in place [Z95.810] 10/2013 Yes       Problems Resolved During this Admission:         Discharged Condition: stable     Disposition: Home or Self Care     Follow Up:        Follow-up Information      Duy Cheng MD Follow up in 1 week(s).    Specialty: Family  Medicine  Contact information:  [address]  [facility] [id number]  [phone]                             Patient Instructions:             Ambulatory referral/consult to Cardiac Electrophysiology    Standing Status: Future   Referral Priority: Urgent Referral Type: Consultation    Referral Reason: Specialty Services Required    Requested Specialty: Cardiology    Number of Visits Requested: 1       Diet Cardiac      Diet diabetic      Notify your health care provider if you experience any of the following:  temperature >100.4      Notify your health care provider if you experience any of the following:  persistent nausea and vomiting or diarrhea      Notify your health care provider if you experience any of the following:  severe uncontrolled pain      Notify your health care provider if you experience any of the following:  redness, tenderness, or signs of infection (pain, swelling, redness, odor or green/yellow discharge around incision site)      Notify your health care provider if you experience any of the following:  difficulty breathing or increased cough      Notify your health care provider if you experience any of the following:  severe persistent headache      Notify your health care provider if you experience any of the following:  worsening rash      Notify your health care provider if you experience any of the following:  persistent dizziness, light-headedness, or visual disturbances      Notify your health care provider if you experience any of the following:  increased confusion or weakness      Activity as tolerated         Significant Diagnostic Studies: Labs:   CMP        Recent Labs   Lab 08/07/23  0310 08/08/23  0255    137   K 4.6 4.3    105   CO2 24 23   * 158*   BUN 13 12   CREATININE 0.8 0.8   CALCIUM 9.0 8.7   ANIONGAP 11 9    and CBC        Recent Labs   Lab 08/07/23  0310 08/08/23  0255   WBC 10.39 9.84   HGB 13.7 13.0   HCT 41.7 39.9    144*              Pending Diagnostic Studies:      None          Medications:  Reconciled Home Medications:       Medication List       START taking these medications    * amiodarone 200 MG Tab  Commonly known as: PACERONE  Take 2 tablets (400 mg total) by mouth 2 (two) times daily. for 14 days      * amiodarone 200 MG Tab  Commonly known as: PACERONE  Take 2 tablets (400 mg total) by mouth once daily. TAKE AFTER 14 DAYS           * This list has 2 medication(s) that are the same as other medications prescribed for you. Read the directions carefully, and ask your doctor or other care provider to review them with you.              CONTINUE taking these medications    amLODIPine 2.5 MG tablet  Commonly known as: NORVASC      aspirin 81 MG EC tablet  Commonly known as: ECOTRIN  Take 81 mg by mouth once daily.      atorvastatin 40 MG tablet  Commonly known as: LIPITOR  Take 1 tablet (40 mg total) by mouth once daily. For cholesterol and heart protection      blood sugar diagnostic Strp  1 each by Misc.(Non-Drug; Combo Route) route once daily.      blood-glucose meter kit  Use as instructed      fluorouraciL 5 % cream  Commonly known as: EFUDEX  Apply topically Daily.      irbesartan 150 MG tablet  Commonly known as: AVAPRO  Take 1 tablet (150 mg total) by mouth once daily. For heart and blood pressure      lancets Misc  1 each by Misc.(Non-Drug; Combo Route) route once daily.      magnesium oxide 400 mg (241.3 mg magnesium) tablet  Commonly known as: MAG-OX  Take 1 tablet (400 mg total) by mouth once daily.      metoprolol succinate 50 MG 24 hr tablet  Commonly known as: TOPROL-XL  Take 1 tablet (50 mg total) by mouth once daily. For heart and blood pressure      mv-min-FA-Ck-Nt-ldepmfp-lutein 0.4-162-18 mg Tab  Take by mouth. 1 Tablet Oral Every day      MYRBETRIQ 25 mg Tb24 ER tablet  Generic drug: mirabegron  Take 1 tablet (25 mg total) by mouth once daily. For bladder      PREMARIN vaginal cream  Generic drug: conjugated  estrogens  1 g with applicator or dime-sized amt with finger in vagina nightly x 2 weeks, then twice a week thereafter      TRULICITY 1.5 mg/0.5 mL pen injector  Generic drug: dulaglutide  Inject 1.5 mg into the skin every 7 days.      vitamin D 1000 units Tab  Commonly known as: VITAMIN D3  Take 1,000 Units by mouth once daily.                Indwelling Lines/Drains at time of discharge:       Lines/Drains/Airways      None                      Time spent on the discharge of patient: 60 minutes           Coral Carranza NP  Department of Hospital Medicine  [place] - Telemetry        Cosigned by: Hakeem Gale MD at 8/8/2023  4:20 PM       Patient queried and denies any further complaints    Patient has MEDICAL HISTORY OF  Patient Active Problem List   Diagnosis    Hypertension associated with diabetes    Refractive error    Pseudophakia    Choroidal nevus of right eye    Chronic combined systolic (congestive) and diastolic (congestive) heart failure    Obesity, diabetes, and hypertension syndrome    Coronary artery disease involving native coronary artery of native heart without angina pectoris    Nonischemic congestive cardiomyopathy    Biventricular ICD (implantable cardioverter-defibrillator) in place    Hyperlipidemia associated with type 2 diabetes mellitus    DM type 2 without retinopathy    History of CVA (cerebrovascular accident)    Type 2 diabetes mellitus with hyperglycemia, without long-term current use of insulin    Class 1 obesity with serious comorbidity in adult    Premature ventricular contractions    Risk for falls    Mixed incontinence    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  Past Surgical History:   Procedure Laterality Date    APPENDECTOMY      CARDIAC CATHETERIZATION      CATARACT EXTRACTION Bilateral     EYE SURGERY Bilateral     cataract    INSERT / REPLACE / REMOVE PACEMAKER      INSERTION OF PACEMAKER      july 22 2019     Orestes Kim MD    TONSILLECTOMY       ALLERGIES updated and reviewed.  Review of patient's allergies indicates:   Allergen Reactions    Penicillins      Caused uti       CURRENT OUTPATIENT MEDICATIONS updated and reviewed    Current Outpatient Medications:     aspirin (ECOTRIN) 81 MG EC tablet, Take 81 mg by mouth once daily., Disp: , Rfl:     atorvastatin (LIPITOR) 40 MG tablet, Take 1 tablet (40 mg total) by mouth once daily. For cholesterol and heart protection, Disp: 90 tablet, Rfl: 3    blood sugar diagnostic Strp, 1 each by Misc.(Non-Drug; Combo Route) route once daily., Disp: 100 each, Rfl: 3    conjugated estrogens (PREMARIN) vaginal cream, 1 g with applicator or dime-sized amt with finger in vagina nightly x 2 weeks, then twice a week thereafter, Disp: 42.5 g, Rfl: 12    dulaglutide (TRULICITY) 1.5 mg/0.5 mL pen injector, Inject 1.5 mg into the skin every 7 days., Disp: 4 pen, Rfl: 11    fluorouracil (EFUDEX) 5 % cream, Apply topically Daily. , Disp: , Rfl:     lancets Misc, 1 each by Misc.(Non-Drug; Combo Route) route once daily., Disp: 100 each, Rfl: 3    magnesium oxide (MAG-OX) 400 mg (241.3 mg magnesium) tablet, Take 1 tablet (400 mg total) by mouth once daily., Disp: , Rfl: 0    metoprolol succinate (TOPROL-XL) 50 MG 24 hr tablet, Take 1 tablet (50 mg total) by mouth once daily. For heart and blood pressure, Disp: 90 tablet, Rfl: 1    mirabegron (MYRBETRIQ) 25 mg Tb24 ER tablet, Take 1 tablet (25 mg total) by mouth once daily. For bladder, Disp: 30 tablet, Rfl: 11    mv-min-FA-Fo-Kd-wmpjhzy-lutein 0.4-162-18 mg Tab, Take by mouth. 1 Tablet Oral Every day,  Disp: , Rfl:     vitamin D (VITAMIN D3) 1000 units Tab, Take 1,000 Units by mouth once daily., Disp: , Rfl:     amiodarone (PACERONE) 200 MG Tab, Take 1 tablet (200 mg total) by mouth once daily. TAKE AFTER 14 DAYS, Disp: 90 tablet, Rfl: 3    blood-glucose meter kit, Use as instructed (Patient not taking: Reported on 8/9/2023), Disp: 1 each, Rfl: 0    sacubitriL-valsartan (ENTRESTO)  mg per tablet, Take 1 tablet by mouth 2 (two) times daily., Disp: 60 tablet, Rfl: 14    Review of Systems   Constitutional:  Negative for activity change, appetite change, chills, diaphoresis, fatigue, fever and unexpected weight change.   HENT:  Negative for congestion, ear discharge, ear pain, facial swelling, hearing loss, nosebleeds, postnasal drip, rhinorrhea, sinus pressure, sneezing, sore throat, tinnitus, trouble swallowing and voice change.    Eyes:  Negative for photophobia, pain, discharge, redness, itching and visual disturbance.   Respiratory:  Positive for cough. Negative for chest tightness, shortness of breath and wheezing.    Cardiovascular:  Negative for chest pain, palpitations and leg swelling.   Gastrointestinal:  Negative for abdominal distention, abdominal pain, anal bleeding, blood in stool, constipation, diarrhea, nausea, rectal pain and vomiting.   Endocrine: Negative for cold intolerance, heat intolerance, polydipsia, polyphagia and polyuria.   Genitourinary:  Negative for difficulty urinating, dysuria and flank pain.   Musculoskeletal:  Negative for arthralgias, back pain, joint swelling, myalgias and neck pain.   Skin:  Negative for rash.   Neurological:  Negative for dizziness, tremors, seizures, syncope, speech difficulty, weakness, light-headedness, numbness and headaches.   Psychiatric/Behavioral:  Negative for behavioral problems, confusion, decreased concentration, dysphoric mood, sleep disturbance and suicidal ideas. The patient is not nervous/anxious and is not hyperactive.        /70 (BP  "Location: Right arm, Patient Position: Sitting, BP Method: Medium (Manual))   Pulse 81   Temp 98.4 °F (36.9 °C) (Oral)   Ht 5' 2" (1.575 m)   Wt 78 kg (171 lb 15.3 oz)   SpO2 97%   BMI 31.45 kg/m²   Physical Exam  Vitals and nursing note reviewed.   Constitutional:       General: She is not in acute distress.     Appearance: Normal appearance. She is well-developed. She is not ill-appearing or toxic-appearing.   HENT:      Head: Normocephalic and atraumatic.      Right Ear: Tympanic membrane, ear canal and external ear normal.      Left Ear: Tympanic membrane, ear canal and external ear normal.      Nose: Nose normal.      Mouth/Throat:      Lips: Pink.      Mouth: Mucous membranes are moist.      Pharynx: No oropharyngeal exudate or posterior oropharyngeal erythema.   Eyes:      General: No scleral icterus.        Right eye: No discharge.         Left eye: No discharge.      Extraocular Movements: Extraocular movements intact.      Conjunctiva/sclera: Conjunctivae normal.   Cardiovascular:      Rate and Rhythm: Normal rate and regular rhythm.      Pulses: Normal pulses.      Heart sounds: Normal heart sounds. No murmur heard.  Pulmonary:      Effort: Pulmonary effort is normal. No respiratory distress.      Breath sounds: Normal breath sounds. No wheezing or rales.   Abdominal:      General: Bowel sounds are normal. There is no distension.      Palpations: Abdomen is soft. There is no mass.      Tenderness: There is no abdominal tenderness. There is no right CVA tenderness, left CVA tenderness, guarding or rebound.      Hernia: No hernia is present.   Musculoskeletal:      Cervical back: Normal range of motion and neck supple. No rigidity or tenderness.   Lymphadenopathy:      Cervical: No cervical adenopathy.   Skin:     General: Skin is warm and dry.   Neurological:      General: No focal deficit present.      Mental Status: She is alert. Mental status is at baseline.   Psychiatric:         Mood and Affect: " Mood normal.         Behavior: Behavior normal. Behavior is cooperative.         ASSESSMENT/PLAN  Diagnoses and all orders for this visit:    Hospital discharge follow-up    Chronic combined systolic (congestive) and diastolic (congestive) heart failure    Viral URI with cough    Acute cough  -     POCT COVID-19 Rapid Screening    Coronary artery disease involving native coronary artery of native heart without angina pectoris    History of CVA (cerebrovascular accident)    Nonischemic congestive cardiomyopathy    Biventricular ICD (implantable cardioverter-defibrillator) in place    Hyperlipidemia associated with type 2 diabetes mellitus    Type 2 diabetes mellitus with hyperglycemia, without long-term current use of insulin    Class 1 obesity due to excess calories with serious comorbidity and body mass index (BMI) of 31.0 to 31.9 in adult    Other orders  -     predniSONE (DELTASONE) 10 MG tablet; Take 1 tablet (10 mg total) by mouth 2 (two) times daily. for 5 days      No evidence of ongoing heart failure now on clinical exam.  Meds as above and below.  Follow-up if not improving in 2 weeks.  Follow-up with Cardiology.  Continue Entresto, metoprolol succinate, atorvastatin, amiodarone, aspirin 81, Trulicity    Hydrate, rest, Mucinex Expectorant as directed for thinning out the mucus; or MucinexDM if with significant cough and mucus.  Zyrtec, Xyzal, Allegra or Claritin. (Would lean towards Allegra which may be a bit more effective than claritin and will not cause sedation as Xyzal or Zyrtec may.)  Nasal saline spray such as [place] brand as needed.  Flonase or Nasonex nasal spray after the nasal saline.  Warm salt water gargles and warm liquids may help soothe a sore throat better than cool liquids.  Tylenol as directed as needed for fever, body aches, headaches.   All are OTC  Most of all, stay well-hydrated.        All lab results over past 2 years available reviewed inc labs and any cardiology or radiology  studies.  Any new prescription medications gone over in detail including reason for taking the medication, the general mechanism of action, most common possible side effects and possible costs, etcetera.    Chronic conditions updated. Other than changes or additions as above, cont current medications and maintain follow-up with specialists if indicated.     Duy Cheng MD  A dictation device was used to produce this document. Use of such devices sometimes results in grammatical errors or replacement of words that sound similarly.

## 2023-09-06 ENCOUNTER — LAB VISIT (OUTPATIENT)
Dept: LAB | Facility: HOSPITAL | Age: 88
End: 2023-09-06
Attending: INTERNAL MEDICINE
Payer: MEDICARE

## 2023-09-06 DIAGNOSIS — I50.20 HFREF (HEART FAILURE WITH REDUCED EJECTION FRACTION): ICD-10-CM

## 2023-09-06 LAB
ANION GAP SERPL CALC-SCNC: 9 MMOL/L (ref 8–16)
BUN SERPL-MCNC: 12 MG/DL (ref 8–23)
CALCIUM SERPL-MCNC: 9.5 MG/DL (ref 8.7–10.5)
CHLORIDE SERPL-SCNC: 104 MMOL/L (ref 95–110)
CO2 SERPL-SCNC: 27 MMOL/L (ref 23–29)
CREAT SERPL-MCNC: 1 MG/DL (ref 0.5–1.4)
EST. GFR  (NO RACE VARIABLE): 54.2 ML/MIN/1.73 M^2
GLUCOSE SERPL-MCNC: 206 MG/DL (ref 70–110)
POTASSIUM SERPL-SCNC: 5.4 MMOL/L (ref 3.5–5.1)
SODIUM SERPL-SCNC: 140 MMOL/L (ref 136–145)

## 2023-09-06 PROCEDURE — 36415 COLL VENOUS BLD VENIPUNCTURE: CPT | Performed by: INTERNAL MEDICINE

## 2023-09-06 PROCEDURE — 80048 BASIC METABOLIC PNL TOTAL CA: CPT | Performed by: INTERNAL MEDICINE

## 2023-09-07 DIAGNOSIS — E87.5 HYPERKALEMIA: Primary | ICD-10-CM

## 2023-09-08 ENCOUNTER — OFFICE VISIT (OUTPATIENT)
Dept: CARDIOLOGY | Facility: CLINIC | Age: 88
End: 2023-09-08
Payer: MEDICARE

## 2023-09-08 VITALS
HEART RATE: 81 BPM | DIASTOLIC BLOOD PRESSURE: 72 MMHG | SYSTOLIC BLOOD PRESSURE: 120 MMHG | WEIGHT: 166.25 LBS | BODY MASS INDEX: 30.59 KG/M2 | HEIGHT: 62 IN

## 2023-09-08 DIAGNOSIS — I47.20 VT (VENTRICULAR TACHYCARDIA): ICD-10-CM

## 2023-09-08 DIAGNOSIS — I50.42 CHRONIC COMBINED SYSTOLIC (CONGESTIVE) AND DIASTOLIC (CONGESTIVE) HEART FAILURE: ICD-10-CM

## 2023-09-08 DIAGNOSIS — I25.10 CORONARY ARTERY DISEASE INVOLVING NATIVE CORONARY ARTERY OF NATIVE HEART WITHOUT ANGINA PECTORIS: ICD-10-CM

## 2023-09-08 DIAGNOSIS — Z95.810 BIVENTRICULAR ICD (IMPLANTABLE CARDIOVERTER-DEFIBRILLATOR) IN PLACE: ICD-10-CM

## 2023-09-08 DIAGNOSIS — I50.22 CHRONIC SYSTOLIC (CONGESTIVE) HEART FAILURE: Primary | Chronic | ICD-10-CM

## 2023-09-08 DIAGNOSIS — I42.8 NONISCHEMIC CARDIOMYOPATHY: ICD-10-CM

## 2023-09-08 PROCEDURE — 99999 PR PBB SHADOW E&M-EST. PATIENT-LVL IV: CPT | Mod: PBBFAC,,, | Performed by: INTERNAL MEDICINE

## 2023-09-08 PROCEDURE — 99214 PR OFFICE/OUTPT VISIT, EST, LEVL IV, 30-39 MIN: ICD-10-PCS | Mod: S$PBB,,, | Performed by: INTERNAL MEDICINE

## 2023-09-08 PROCEDURE — 99214 OFFICE O/P EST MOD 30 MIN: CPT | Mod: S$PBB,,, | Performed by: INTERNAL MEDICINE

## 2023-09-08 PROCEDURE — 99214 OFFICE O/P EST MOD 30 MIN: CPT | Mod: PBBFAC | Performed by: INTERNAL MEDICINE

## 2023-09-08 PROCEDURE — 99999 PR PBB SHADOW E&M-EST. PATIENT-LVL IV: ICD-10-PCS | Mod: PBBFAC,,, | Performed by: INTERNAL MEDICINE

## 2023-09-08 RX ORDER — METOPROLOL SUCCINATE 50 MG/1
50 TABLET, EXTENDED RELEASE ORAL 2 TIMES DAILY
Qty: 180 TABLET | Refills: 3 | Status: ON HOLD | OUTPATIENT
Start: 2023-09-08 | End: 2023-10-15 | Stop reason: SDUPTHER

## 2023-09-08 RX ORDER — FUROSEMIDE 20 MG/1
20 TABLET ORAL DAILY PRN
Qty: 30 TABLET | Refills: 11 | Status: SHIPPED | OUTPATIENT
Start: 2023-09-08 | End: 2024-02-22 | Stop reason: SDUPTHER

## 2023-09-08 NOTE — PROGRESS NOTES
Subjective:   09/08/2023     Patient ID:  Eliana Wagner is a 88 y.o. female who presents for evaulation of Follow-up      Comes in for follow-up.  Guideline directed medical therapy is being titrated.  She is now on high-dose Entresto, tolerating well, potassium was 5.4.  Will repeat 2 weeks.  Not on potassium, not on spironolactone.    She continues on amiodarone, will follow-up with EP next month.    She has a history of a dilated cardiomyopathy and congestive heart failure, this had improved since implantation of a biventricular defibrillator, but a recent echo showed ejection fraction 35%.  She is not having chest pains tightness or shortness of breath now.  She did have a defibrillator discharge associated with syncope.  She was treated with amiodarone.  She had been admitted to [facility] for this.  A nuclear stress test did not show evidence for ischemia, ejection fraction was 45%.  She followed up with EP, amiodarone recommended to be continued and she will follow-up with them in November.  Her CRT-D device will need reimplantation in the next year to 2.    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  She does have less than 93% pacing, possibly due to PVCs.      Hypertension is present, currently well controlled with guideline directed medical therapy for congestive heart failure.    Hypercholesterolemia is present, her LDL is well controlled on high-dose statin therapy.    Recent recommendations:   1. Discontinue amlodipine and irbesartan  2. Continue amiodarone 200 mg every day  3. Begin Entresto 97/103, 1 tablet twice a day with meals     Subsequent lab:   09/06/23 09:33  Sodium: 140  Potassium: 5.4 (H)  Chloride: 104  CO2: 27  Anion Gap: 9  BUN: 12  Creatinine: 1.0  eGFR: 54.2 !  Glucose: 206 (H)  Calcium: 9.5         Congestive Heart Failure  Associated symptoms include nocturia. Pertinent negatives include no  abdominal pain, chest pain, claudication, near-syncope, palpitations or shortness of breath. Her past medical history is significant for CAD.   Coronary Artery Disease  Pertinent negatives include no chest pain, leg swelling, palpitations, shortness of breath or weight gain. Risk factors include hyperlipidemia. Her past medical history is significant for CHF.   Hyperlipidemia  Pertinent negatives include no chest pain, focal weakness, myalgias or shortness of breath.       Patient Active Problem List   Diagnosis    Hypertension associated with diabetes    Refractive error    Pseudophakia    Choroidal nevus of right eye    Chronic combined systolic (congestive) and diastolic (congestive) heart failure    Obesity, diabetes, and hypertension syndrome    Coronary artery disease involving native coronary artery of native heart without angina pectoris    Biventricular ICD (implantable cardioverter-defibrillator) in place    Hyperlipidemia associated with type 2 diabetes mellitus    DM type 2 without retinopathy    History of CVA (cerebrovascular accident)    Type 2 diabetes mellitus with hyperglycemia, without long-term current use of insulin    Class 1 obesity with serious comorbidity in adult    Premature ventricular contractions    Risk for falls    Mixed incontinence    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, with long-term current use of insulin    Encounter for preventive health examination    Syncope    VT (ventricular tachycardia)    HFrEF (heart failure with reduced ejection fraction)    Nonischemic cardiomyopathy    Actinic keratosis    Other rosacea        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, with long-term current use of insulin 04/24/2023    Diabetes mellitus, type 2      Hyperlipidemia     Retinal detachment     od     Squamous cell carcinoma of skin     Stroke     tia x 3   ( last in 2005)    Urinary incontinence without sensory awareness 05/12/2021       Past Surgical History:   Procedure Laterality Date    APPENDECTOMY      CARDIAC CATHETERIZATION      CATARACT EXTRACTION Bilateral     EYE SURGERY Bilateral     cataract    INSERT / REPLACE / REMOVE PACEMAKER      INSERTION OF PACEMAKER      july 22 2019     Orestes Kim MD    TONSILLECTOMY         Review of patient's allergies indicates:   Allergen Reactions    Penicillins      Caused uti         Current Outpatient Medications:     amiodarone (PACERONE) 200 MG Tab, Take 1 tablet (200 mg total) by mouth once daily. TAKE AFTER 14 DAYS, Disp: 90 tablet, Rfl: 3    aspirin (ECOTRIN) 81 MG EC tablet, Take 81 mg by mouth once daily., Disp: , Rfl:     atorvastatin (LIPITOR) 40 MG tablet, Take 1 tablet (40 mg total) by mouth once daily. For cholesterol and heart protection, Disp: 90 tablet, Rfl: 3    blood sugar diagnostic Strp, 1 each by Misc.(Non-Drug; Combo Route) route once daily., Disp: 100 each, Rfl: 3    conjugated estrogens (PREMARIN) vaginal cream, 1 g with applicator or dime-sized amt with finger in vagina nightly x 2 weeks, then twice a week thereafter, Disp: 42.5 g, Rfl: 12    dulaglutide (TRULICITY) 1.5 mg/0.5 mL pen injector, Inject 1.5 mg into the skin every 7 days., Disp: 4 pen, Rfl: 11    fluorouracil (EFUDEX) 5 % cream, Apply topically Daily. , Disp: , Rfl:     lancets Misc, 1 each by Misc.(Non-Drug; Combo Route) route once daily., Disp: 100 each, Rfl: 3    magnesium oxide (MAG-OX) 400 mg (241.3 mg magnesium) tablet, Take 1 tablet (400 mg total) by mouth once daily., Disp: , Rfl: 0    mirabegron (MYRBETRIQ) 25 mg Tb24 ER tablet, Take 1 tablet (25 mg total) by mouth once daily. For bladder, Disp: 30 tablet, Rfl: 11    mv-min-FA-Sj-Mt-sypxqfz-lutein 0.4-162-18 mg Tab, Take by mouth. 1 Tablet Oral Every day, Disp: ,  Rfl:     sacubitriL-valsartan (ENTRESTO)  mg per tablet, Take 1 tablet by mouth 2 (two) times daily., Disp: 60 tablet, Rfl: 14    vitamin D (VITAMIN D3) 1000 units Tab, Take 1,000 Units by mouth once daily., Disp: , Rfl:     blood-glucose meter kit, Use as instructed (Patient not taking: Reported on 8/9/2023), Disp: 1 each, Rfl: 0    furosemide (LASIX) 20 MG tablet, Take 1 tablet (20 mg total) by mouth daily as needed (swelling)., Disp: 30 tablet, Rfl: 11    metoprolol succinate (TOPROL-XL) 50 MG 24 hr tablet, Take 1 tablet (50 mg total) by mouth 2 (two) times daily. For heart and blood pressure, Disp: 180 tablet, Rfl: 3            Objective:   Review of Systems   Constitutional: Negative for chills, decreased appetite, diaphoresis, fever, malaise/fatigue, weight gain and weight loss.   HENT:  Negative for congestion, hearing loss, nosebleeds and sore throat.    Eyes:  Negative for double vision, vision loss in left eye and vision loss in right eye.   Cardiovascular:  Negative for chest pain, claudication, cyanosis, dyspnea on exertion, irregular heartbeat, leg swelling, near-syncope, orthopnea, palpitations, paroxysmal nocturnal dyspnea and syncope.   Respiratory:  Negative for cough, hemoptysis, shortness of breath, sleep disturbances due to breathing, snoring and wheezing.    Endocrine: Negative for cold intolerance and heat intolerance.   Hematologic/Lymphatic: Negative for adenopathy and bleeding problem. Bruises/bleeds easily.   Skin:  Negative for itching, nail changes and rash.   Musculoskeletal:  Positive for falls and neck pain. Negative for arthritis, back pain and myalgias.   Gastrointestinal:  Positive for constipation. Negative for bloating, abdominal pain, anorexia, diarrhea, hematochezia, melena, nausea and vomiting.   Genitourinary:  Positive for frequency and nocturia. Negative for hematuria.   Neurological:  Positive for excessive daytime sleepiness, loss of balance and paresthesias.  Negative for focal weakness, headaches, vertigo and weakness.   Psychiatric/Behavioral:  Negative for altered mental status, depression and memory loss.    Allergic/Immunologic: Negative for hives.       Vitals:    09/08/23 1118   BP: 120/72   Pulse: 81           Physical Exam  Vitals reviewed.   Constitutional:       General: She is not in acute distress.     Appearance: She is well-developed.   HENT:      Head: Normocephalic and atraumatic.      Nose: Nose normal.   Eyes:      Conjunctiva/sclera: Conjunctivae normal.      Pupils: Pupils are equal, round, and reactive to light.   Neck:      Vascular: No JVD.   Cardiovascular:      Rate and Rhythm: Normal rate and regular rhythm.      Pulses: Normal pulses and intact distal pulses.      Heart sounds: Normal heart sounds. No murmur heard.     No friction rub. No gallop.      Comments: Jugular venous pressure normal  Defibrillator site appears normal  Pulmonary:      Effort: Pulmonary effort is normal. No respiratory distress.      Breath sounds: Normal breath sounds. No wheezing or rales.   Chest:      Chest wall: No tenderness.   Abdominal:      General: Bowel sounds are normal. There is no distension.      Palpations: Abdomen is soft.      Tenderness: There is no abdominal tenderness.   Musculoskeletal:         General: No tenderness or deformity. Normal range of motion.      Cervical back: Normal range of motion and neck supple.      Right lower leg: No edema.      Left lower leg: No edema.   Skin:     General: Skin is warm and dry.      Findings: No erythema or rash.   Neurological:      Mental Status: She is alert and oriented to person, place, and time.      Cranial Nerves: No cranial nerve deficit.      Motor: No abnormal muscle tone.      Coordination: Coordination normal.   Psychiatric:         Behavior: Behavior normal.         Thought Content: Thought content normal.         Judgment: Judgment normal.              Assessment and Plan:     Chronic systolic  (congestive) heart failure  Comments:  Currently asymptomatic  Continue titration of guideline directed medical therapy, increase metoprolol  Orders:  -     metoprolol succinate (TOPROL-XL) 50 MG 24 hr tablet; Take 1 tablet (50 mg total) by mouth 2 (two) times daily. For heart and blood pressure  Dispense: 180 tablet; Refill: 3  -     furosemide (LASIX) 20 MG tablet; Take 1 tablet (20 mg total) by mouth daily as needed (swelling).  Dispense: 30 tablet; Refill: 11    Chronic combined systolic (congestive) and diastolic (congestive) heart failure    Biventricular ICD (implantable cardioverter-defibrillator) in place  Comments:  Follow-up with EP    VT (ventricular tachycardia)  Comments:  Status post defibrillator discharge on amiodarone, follow-up with EP    Nonischemic cardiomyopathy    Coronary artery disease involving native coronary artery of native heart without angina pectoris  Comments:  Currently asymptomatic  Nuclear stress test showed no ischemia               Follow up in about 3 months (around 12/8/2023).

## 2023-09-30 ENCOUNTER — PATIENT MESSAGE (OUTPATIENT)
Dept: PRIMARY CARE CLINIC | Facility: CLINIC | Age: 88
End: 2023-09-30

## 2023-10-03 ENCOUNTER — CLINICAL SUPPORT (OUTPATIENT)
Dept: CARDIOLOGY | Facility: HOSPITAL | Age: 88
End: 2023-10-03
Attending: INTERNAL MEDICINE
Payer: MEDICARE

## 2023-10-03 ENCOUNTER — CLINICAL SUPPORT (OUTPATIENT)
Dept: CARDIOLOGY | Facility: HOSPITAL | Age: 88
End: 2023-10-03
Payer: MEDICARE

## 2023-10-03 DIAGNOSIS — I47.20 VENTRICULAR TACHYCARDIA, UNSPECIFIED: ICD-10-CM

## 2023-10-06 ENCOUNTER — OFFICE VISIT (OUTPATIENT)
Dept: PRIMARY CARE CLINIC | Facility: CLINIC | Age: 88
End: 2023-10-06
Payer: MEDICARE

## 2023-10-06 VITALS
OXYGEN SATURATION: 100 % | SYSTOLIC BLOOD PRESSURE: 112 MMHG | HEART RATE: 79 BPM | BODY MASS INDEX: 31.2 KG/M2 | WEIGHT: 169.56 LBS | HEIGHT: 62 IN | DIASTOLIC BLOOD PRESSURE: 62 MMHG

## 2023-10-06 DIAGNOSIS — R30.0 DYSURIA: Primary | ICD-10-CM

## 2023-10-06 PROCEDURE — 99214 OFFICE O/P EST MOD 30 MIN: CPT | Mod: PBBFAC | Performed by: STUDENT IN AN ORGANIZED HEALTH CARE EDUCATION/TRAINING PROGRAM

## 2023-10-06 PROCEDURE — 99213 OFFICE O/P EST LOW 20 MIN: CPT | Mod: S$PBB,,, | Performed by: STUDENT IN AN ORGANIZED HEALTH CARE EDUCATION/TRAINING PROGRAM

## 2023-10-06 PROCEDURE — 99999 PR PBB SHADOW E&M-EST. PATIENT-LVL IV: ICD-10-PCS | Mod: PBBFAC,,, | Performed by: STUDENT IN AN ORGANIZED HEALTH CARE EDUCATION/TRAINING PROGRAM

## 2023-10-06 PROCEDURE — 99999 PR PBB SHADOW E&M-EST. PATIENT-LVL IV: CPT | Mod: PBBFAC,,, | Performed by: STUDENT IN AN ORGANIZED HEALTH CARE EDUCATION/TRAINING PROGRAM

## 2023-10-06 PROCEDURE — 99213 PR OFFICE/OUTPT VISIT, EST, LEVL III, 20-29 MIN: ICD-10-PCS | Mod: S$PBB,,, | Performed by: STUDENT IN AN ORGANIZED HEALTH CARE EDUCATION/TRAINING PROGRAM

## 2023-10-06 RX ORDER — NITROFURANTOIN 25; 75 MG/1; MG/1
100 CAPSULE ORAL 2 TIMES DAILY
Qty: 14 CAPSULE | Refills: 0 | Status: ON HOLD | OUTPATIENT
Start: 2023-10-06 | End: 2023-10-15 | Stop reason: HOSPADM

## 2023-10-06 RX ORDER — IRBESARTAN 150 MG/1
150 TABLET ORAL
COMMUNITY
Start: 2023-09-21 | End: 2023-10-14

## 2023-10-06 NOTE — PROGRESS NOTES
INTERNAL MEDICINE SAME DAY PRIMARY CARE VISIT NOTE    Subjective:     Chief Complaint: Urinary Tract Infection       Patient ID: Eliana Wagner is a 88 y.o. female , here today for focused same-day primary care visit.    Today, patient with complaint of dysuria    Symptoms started about 2 weeks ago.  Urine is cloudy, endorses dysuria, and pelvic discomfort.  Had some leftover ciprofloxacin and took a few doses of that since symptoms started.  No hematuria, fevers, or back pain.    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 Authorizing Provider   amiodarone (PACERONE) 200 MG Tab Take 1 tablet (200 mg total) by mouth once daily. TAKE AFTER 14 DAYS 8/25/23 8/24/24 Yes Orestes Kim Jr., MD   aspirin (ECOTRIN) 81 MG EC tablet Take 81 mg by mouth once daily.   Yes Provider, Historical   blood sugar diagnostic Strp 1 each by Misc.(Non-Drug; Combo Route) route once daily. 9/24/18  Yes Nara Lawton MD   conjugated estrogens (PREMARIN) vaginal cream 1 g with applicator or dime-sized amt with finger in vagina nightly x 2 weeks, then twice a week thereafter 4/7/22  Yes Goip Lewis MD   dulaglutide (TRULICITY) 1.5 mg/0.5 mL pen injector Inject 1.5 mg into the skin every 7 days. 5/10/23 5/9/24 Yes Carisa Weber NP   furosemide (LASIX) 20 MG tablet Take 1 tablet (20 mg total) by mouth daily as needed (swelling). 9/8/23  Yes Orestes Kim Jr., MD   irbesartan  (AVAPRO) 150 MG tablet Take 150 mg by mouth. 9/21/23  Yes Provider, Historical   lancets Misc 1 each by Misc.(Non-Drug; Combo Route) route once daily. 9/24/18  Yes Nara Lawton MD   magnesium oxide (MAG-OX) 400 mg (241.3 mg magnesium) tablet Take 1 tablet (400 mg total) by mouth once daily. 2/2/22  Yes Orestes Kim Jr., MD   metoprolol succinate (TOPROL-XL) 50 MG 24 hr tablet Take 1 tablet (50 mg total) by mouth 2 (two) times daily. For heart and blood pressure 9/8/23 9/7/24 Yes Orestes Kim Jr., MD   mirabegron (MYRBETRIQ) 25 mg Tb24 ER tablet Take 1 tablet (25 mg total) by mouth once daily. For bladder 4/18/23 4/17/24 Yes Duy Cheng MD   mv-min-FA-Ua-Am-csqwixr-lutein 0.4-162-18 mg Tab Take by mouth. 1 Tablet Oral Every day   Yes Provider, Historical   sacubitriL-valsartan (ENTRESTO)  mg per tablet Take 1 tablet by mouth 2 (two) times daily. 8/25/23 11/17/24 Yes Orestes Kim Jr., MD   vitamin D (VITAMIN D3) 1000 units Tab Take 1,000 Units by mouth once daily.   Yes Provider, Historical   atorvastatin (LIPITOR) 40 MG tablet Take 1 tablet (40 mg total) by mouth once daily. For cholesterol and heart protection  Patient not taking: Reported on 10/6/2023 4/18/23   Duy Cheng MD   blood-glucose meter kit Use as instructed  Patient not taking: Reported on 8/9/2023 9/24/18 12/16/20  Nara Lawton MD   fluorouracil (EFUDEX) 5 % cream Apply topically Daily.  6/18/18   Provider, Historical       Allergies:  Review of patient's allergies indicates:   Allergen Reactions    Penicillins      Caused uti       Social History:  Social History     Tobacco Use    Smoking status: Never    Smokeless tobacco: Never   Substance Use Topics    Alcohol use: Not Currently     Comment: socially    Drug use: No         Review of Systems   Constitutional:  Negative for diaphoresis, fatigue and fever.   Respiratory:  Negative for shortness of breath and wheezing.   "  Cardiovascular:  Negative for chest pain.   Gastrointestinal:  Negative for abdominal pain.   Genitourinary:  Positive for dysuria, frequency and urgency. Negative for decreased urine volume, hematuria and pelvic pain.   Musculoskeletal:  Negative for back pain, gait problem and joint swelling.   Skin:  Negative for rash.   Neurological:  Negative for weakness.           Objective:   /62 (BP Location: Left arm, Patient Position: Sitting, BP Method: Medium (Manual))   Pulse 79   Ht 5' 2" (1.575 m)   Wt 76.9 kg (169 lb 8.5 oz)   SpO2 100%   BMI 31.01 kg/m²        General: AAO x3, no apparent distress  HEENT: PERRL, OP clear  CV: RRR, no m/r/g  Pulm: Lungs CTAB, no crackles, no wheezes  Abd: s/NT/ND +BS  Extremities: no c/c/e    Labs:         Assessment/Plan     Eliana was seen today for urinary tract infection.    Diagnoses and all orders for this visit:    Dysuria  -     nitrofurantoin, macrocrystal-monohydrate, (MACROBID) 100 MG capsule; Take 1 capsule (100 mg total) by mouth 2 (two) times daily. for 7 days  -     Urinalysis; Future  -     Urine culture; Future        RTC prn     Tyra Marquez MD  Department of Internal Medicine - [facility]  10/06/2023    "

## 2023-10-14 ENCOUNTER — HOSPITAL ENCOUNTER (OUTPATIENT)
Facility: HOSPITAL | Age: 88
Discharge: HOME | End: 2023-10-15
Attending: EMERGENCY MEDICINE | Admitting: HOSPITALIST
Payer: MEDICARE

## 2023-10-14 DIAGNOSIS — I50.22 CHRONIC SYSTOLIC (CONGESTIVE) HEART FAILURE: Chronic | ICD-10-CM

## 2023-10-14 DIAGNOSIS — R11.2 NAUSEA AND VOMITING, UNSPECIFIED VOMITING TYPE: Primary | ICD-10-CM

## 2023-10-14 DIAGNOSIS — R07.9 CHEST PAIN: ICD-10-CM

## 2023-10-14 DIAGNOSIS — R42 DIZZINESS: ICD-10-CM

## 2023-10-14 DIAGNOSIS — H92.02 LEFT EAR PAIN: ICD-10-CM

## 2023-10-14 PROBLEM — N39.0 UTI (URINARY TRACT INFECTION): Status: ACTIVE | Noted: 2023-10-14

## 2023-10-14 LAB
ALBUMIN SERPL BCP-MCNC: 3.6 G/DL (ref 3.5–5.2)
ALLENS TEST: NORMAL
ALLENS TEST: NORMAL
ALP SERPL-CCNC: 80 U/L (ref 55–135)
ALT SERPL W/O P-5'-P-CCNC: 27 U/L (ref 10–44)
ANION GAP SERPL CALC-SCNC: 11 MMOL/L (ref 8–16)
AST SERPL-CCNC: 24 U/L (ref 10–40)
BASOPHILS # BLD AUTO: 0.04 K/UL (ref 0–0.2)
BASOPHILS NFR BLD: 0.5 % (ref 0–1.9)
BILIRUB SERPL-MCNC: 0.5 MG/DL (ref 0.1–1)
BUN SERPL-MCNC: 14 MG/DL (ref 8–23)
CALCIUM SERPL-MCNC: 9.1 MG/DL (ref 8.7–10.5)
CHLORIDE SERPL-SCNC: 105 MMOL/L (ref 95–110)
CHOLEST SERPL-MCNC: 180 MG/DL (ref 120–199)
CHOLEST/HDLC SERPL: 3.3 {RATIO} (ref 2–5)
CO2 SERPL-SCNC: 23 MMOL/L (ref 23–29)
CREAT SERPL-MCNC: 0.6 MG/DL (ref 0.5–1.4)
CREAT SERPL-MCNC: 0.8 MG/DL (ref 0.5–1.4)
DELSYS: NORMAL
DELSYS: NORMAL
DIFFERENTIAL METHOD: ABNORMAL
EOSINOPHIL # BLD AUTO: 0.1 K/UL (ref 0–0.5)
EOSINOPHIL NFR BLD: 0.7 % (ref 0–8)
ERYTHROCYTE [DISTWIDTH] IN BLOOD BY AUTOMATED COUNT: 13.6 % (ref 11.5–14.5)
EST. GFR  (NO RACE VARIABLE): >60 ML/MIN/1.73 M^2
GLUCOSE SERPL-MCNC: 252 MG/DL (ref 70–110)
HCT VFR BLD AUTO: 44.4 % (ref 37–48.5)
HDLC SERPL-MCNC: 55 MG/DL (ref 40–75)
HDLC SERPL: 30.6 % (ref 20–50)
HGB BLD-MCNC: 14.9 G/DL (ref 12–16)
IMM GRANULOCYTES # BLD AUTO: 0.03 K/UL (ref 0–0.04)
IMM GRANULOCYTES NFR BLD AUTO: 0.3 % (ref 0–0.5)
INR PPP: 1 (ref 0.8–1.2)
LDLC SERPL CALC-MCNC: 95 MG/DL (ref 63–159)
LYMPHOCYTES # BLD AUTO: 2.5 K/UL (ref 1–4.8)
LYMPHOCYTES NFR BLD: 28 % (ref 18–48)
MCH RBC QN AUTO: 29.4 PG (ref 27–31)
MCHC RBC AUTO-ENTMCNC: 33.6 G/DL (ref 32–36)
MCV RBC AUTO: 88 FL (ref 82–98)
MONOCYTES # BLD AUTO: 0.4 K/UL (ref 0.3–1)
MONOCYTES NFR BLD: 4 % (ref 4–15)
NEUTROPHILS # BLD AUTO: 5.9 K/UL (ref 1.8–7.7)
NEUTROPHILS NFR BLD: 66.5 % (ref 38–73)
NONHDLC SERPL-MCNC: 125 MG/DL
NRBC BLD-RTO: 0 /100 WBC
PLATELET # BLD AUTO: 140 K/UL (ref 150–450)
PMV BLD AUTO: 9.8 FL (ref 9.2–12.9)
POC PTINR: 1.2 (ref 0.9–1.2)
POCT GLUCOSE: 189 MG/DL (ref 70–110)
POCT GLUCOSE: 212 MG/DL (ref 70–110)
POTASSIUM SERPL-SCNC: 4.1 MMOL/L (ref 3.5–5.1)
PROT SERPL-MCNC: 7.1 G/DL (ref 6–8.4)
PROTHROMBIN TIME: 10.9 SEC (ref 9–12.5)
RBC # BLD AUTO: 5.06 M/UL (ref 4–5.4)
SAMPLE: NORMAL
SAMPLE: NORMAL
SITE: NORMAL
SITE: NORMAL
SODIUM SERPL-SCNC: 139 MMOL/L (ref 136–145)
TRIGL SERPL-MCNC: 150 MG/DL (ref 30–150)
TSH SERPL DL<=0.005 MIU/L-ACNC: 2.49 UIU/ML (ref 0.4–4)
WBC # BLD AUTO: 8.82 K/UL (ref 3.9–12.7)

## 2023-10-14 PROCEDURE — 96376 TX/PRO/DX INJ SAME DRUG ADON: CPT | Mod: 59

## 2023-10-14 PROCEDURE — 25000003 PHARM REV CODE 250

## 2023-10-14 PROCEDURE — 25500020 PHARM REV CODE 255: Performed by: EMERGENCY MEDICINE

## 2023-10-14 PROCEDURE — 99447 PR INTERPROF, PHONE/INTERNET/EHR, CONSULT, 11-20 MINS: ICD-10-PCS | Mod: ,,, | Performed by: PSYCHIATRY & NEUROLOGY

## 2023-10-14 PROCEDURE — 96361 HYDRATE IV INFUSION ADD-ON: CPT

## 2023-10-14 PROCEDURE — 85025 COMPLETE CBC W/AUTO DIFF WBC: CPT | Performed by: NURSE PRACTITIONER

## 2023-10-14 PROCEDURE — 93010 EKG 12-LEAD: ICD-10-PCS | Mod: ,,, | Performed by: INTERNAL MEDICINE

## 2023-10-14 PROCEDURE — 63600175 PHARM REV CODE 636 W HCPCS

## 2023-10-14 PROCEDURE — 82962 GLUCOSE BLOOD TEST: CPT

## 2023-10-14 PROCEDURE — G0378 HOSPITAL OBSERVATION PER HR: HCPCS

## 2023-10-14 PROCEDURE — 85610 PROTHROMBIN TIME: CPT | Mod: 59 | Performed by: NURSE PRACTITIONER

## 2023-10-14 PROCEDURE — 63600175 PHARM REV CODE 636 W HCPCS: Performed by: EMERGENCY MEDICINE

## 2023-10-14 PROCEDURE — 99900035 HC TECH TIME PER 15 MIN (STAT)

## 2023-10-14 PROCEDURE — 94761 N-INVAS EAR/PLS OXIMETRY MLT: CPT

## 2023-10-14 PROCEDURE — 80061 LIPID PANEL: CPT | Performed by: NURSE PRACTITIONER

## 2023-10-14 PROCEDURE — 82565 ASSAY OF CREATININE: CPT

## 2023-10-14 PROCEDURE — 99447 NTRPROF PH1/NTRNET/EHR 11-20: CPT | Mod: ,,, | Performed by: PSYCHIATRY & NEUROLOGY

## 2023-10-14 PROCEDURE — 63600175 PHARM REV CODE 636 W HCPCS: Performed by: NURSE PRACTITIONER

## 2023-10-14 PROCEDURE — 93005 ELECTROCARDIOGRAM TRACING: CPT

## 2023-10-14 PROCEDURE — 25000003 PHARM REV CODE 250: Performed by: NURSE PRACTITIONER

## 2023-10-14 PROCEDURE — 96372 THER/PROPH/DIAG INJ SC/IM: CPT | Mod: 59

## 2023-10-14 PROCEDURE — 84443 ASSAY THYROID STIM HORMONE: CPT | Performed by: NURSE PRACTITIONER

## 2023-10-14 PROCEDURE — 99285 EMERGENCY DEPT VISIT HI MDM: CPT | Mod: 25

## 2023-10-14 PROCEDURE — 80053 COMPREHEN METABOLIC PANEL: CPT | Performed by: NURSE PRACTITIONER

## 2023-10-14 PROCEDURE — 85610 PROTHROMBIN TIME: CPT | Mod: 59

## 2023-10-14 PROCEDURE — 93010 ELECTROCARDIOGRAM REPORT: CPT | Mod: ,,, | Performed by: INTERNAL MEDICINE

## 2023-10-14 PROCEDURE — 96374 THER/PROPH/DIAG INJ IV PUSH: CPT | Mod: 59

## 2023-10-14 RX ORDER — METOPROLOL SUCCINATE 50 MG/1
50 TABLET, EXTENDED RELEASE ORAL 2 TIMES DAILY
Status: DISCONTINUED | OUTPATIENT
Start: 2023-10-14 | End: 2023-10-15 | Stop reason: HOSPADM

## 2023-10-14 RX ORDER — AMOXICILLIN 250 MG
1 CAPSULE ORAL 2 TIMES DAILY PRN
Status: DISCONTINUED | OUTPATIENT
Start: 2023-10-14 | End: 2023-10-15 | Stop reason: HOSPADM

## 2023-10-14 RX ORDER — ONDANSETRON 2 MG/ML
4 INJECTION INTRAMUSCULAR; INTRAVENOUS EVERY 8 HOURS PRN
Status: DISCONTINUED | OUTPATIENT
Start: 2023-10-14 | End: 2023-10-15 | Stop reason: HOSPADM

## 2023-10-14 RX ORDER — ASPIRIN 81 MG/1
81 TABLET ORAL DAILY
Status: DISCONTINUED | OUTPATIENT
Start: 2023-10-14 | End: 2023-10-15 | Stop reason: HOSPADM

## 2023-10-14 RX ORDER — ENOXAPARIN SODIUM 100 MG/ML
40 INJECTION SUBCUTANEOUS EVERY 24 HOURS
Status: DISCONTINUED | OUTPATIENT
Start: 2023-10-14 | End: 2023-10-15 | Stop reason: HOSPADM

## 2023-10-14 RX ORDER — GLUCAGON 1 MG
1 KIT INJECTION
Status: DISCONTINUED | OUTPATIENT
Start: 2023-10-14 | End: 2023-10-15 | Stop reason: HOSPADM

## 2023-10-14 RX ORDER — IBUPROFEN 200 MG
24 TABLET ORAL
Status: DISCONTINUED | OUTPATIENT
Start: 2023-10-14 | End: 2023-10-15 | Stop reason: HOSPADM

## 2023-10-14 RX ORDER — AMIODARONE HYDROCHLORIDE 200 MG/1
200 TABLET ORAL DAILY
Status: DISCONTINUED | OUTPATIENT
Start: 2023-10-14 | End: 2023-10-15 | Stop reason: HOSPADM

## 2023-10-14 RX ORDER — ONDANSETRON 2 MG/ML
4 INJECTION INTRAMUSCULAR; INTRAVENOUS
Status: COMPLETED | OUTPATIENT
Start: 2023-10-14 | End: 2023-10-14

## 2023-10-14 RX ORDER — MECLIZINE HYDROCHLORIDE 25 MG/1
50 TABLET ORAL
Status: COMPLETED | OUTPATIENT
Start: 2023-10-14 | End: 2023-10-14

## 2023-10-14 RX ORDER — INSULIN ASPART 100 [IU]/ML
1-10 INJECTION, SOLUTION INTRAVENOUS; SUBCUTANEOUS
Status: DISCONTINUED | OUTPATIENT
Start: 2023-10-14 | End: 2023-10-15 | Stop reason: HOSPADM

## 2023-10-14 RX ORDER — SODIUM CHLORIDE, SODIUM LACTATE, POTASSIUM CHLORIDE, CALCIUM CHLORIDE 600; 310; 30; 20 MG/100ML; MG/100ML; MG/100ML; MG/100ML
INJECTION, SOLUTION INTRAVENOUS CONTINUOUS
Status: ACTIVE | OUTPATIENT
Start: 2023-10-14 | End: 2023-10-15

## 2023-10-14 RX ORDER — SODIUM CHLORIDE 0.9 % (FLUSH) 0.9 %
5 SYRINGE (ML) INJECTION
Status: DISCONTINUED | OUTPATIENT
Start: 2023-10-14 | End: 2023-10-15 | Stop reason: HOSPADM

## 2023-10-14 RX ORDER — ACETAMINOPHEN 325 MG/1
650 TABLET ORAL EVERY 8 HOURS PRN
Status: DISCONTINUED | OUTPATIENT
Start: 2023-10-14 | End: 2023-10-15 | Stop reason: HOSPADM

## 2023-10-14 RX ORDER — TALC
9 POWDER (GRAM) TOPICAL NIGHTLY PRN
Status: DISCONTINUED | OUTPATIENT
Start: 2023-10-14 | End: 2023-10-15 | Stop reason: HOSPADM

## 2023-10-14 RX ORDER — ONDANSETRON 2 MG/ML
4 INJECTION INTRAMUSCULAR; INTRAVENOUS
Status: DISCONTINUED | OUTPATIENT
Start: 2023-10-14 | End: 2023-10-14

## 2023-10-14 RX ORDER — IBUPROFEN 200 MG
16 TABLET ORAL
Status: DISCONTINUED | OUTPATIENT
Start: 2023-10-14 | End: 2023-10-15 | Stop reason: HOSPADM

## 2023-10-14 RX ORDER — NITROFURANTOIN 25; 75 MG/1; MG/1
100 CAPSULE ORAL EVERY 12 HOURS
Status: COMPLETED | OUTPATIENT
Start: 2023-10-14 | End: 2023-10-14

## 2023-10-14 RX ORDER — LIDOCAINE 50 MG/G
1 PATCH TOPICAL DAILY PRN
Status: DISCONTINUED | OUTPATIENT
Start: 2023-10-14 | End: 2023-10-15 | Stop reason: HOSPADM

## 2023-10-14 RX ADMIN — ONDANSETRON 4 MG: 2 INJECTION INTRAMUSCULAR; INTRAVENOUS at 03:10

## 2023-10-14 RX ADMIN — MECLIZINE HYDROCHLORIDE 50 MG: 25 TABLET ORAL at 08:10

## 2023-10-14 RX ADMIN — SODIUM CHLORIDE, POTASSIUM CHLORIDE, SODIUM LACTATE AND CALCIUM CHLORIDE: 600; 310; 30; 20 INJECTION, SOLUTION INTRAVENOUS at 03:10

## 2023-10-14 RX ADMIN — ASPIRIN 81 MG: 81 TABLET, COATED ORAL at 02:10

## 2023-10-14 RX ADMIN — SACUBITRIL AND VALSARTAN 1 TABLET: 97; 103 TABLET, FILM COATED ORAL at 08:10

## 2023-10-14 RX ADMIN — ONDANSETRON 4 MG: 2 INJECTION INTRAMUSCULAR; INTRAVENOUS at 02:10

## 2023-10-14 RX ADMIN — AMIODARONE HYDROCHLORIDE 200 MG: 200 TABLET ORAL at 02:10

## 2023-10-14 RX ADMIN — ENOXAPARIN SODIUM 40 MG: 40 INJECTION SUBCUTANEOUS at 06:10

## 2023-10-14 RX ADMIN — NITROFURANTOIN MONOHYDRATE/MACROCRYSTALS 100 MG: 25; 75 CAPSULE ORAL at 03:10

## 2023-10-14 RX ADMIN — ONDANSETRON 4 MG: 2 INJECTION INTRAMUSCULAR; INTRAVENOUS at 08:10

## 2023-10-14 RX ADMIN — Medication 9 MG: at 08:10

## 2023-10-14 RX ADMIN — IOHEXOL 100 ML: 350 INJECTION, SOLUTION INTRAVENOUS at 10:10

## 2023-10-14 RX ADMIN — NITROFURANTOIN MONOHYDRATE/MACROCRYSTALS 100 MG: 25; 75 CAPSULE ORAL at 08:10

## 2023-10-14 NOTE — ED PROVIDER NOTES
Encounter Date: 10/14/2023       History     Chief Complaint   Patient presents with    Dizziness     Stood became dizzy and fell back on the bed. No injuries. Ear pain and decreased hearing in the left ear X 3 weeks.      89 y/o female with DM, HLD, hx of TIA x 3, urinary incontinence, CHF, ICD placement, CAD, and hx of vtach which presents via EMS with dizziness and inability to walk without holding onto to something that occurred upon waking up this morning. She also endorses decreased hearing and pain to the left ear. She states she woke up and upon walking to the bathroom, the room began to spin and she became nauseated. Denies hx of vertigo. She states her defibrillator went off last month but she has seen cardiology since then. Denies difficulty speaking, or swallowing. Also is having left ear pain that she has seen ENT for and recently had a hearing test. No other complaints at this time.     The history is provided by the patient.     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, with long-term current use of insulin 04/24/2023    Diabetes mellitus, type 2     Hyperlipidemia     Retinal detachment     od     Squamous cell carcinoma of skin     Stroke     tia x 3   ( last in 2005)    Urinary incontinence without sensory awareness 05/12/2021     Past Surgical History:   Procedure Laterality Date    APPENDECTOMY      CARDIAC CATHETERIZATION      CATARACT EXTRACTION Bilateral     EYE SURGERY Bilateral     cataract    INSERT / REPLACE / REMOVE PACEMAKER      INSERTION OF PACEMAKER      july 22 2019     Orestes Kim MD    TONSILLECTOMY       Family History   Problem Relation Age of Onset    Cataracts Mother     Cataracts Father     No Known Problems Sister     No Known  Problems Brother     Cancer Son         prostate    Liver disease Son         liver transplant    No Known Problems Maternal Aunt     No Known Problems Maternal Uncle     No Known Problems Paternal Aunt     No Known Problems Paternal Uncle     No Known Problems Maternal Grandmother     Glaucoma Maternal Grandfather     No Known Problems Paternal Grandmother     No Known Problems Paternal Grandfather     Breast cancer Neg Hx     Colon cancer Neg Hx     Ovarian cancer Neg Hx     Amblyopia Neg Hx     Blindness Neg Hx     Diabetes Neg Hx     Hypertension Neg Hx     Macular degeneration Neg Hx     Retinal detachment Neg Hx     Strabismus Neg Hx     Stroke Neg Hx     Thyroid disease Neg Hx      Social History     Tobacco Use    Smoking status: Never    Smokeless tobacco: Never   Substance Use Topics    Alcohol use: Not Currently     Comment: socially    Drug use: No     Review of Systems   Constitutional:  Negative for fever.   HENT:  Negative for sore throat.    Respiratory:  Negative for shortness of breath.    Cardiovascular:  Negative for chest pain.   Gastrointestinal:  Positive for nausea.   Genitourinary:  Negative for dysuria.   Musculoskeletal:  Negative for back pain.   Skin:  Negative for rash.   Neurological:  Positive for dizziness. Negative for weakness.   Hematological:  Does not bruise/bleed easily.     Physical Exam     Initial Vitals   BP Pulse Resp Temp SpO2   10/14/23 0822 10/14/23 0822 10/14/23 0901 10/14/23 0824 10/14/23 0822   (!) 154/84 68 (!) 22 98 °F (36.7 °C) 97 %      MAP       --                Physical Exam    Nursing note and vitals reviewed.  Constitutional: She appears well-developed and well-nourished.   HENT:   Head: Normocephalic and atraumatic.   Right Ear: Hearing, tympanic membrane, external ear and ear canal normal.   Left Ear: Tympanic membrane, external ear and ear canal normal.   Per patient she has decreased hearing to the left ear   Eyes: Conjunctivae and EOM are normal. Pupils  are equal, round, and reactive to light.   Neck:   Normal range of motion.  Cardiovascular:  Intact distal pulses.     Exam reveals no gallop and no friction rub.       Murmur heard.  Pulmonary/Chest: Breath sounds normal. No respiratory distress. She has no wheezes. She has no rhonchi. She has no rales. She exhibits no tenderness.   Abdominal: Abdomen is soft. Bowel sounds are normal. She exhibits no distension and no mass. There is no abdominal tenderness. There is no rebound and no guarding.   Musculoskeletal:         General: No tenderness or edema. Normal range of motion.      Cervical back: Normal range of motion.     Neurological: She is alert and oriented to person, place, and time. She has normal strength. GCS score is 15. GCS eye subscore is 4. GCS verbal subscore is 5. GCS motor subscore is 6.   Pt unable to walk due to dizziness- upon swinging legs over bed she felt like she was going to pass out and immediately started vomiting   Skin: Skin is warm. Capillary refill takes less than 2 seconds. No rash noted. No erythema.   Psychiatric: She has a normal mood and affect.       ED Course   Procedures  Labs Reviewed   CBC W/ AUTO DIFFERENTIAL - Abnormal; Notable for the following components:       Result Value    Platelets 140 (*)     All other components within normal limits   COMPREHENSIVE METABOLIC PANEL - Abnormal; Notable for the following components:    Glucose 252 (*)     All other components within normal limits   POCT GLUCOSE - Abnormal; Notable for the following components:    POCT Glucose 212 (*)     All other components within normal limits   PROTIME-INR   TSH   LIPID PANEL   POCT GLUCOSE, HAND-HELD DEVICE   ISTAT CREATININE   ISTAT PROCEDURE          Imaging Results              CTA Head and Neck (xpd) (Final result)  Result time 10/14/23 11:11:31      Final result by Blake Johns MD (10/14/23 11:11:31)                   Impression:      No acute intracranial process.    No significant  stenosis at the carotid bifurcations by NASCET criteria.  The vertebral arteries are patent without significant stenosis.    No major branch stenosis/occlusion at the [id number]-jh-Mxmjto.  The basilar artery is widely patent.      Electronically signed by: Blake Johns  Date:    10/14/2023  Time:    11:11               Narrative:    EXAMINATION:  CTA HEAD AND NECK (XPD)    CLINICAL HISTORY:  Vertigo, central;    TECHNIQUE:  Non contrast low dose axial images were obtained through the head.  CT angiogram was performed from the level of the cirilo to the top of the head following the IV administration of 100mL of Omnipaque 350.   Sagittal and coronal reconstructions and maximum intensity projection reconstructions were performed. Arterial stenosis percentages are based on NASCET measurement criteria.    3D reformats were created on an independent workstation to evaluate the [id number].    COMPARISON:  CT 10/14/2023    FINDINGS:  There is no evidence of hydrocephalus mass effect intracranial hemorrhage or acute infarct.  Decreased attenuation within the periventricular white matter is nonspecific but may reflect mild chronic small vessel ischemic change.    No enhancing intracranial lesion.    CTA:    There is no advanced stenosis at the origins of the vessels from the aortic arch or at the origin of the vertebral arteries from the subclavian arteries. No advanced stenosis of the vertebral arteries in the neck.    There is no significant stenosis at the carotid bifurcations by NASCET criteria.    Intracranially there is no major branch advanced stenosis/occlusion at the [place].    No aneurysm. The venous sinuses are patent.    No soft tissue mass is identified in the neck.  Subcentimeter nodules in the thyroid gland too small to warrant further evaluation radiographically at this time.    Endplate osteophyte formation ossification the posterior longitudinal ligament impinges the traversing cord at stable  C5/C5-6.                                       CT Head Without Contrast (Final result)  Result time 10/14/23 08:50:37      Final result by Blake Johns MD (10/14/23 08:50:37)                   Impression:      No acute intracranial process.      Electronically signed by: Blake Johns  Date:    10/14/2023  Time:    08:50               Narrative:    EXAMINATION:  CT HEAD WITHOUT CONTRAST    CLINICAL HISTORY:  Neuro deficit, acute, stroke suspected;    TECHNIQUE:  Low dose axial images were obtained through the head.  Coronal and sagittal reformations were also performed. Contrast was not administered.    COMPARISON:  08/05/2023    FINDINGS:  No evidence of hydrocephalus, mass effect, intracranial hemorrhage or acute territorial infarct.    Decreased attenuation in the periventricular white matter is nonspecific but may reflect mild chronic small vessel ischemic change, similar to the prior study.    Atherosclerotic vascular calcifications are identified at the skull base.    The calvarium is intact. The visualized sinuses and mastoid air cells are clear.                                       Medications   sodium chloride 0.9% flush 5 mL (has no administration in time range)   melatonin tablet 9 mg (has no administration in time range)   senna-docusate 8.6-50 mg per tablet 1 tablet (has no administration in time range)   acetaminophen tablet 650 mg (has no administration in time range)   LIDOcaine 5 % patch 1 patch (has no administration in time range)   insulin aspart U-100 pen 1-10 Units (has no administration in time range)   glucose chewable tablet 16 g (has no administration in time range)   glucose chewable tablet 24 g (has no administration in time range)   glucagon (human recombinant) injection 1 mg (has no administration in time range)   enoxaparin injection 40 mg (has no administration in time range)   ondansetron injection 4 mg (has no administration in time range)   dextrose 10% bolus 125 mL 125 mL  (has no administration in time range)   dextrose 10% bolus 250 mL 250 mL (has no administration in time range)   sacubitriL-valsartan  mg per tablet 1 tablet (has no administration in time range)   metoprolol succinate (TOPROL-XL) 24 hr tablet 50 mg (has no administration in time range)   amiodarone tablet 200 mg (has no administration in time range)   aspirin EC tablet 81 mg (has no administration in time range)   ondansetron injection 4 mg (has no administration in time range)   nitrofurantoin (macrocrystal-monohydrate) 100 MG capsule 100 mg (has no administration in time range)   ondansetron injection 4 mg (4 mg Intravenous Given 10/14/23 0850)   meclizine tablet 50 mg (50 mg Oral Given 10/14/23 0857)   iohexoL (OMNIPAQUE 350) injection 100 mL (100 mLs Intravenous Given 10/14/23 1010)     Medical Decision Making  89 y/o female which presents via EMS with sudden onset of dizziness upon waking up this morning. She is unable to walk secondary to dizziness. Stroke code activation by triage. Vascular neuro felt that it was dizziness as her CT was normal and she is VAN negative. CTA neck/head completed and was WNL. Pt will be admitted to hospital medicine for dizziness evaluation/TIA work up. Pt was given zofran and meclizine in the ED. She states that her dizziness has improved since the meclizine but has not completely resolved. Discussed case with [facility] and notified them of ED Course and need for MRI and further dizziness workup as the patient does not have a history of vertigo. Pt and daughter updated on the plan of care and agree to admission.     Problems Addressed:  Dizziness: acute illness or injury  Left ear pain: acute illness or injury  Nausea and vomiting, unspecified vomiting type: acute illness or injury    Amount and/or Complexity of Data Reviewed  Independent Historian: EMS     Details: dagter  Labs: ordered.  Radiology: ordered.    Risk  Prescription drug management.  Decision  regarding hospitalization.               ED Course as of 10/14/23 1359   Sat Oct 14, 2023   0930 Attempted to ambulate patient- upon swinging her legs to the side of the bed, she was unable to sit up and felt like she was passing out. She immediately began vomiting.  [AT]   1207 BP(!): 154/84 [AT]   1207 Pulse: 68 [AT]   1207 SpO2: 97 % [AT]   1207 BP(!): 161/72 [AT]   1207 Temp: 98.1 °F (36.7 °C) [AT]   1207 Pulse: 65 [AT]   1207 Resp: 14 [AT]   1207 SpO2: 96 % [AT]      ED Course User Index  [AT] Esther Burns FNP                      Clinical Impression:   Final diagnoses:  [R42] Dizziness  [R11.2] Nausea and vomiting, unspecified vomiting type (Primary)  [H92.02] Left ear pain        ED Disposition Condition    Observation Stable                Esther Burns FNP  10/14/23 1400

## 2023-10-14 NOTE — ASSESSMENT & PLAN NOTE
"Patient is identified as having Systolic (HFrEF) heart failure that is Chronic. CHF is currently controlled. Latest ECHO performed and demonstrates- Results for orders placed during the hospital encounter of 05/17/23    Echo    Interpretation Summary  · The left ventricle is moderately enlarged with moderately decreased systolic function.  · The estimated ejection fraction is 35%.  · The quantitatively derived ejection fraction is 33%.  · The left ventricular global longitudinal strain is -9.9%.  · There are segmental left ventricular wall motion abnormalities. Septal akinesis  · Grade I left ventricular diastolic dysfunction.  · The estimated PA systolic pressure is 28 mmHg.  · Normal right ventricular size with normal right ventricular systolic function.  · Normal central venous pressure (3 mmHg).  · Mild aortic regurgitation.  · Mild mitral regurgitation.  · Mild tricuspid regurgitation.  · There is no pulmonary hypertension.  . Continue Nitrate/Vasodilator and monitor clinical status closely. Monitor on telemetry. Patient is on CHF pathway.  Monitor strict Is&Os and daily weights.  Place on fluid restriction of 1 L. Cardiology has not been consulted. Continue to stress to patient importance of self efficacy and  on diet for CHF. Last BNP reviewed- and noted below No results for input(s): "BNP", "BNPTRIAGEBLO" in the last 168 hours..  "

## 2023-10-14 NOTE — SUBJECTIVE & OBJECTIVE
Interval History:    Review of Systems   Constitutional:  Negative for chills and fever.   HENT:  Negative for voice change.    Eyes:  Negative for visual disturbance.   Respiratory:  Negative for shortness of breath.    Cardiovascular:  Negative for chest pain and palpitations.   Gastrointestinal:  Positive for nausea and vomiting. Negative for abdominal pain.   Genitourinary:  Negative for dysuria and hematuria.   Neurological:  Positive for dizziness and light-headedness. Negative for weakness.   Psychiatric/Behavioral:  Negative for confusion.      Objective:     Vital Signs (Most Recent):  Temp: 98.1 °F (36.7 °C) (10/14/23 1122)  Pulse: 65 (10/14/23 1122)  Resp: 14 (10/14/23 1122)  BP: (!) 161/72 (10/14/23 1122)  SpO2: 96 % (10/14/23 1122) Vital Signs (24h Range):  Temp:  [98 °F (36.7 °C)-98.1 °F (36.7 °C)] 98.1 °F (36.7 °C)  Pulse:  [60-68] 65  Resp:  [14-22] 14  SpO2:  [96 %-98 %] 96 %  BP: (154-162)/(72-84) 161/72     Weight: 75.5 kg (166 lb 7.2 oz)  Body mass index is 30.44 kg/m².    Intake/Output Summary (Last 24 hours) at 10/14/2023 1324  Last data filed at 10/14/2023 1229  Gross per 24 hour   Intake --   Output 3 ml   Net -3 ml         Physical Exam  Constitutional:       General: She is not in acute distress.  HENT:      Left Ear: Ear canal and external ear normal.      Ears:      Comments: Large volume of cerumen     Nose: No rhinorrhea.   Eyes:      General:         Right eye: No discharge.         Left eye: No discharge.      Conjunctiva/sclera: Conjunctivae normal.   Cardiovascular:      Rate and Rhythm: Normal rate.      Pulses: Normal pulses.   Pulmonary:      Effort: Pulmonary effort is normal. No respiratory distress.   Abdominal:      General: Bowel sounds are normal.      Palpations: Abdomen is soft.      Tenderness: There is abdominal tenderness. There is no guarding or rebound.   Neurological:      Mental Status: She is alert and oriented to person, place, and time.      Motor: No weakness.       Comments: Dizziness elicited with head movement   Psychiatric:         Mood and Affect: Mood normal.         Behavior: Behavior normal.             Significant Labs: All pertinent labs within the past 24 hours have been reviewed.  CBC:   Recent Labs   Lab 10/14/23  0858   WBC 8.82   HGB 14.9   HCT 44.4   *     CMP:   Recent Labs   Lab 10/14/23  0858      K 4.1      CO2 23   *   BUN 14   CREATININE 0.8   CALCIUM 9.1   PROT 7.1   ALBUMIN 3.6   BILITOT 0.5   ALKPHOS 80   AST 24   ALT 27   ANIONGAP 11       Significant Imaging: I have reviewed all pertinent imaging results/findings within the past 24 hours.

## 2023-10-14 NOTE — ASSESSMENT & PLAN NOTE
Patient presents with sudden-onset dizziness that is exacerbated by head movement. This is on a background of similar symptoms previously attributed to TIAs. No difficulty speaking or moving extremities.  Patient given meclizine & ondansetron for symptom management.    Plan:  - CT Head: No acute intracranial process.   - MRI pending to rule out stroke  - Vascular neurology consulted; also believes it is most likely vertigo  - Will consider scopolamine patch if nausea does not improve

## 2023-10-14 NOTE — HPI
"88-year-old woman, PMHx of HTN, DM, HLD, past TIAs, CHF, ICD placement, CAD, and hx of vtach presents via EMS for sudden onset dizziness.    Patient reports getting up to go to the bathroom when she felt the room spinning. She had to hold onto the furniture  and inability to walk without holding onto to something that occurred upon waking up this morning. While patient denies a prior diagnosis of vertigo, she reports similar episodes in the past that were attributed to TIAs but was what she describes as disorientating ("the floor was upside down") and where she also felt dizzy. She had had an episode in the past where she was driving and felt dizzy and needed someone to come and pick her up. She currently endorses dizziness and nausea with head movement up and down as well as side to side. Patient denies difficulty speaking, or swallowing.    Of note, patient is on a course of antibiotics for a recent UTI. She has not had anything to eat or drink since last night. She has not taken her medications this morning. She was having some left ear pain and was revealed to have wax buildup. Her defibrillator had gone off this past August but she has seen cardiology since then.    In the ED, the patient's vitals were BP: 154/84, HR: 68, SpO2:97%. Labs were significant for Glucose of 252. Na, K, BUN/Cr were WNL at 139, 4.1, 14/0.8 respectively. CT Head noncontrast showed: "No acute intracranial process. " CTA head and neck showed: "No acute intracranial process. No significant stenosis at the carotid bifurcations by NASCET criteria.  The vertebral arteries are patent without significant stenosis. No major branch stenosis/occlusion at the ymauqz-[id number]-Pnzoln.  The basilar artery is widely patent."   "

## 2023-10-14 NOTE — H&P
"[facility] Emergency [facility] Medicine  History & Physical    Patient Name: Eliana Wagner  MRN: [id number]  Patient Class: OP- Observation  Admission Date: 10/14/2023  Attending Physician: Gilbert Cobos MD   Primary Care Provider: Tyra Marquez MD         Patient information was obtained from patient and ER records.     Subjective:     Principal Problem:Dizziness    Chief Complaint:   Chief Complaint   Patient presents with    Dizziness     Stood became dizzy and fell back on the bed. No injuries. Ear pain and decreased hearing in the left ear X 3 weeks.         HPI: 88-year-old woman, PMHx of HTN, DM, HLD, past TIAs, CHF, ICD placement, CAD, and hx of vtach presents via EMS for sudden onset dizziness.    Patient reports getting up to go to the bathroom when she felt the room spinning. She had to hold onto the furniture  and inability to walk without holding onto to something that occurred upon waking up this morning. While patient denies a prior diagnosis of vertigo, she reports similar episodes in the past that were attributed to TIAs but was what she describes as disorientating ("the floor was upside down") and where she also felt dizzy. She had had an episode in the past where she was driving and felt dizzy and needed someone to come and pick her up. She currently endorses dizziness and nausea with head movement up and down as well as side to side. Patient denies difficulty speaking, or swallowing.    Of note, patient is on a course of antibiotics for a recent UTI. She has not had anything to eat or drink since last night. She has not taken her medications this morning. She was having some left ear pain and was revealed to have wax buildup. Her defibrillator had gone off this past August but she has seen cardiology since then.    In the ED, the patient's vitals were BP: 154/84, HR: 68, SpO2:97%. Labs were significant for Glucose of 252. Na, K, BUN/Cr were WNL at 139, 4.1, 14/0.8 respectively. CT " "Head noncontrast showed: "No acute intracranial process. " CTA head and neck showed: "No acute intracranial process. No significant stenosis at the carotid bifurcations by NASCET criteria.  The vertebral arteries are patent without significant stenosis. No major branch stenosis/occlusion at the ezfait-xl-Ybejbj.  The basilar artery is widely patent."     Interval History:    Review of Systems   Constitutional:  Negative for chills and fever.   HENT:  Negative for voice change.    Eyes:  Negative for visual disturbance.   Respiratory:  Negative for shortness of breath.    Cardiovascular:  Negative for chest pain and palpitations.   Gastrointestinal:  Positive for nausea and vomiting. Negative for abdominal pain.   Genitourinary:  Negative for dysuria and hematuria.   Neurological:  Positive for dizziness and light-headedness. Negative for weakness.   Psychiatric/Behavioral:  Negative for confusion.      Objective:     Vital Signs (Most Recent):  Temp: 98.1 °F (36.7 °C) (10/14/23 1122)  Pulse: 65 (10/14/23 1122)  Resp: 14 (10/14/23 1122)  BP: (!) 161/72 (10/14/23 1122)  SpO2: 96 % (10/14/23 1122) Vital Signs (24h Range):  Temp:  [98 °F (36.7 °C)-98.1 °F (36.7 °C)] 98.1 °F (36.7 °C)  Pulse:  [60-68] 65  Resp:  [14-22] 14  SpO2:  [96 %-98 %] 96 %  BP: (154-162)/(72-84) 161/72     Weight: 75.5 kg (166 lb 7.2 oz)  Body mass index is 30.44 kg/m².    Intake/Output Summary (Last 24 hours) at 10/14/2023 1324  Last data filed at 10/14/2023 1229  Gross per 24 hour   Intake --   Output 3 ml   Net -3 ml         Physical Exam  Constitutional:       General: She is not in acute distress.  HENT:      Left Ear: Ear canal and external ear normal.      Ears:      Comments: Large volume of cerumen     Nose: No rhinorrhea.   Eyes:      General:         Right eye: No discharge.         Left eye: No discharge.      Conjunctiva/sclera: Conjunctivae normal.   Cardiovascular:      Rate and Rhythm: Normal rate.      Pulses: Normal pulses. "   Pulmonary:      Effort: Pulmonary effort is normal. No respiratory distress.   Abdominal:      General: Bowel sounds are normal.      Palpations: Abdomen is soft.      Tenderness: There is abdominal tenderness. There is no guarding or rebound.   Neurological:      Mental Status: She is alert and oriented to person, place, and time.      Motor: No weakness.      Comments: Dizziness elicited with head movement   Psychiatric:         Mood and Affect: Mood normal.         Behavior: Behavior normal.             Significant Labs: All pertinent labs within the past 24 hours have been reviewed.  CBC:   Recent Labs   Lab 10/14/23  0858   WBC 8.82   HGB 14.9   HCT 44.4   *     CMP:   Recent Labs   Lab 10/14/23  0858      K 4.1      CO2 23   *   BUN 14   CREATININE 0.8   CALCIUM 9.1   PROT 7.1   ALBUMIN 3.6   BILITOT 0.5   ALKPHOS 80   AST 24   ALT 27   ANIONGAP 11       Significant Imaging: I have reviewed all pertinent imaging results/findings within the past 24 hours.  Assessment/Plan:     * Dizziness  Patient presents with sudden-onset dizziness that is exacerbated by head movement. This is on a background of similar symptoms previously attributed to TIAs. No difficulty speaking or moving extremities.  Patient given meclizine & ondansetron for symptom management.    Plan:  - CT Head: No acute intracranial process.   - MRI pending to rule out stroke  - Vascular neurology consulted; also believes it is most likely vertigo  - Will consider scopolamine patch if nausea does not improve    UTI (urinary tract infection)  Patient was recently diagnosed with an UTI & was prescribed Macrobid 100mg BID.    Plan:  - Continue home regimen for 1 more day      Type 2 diabetes mellitus with hyperglycemia, without long-term current use of insulin  Home medications: Trulicity 1/week      Hyperlipidemia associated with type 2 diabetes mellitus  Lipid panel WNL    Plan:  - No medications required at this  "time    Chronic combined systolic (congestive) and diastolic (congestive) heart failure  Patient is identified as having Systolic (HFrEF) heart failure that is Chronic. CHF is currently controlled. Latest ECHO performed and demonstrates- Results for orders placed during the hospital encounter of 05/17/23    Echo    Interpretation Summary  · The left ventricle is moderately enlarged with moderately decreased systolic function.  · The estimated ejection fraction is 35%.  · The quantitatively derived ejection fraction is 33%.  · The left ventricular global longitudinal strain is -9.9%.  · There are segmental left ventricular wall motion abnormalities. Septal akinesis  · Grade I left ventricular diastolic dysfunction.  · The estimated PA systolic pressure is 28 mmHg.  · Normal right ventricular size with normal right ventricular systolic function.  · Normal central venous pressure (3 mmHg).  · Mild aortic regurgitation.  · Mild mitral regurgitation.  · Mild tricuspid regurgitation.  · There is no pulmonary hypertension.  . Continue Nitrate/Vasodilator and monitor clinical status closely. Monitor on telemetry. Patient is on CHF pathway.  Monitor strict Is&Os and daily weights.  Place on fluid restriction of 1 L. Cardiology has not been consulted. Continue to stress to patient importance of self efficacy and  on diet for CHF. Last BNP reviewed- and noted below No results for input(s): "BNP", "BNPTRIAGEBLO" in the last 168 hours..      VTE Risk Mitigation (From admission, onward)           Ordered     enoxaparin injection 40 mg  Daily         10/14/23 1203     IP VTE HIGH RISK PATIENT  Once         10/14/23 1203     Place sequential compression device  Until discontinued         10/14/23 1203                           On 10/14/2023, patient should be placed in hospital observation services under my care in collaboration with Dr. Cobos.    Madie Hong MD  Department of Hospital Medicine  [place] - Emergency Dept  "

## 2023-10-14 NOTE — ED NOTES
"Pt states L sided chest pain, described as " pressure".  Pt denies SOB, endorses dizziness and nausea with position change.  NP notified.  "

## 2023-10-14 NOTE — CONSULTS
[facility]  Vascular Neurology  Comprehensive Stroke Center  TeleVascular Neurology Interprofessional Consult Note          Consulting Provider: Dr. Knight  Patient Location: [facility]    Summary of patient's symptoms:  87 yo lady presenting with isolated vertigo. No associated symptoms like weakness or numbness.    Images personally interpreted:  Head CT done today: no acute findings    Assessment and plan:  Likely isolated vertigo --> manage symtomatically.  Differential dx includes positional vertigo and labrynthitis, both also managed symptomatically.    Please reach back should any new findings arise or if you have other questions or concerns in the acute setting.    I spent approximately 15 minutes on this encounter. More than half of that time was spent communicating with the consulting provider and coordinating patient care.        Luis Rodrigues MD  Vascular and Interventional Neurology  , Department of Neurology  Section Head, Vascular Neurology  Departments of Neurology, Neurosurgery and Radiology  [facility] [place], [place]    This encounter was conducted as an interprofessional communication between providers at the [facility] and vascular neurologist. The interaction was completed over the phone or via secure messaging (electronic medical record - [facility] Secure Chat).    Once this note was completed, a written copy was sent back to the provider via fax or electronic medical record.

## 2023-10-14 NOTE — ED NOTES
Pt resting comfortably in bed ,denies chest pain, denies nausea, denies dizziness at this time.  ZAINAB.

## 2023-10-14 NOTE — PLAN OF CARE
VN cued into pt's room for introduction with pt's permission.  VN role explained and informed pt that VN would be working with bedside nurse and the rest of the care team.  Fall risk and bed alarm protocol education provided.  Instructed pt to call for assistance and agreeable.  Allowed time for questions.pt does not know all of her home medications, unable to complete at this time.  NAD noted.  Will cont to be available as needed.      10/14/23 1517   Admission   Initial VN Admission Questions Complete   Communication Issues? None   Shift   Virtual Nurse - Patient Verbalized Approval Of Camera Use;VN Rounding   Safety/Activity   Patient Rounds call light in patient/parent reach;visualized patient;clutter free environment maintained   Safety Promotion/Fall Prevention Fall Risk reviewed with patient/family;instructed to call staff for mobility   Pain/Comfort/Sleep   Preferred Pain Scale number (Numeric Rating Pain Scale)   Pain Rating (0-10): Rest 0

## 2023-10-14 NOTE — ASSESSMENT & PLAN NOTE
Patient was recently diagnosed with an UTI & was prescribed Macrobid 100mg BID.    Plan:  - Continue home regimen for 1 more day

## 2023-10-14 NOTE — ED NOTES
"Pt BIB EMS for dizziness, weakness and nausea starting at 0700.  Pt states PMHx of ear wax plug in L ear, removed 6 weeks ago.  Pt states when she gets up to ambulate "the room starts spinning". Pt states defibrillator last went off in August, no activity since.  Pt denies chest pain, denies SOB.  VSS.  Pt A/Ox4.  No neuro deficits present.  Bed low/locked, siderails upx2, pt agrees to plan of care.  "

## 2023-10-15 VITALS
RESPIRATION RATE: 18 BRPM | DIASTOLIC BLOOD PRESSURE: 50 MMHG | HEIGHT: 62 IN | WEIGHT: 168 LBS | HEART RATE: 67 BPM | TEMPERATURE: 98 F | OXYGEN SATURATION: 95 % | BODY MASS INDEX: 30.91 KG/M2 | SYSTOLIC BLOOD PRESSURE: 104 MMHG

## 2023-10-15 PROBLEM — R42 DIZZINESS: Status: RESOLVED | Noted: 2023-10-14 | Resolved: 2023-10-15

## 2023-10-15 PROBLEM — N39.0 UTI (URINARY TRACT INFECTION): Status: RESOLVED | Noted: 2023-10-14 | Resolved: 2023-10-15

## 2023-10-15 LAB
ALBUMIN SERPL BCP-MCNC: 3.2 G/DL (ref 3.5–5.2)
ALP SERPL-CCNC: 68 U/L (ref 55–135)
ALT SERPL W/O P-5'-P-CCNC: 23 U/L (ref 10–44)
ANION GAP SERPL CALC-SCNC: 10 MMOL/L (ref 8–16)
AST SERPL-CCNC: 20 U/L (ref 10–40)
BASOPHILS # BLD AUTO: 0.02 K/UL (ref 0–0.2)
BASOPHILS NFR BLD: 0.2 % (ref 0–1.9)
BILIRUB SERPL-MCNC: 0.4 MG/DL (ref 0.1–1)
BUN SERPL-MCNC: 14 MG/DL (ref 8–23)
CALCIUM SERPL-MCNC: 8.7 MG/DL (ref 8.7–10.5)
CHLORIDE SERPL-SCNC: 104 MMOL/L (ref 95–110)
CO2 SERPL-SCNC: 27 MMOL/L (ref 23–29)
CREAT SERPL-MCNC: 0.8 MG/DL (ref 0.5–1.4)
DIFFERENTIAL METHOD: ABNORMAL
EOSINOPHIL # BLD AUTO: 0.1 K/UL (ref 0–0.5)
EOSINOPHIL NFR BLD: 0.8 % (ref 0–8)
ERYTHROCYTE [DISTWIDTH] IN BLOOD BY AUTOMATED COUNT: 13.8 % (ref 11.5–14.5)
EST. GFR  (NO RACE VARIABLE): >60 ML/MIN/1.73 M^2
GLUCOSE SERPL-MCNC: 118 MG/DL (ref 70–110)
HCT VFR BLD AUTO: 43.4 % (ref 37–48.5)
HGB BLD-MCNC: 13.9 G/DL (ref 12–16)
IMM GRANULOCYTES # BLD AUTO: 0.02 K/UL (ref 0–0.04)
IMM GRANULOCYTES NFR BLD AUTO: 0.2 % (ref 0–0.5)
LYMPHOCYTES # BLD AUTO: 3.7 K/UL (ref 1–4.8)
LYMPHOCYTES NFR BLD: 40.2 % (ref 18–48)
MAGNESIUM SERPL-MCNC: 2.1 MG/DL (ref 1.6–2.6)
MCH RBC QN AUTO: 29.1 PG (ref 27–31)
MCHC RBC AUTO-ENTMCNC: 32 G/DL (ref 32–36)
MCV RBC AUTO: 91 FL (ref 82–98)
MONOCYTES # BLD AUTO: 0.6 K/UL (ref 0.3–1)
MONOCYTES NFR BLD: 6.1 % (ref 4–15)
NEUTROPHILS # BLD AUTO: 4.8 K/UL (ref 1.8–7.7)
NEUTROPHILS NFR BLD: 52.5 % (ref 38–73)
NRBC BLD-RTO: 0 /100 WBC
PHOSPHATE SERPL-MCNC: 3.9 MG/DL (ref 2.7–4.5)
PLATELET # BLD AUTO: 144 K/UL (ref 150–450)
PMV BLD AUTO: 10.8 FL (ref 9.2–12.9)
POCT GLUCOSE: 118 MG/DL (ref 70–110)
POCT GLUCOSE: 135 MG/DL (ref 70–110)
POCT GLUCOSE: 185 MG/DL (ref 70–110)
POTASSIUM SERPL-SCNC: 4.1 MMOL/L (ref 3.5–5.1)
PROT SERPL-MCNC: 6.2 G/DL (ref 6–8.4)
RBC # BLD AUTO: 4.78 M/UL (ref 4–5.4)
SODIUM SERPL-SCNC: 141 MMOL/L (ref 136–145)
WBC # BLD AUTO: 9.12 K/UL (ref 3.9–12.7)

## 2023-10-15 PROCEDURE — 84100 ASSAY OF PHOSPHORUS: CPT

## 2023-10-15 PROCEDURE — 85025 COMPLETE CBC W/AUTO DIFF WBC: CPT

## 2023-10-15 PROCEDURE — 80053 COMPREHEN METABOLIC PANEL: CPT

## 2023-10-15 PROCEDURE — 36415 COLL VENOUS BLD VENIPUNCTURE: CPT

## 2023-10-15 PROCEDURE — G0378 HOSPITAL OBSERVATION PER HR: HCPCS

## 2023-10-15 PROCEDURE — 83735 ASSAY OF MAGNESIUM: CPT

## 2023-10-15 PROCEDURE — 94761 N-INVAS EAR/PLS OXIMETRY MLT: CPT

## 2023-10-15 PROCEDURE — 25000003 PHARM REV CODE 250

## 2023-10-15 RX ORDER — METOPROLOL SUCCINATE 25 MG/1
25 TABLET, EXTENDED RELEASE ORAL 2 TIMES DAILY
Qty: 60 TABLET | Refills: 1 | Status: SHIPPED | OUTPATIENT
Start: 2023-10-15 | End: 2023-10-15 | Stop reason: SDUPTHER

## 2023-10-15 RX ORDER — MECLIZINE HYDROCHLORIDE 25 MG/1
25 TABLET ORAL 3 TIMES DAILY PRN
Qty: 60 TABLET | Refills: 0 | Status: SHIPPED | OUTPATIENT
Start: 2023-10-15

## 2023-10-15 RX ORDER — METOPROLOL SUCCINATE 25 MG/1
25 TABLET, EXTENDED RELEASE ORAL 2 TIMES DAILY
Qty: 60 TABLET | Refills: 1 | Status: SHIPPED | OUTPATIENT
Start: 2023-10-15 | End: 2023-12-14

## 2023-10-15 RX ORDER — MECLIZINE HYDROCHLORIDE 25 MG/1
25 TABLET ORAL 3 TIMES DAILY PRN
Qty: 60 TABLET | Refills: 0 | Status: SHIPPED | OUTPATIENT
Start: 2023-10-15 | End: 2023-10-15 | Stop reason: SDUPTHER

## 2023-10-15 RX ADMIN — SACUBITRIL AND VALSARTAN 1 TABLET: 97; 103 TABLET, FILM COATED ORAL at 08:10

## 2023-10-15 RX ADMIN — ASPIRIN 81 MG: 81 TABLET, COATED ORAL at 08:10

## 2023-10-15 RX ADMIN — AMIODARONE HYDROCHLORIDE 200 MG: 200 TABLET ORAL at 08:10

## 2023-10-15 NOTE — PLAN OF CARE
SW met with patient via ZventsO to discuss discharge planning. Pt will dc with no HH or DME needs noted. Pt daughter will schedule pt a PCP hospital f/u appointment. Pt daughter Eliana will assist pt with transportation at time of discharge.    Pt cleared from CM standpoint. Bedside nurse and VN notified.    Future Appointments   Date Time Provider Department Center   10/25/2023 10:00 AM Orestes Kim Jr., MD [facility] C[facility]IO [place]   11/16/2023  8:45 AM EKG, APPT [facility] EKG [facility]   11/16/2023  9:00 AM COORDINATED DEVICE CHECK [facility]   11/16/2023 10:20 AM GENESIS Mittal MD [facility] ARRHYTH [facility]   11/24/2023 10:00 [facility]   12/8/2023 11:20 AM Orestes Kim Jr., MD [facility] [place]        10/15/23 1117   Final Note   Assessment Type Final Discharge Note   Anticipated Discharge Disposition Home   What phone number can be called within the next 1-3 days to see how you are doing after discharge? [phone]   Hospital Resources/Appts/Education Provided Appointments scheduled and added to AVS   Post-Acute Status   Discharge Delays None known at this time

## 2023-10-15 NOTE — PLAN OF CARE
Patient is AAOx4. Patient slept well. Tolerated diabetic 2000cal diet. No complains of pain. Pure wick on. Glucose monitoring continued. Heart monitoring continued. Fall precautions continued. Call light within reach. Plan of care ongoing.

## 2023-10-15 NOTE — DISCHARGE SUMMARY
"[facility] Hospital Medicine  Discharge Summary      Patient Name: Eliana Wagner  MRN: [id number]  LILIANA: [id number]  Patient Class: OP- Observation  Admission Date: 10/14/2023  Hospital Length of Stay: 0 days  Discharge Date and Time:  10/15/2023 4:46 PM  Attending Physician: No att. providers found   Discharging Provider: Otoniel Connors DO  Primary Care Provider: Tyra Marquez MD    Primary Care Team: Networked reference to record PCT     HPI:   88-year-old woman, PMHx of HTN, DM, HLD, past TIAs, CHF, ICD placement, CAD, and hx of vtach presents via EMS for sudden onset dizziness.    Patient reports getting up to go to the bathroom when she felt the room spinning. She had to hold onto the furniture  and inability to walk without holding onto to something that occurred upon waking up this morning. While patient denies a prior diagnosis of vertigo, she reports similar episodes in the past that were attributed to TIAs but was what she describes as disorientating ("the floor was upside down") and where she also felt dizzy. She had had an episode in the past where she was driving and felt dizzy and needed someone to come and pick her up. She currently endorses dizziness and nausea with head movement up and down as well as side to side. Patient denies difficulty speaking, or swallowing.    Of note, patient is on a course of antibiotics for a recent UTI. She has not had anything to eat or drink since last night. She has not taken her medications this morning. She was having some left ear pain and was revealed to have wax buildup. Her defibrillator had gone off this past August but she has seen cardiology since then.    In the ED, the patient's vitals were BP: 154/84, HR: 68, SpO2:97%. Labs were significant for Glucose of 252. Na, K, BUN/Cr were WNL at 139, 4.1, 14/0.8 respectively. CT Head noncontrast showed: "No acute intracranial process. " CTA head and neck showed: "No acute intracranial process. No " "significant stenosis at the carotid bifurcations by NASCET criteria.  The vertebral arteries are patent without significant stenosis. No major branch stenosis/occlusion at the qtrtme-ge-Aphqpl.  The basilar artery is widely patent."     * No surgery found *      Hospital Course:   Patient observed overnight without episodes, of lightheadedness, vertigo, dizziness, or focal weakness. On day of discharge, patient was ambulated without onset of symptoms. Metoprolol was held on day of DC due soft Bps and borderline bradycardia. Recommended reduced dose of metoprolol from 50 mg BID to 25 mg BID and to follow up with PCP. Patient established with ENT and advised to schedule appt if vertigo, dizziness were to return. Rx for meclizine provided. Patient subsequently discharged and all questions answered.      Physical Exam  Vitals and nursing note reviewed.   Constitutional:       Appearance: Normal appearance.   HENT:      Head: Normocephalic and atraumatic.   Cardiovascular:      Rate and Rhythm: Normal rate and regular rhythm.      Pulses: Normal pulses.      Heart sounds: Normal heart sounds.   Pulmonary:      Effort: Pulmonary effort is normal.      Breath sounds: Normal breath sounds.   Neurological:      Mental Status: She is alert and oriented to person, place, and time.      Motor: No weakness.      Coordination: Coordination normal.      Gait: Gait normal.   Psychiatric:         Mood and Affect: Mood normal.         Behavior: Behavior normal.          Anesthesia/Surgery risks, benefits and alternative options discussed and understood by patient/family.    Goals of Care Treatment Preferences:  Code Status: Full Code      Consults:   Consults (From admission, onward)          Status Ordering Provider     Consult to Telemedicine - Acute Stroke  Once        Provider:  (Not yet assigned)    JYOTI Noble            No new Assessment & Plan notes have been filed under this hospital service since the last " note was generated.  Service: Hospital Medicine    Final Active Diagnoses:    Diagnosis Date Noted POA    Type 2 diabetes mellitus with hyperglycemia, without long-term current use of insulin [E11.65] 02/23/2021 Yes    Hyperlipidemia associated with type 2 diabetes mellitus [E11.69, E78.5] 02/23/2021 Yes    Chronic combined systolic (congestive) and diastolic (congestive) heart failure [I50.42] 02/21/2020 Yes      Problems Resolved During this Admission:    Diagnosis Date Noted Date Resolved POA    PRINCIPAL PROBLEM:  Dizziness [R42] 10/14/2023 10/15/2023 Yes    UTI (urinary tract infection) [N39.0] 10/14/2023 10/15/2023 Yes       Discharged Condition: stable    Disposition: Home or Self Care    Follow Up:   Follow-up Information       Tyra Marquez MD. Call in 1 week(s).    Specialty: Internal Medicine  Contact information:  [address] [facility] [address]  [phone]               Marga Basurto NP Follow up.    Specialty: Otolaryngology  Why: As needed, If symptoms worsen  Contact information:  [address] [facility] [address]  [phone]                           Patient Instructions:      Notify your health care provider if you experience any of the following:  temperature >100.4     Notify your health care provider if you experience any of the following:  persistent nausea and vomiting or diarrhea     Notify your health care provider if you experience any of the following:  severe uncontrolled pain     Notify your health care provider if you experience any of the following:  redness, tenderness, or signs of infection (pain, swelling, redness, odor or green/yellow discharge around incision site)     Notify your health care provider if you experience any of the following:  severe persistent headache     Notify your health care provider if you experience any of the following:  increased confusion or weakness     Notify your health care provider if you experience any of the  following:  persistent dizziness, light-headedness, or visual disturbances     Activity as tolerated       Significant Diagnostic Studies: Labs: All labs within the past 24 hours have been reviewed    Pending Diagnostic Studies:       None           Medications:  Reconciled Home Medications:      Medication List        START taking these medications      meclizine 25 mg tablet  Commonly known as: ANTIVERT  Take 1 tablet (25 mg total) by mouth 3 (three) times daily as needed for Dizziness.            CHANGE how you take these medications      amiodarone 200 MG Tab  Commonly known as: PACERONE  Take 1 tablet (200 mg total) by mouth once daily. TAKE AFTER 14 DAYS  What changed: additional instructions     metoprolol succinate 25 MG 24 hr tablet  Commonly known as: TOPROL-XL  Take 1 tablet (25 mg total) by mouth 2 (two) times daily. For heart and blood pressure  What changed:   medication strength  how much to take            CONTINUE taking these medications      aspirin 81 MG EC tablet  Commonly known as: ECOTRIN  Take 81 mg by mouth once daily.     blood sugar diagnostic Strp  1 each by Misc.(Non-Drug; Combo Route) route once daily.     ENTRESTO  mg per tablet  Generic drug: sacubitriL-valsartan  Take 1 tablet by mouth 2 (two) times daily.     fluorouraciL 5 % cream  Commonly known as: EFUDEX  Apply topically Daily.     furosemide 20 MG tablet  Commonly known as: LASIX  Take 1 tablet (20 mg total) by mouth daily as needed (swelling).     lancets Misc  1 each by Misc.(Non-Drug; Combo Route) route once daily.     magnesium oxide 400 mg (241.3 mg magnesium) tablet  Commonly known as: MAG-OX  Take 1 tablet (400 mg total) by mouth once daily.     mv-min-FA-Jv-Mq-zychooo-lutein 0.4-162-18 mg Tab  Take by mouth. 1 Tablet Oral Every day     MYRBETRIQ 25 mg Tb24 ER tablet  Generic drug: mirabegron  Take 1 tablet (25 mg total) by mouth once daily. For bladder     PREMARIN vaginal cream  Generic drug: conjugated  estrogens  1 g with applicator or dime-sized amt with finger in vagina nightly x 2 weeks, then twice a week thereafter     TRULICITY 1.5 mg/0.5 mL pen injector  Generic drug: dulaglutide  Inject 1.5 mg into the skin every 7 days.     vitamin D 1000 units Tab  Commonly known as: VITAMIN D3  Take 1,000 Units by mouth once daily.            STOP taking these medications      atorvastatin 40 MG tablet  Commonly known as: LIPITOR     blood-glucose meter kit     nitrofurantoin (macrocrystal-monohydrate) 100 MG capsule  Commonly known as: MACROBID              Indwelling Lines/Drains at time of discharge:   Lines/Drains/Airways       None                   Time spent on the discharge of patient: 35 minutes         Otoniel Connors DO  Department of Hospital Medicine  [place] - Med Surg

## 2023-10-15 NOTE — PLAN OF CARE
Virtual Nurse:Discharge orders noted; additional clinical references attached.  and pharmacy tech notified.  Patient's discharge instruction packet given by bedside RN.    Cued into patient's room.  Permission received per patient to turn camera to view patient.  Introduced as VN that will be instructing on discharge instructions.  Daughter, Eliana, at bedside.  Educated patient on reason for admission; medications to hold, continue, and start, appointment to follow-up with doctor, and when to return to ED. Teach back method used. Verbalized understanding  Number given for 24/7 Nurse Line. Opportunity given for questions and questions answered.  Bedside nurse updated.        10/15/23 1247   Shift   Virtual Nurse - Patient Verbalized Approval Of Camera Use;VN Rounding   Safety/Activity   Patient Rounds visualized patient   Safety Promotion/Fall Prevention Fall Risk reviewed with patient/family

## 2023-10-15 NOTE — HOSPITAL COURSE
Patient observed overnight without episodes, of lightheadedness, vertigo, dizziness, or focal weakness. On day of discharge, patient was ambulated without onset of symptoms. Metoprolol was held on day of DC due soft Bps and borderline bradycardia. Recommended reduced dose of metoprolol from 50 mg BID to 25 mg BID and to follow up with PCP. Patient established with ENT and advised to schedule appt if vertigo, dizziness were to return. Rx for meclizine provided. Patient subsequently discharged and all questions answered.

## 2023-10-16 PROBLEM — Z00.00 ENCOUNTER FOR PREVENTIVE HEALTH EXAMINATION: Chronic | Status: RESOLVED | Noted: 2023-07-11 | Resolved: 2023-10-16

## 2023-10-18 ENCOUNTER — TELEPHONE (OUTPATIENT)
Dept: PRIMARY CARE CLINIC | Facility: CLINIC | Age: 88
End: 2023-10-18
Payer: MEDICARE

## 2023-10-18 LAB
OHS CV AF BURDEN PERCENT: < 1
OHS CV BIV PACING PERCENT: 87.68 %
OHS CV DC REMOTE DEVICE TYPE: NORMAL
OHS CV RV PACING PERCENT: 87.79 %

## 2023-10-18 NOTE — TELEPHONE ENCOUNTER
Pt's daughter calling asking for pt to do urine test    Called pt to ask which symptoms she's still having, no answer, lvm

## 2023-10-18 NOTE — TELEPHONE ENCOUNTER
----- Message from Magali Tyrone sent at 10/18/2023  9:38 AM CDT -----  Contact: [phone]  [email or web address]     Patient is calling for Medical Advice regarding: pt daughter is requesting an order for an urinalysis. She believes the pt have an UTI    How long has patient had these symptoms: 3 weeks     Pharmacy name and phone#:   [facility] - [address]  [address] [place] [address]  Phone: [phone] Fax: [phone]    Would like response via [email or web address]:  call back     Comments:

## 2023-10-18 NOTE — TELEPHONE ENCOUNTER
----- Message from Kylie Freeman sent at 10/18/2023 10:22 AM CDT -----  Type:  Needs Medical Advice    Who Called:  Pt Daughter Fissy  Symptoms (please be specific):  UTI   How long has patient had these symptoms:   3 weeks  Pharmacy name and phone #:  [facility] - [place] - [address].   Phone:  [phone]  Fax:  [phone]  Would the patient rather a call back or a response via [facility]?  call  Best Call Back Number: [phone]  Additional Information:  Pt is requesting to take a urine test for her UTI and be prescribed medication as well.  Pt would like a call back as soon as possible.

## 2023-10-19 ENCOUNTER — OFFICE VISIT (OUTPATIENT)
Dept: PRIMARY CARE CLINIC | Facility: CLINIC | Age: 88
End: 2023-10-19
Payer: MEDICARE

## 2023-10-19 ENCOUNTER — LAB VISIT (OUTPATIENT)
Dept: LAB | Facility: HOSPITAL | Age: 88
End: 2023-10-19
Attending: STUDENT IN AN ORGANIZED HEALTH CARE EDUCATION/TRAINING PROGRAM
Payer: MEDICARE

## 2023-10-19 VITALS
SYSTOLIC BLOOD PRESSURE: 108 MMHG | DIASTOLIC BLOOD PRESSURE: 54 MMHG | BODY MASS INDEX: 30.76 KG/M2 | OXYGEN SATURATION: 98 % | WEIGHT: 167.13 LBS | HEART RATE: 67 BPM | HEIGHT: 62 IN

## 2023-10-19 DIAGNOSIS — H91.90 HEARING LOSS, UNSPECIFIED HEARING LOSS TYPE, UNSPECIFIED LATERALITY: Primary | ICD-10-CM

## 2023-10-19 DIAGNOSIS — R30.0 DYSURIA: ICD-10-CM

## 2023-10-19 DIAGNOSIS — D69.6 THROMBOCYTOPENIA, UNSPECIFIED: ICD-10-CM

## 2023-10-19 LAB
BACTERIA #/AREA URNS AUTO: ABNORMAL /HPF
BILIRUB UR QL STRIP: NEGATIVE
CLARITY UR REFRACT.AUTO: CLEAR
COLOR UR AUTO: YELLOW
GLUCOSE UR QL STRIP: NEGATIVE
HGB UR QL STRIP: NEGATIVE
KETONES UR QL STRIP: NEGATIVE
LEUKOCYTE ESTERASE UR QL STRIP: ABNORMAL
MICROSCOPIC COMMENT: ABNORMAL
NITRITE UR QL STRIP: POSITIVE
PH UR STRIP: >8 [PH] (ref 5–8)
PROT UR QL STRIP: ABNORMAL
RBC #/AREA URNS AUTO: 6 /HPF (ref 0–4)
SP GR UR STRIP: 1.01 (ref 1–1.03)
SQUAMOUS #/AREA URNS AUTO: 1 /HPF
URN SPEC COLLECT METH UR: ABNORMAL
WBC #/AREA URNS AUTO: 6 /HPF (ref 0–5)

## 2023-10-19 PROCEDURE — 81001 URINALYSIS AUTO W/SCOPE: CPT | Performed by: STUDENT IN AN ORGANIZED HEALTH CARE EDUCATION/TRAINING PROGRAM

## 2023-10-19 PROCEDURE — 99214 OFFICE O/P EST MOD 30 MIN: CPT | Mod: S$PBB,,, | Performed by: STUDENT IN AN ORGANIZED HEALTH CARE EDUCATION/TRAINING PROGRAM

## 2023-10-19 PROCEDURE — 87086 URINE CULTURE/COLONY COUNT: CPT | Performed by: STUDENT IN AN ORGANIZED HEALTH CARE EDUCATION/TRAINING PROGRAM

## 2023-10-19 PROCEDURE — 99999 PR PBB SHADOW E&M-EST. PATIENT-LVL V: ICD-10-PCS | Mod: PBBFAC,,, | Performed by: STUDENT IN AN ORGANIZED HEALTH CARE EDUCATION/TRAINING PROGRAM

## 2023-10-19 PROCEDURE — 87186 SC STD MICRODIL/AGAR DIL: CPT | Performed by: STUDENT IN AN ORGANIZED HEALTH CARE EDUCATION/TRAINING PROGRAM

## 2023-10-19 PROCEDURE — 99999 PR PBB SHADOW E&M-EST. PATIENT-LVL V: CPT | Mod: PBBFAC,,, | Performed by: STUDENT IN AN ORGANIZED HEALTH CARE EDUCATION/TRAINING PROGRAM

## 2023-10-19 PROCEDURE — 87088 URINE BACTERIA CULTURE: CPT | Performed by: STUDENT IN AN ORGANIZED HEALTH CARE EDUCATION/TRAINING PROGRAM

## 2023-10-19 PROCEDURE — 99214 PR OFFICE/OUTPT VISIT, EST, LEVL IV, 30-39 MIN: ICD-10-PCS | Mod: S$PBB,,, | Performed by: STUDENT IN AN ORGANIZED HEALTH CARE EDUCATION/TRAINING PROGRAM

## 2023-10-19 PROCEDURE — 87077 CULTURE AEROBIC IDENTIFY: CPT | Performed by: STUDENT IN AN ORGANIZED HEALTH CARE EDUCATION/TRAINING PROGRAM

## 2023-10-19 PROCEDURE — 99215 OFFICE O/P EST HI 40 MIN: CPT | Mod: PBBFAC | Performed by: STUDENT IN AN ORGANIZED HEALTH CARE EDUCATION/TRAINING PROGRAM

## 2023-10-19 NOTE — PROGRESS NOTES
"INTERNAL MEDICINE SAME DAY PRIMARY CARE VISIT NOTE    Subjective:     Chief Complaint: Urinary Tract Infection       Patient ID: Eliana Wagner is a 88 y.o. female , here today for focused same-day primary care visit.    Today, patient with complaint of dysuria    Symptoms started about 1 week ago.  Urine is cloudy, endorses dysuria, and pelvic discomfort.  No hematuria, fevers, or back pain. Of note, recently tx for UTI. Per patient, sometimes accidentally wipes from back to front".    Patient and daughter endorse decreased b/l hearing over the last several years. No head injuries.    Thrombocytopenia: Noted on lab work and has remained stable over time. Last platelet count 140.    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 Authorizing Provider   amiodarone (PACERONE) 200 MG Tab Take 1 tablet (200 mg total) by mouth once daily. TAKE AFTER 14 DAYS 8/25/23 8/24/24 Yes Orestes Kim Jr., MD   aspirin (ECOTRIN) 81 MG EC tablet Take 81 mg by mouth once daily.   Yes Provider, Historical   blood sugar diagnostic Strp 1 each by Misc.(Non-Drug; Combo Route) route once daily. 9/24/18  Yes Nara Lawton MD   conjugated estrogens (PREMARIN) vaginal cream 1 g with applicator or dime-sized amt with finger in vagina nightly x 2 weeks, then twice a week thereafter 4/7/22  Yes Gopi Lewis MD   dulaglutide (TRULICITY) 1.5 mg/0.5 mL pen injector Inject 1.5 mg into the skin every " 7 days. 5/10/23 5/9/24 Yes Carisa Weber NP   furosemide (LASIX) 20 MG tablet Take 1 tablet (20 mg total) by mouth daily as needed (swelling). 9/8/23  Yes Orestes Kim Jr., MD   irbesartan (AVAPRO) 150 MG tablet Take 150 mg by mouth. 9/21/23  Yes Provider, Historical   lancets Misc 1 each by Misc.(Non-Drug; Combo Route) route once daily. 9/24/18  Yes Nara Lawton MD   magnesium oxide (MAG-OX) 400 mg (241.3 mg magnesium) tablet Take 1 tablet (400 mg total) by mouth once daily. 2/2/22  Yes Orestes Kim Jr., MD   metoprolol succinate (TOPROL-XL) 50 MG 24 hr tablet Take 1 tablet (50 mg total) by mouth 2 (two) times daily. For heart and blood pressure 9/8/23 9/7/24 Yes Orestes Kim Jr., MD   mirabegron (MYRBETRIQ) 25 mg Tb24 ER tablet Take 1 tablet (25 mg total) by mouth once daily. For bladder 4/18/23 4/17/24 Yes Duy Cheng MD   mv-min-FA-Bt-Uv-ucropuu-lutein 0.4-162-18 mg Tab Take by mouth. 1 Tablet Oral Every day   Yes Provider, Historical   sacubitriL-valsartan (ENTRESTO)  mg per tablet Take 1 tablet by mouth 2 (two) times daily. 8/25/23 11/17/24 Yes Orestes Kim Jr., MD   vitamin D (VITAMIN D3) 1000 units Tab Take 1,000 Units by mouth once daily.   Yes Provider, Historical   atorvastatin (LIPITOR) 40 MG tablet Take 1 tablet (40 mg total) by mouth once daily. For cholesterol and heart protection  Patient not taking: Reported on 10/6/2023 4/18/23   Duy Cheng MD   blood-glucose meter kit Use as instructed  Patient not taking: Reported on 8/9/2023 9/24/18 12/16/20  Nara Lawton MD   fluorouracil (EFUDEX) 5 % cream Apply topically Daily.  6/18/18   Provider, Historical       Allergies:  Review of patient's allergies indicates:   Allergen Reactions    Penicillins      Caused uti       Social History:  Social History     Tobacco Use    Smoking status: Never    Smokeless tobacco: Never   Substance Use Topics    Alcohol use: Yes      "Alcohol/week: 2.0 standard drinks of alcohol     Types: 2 Cans of beer per week     Comment: socially    Drug use: No         Review of Systems   Constitutional:  Negative for diaphoresis, fatigue and fever.   HENT:  Positive for hearing loss.    Respiratory:  Negative for shortness of breath and wheezing.    Cardiovascular:  Negative for chest pain.   Gastrointestinal:  Negative for abdominal pain.   Genitourinary:  Positive for dysuria, frequency and urgency. Negative for decreased urine volume, hematuria and pelvic pain.   Musculoskeletal:  Negative for back pain, gait problem and joint swelling.   Skin:  Negative for rash.   Neurological:  Negative for weakness.           Objective:   BP (!) 108/54 (BP Location: Left arm, Patient Position: Sitting, BP Method: Medium (Manual))   Pulse 67   Ht 5' 2" (1.575 m)   Wt 75.8 kg (167 lb 1.7 oz)   SpO2 98%   BMI 30.56 kg/m²        General: AAO x3, no apparent distress  HEENT: PERRL, OP clear  CV: RRR, no m/r/g  Pulm: Lungs CTAB, no crackles, no wheezes  Abd: s/NT/ND +BS  Extremities: no c/c/e    Labs:         Assessment/Plan     Eliana was seen today for urinary tract infection.    Diagnoses and all orders for this visit:    Hearing loss, unspecified hearing loss type, unspecified laterality  -     Ambulatory referral/consult to Audiology; Future  Follow up with audiology    Dysuria  -     Urine culture; Future  -     Urinalysis; Future  Follow up urinalysis    Thrombocytopenia, unspecified  Stable on CBC, repeat q 6 months      RTC prn     Tyra Marquez MD  Department of Internal Medicine - [facility]      "

## 2023-10-20 DIAGNOSIS — N30.00 ACUTE CYSTITIS WITHOUT HEMATURIA: Primary | ICD-10-CM

## 2023-10-20 RX ORDER — CIPROFLOXACIN 500 MG/1
500 TABLET ORAL 2 TIMES DAILY
Qty: 14 TABLET | Refills: 0 | Status: SHIPPED | OUTPATIENT
Start: 2023-10-20 | End: 2023-10-27

## 2023-10-21 LAB — BACTERIA UR CULT: ABNORMAL

## 2023-10-25 ENCOUNTER — CLINICAL SUPPORT (OUTPATIENT)
Dept: AUDIOLOGY | Facility: CLINIC | Age: 88
End: 2023-10-25
Payer: MEDICARE

## 2023-10-25 ENCOUNTER — OFFICE VISIT (OUTPATIENT)
Dept: CARDIOLOGY | Facility: CLINIC | Age: 88
End: 2023-10-25
Payer: MEDICARE

## 2023-10-25 VITALS
SYSTOLIC BLOOD PRESSURE: 120 MMHG | BODY MASS INDEX: 30.59 KG/M2 | WEIGHT: 166.25 LBS | HEART RATE: 65 BPM | DIASTOLIC BLOOD PRESSURE: 71 MMHG | HEIGHT: 62 IN

## 2023-10-25 DIAGNOSIS — H91.90 HEARING LOSS, UNSPECIFIED HEARING LOSS TYPE, UNSPECIFIED LATERALITY: ICD-10-CM

## 2023-10-25 DIAGNOSIS — H90.A22 SENSORINEURAL HEARING LOSS (SNHL) OF LEFT EAR WITH RESTRICTED HEARING OF RIGHT EAR: Primary | ICD-10-CM

## 2023-10-25 DIAGNOSIS — I42.8 NONISCHEMIC CARDIOMYOPATHY: ICD-10-CM

## 2023-10-25 DIAGNOSIS — Z95.810 BIVENTRICULAR ICD (IMPLANTABLE CARDIOVERTER-DEFIBRILLATOR) IN PLACE: ICD-10-CM

## 2023-10-25 DIAGNOSIS — I50.42 CHRONIC COMBINED SYSTOLIC (CONGESTIVE) AND DIASTOLIC (CONGESTIVE) HEART FAILURE: Primary | ICD-10-CM

## 2023-10-25 DIAGNOSIS — E11.59 HYPERTENSION ASSOCIATED WITH DIABETES: ICD-10-CM

## 2023-10-25 DIAGNOSIS — I15.2 HYPERTENSION ASSOCIATED WITH DIABETES: ICD-10-CM

## 2023-10-25 DIAGNOSIS — I47.20 VT (VENTRICULAR TACHYCARDIA): ICD-10-CM

## 2023-10-25 DIAGNOSIS — I25.10 CORONARY ARTERY DISEASE INVOLVING NATIVE CORONARY ARTERY OF NATIVE HEART WITHOUT ANGINA PECTORIS: ICD-10-CM

## 2023-10-25 DIAGNOSIS — I49.3 PREMATURE VENTRICULAR CONTRACTIONS: ICD-10-CM

## 2023-10-25 DIAGNOSIS — H93.12 TINNITUS, LEFT EAR: ICD-10-CM

## 2023-10-25 PROCEDURE — 99214 OFFICE O/P EST MOD 30 MIN: CPT | Mod: PBBFAC | Performed by: INTERNAL MEDICINE

## 2023-10-25 PROCEDURE — 99999 PR PBB SHADOW E&M-EST. PATIENT-LVL IV: ICD-10-PCS | Mod: PBBFAC,,, | Performed by: INTERNAL MEDICINE

## 2023-10-25 PROCEDURE — 99999 PR PBB SHADOW E&M-EST. PATIENT-LVL II: CPT | Mod: PBBFAC,,, | Performed by: AUDIOLOGIST

## 2023-10-25 PROCEDURE — 99214 PR OFFICE/OUTPT VISIT, EST, LEVL IV, 30-39 MIN: ICD-10-PCS | Mod: S$PBB,,, | Performed by: INTERNAL MEDICINE

## 2023-10-25 PROCEDURE — 92557 COMPREHENSIVE HEARING TEST: CPT | Mod: PBBFAC | Performed by: AUDIOLOGIST

## 2023-10-25 PROCEDURE — 99999 PR PBB SHADOW E&M-EST. PATIENT-LVL II: ICD-10-PCS | Mod: PBBFAC,,, | Performed by: AUDIOLOGIST

## 2023-10-25 PROCEDURE — 92567 TYMPANOMETRY: CPT | Mod: PBBFAC | Performed by: AUDIOLOGIST

## 2023-10-25 PROCEDURE — 99212 OFFICE O/P EST SF 10 MIN: CPT | Mod: PBBFAC,27 | Performed by: AUDIOLOGIST

## 2023-10-25 PROCEDURE — 99214 OFFICE O/P EST MOD 30 MIN: CPT | Mod: S$PBB,,, | Performed by: INTERNAL MEDICINE

## 2023-10-25 PROCEDURE — 99999 PR PBB SHADOW E&M-EST. PATIENT-LVL IV: CPT | Mod: PBBFAC,,, | Performed by: INTERNAL MEDICINE

## 2023-10-25 RX ORDER — AMIODARONE HYDROCHLORIDE 200 MG/1
100 TABLET ORAL DAILY
Qty: 45 TABLET | Refills: 3 | Status: SHIPPED | OUTPATIENT
Start: 2023-10-25 | End: 2024-10-24

## 2023-10-25 NOTE — PROGRESS NOTES
Subjective:   10/25/2023     Patient ID:  Eliana Wagner is a 88 y.o. female who presents for evaulation of Hypertension, Congestive Heart Failure, and Coronary Artery Disease      Comes in for follow-up.  She would just been in the emergency room with an episode of vertigo, blood pressures were low, metoprolol dose decreased.  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  Guideline directed medical therapy was being titrated.  She is now on high-dose Entresto.  Not on potassium, not on spironolactone.  She did have hyperkalemia previously    She continues on amiodarone, will follow-up with EP next month.  Given recent episode of dizziness, I have asked her to decrease to 1/2 tablet daily    She has a history of a dilated cardiomyopathy and congestive heart failure, this had improved since implantation of a biventricular defibrillator, but a recent echo showed ejection fraction 35%.  She is not having chest pains tightness or shortness of breath now.  She did have a defibrillator discharge associated with syncope.  She was treated with amiodarone.  She had been admitted to [facility] for this.  A nuclear stress test did not show evidence for ischemia, ejection fraction was 45%.  She followed up with EP, amiodarone recommended to be continued and she will follow-up with them in November.  Her CRT-D device will need reimplantation in the next year to 2.    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  She does have less than 93% pacing, possibly due to PVCs.      Hypertension is present, currently well controlled with guideline directed medical therapy for congestive heart failure.    Hypercholesterolemia is present, her LDL is well controlled on high-dose statin therapy.    Recent recommendations:   1. Discontinue amlodipine and irbesartan  2. Continue amiodarone 200 mg every day  3. Begin Entresto 97/103, 1 tablet twice a day with  meals     Subsequent lab:   09/06/23 09:33  Sodium: 140  Potassium: 5.4 (H)  Chloride: 104  CO2: 27  Anion Gap: 9  BUN: 12  Creatinine: 1.0  eGFR: 54.2 !  Glucose: 206 (H)  Calcium: 9.5         Congestive Heart Failure  Associated symptoms include nocturia. Pertinent negatives include no abdominal pain, chest pain, claudication, near-syncope, palpitations or shortness of breath. Her past medical history is significant for CAD.   Coronary Artery Disease  Symptoms include dizziness. Pertinent negatives include no chest pain, leg swelling, palpitations, shortness of breath or weight gain. Risk factors include hyperlipidemia. Her past medical history is significant for CHF.   Hyperlipidemia  Pertinent negatives include no chest pain, focal weakness, myalgias or shortness of breath.   Hypertension  Associated symptoms include neck pain. Pertinent negatives include no chest pain, headaches, malaise/fatigue, orthopnea, palpitations, PND or shortness of breath.       Patient Active Problem List   Diagnosis    Hypertension associated with diabetes    Refractive error    Pseudophakia    Choroidal nevus of right eye    Chronic combined systolic (congestive) and diastolic (congestive) heart failure    Obesity, diabetes, and hypertension syndrome    Coronary artery disease involving native coronary artery of native heart without angina pectoris    Biventricular ICD (implantable cardioverter-defibrillator) in place    Hyperlipidemia associated with type 2 diabetes mellitus    DM type 2 without retinopathy    History of CVA (cerebrovascular accident)    Type 2 diabetes mellitus with hyperglycemia, without long-term current use of insulin    Class 1 obesity with serious comorbidity in adult    Premature ventricular contractions    Risk for falls    Mixed incontinence    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, with long-term current use of insulin     Syncope    VT (ventricular tachycardia)    HFrEF (heart failure with reduced ejection fraction)    Nonischemic cardiomyopathy    Actinic keratosis    Other rosacea        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, with long-term current use of insulin 04/24/2023    Diabetes mellitus, type 2     Hyperlipidemia     Retinal detachment     od     Squamous cell carcinoma of skin     Stroke     tia x 3   ( last in 2005)    Urinary incontinence without sensory awareness 05/12/2021       Past Surgical History:   Procedure Laterality Date    APPENDECTOMY      CARDIAC CATHETERIZATION      CATARACT EXTRACTION Bilateral     EYE SURGERY Bilateral     cataract    INSERT / REPLACE / REMOVE PACEMAKER      INSERTION OF PACEMAKER      july 22 2019     Orestes Kim MD    TONSILLECTOMY         Review of patient's allergies indicates:   Allergen Reactions    Penicillins      Caused uti         Current Outpatient Medications:     aspirin (ECOTRIN) 81 MG EC tablet, Take 81 mg by mouth once daily., Disp: , Rfl:     blood sugar diagnostic Strp, 1 each by Misc.(Non-Drug; Combo Route) route once daily., Disp: 100 each, Rfl: 3    ciprofloxacin HCl (CIPRO) 500 MG tablet, Take 1 tablet (500 mg total) by mouth 2 (two) times daily. for 7 days, Disp: 14 tablet, Rfl: 0    dulaglutide (TRULICITY) 1.5 mg/0.5 mL pen injector, Inject 1.5 mg into the skin every 7 days., Disp: 4 pen, Rfl: 11    fluorouracil (EFUDEX) 5 % cream, Apply topically Daily. , Disp: , Rfl:     furosemide (LASIX) 20 MG tablet, Take 1 tablet (20 mg total) by mouth daily as needed (swelling)., Disp: 30 tablet, Rfl: 11    lancets Misc, 1 each by Misc.(Non-Drug; Combo Route) route once daily., Disp: 100 each, Rfl: 3    magnesium oxide (MAG-OX) 400 mg (241.3 mg magnesium) tablet, Take 1 tablet (400 mg total) by mouth once daily., Disp: ,  Rfl: 0    meclizine (ANTIVERT) 25 mg tablet, Take 1 tablet (25 mg total) by mouth 3 (three) times daily as needed for Dizziness., Disp: 60 tablet, Rfl: 0    metoprolol succinate (TOPROL-XL) 25 MG 24 hr tablet, Take 1 tablet (25 mg total) by mouth 2 (two) times daily. For heart and blood pressure, Disp: 60 tablet, Rfl: 1    mirabegron (MYRBETRIQ) 25 mg Tb24 ER tablet, Take 1 tablet (25 mg total) by mouth once daily. For bladder, Disp: 30 tablet, Rfl: 11    mv-min-FA-Cy-Mp-qgjlpxi-lutein 0.4-162-18 mg Tab, Take by mouth. 1 Tablet Oral Every day, Disp: , Rfl:     sacubitriL-valsartan (ENTRESTO)  mg per tablet, Take 1 tablet by mouth 2 (two) times daily., Disp: 60 tablet, Rfl: 14    vitamin D (VITAMIN D3) 1000 units Tab, Take 1,000 Units by mouth once daily., Disp: , Rfl:     amiodarone (PACERONE) 200 MG Tab, Take 0.5 tablets (100 mg total) by mouth once daily., Disp: 45 tablet, Rfl: 3    conjugated estrogens (PREMARIN) vaginal cream, 1 g with applicator or dime-sized amt with finger in vagina nightly x 2 weeks, then twice a week thereafter (Patient not taking: Reported on 10/25/2023), Disp: 42.5 g, Rfl: 12            Objective:   Review of Systems   Constitutional: Negative for chills, decreased appetite, diaphoresis, fever, malaise/fatigue, weight gain and weight loss.   HENT:  Negative for congestion, hearing loss, nosebleeds and sore throat.    Eyes:  Negative for double vision, vision loss in left eye and vision loss in right eye.   Cardiovascular:  Negative for chest pain, claudication, cyanosis, dyspnea on exertion, irregular heartbeat, leg swelling, near-syncope, orthopnea, palpitations, paroxysmal nocturnal dyspnea and syncope.   Respiratory:  Negative for cough, hemoptysis, shortness of breath, sleep disturbances due to breathing, snoring and wheezing.    Endocrine: Negative for cold intolerance and heat intolerance.   Hematologic/Lymphatic: Negative for adenopathy and bleeding problem. Bruises/bleeds  easily.   Skin:  Negative for itching, nail changes and rash.   Musculoskeletal:  Positive for falls and neck pain. Negative for arthritis, back pain and myalgias.   Gastrointestinal:  Positive for constipation. Negative for bloating, abdominal pain, anorexia, diarrhea, hematochezia, melena, nausea and vomiting.   Genitourinary:  Positive for frequency and nocturia. Negative for hematuria.   Neurological:  Positive for excessive daytime sleepiness, dizziness, loss of balance and paresthesias. Negative for focal weakness, headaches, vertigo and weakness.   Psychiatric/Behavioral:  Negative for altered mental status, depression and memory loss.    Allergic/Immunologic: Negative for hives.       Vitals:    10/25/23 1011   BP: 120/71   Pulse: 65           Physical Exam  Vitals reviewed.   Constitutional:       General: She is not in acute distress.     Appearance: She is well-developed.   HENT:      Head: Normocephalic and atraumatic.      Nose: Nose normal.   Eyes:      Conjunctiva/sclera: Conjunctivae normal.      Pupils: Pupils are equal, round, and reactive to light.   Neck:      Vascular: No carotid bruit or JVD.   Cardiovascular:      Rate and Rhythm: Normal rate and regular rhythm.      Pulses: Normal pulses and intact distal pulses.      Heart sounds: Normal heart sounds. No murmur heard.     No friction rub. No gallop.      Comments: Jugular venous pressure normal  Defibrillator site appears normal  Pulmonary:      Effort: Pulmonary effort is normal. No respiratory distress.      Breath sounds: Normal breath sounds. No wheezing or rales.   Chest:      Chest wall: No tenderness.   Abdominal:      General: Bowel sounds are normal. There is no distension.      Palpations: Abdomen is soft.      Tenderness: There is no abdominal tenderness.   Musculoskeletal:         General: No tenderness or deformity. Normal range of motion.      Cervical back: Normal range of motion and neck supple.      Right lower leg: No  edema.      Left lower leg: No edema.   Skin:     General: Skin is warm and dry.      Findings: No erythema or rash.   Neurological:      Mental Status: She is alert and oriented to person, place, and time.      Cranial Nerves: No cranial nerve deficit.      Motor: No abnormal muscle tone.      Coordination: Coordination normal.   Psychiatric:         Behavior: Behavior normal.         Thought Content: Thought content normal.         Judgment: Judgment normal.              Assessment and Plan:     Chronic combined systolic (congestive) and diastolic (congestive) heart failure  Comments:  Appears to be on maximally tolerated guideline directed medical therapy    VT (ventricular tachycardia)  Comments:  Now with dizziness, I asked her to decrease amiodarone to 1/2 tablet daily  Follow-up with EP, already scheduled  Orders:  -     amiodarone (PACERONE) 200 MG Tab; Take 0.5 tablets (100 mg total) by mouth once daily.  Dispense: 45 tablet; Refill: 3    Hypertension associated with diabetes  Comments:  Currently well controlled    Coronary artery disease involving native coronary artery of native heart without angina pectoris  Comments:  Asymptomatic, nuclear stress test showed no ischemia    Biventricular ICD (implantable cardioverter-defibrillator) in place  Comments:  Has some diaphragmatic stimulation, asked her to mention it to EP with next visit    Premature ventricular contractions    Nonischemic cardiomyopathy  -     Echo; Future; Expected date: 01/25/2024               Follow up in about 3 months (around 1/25/2024).

## 2023-10-25 NOTE — PROGRESS NOTES
"Eliana Wagner was seen today in the clinic for an audiologic evaluation.  Patient's main complaint was decreased hearing in the left ear following a sudden onset episode of vertigo.  Mrs. Wagner reported her vertigo resulted in an overnight hospital stay. Her daughter reported that her mother has a history of "small strokes" and has heart disease. Mrs. Wagner last had an audiogram this past August that revealed bilateral, mild to moderate sensorineural hearing loss with good speech discrimination. She reported that she has a history of cerumen occlusion but had her ears cleaned when she was seen in August. She reported that she also hears dripping sounds in the left ear and feels like it is plugged up.     Otoscopy revealed clear EAC's with visible TM's in both ears. Left TM appeared dull and retracted with clear fluid.    Tympanometry revealed Type A in the right ear and Type A in the left ear.     Audiogram results revealed a mild to moderately severe sensorineural hearing loss (SNHL) in the right ear and a profound SNHL in the left ear.      Speech reception thresholds were noted at 35 dB in the right ear and no response in the left ear.    Speech discrimination scores were 88% in the right ear and no response in the left ear.    Results were discussed in detail with patient and her daughter following testing. It is recommended she follow-up with ENT in the near future. A hearing aid consultation is strongly recommended following medical clearance.     Recommendations:  Otologic evaluation with ENT  Annual audiogram or as needed  Hearing protection when in noise  Hearing aid consultation upon medical clearance          " home

## 2023-10-25 NOTE — Clinical Note
Henna Mary,  Today's testing revealed that Mrs. Wagner's left ear bottomed out following that vertigo episode. She needs to be seen with ENT as soon as possible. Could you please put in a referral? Is there a way to request she see an MD versus mid-level provider? After speaking with the team, this would be the most efficient course of action due to her case history.   Thanks!  Christiana

## 2023-10-27 DIAGNOSIS — R30.0 DYSURIA: Primary | ICD-10-CM

## 2023-10-27 DIAGNOSIS — N30.00 ACUTE CYSTITIS WITHOUT HEMATURIA: ICD-10-CM

## 2023-10-27 RX ORDER — CIPROFLOXACIN 500 MG/1
500 TABLET ORAL 2 TIMES DAILY
Qty: 14 TABLET | Refills: 0 | OUTPATIENT
Start: 2023-10-27 | End: 2023-11-03

## 2023-10-27 RX ORDER — CIPROFLOXACIN 500 MG/1
500 TABLET ORAL 2 TIMES DAILY
Qty: 10 TABLET | Refills: 0 | Status: SHIPPED | OUTPATIENT
Start: 2023-10-27 | End: 2023-11-01

## 2023-10-30 ENCOUNTER — PATIENT MESSAGE (OUTPATIENT)
Dept: CARDIOLOGY | Facility: CLINIC | Age: 88
End: 2023-10-30
Payer: MEDICARE

## 2023-10-31 ENCOUNTER — TELEPHONE (OUTPATIENT)
Dept: CARDIOLOGY | Facility: CLINIC | Age: 88
End: 2023-10-31
Payer: MEDICARE

## 2023-11-09 ENCOUNTER — PATIENT MESSAGE (OUTPATIENT)
Dept: CARDIOLOGY | Facility: CLINIC | Age: 88
End: 2023-11-09
Payer: MEDICARE

## 2023-11-09 NOTE — ASSESSMENT & PLAN NOTE
· Continue ASA, statin, and BP meds       Minocycline Counseling: Patient advised regarding possible photosensitivity and discoloration of the teeth, skin, lips, tongue and gums.  Patient instructed to avoid sunlight, if possible.  When exposed to sunlight, patients should wear protective clothing, sunglasses, and sunscreen.  The patient was instructed to call the office immediately if the following severe adverse effects occur:  hearing changes, easy bruising/bleeding, severe headache, or vision changes.  The patient verbalized understanding of the proper use and possible adverse effects of minocycline.  All of the patient's questions and concerns were addressed.

## 2023-11-16 ENCOUNTER — CLINICAL SUPPORT (OUTPATIENT)
Dept: CARDIOLOGY | Facility: HOSPITAL | Age: 88
End: 2023-11-16
Attending: STUDENT IN AN ORGANIZED HEALTH CARE EDUCATION/TRAINING PROGRAM
Payer: MEDICARE

## 2023-11-16 ENCOUNTER — OFFICE VISIT (OUTPATIENT)
Dept: ELECTROPHYSIOLOGY | Facility: CLINIC | Age: 88
End: 2023-11-16
Payer: MEDICARE

## 2023-11-16 ENCOUNTER — HOSPITAL ENCOUNTER (OUTPATIENT)
Dept: CARDIOLOGY | Facility: CLINIC | Age: 88
Discharge: HOME | End: 2023-11-16
Payer: MEDICARE

## 2023-11-16 VITALS
DIASTOLIC BLOOD PRESSURE: 67 MMHG | HEART RATE: 78 BPM | WEIGHT: 170 LBS | BODY MASS INDEX: 31.09 KG/M2 | SYSTOLIC BLOOD PRESSURE: 147 MMHG

## 2023-11-16 DIAGNOSIS — E11.69 HYPERLIPIDEMIA ASSOCIATED WITH TYPE 2 DIABETES MELLITUS: ICD-10-CM

## 2023-11-16 DIAGNOSIS — I42.0 NONISCHEMIC CONGESTIVE CARDIOMYOPATHY: ICD-10-CM

## 2023-11-16 DIAGNOSIS — I42.8 NONISCHEMIC CARDIOMYOPATHY: ICD-10-CM

## 2023-11-16 DIAGNOSIS — E78.2 MIXED HYPERLIPIDEMIA: ICD-10-CM

## 2023-11-16 DIAGNOSIS — I50.42 CHRONIC COMBINED SYSTOLIC AND DIASTOLIC HEART FAILURE: ICD-10-CM

## 2023-11-16 DIAGNOSIS — E66.9 OBESITY, DIABETES, AND HYPERTENSION SYNDROME: ICD-10-CM

## 2023-11-16 DIAGNOSIS — E11.59 OBESITY, DIABETES, AND HYPERTENSION SYNDROME: ICD-10-CM

## 2023-11-16 DIAGNOSIS — I15.2 OBESITY, DIABETES, AND HYPERTENSION SYNDROME: ICD-10-CM

## 2023-11-16 DIAGNOSIS — I15.2 HYPERTENSION ASSOCIATED WITH DIABETES: ICD-10-CM

## 2023-11-16 DIAGNOSIS — E11.59 HYPERTENSION ASSOCIATED WITH DIABETES: ICD-10-CM

## 2023-11-16 DIAGNOSIS — E78.5 HYPERLIPIDEMIA ASSOCIATED WITH TYPE 2 DIABETES MELLITUS: ICD-10-CM

## 2023-11-16 DIAGNOSIS — Z86.73 HISTORY OF CVA (CEREBROVASCULAR ACCIDENT): ICD-10-CM

## 2023-11-16 DIAGNOSIS — I47.20 VT (VENTRICULAR TACHYCARDIA): Primary | ICD-10-CM

## 2023-11-16 DIAGNOSIS — I10 PRIMARY HYPERTENSION: ICD-10-CM

## 2023-11-16 DIAGNOSIS — E11.65 TYPE 2 DIABETES MELLITUS WITH HYPERGLYCEMIA, WITHOUT LONG-TERM CURRENT USE OF INSULIN: ICD-10-CM

## 2023-11-16 DIAGNOSIS — Z95.810 PRESENCE OF CARDIAC RESYNCHRONIZATION THERAPY DEFIBRILLATOR (CRT-D): ICD-10-CM

## 2023-11-16 DIAGNOSIS — I47.20 VT (VENTRICULAR TACHYCARDIA): ICD-10-CM

## 2023-11-16 DIAGNOSIS — E11.69 OBESITY, DIABETES, AND HYPERTENSION SYNDROME: ICD-10-CM

## 2023-11-16 DIAGNOSIS — I25.10 CORONARY ARTERY DISEASE INVOLVING NATIVE CORONARY ARTERY OF NATIVE HEART WITHOUT ANGINA PECTORIS: ICD-10-CM

## 2023-11-16 DIAGNOSIS — I50.42 CHRONIC COMBINED SYSTOLIC (CONGESTIVE) AND DIASTOLIC (CONGESTIVE) HEART FAILURE: ICD-10-CM

## 2023-11-16 DIAGNOSIS — I49.3 PREMATURE VENTRICULAR CONTRACTIONS: ICD-10-CM

## 2023-11-16 DIAGNOSIS — I50.20 HFREF (HEART FAILURE WITH REDUCED EJECTION FRACTION): ICD-10-CM

## 2023-11-16 DIAGNOSIS — Z95.810 BIVENTRICULAR ICD (IMPLANTABLE CARDIOVERTER-DEFIBRILLATOR) IN PLACE: ICD-10-CM

## 2023-11-16 PROCEDURE — 93005 ELECTROCARDIOGRAM TRACING: CPT | Mod: 59,PBBFAC | Performed by: INTERNAL MEDICINE

## 2023-11-16 PROCEDURE — 93284 PRGRMG EVAL IMPLANTABLE DFB: CPT

## 2023-11-16 PROCEDURE — 93010 RHYTHM STRIP: ICD-10-PCS | Mod: S$PBB,,, | Performed by: INTERNAL MEDICINE

## 2023-11-16 PROCEDURE — 99999 PR PBB SHADOW E&M-EST. PATIENT-LVL III: ICD-10-PCS | Mod: PBBFAC,,, | Performed by: STUDENT IN AN ORGANIZED HEALTH CARE EDUCATION/TRAINING PROGRAM

## 2023-11-16 PROCEDURE — 93284 CARDIAC DEVICE CHECK - IN CLINIC & HOSPITAL: ICD-10-PCS | Mod: 26,,, | Performed by: STUDENT IN AN ORGANIZED HEALTH CARE EDUCATION/TRAINING PROGRAM

## 2023-11-16 PROCEDURE — 99213 OFFICE O/P EST LOW 20 MIN: CPT | Mod: PBBFAC | Performed by: STUDENT IN AN ORGANIZED HEALTH CARE EDUCATION/TRAINING PROGRAM

## 2023-11-16 PROCEDURE — 93284 PRGRMG EVAL IMPLANTABLE DFB: CPT | Mod: 26,,, | Performed by: STUDENT IN AN ORGANIZED HEALTH CARE EDUCATION/TRAINING PROGRAM

## 2023-11-16 PROCEDURE — 99214 OFFICE O/P EST MOD 30 MIN: CPT | Mod: S$PBB,,, | Performed by: STUDENT IN AN ORGANIZED HEALTH CARE EDUCATION/TRAINING PROGRAM

## 2023-11-16 PROCEDURE — 99214 PR OFFICE/OUTPT VISIT, EST, LEVL IV, 30-39 MIN: ICD-10-PCS | Mod: S$PBB,,, | Performed by: STUDENT IN AN ORGANIZED HEALTH CARE EDUCATION/TRAINING PROGRAM

## 2023-11-16 PROCEDURE — 93010 ELECTROCARDIOGRAM REPORT: CPT | Mod: S$PBB,,, | Performed by: INTERNAL MEDICINE

## 2023-11-16 PROCEDURE — 99999 PR PBB SHADOW E&M-EST. PATIENT-LVL III: CPT | Mod: PBBFAC,,, | Performed by: STUDENT IN AN ORGANIZED HEALTH CARE EDUCATION/TRAINING PROGRAM

## 2023-11-16 RX ORDER — DULAGLUTIDE 0.75 MG/.5ML
INJECTION, SOLUTION SUBCUTANEOUS
COMMUNITY
Start: 2023-08-30

## 2023-11-16 RX ORDER — METOPROLOL SUCCINATE 50 MG/1
50 TABLET, EXTENDED RELEASE ORAL
COMMUNITY
Start: 2023-11-15 | End: 2024-02-14

## 2023-11-16 NOTE — PROGRESS NOTES
"Electrophysiology Clinic Note    Reason for follow-up patient visit: Ongoing evaluation and routine device surveillance of a Medtronic CRT-D that is approaching RRT.     PRESENTING HISTORY:     History of Present Illness:  Ms. Eliana Wagner is a bridgett 88-year-old woman who returns to clinic today for ongoing evaluation and routine device surveillance of a Medtronic CRT-D that is approaching RRT. She has a past medical history significant for nonischemic cardiomyopathy with most recent LVEF 35%, s/p implantation of a Medtronic CRT-D 6/17/2019 with Dr. Kim, baseline LBBB, hypertension, hyperlipidemia, type II diabetes mellitus, history of prior CVA, and obesity. She previously suffered an event of VF on 8/5/2023 lasting 12 seconds that required ATP burst during charging and one effective 20J shock. She had a prior event of MMVT on 4/23/2023 that was successfully ATP terminated without requiring defibrillation. She remains on metoprolol succinate 50mg po daily and amiodarone 100mg po daily with no subsequent ventricular arrhythmias on device interrogation today. Her battery longevity continues to decline, with an estimated 10 months remaining until requiring a generator change.      In brief review of her prior cardiac history, she has a history of a dilated cardiomyopathy and left bundle branch block with minimal improvement after implantation of a cardiac resynchronization therapy. She is biventricularly pacing 99.3%, improved from prior to amiodarone initiation. She had never required ICD tachytherapies until her event of VT on 8/5/2023. At that time, she describes that she had just gotten into her car after getting her groceries when she suddenly lost consciousness, with no reported prodromal symptoms. She reports feeling "weak" for some time afterwards. She drove home shortly after and told her family, who promptly called 911 and she was taken to [facility]. She underwent cardiac evaluation with a " pharmacologic nuclear cardiac stress test that did not reveal any ischemia or prior infarction. She was subsequently started on amiodarone and has not had any further events since initiation of this therapy. She continues to live an active lifestyle, and is able to exercise at the gym and perform her ADLs independently. At our previous encounter, we reprogrammed her device with burst ATP as per MADIT-RIT. Of note, she has approximately 10 months of remaining battery longevity prior to requiring a generator change.     In discussion with Ms. Wagner and her daughter today, she tells me that she is feeling overall quite well. She denies any episodes of dizziness, lightheadedness, syncope/presyncope, chest pain or chest discomfort, palpitations, nausea or vomiting, orthopnea, or PND. She reports baseline mild shortness of breath and dyspnea with exertion that has remained stable for her. She has difficulty climbing stairs secondary to knee and hip pain, but has continued to exercise at the gym on a seated bicycle or seated elliptical without reported limitation. She recently started walking with a rolling walker for longer distances secondary to periods of transient dizziness associated with her vertigo. She denies any alarms or alerts from her device.     Review of Systems:  Review of Systems   Constitutional:  Negative for activity change.   HENT:  Negative for nasal congestion, nosebleeds, postnasal drip, rhinorrhea, sinus pressure/congestion, sneezing and sore throat.    Respiratory:  Positive for shortness of breath. Negative for apnea, cough, chest tightness and wheezing.    Cardiovascular:  Negative for chest pain, palpitations and leg swelling.   Gastrointestinal:  Negative for abdominal distention, abdominal pain, blood in stool, change in bowel habit, constipation, diarrhea, nausea and vomiting.   Genitourinary:  Negative for dysuria and hematuria.   Musculoskeletal:  Positive for arthralgias and back pain.  Negative for gait problem.   Neurological:  Positive for vertigo. Negative for dizziness, seizures, syncope, weakness, light-headedness, headaches and coordination difficulties.        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, with long-term current use of insulin 04/24/2023    Diabetes mellitus, type 2     Hyperlipidemia     Retinal detachment     od     Squamous cell carcinoma of skin     Stroke     tia x 3   ( last in 2005)    Urinary incontinence without sensory awareness 05/12/2021       Past Surgical History:   Procedure Laterality Date    APPENDECTOMY      CARDIAC CATHETERIZATION      CATARACT EXTRACTION Bilateral     EYE SURGERY Bilateral     cataract    INSERT / REPLACE / REMOVE PACEMAKER      INSERTION OF PACEMAKER      july 22 2019     Orestes Kim MD    TONSILLECTOMY         Family History:  Family History   Problem Relation Age of Onset    Cataracts Mother     Heart disease Mother     Cataracts Father     Cancer Father     Diabetes Father     Heart disease Father     Stroke Father     No Known Problems Sister     No Known Problems Brother     Cancer Son         prostate    Liver disease Son         liver transplant    No Known Problems Maternal Aunt     No Known Problems Maternal Uncle     No Known Problems Paternal Aunt     No Known Problems Paternal Uncle     No Known Problems Maternal Grandmother     Glaucoma Maternal Grandfather     No Known Problems Paternal Grandmother     No Known Problems Paternal Grandfather     Breast cancer Neg Hx     Colon cancer Neg Hx     Ovarian cancer Neg Hx     Amblyopia Neg Hx     Blindness Neg Hx     Hypertension Neg Hx     Macular degeneration Neg Hx     Retinal detachment Neg Hx     Strabismus Neg Hx     Thyroid disease Neg Hx        Social History:  She  reports that she has never smoked. She has  never used smokeless tobacco. She reports current alcohol use of about 2.0 standard drinks of alcohol per week. She reports that she does not use drugs.      MEDICATIONS & ALLERGIES:     Review of patient's allergies indicates:   Allergen Reactions    Penicillins      Caused uti       Current Outpatient Medications on File Prior to Visit   Medication Sig Dispense Refill    amiodarone (PACERONE) 200 MG Tab Take 0.5 tablets (100 mg total) by mouth once daily. 45 tablet 3    aspirin (ECOTRIN) 81 MG EC tablet Take 81 mg by mouth once daily.      blood sugar diagnostic Strp 1 each by Misc.(Non-Drug; Combo Route) route once daily. 100 each 3    conjugated estrogens (PREMARIN) vaginal cream 1 g with applicator or dime-sized amt with finger in vagina nightly x 2 weeks, then twice a week thereafter (Patient not taking: Reported on 10/25/2023) 42.5 g 12    dulaglutide (TRULICITY) 1.5 mg/0.5 mL pen injector Inject 1.5 mg into the skin every 7 days. 4 pen 11    fluorouracil (EFUDEX) 5 % cream Apply topically Daily.       furosemide (LASIX) 20 MG tablet Take 1 tablet (20 mg total) by mouth daily as needed (swelling). 30 tablet 11    lancets Misc 1 each by Misc.(Non-Drug; Combo Route) route once daily. 100 each 3    magnesium oxide (MAG-OX) 400 mg (241.3 mg magnesium) tablet Take 1 tablet (400 mg total) by mouth once daily.  0    meclizine (ANTIVERT) 25 mg tablet Take 1 tablet (25 mg total) by mouth 3 (three) times daily as needed for Dizziness. 60 tablet 0    metoprolol succinate (TOPROL-XL) 25 MG 24 hr tablet Take 1 tablet (25 mg total) by mouth 2 (two) times daily. For heart and blood pressure 60 tablet 1    mirabegron (MYRBETRIQ) 25 mg Tb24 ER tablet Take 1 tablet (25 mg total) by mouth once daily. For bladder 30 tablet 11    mv-min-FA-Lo-Xz-vbyulnv-lutein 0.4-162-18 mg Tab Take by mouth. 1 Tablet Oral Every day      sacubitriL-valsartan (ENTRESTO)  mg per tablet Take 1 tablet by mouth 2 (two) times daily. 60 tablet 14     vitamin D (VITAMIN D3) 1000 units Tab Take 1,000 Units by mouth once daily.       No current facility-administered medications on file prior to visit.        OBJECTIVE:     Vital Signs:  BP (!) 147/67   Pulse 78   Wt 77.1 kg (169 lb 15.6 oz)   BMI 31.09 kg/m²     Physical Exam:  Physical Exam  Constitutional:       General: She is not in acute distress.     Appearance: Normal appearance. She is not ill-appearing or diaphoretic.      Comments: Well-appearing elderly woman in NAD, ambulating with a rolling walker.    HENT:      Head: Normocephalic and atraumatic.      Nose: Nose normal.      Mouth/Throat:      Mouth: Mucous membranes are moist.      Pharynx: Oropharynx is clear.   Eyes:      Pupils: Pupils are equal, round, and reactive to light.   Cardiovascular:      Rate and Rhythm: Normal rate and regular rhythm.      Pulses: Normal pulses.      Heart sounds: Normal heart sounds. No murmur heard.     No friction rub. No gallop.      Comments: Left anterior chest wall incision site is in excellent repair. She has lost weight since implantation and the device is located slightly more superficially.   Pulmonary:      Effort: Pulmonary effort is normal. No respiratory distress.      Breath sounds: Normal breath sounds. No wheezing, rhonchi or rales.   Chest:      Chest wall: No tenderness.   Abdominal:      General: There is no distension.      Palpations: Abdomen is soft.      Tenderness: There is no abdominal tenderness.   Musculoskeletal:         General: No swelling or tenderness.      Cervical back: Normal range of motion.      Right lower leg: No edema.      Left lower leg: No edema.   Skin:     General: Skin is warm and dry.      Findings: No erythema, lesion or rash.   Neurological:      General: No focal deficit present.      Mental Status: She is alert and oriented to person, place, and time. Mental status is at baseline.      Motor: No weakness.      Gait: Gait normal.   Psychiatric:         Mood and  Affect: Mood normal.         Behavior: Behavior normal.          Laboratory Data:  Lab Results   Component Value Date    WBC 9.12 10/15/2023    HGB 13.9 10/15/2023    HCT 43.4 10/15/2023    MCV 91 10/15/2023     (L) 10/15/2023     Lab Results   Component Value Date     (H) 10/15/2023     10/15/2023    K 4.1 10/15/2023     10/15/2023    CO2 27 10/15/2023    BUN 14 10/15/2023    CREATININE 0.8 10/15/2023    CALCIUM 8.7 10/15/2023    MG 2.1 10/15/2023     Lab Results   Component Value Date    INR 1.2 10/14/2023    INR 1.0 10/14/2023    INR 1.0 06/06/2008       Pertinent Cardiac Data:  ECG: Atrially-paced, biventricularly-paced rhythm with rate of 78 bpm, AV delay ~180 ms, biventricularly paced  ms, QT/QTc 512/583 ms.     Resting 2D Transthoracic Echocardiogram - 9/27/2021:  The left ventricle is mildly enlarged with mildly decreased systolic function.  The estimated ejection fraction is 45%.  There is abnormal septal wall motion consistent with left bundle branch block.  Grade I left ventricular diastolic dysfunction.  The estimated PA systolic pressure is 26 mmHg.  Normal right ventricular size with normal right ventricular systolic function.  Normal central venous pressure (3 mmHg).  Mild mitral regurgitation.  There is no pulmonary hypertension.    Resting 2D Transthoracic Echocardiogram - 1/13/2022:  The left ventricle is normal in size with mildly decreased systolic function.  The estimated ejection fraction is 45%.  There are segmental left ventricular wall motion abnormalities: akinetic inferolateral and apical inferior segments.  Grade I left ventricular diastolic dysfunction.  Mild aortic regurgitation.  Normal right ventricular size with normal right ventricular systolic function.  Normal central venous pressure (3 mmHg).  The estimated PA systolic pressure is 23 mmHg.    Resting 2D Transthoracic Echocardiogram - 5/17/2023:  The left ventricle is moderately enlarged with  moderately decreased systolic function.  The estimated ejection fraction is 35%.  The quantitatively derived ejection fraction is 33%.  The left ventricular global longitudinal strain is -9.9%.  There are segmental left ventricular wall motion abnormalities. Septal akinesis  Grade I left ventricular diastolic dysfunction.  The estimated PA systolic pressure is 28 mmHg.  Normal right ventricular size with normal right ventricular systolic function.  Normal central venous pressure (3 mmHg).  Mild aortic regurgitation.  Mild mitral regurgitation.  Mild tricuspid regurgitation.  There is no pulmonary hypertension.    Pharmacologic Nuclear Cardiac Stress Test - SPECT - 8/7/2023:  1. No convincing scintigraphic evidence for ischemia or infarct.  2. The global left ventricular systolic function is mildly diminished with an LV ejection fraction of 45 % and no evidence of LV dilatation.    Device Interrogation: Personal review of her device interrogation report ([employer] MRI CRT-D, implanted 7/17/2019) reveals declining battery longevity with an estimated 10 months of battery life remaining. Her leads were interrogated with acceptable and stable lead parameters. Of note, she had phrenic stimulation with high LV pacing output, with widening of her pulse width to 1.5ms with no reported symptoms. She is presently in underlying sinus bradycardia and is currently AP-BiVP. She is RA paced 80%, biventricularly paced 99.3%. Her previously recorded PVC burden of 2.6% has improved to <1% since initiation of amiodarone therapy. There are no concerning AHR or VHR episodes noted, and she has not required tachytherapies since her prior event of VF on 8/5/2023 lasting 12 seconds that required ATP burst during charging and one effective 20J shock.      ASSESSMENT & PLAN:   Ms. Eilana Wagner is a bridgett 88-year-old woman who returns to clinic today for ongoing evaluation and routine device surveillance of a Medtronic CRT-D that  is approaching RRT. She has a past medical history significant for nonischemic cardiomyopathy with most recent LVEF 35%, s/p implantation of a Medtronic CRT-D 6/17/2019 with Dr. Kim, baseline LBBB, hypertension, hyperlipidemia, type II diabetes mellitus, history of prior CVA, and obesity. She previously suffered an event of VF on 8/5/2023 lasting 12 seconds that required ATP burst during charging and one effective 20J shock. She had a prior event of MMVT on 4/23/2023 that was successfully ATP terminated without requiring defibrillation. She remains on metoprolol succinate 50mg po daily and amiodarone 100mg po daily with no subsequent ventricular arrhythmias on device interrogation today. Her battery longevity continues to decline, with an estimated 10 months remaining until requiring a generator change.      - She has a normally functioning CRT-D on device interrogation today with underlying sinus bradycardia. She has a biventricular paced QRSd that remains wide at 170ms. She is biventricularly paced 99.3%, with suppression of her previously noted frequent PVCs (prior PVC burden of 2.6%, currently <1%).   - We will continue pharmacologic ectopy suppression with metoprolol succinate 50mg po daily and amiodarone 100mg po daily. She has not required any further device tachytherapies since starting this therapy.   - She continues to have reduced systolic function on her most recent echocardiogram with most recent LVEF 35%. Dr. Kim has ordered a repeat echocardiogram for ongoing evaluation. She reports improved symptoms with improved exercise capacity and denies any overt cardiac symptoms on assessment today.  - We discussed that she will require a generator change in approximately 10 months. This procedure was described in detail, in addition to potential risks, benefits, and alternative treatment options. Ms. Wagner voices understanding and is in agreement with proceeding once she reaches RRT.   - We  discussed continued device surveillance, and she continues to send remote transmission every month as she approaches RRT, with repeat in-office interrogation in six months.     This patient will return to clinic with me in six months for an in-office device interrogation, with ongoing remote device transmissions monthly in the interim. She will continue to follow with her general cardiologist, Dr. Kim, and her PCP. All questions and concerns were addressed at this encounter.     Signing Physician:       RAMANDEEP Mittal MD  Electrophysiology Attending

## 2023-11-17 PROBLEM — D69.6 THROMBOCYTOPENIA, UNSPECIFIED: Status: ACTIVE | Noted: 2023-11-17

## 2023-11-30 LAB — OHS CV AF BURDEN PERCENT: < 1

## 2023-12-31 ENCOUNTER — PATIENT MESSAGE (OUTPATIENT)
Dept: PRIMARY CARE CLINIC | Facility: CLINIC | Age: 88
End: 2023-12-31
Payer: MEDICARE

## 2024-01-02 ENCOUNTER — CLINICAL SUPPORT (OUTPATIENT)
Dept: CARDIOLOGY | Facility: HOSPITAL | Age: 89
End: 2024-01-02
Attending: INTERNAL MEDICINE
Payer: MEDICARE

## 2024-01-02 ENCOUNTER — CLINICAL SUPPORT (OUTPATIENT)
Dept: CARDIOLOGY | Facility: HOSPITAL | Age: 89
End: 2024-01-02
Payer: MEDICARE

## 2024-01-02 DIAGNOSIS — I47.20 VENTRICULAR TACHYCARDIA, UNSPECIFIED: ICD-10-CM

## 2024-01-02 DIAGNOSIS — Z95.810 PRESENCE OF AUTOMATIC (IMPLANTABLE) CARDIAC DEFIBRILLATOR: ICD-10-CM

## 2024-01-02 PROCEDURE — 93295 DEV INTERROG REMOTE 1/2/MLT: CPT | Mod: ,,, | Performed by: INTERNAL MEDICINE

## 2024-01-02 PROCEDURE — 93296 REM INTERROG EVL PM/IDS: CPT | Performed by: INTERNAL MEDICINE

## 2024-01-04 ENCOUNTER — OFFICE VISIT (OUTPATIENT)
Dept: FAMILY MEDICINE | Facility: CLINIC | Age: 89
End: 2024-01-04
Payer: MEDICARE

## 2024-01-04 VITALS
HEART RATE: 76 BPM | DIASTOLIC BLOOD PRESSURE: 70 MMHG | WEIGHT: 169.56 LBS | BODY MASS INDEX: 31.2 KG/M2 | HEIGHT: 62 IN | OXYGEN SATURATION: 98 % | SYSTOLIC BLOOD PRESSURE: 130 MMHG

## 2024-01-04 DIAGNOSIS — N30.00 ACUTE CYSTITIS WITHOUT HEMATURIA: Primary | ICD-10-CM

## 2024-01-04 LAB
BILIRUB SERPL-MCNC: ABNORMAL MG/DL
BLOOD, POC UA: ABNORMAL
GLUCOSE UR QL STRIP: ABNORMAL
KETONES UR QL STRIP: ABNORMAL
LEUKOCYTE ESTERASE URINE, POC: ABNORMAL
NITRITE, POC UA: ABNORMAL
PH, POC UA: 6.5
PROTEIN, POC: ABNORMAL
SPECIFIC GRAVITY, POC UA: 1.01
UROBILINOGEN, POC UA: 0.2

## 2024-01-04 PROCEDURE — 87186 SC STD MICRODIL/AGAR DIL: CPT | Performed by: FAMILY MEDICINE

## 2024-01-04 PROCEDURE — 81003 URINALYSIS AUTO W/O SCOPE: CPT | Mod: QW,S$GLB,, | Performed by: FAMILY MEDICINE

## 2024-01-04 PROCEDURE — 99999 PR PBB SHADOW E&M-EST. PATIENT-LVL IV: CPT | Mod: PBBFAC,,, | Performed by: FAMILY MEDICINE

## 2024-01-04 PROCEDURE — 99214 OFFICE O/P EST MOD 30 MIN: CPT | Mod: S$GLB,,, | Performed by: FAMILY MEDICINE

## 2024-01-04 PROCEDURE — 87077 CULTURE AEROBIC IDENTIFY: CPT | Performed by: FAMILY MEDICINE

## 2024-01-04 PROCEDURE — 87088 URINE BACTERIA CULTURE: CPT | Performed by: FAMILY MEDICINE

## 2024-01-04 PROCEDURE — 87086 URINE CULTURE/COLONY COUNT: CPT | Performed by: FAMILY MEDICINE

## 2024-01-04 RX ORDER — CIPROFLOXACIN 500 MG/1
500 TABLET ORAL 2 TIMES DAILY
Qty: 14 TABLET | Refills: 0 | Status: SHIPPED | OUTPATIENT
Start: 2024-01-04 | End: 2024-01-11

## 2024-01-04 NOTE — PROGRESS NOTES
(Portions of this note were dictated using voice recognition software and may contain dictation related errors in spelling/grammar/syntax not found on text review)    CC:   Chief Complaint   Patient presents with    Urinary Tract Infection       HPI: 88 y.o. female pt of Dr Marquez presented for evaluation of UTI a patient to me for urgent care visit. She is accompanied with her daughter.  She has medical history significant for type 2 diabetes mellitus, hyperlipidemia,, recurrent UTIs.    Patient reports that symptoms started 3 days ago, she reports burning with urination, denies frequency, hesitancy urgency, has no fever, chills, flank pains.  Patient is going for a cruise trip next week and wants to get treated for UTI before going on a trip.    She denies any other symptoms or concerns.      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, with long-term current use of insulin 04/24/2023    Diabetes mellitus, type 2     Hyperlipidemia     Retinal detachment     od     Squamous cell carcinoma of skin     Stroke     tia x 3   ( last in 2005)    Urinary incontinence without sensory awareness 05/12/2021       Past Surgical History:   Procedure Laterality Date    APPENDECTOMY      CARDIAC CATHETERIZATION      CATARACT EXTRACTION Bilateral     EYE SURGERY Bilateral     cataract    INSERT / REPLACE / REMOVE PACEMAKER      INSERTION OF PACEMAKER      july 22 2019     Orestes Kim MD    TONSILLECTOMY         Family History   Problem Relation Age of Onset    Cataracts Mother     Heart disease Mother     Cataracts Father     Cancer Father     Diabetes Father     Heart disease Father     Stroke Father     No Known Problems Sister     No Known Problems Brother     Cancer Son         prostate    Liver disease Son         liver transplant    No Known Problems Maternal Aunt     No Known  Problems Maternal Uncle     No Known Problems Paternal Aunt     No Known Problems Paternal Uncle     No Known Problems Maternal Grandmother     Glaucoma Maternal Grandfather     No Known Problems Paternal Grandmother     No Known Problems Paternal Grandfather     Breast cancer Neg Hx     Colon cancer Neg Hx     Ovarian cancer Neg Hx     Amblyopia Neg Hx     Blindness Neg Hx     Hypertension Neg Hx     Macular degeneration Neg Hx     Retinal detachment Neg Hx     Strabismus Neg Hx     Thyroid disease Neg Hx        Social History     Tobacco Use    Smoking status: Never    Smokeless tobacco: Never   Substance Use Topics    Alcohol use: Yes     Alcohol/week: 2.0 standard drinks of alcohol     Types: 2 Cans of beer per week     Comment: socially    Drug use: No       Lab Results   Component Value Date    WBC 9.12 10/15/2023    HGB 13.9 10/15/2023    HCT 43.4 10/15/2023    MCV 91 10/15/2023     (L) 10/15/2023    CHOL 180 10/14/2023    TRIG 150 10/14/2023    HDL 55 10/14/2023    ALT 23 10/15/2023    AST 20 10/15/2023    BILITOT 0.4 10/15/2023    ALKPHOS 68 10/15/2023     10/15/2023    K 4.1 10/15/2023     10/15/2023    CREATININE 0.8 10/15/2023    ESTGFRAFRICA >60.0 01/12/2022    EGFRNONAA >60.0 01/12/2022    CALCIUM 8.7 10/15/2023    ALBUMIN 3.2 (L) 10/15/2023    BUN 14 10/15/2023    CO2 27 10/15/2023    TSH 2.493 10/14/2023    INR 1.2 10/14/2023    HGBA1C 8.4 (H) 07/21/2023    MICALBCREAT Unable to calculate 05/10/2023    LDLCALC 95.0 10/14/2023     (H) 10/15/2023             Vital signs reviewed  PE:   APPEARANCE: Well nourished, well developed, in no acute distress.    HEAD: Normocephalic, atraumatic.  EYES: EOMI.  Conjunctivae noninjected.  NOSE: Mucosa pink. Airway clear.  MOUTH & THROAT: No tonsillar enlargement. No pharyngeal erythema or exudate.   NECK: Supple with no cervical lymphadenopathy.    CHEST: Good inspiratory effort. Lungs clear to auscultation with no wheezes or  crackles.  CARDIOVASCULAR: Normal S1, S2. No rubs, murmurs, or gallops.  ABDOMEN: Bowel sounds normal. Not distended. Soft. No tenderness or masses. No organomegaly.  EXTREMITIES: No edema, cyanosis, or clubbing.    Review of Systems   Constitutional:  Negative for chills, fatigue and fever.   HENT: Negative.     Respiratory:  Negative for cough, shortness of breath and wheezing.    Cardiovascular:  Negative for chest pain, palpitations and leg swelling.   Gastrointestinal: Negative.    Genitourinary:  Positive for dysuria. Negative for bladder incontinence, decreased urine volume, difficulty urinating, flank pain, frequency, hematuria, nocturia and urgency.   Neurological: Negative.    Psychiatric/Behavioral: Negative.     All other systems reviewed and are negative.      IMPRESSION  1. Acute cystitis without hematuria            PLAN      1. Acute cystitis without hematuria    - POCT URINALYSIS- shows few leuks and blood cells, nitrite neg      - Urine culture    - ciprofloxacin HCl (CIPRO) 500 MG tablet; Take 1 tablet (500 mg total) by mouth 2 (two) times daily. for 7 days  Dispense: 14 tablet; Refill: 0       Advised to drink enough water      Age/demographic appropriate health maintenance:    Health Maintenance Due   Topic Date Due    RSV Vaccine (Age 60+ and Pregnant patients) (1 - 1-dose 60+ series) Never done    Influenza Vaccine (1) 09/01/2023    Hemoglobin A1c  10/21/2023           Spent adequate time in obtaining history and explaining differentials     30 minutes spent during this visit of which greater than 50% devoted to face-face counseling and coordination of care regarding diagnosis and management plan         Moo Bran   1/4/2024

## 2024-01-05 ENCOUNTER — PATIENT MESSAGE (OUTPATIENT)
Dept: OTOLARYNGOLOGY | Facility: CLINIC | Age: 89
End: 2024-01-05
Payer: MEDICARE

## 2024-01-06 LAB — BACTERIA UR CULT: ABNORMAL

## 2024-01-08 LAB
OHS CV AF BURDEN PERCENT: < 1
OHS CV BIV PACING PERCENT: 54.52 %
OHS CV DC REMOTE DEVICE TYPE: NORMAL
OHS CV RV PACING PERCENT: 54.69 %

## 2024-01-19 ENCOUNTER — PATIENT MESSAGE (OUTPATIENT)
Dept: ADMINISTRATIVE | Facility: HOSPITAL | Age: 89
End: 2024-01-19
Payer: MEDICARE

## 2024-01-30 ENCOUNTER — OFFICE VISIT (OUTPATIENT)
Dept: SPORTS MEDICINE | Facility: CLINIC | Age: 89
End: 2024-01-30
Payer: MEDICARE

## 2024-01-30 ENCOUNTER — HOSPITAL ENCOUNTER (OUTPATIENT)
Dept: RADIOLOGY | Facility: HOSPITAL | Age: 89
Discharge: HOME | End: 2024-01-30
Attending: ORTHOPAEDIC SURGERY
Payer: MEDICARE

## 2024-01-30 VITALS
BODY MASS INDEX: 31.56 KG/M2 | HEART RATE: 71 BPM | SYSTOLIC BLOOD PRESSURE: 125 MMHG | WEIGHT: 171.5 LBS | DIASTOLIC BLOOD PRESSURE: 75 MMHG | HEIGHT: 62 IN

## 2024-01-30 DIAGNOSIS — M25.511 ACUTE PAIN OF RIGHT SHOULDER: Primary | ICD-10-CM

## 2024-01-30 DIAGNOSIS — M25.511 RIGHT SHOULDER PAIN, UNSPECIFIED CHRONICITY: ICD-10-CM

## 2024-01-30 PROCEDURE — 99999 PR PBB SHADOW E&M-EST. PATIENT-LVL III: CPT | Mod: PBBFAC,,, | Performed by: ORTHOPAEDIC SURGERY

## 2024-01-30 PROCEDURE — 73030 X-RAY EXAM OF SHOULDER: CPT | Mod: TC,RT

## 2024-01-30 PROCEDURE — 99204 OFFICE O/P NEW MOD 45 MIN: CPT | Mod: S$GLB,,, | Performed by: ORTHOPAEDIC SURGERY

## 2024-01-30 PROCEDURE — 73030 X-RAY EXAM OF SHOULDER: CPT | Mod: 26,RT,, | Performed by: STUDENT IN AN ORGANIZED HEALTH CARE EDUCATION/TRAINING PROGRAM

## 2024-01-30 NOTE — PROGRESS NOTES
CC: Right shoulder pain     89 y.o. Female presents as a new patient to me.  Right-hand dominant.  Accompanied by her daughter.  She reports a 2 week history of right shoulder pain.  She was on a cruise recently when she tripped and fell with her outstretched right hand.  Axial load to the right shoulder.  No instability event but she experienced immediate pain with some swelling.  Pain has worsened a bit with time.  Denies any pre-existing issues with the right shoulder.  She localizes over the lateral subdeltoid recess and anterolateral shoulder.  No numbness or tingling.  No neck complaints.  Treatment to this point has included rest, activity modifications, and oral medication.    PMHx notable for HLD.  Defibrillator in place.  Off oral anticoagulation.  History of known diabetes.  Previous hemoglobin A1c levels as high as 11.  No recent recheck since July.  She does report that her serum glucose levels are not particularly well controlled.  Negative for tobacco.   Positive for diabetes. 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, with long-term current use of insulin 04/24/2023    Diabetes mellitus, type 2     Hyperlipidemia     Retinal detachment     od     Squamous cell carcinoma of skin     Stroke     tia x 3   ( last in 2005)    Urinary incontinence without sensory awareness 05/12/2021     PAST SURGICAL HISTORY:  Past Surgical History:   Procedure Laterality Date    APPENDECTOMY      CARDIAC CATHETERIZATION      CATARACT EXTRACTION Bilateral     EYE SURGERY Bilateral     cataract    INSERT / REPLACE / REMOVE PACEMAKER      INSERTION OF PACEMAKER      july 22 2019     Orestes Kim MD    TONSILLECTOMY       FAMILY HISTORY:  Family History   Problem Relation Age of Onset    Cataracts Mother     Heart disease Mother      Cataracts Father     Cancer Father     Diabetes Father     Heart disease Father     Stroke Father     No Known Problems Sister     No Known Problems Brother     Cancer Son         prostate    Liver disease Son         liver transplant    No Known Problems Maternal Aunt     No Known Problems Maternal Uncle     No Known Problems Paternal Aunt     No Known Problems Paternal Uncle     No Known Problems Maternal Grandmother     Glaucoma Maternal Grandfather     No Known Problems Paternal Grandmother     No Known Problems Paternal Grandfather     Breast cancer Neg Hx     Colon cancer Neg Hx     Ovarian cancer Neg Hx     Amblyopia Neg Hx     Blindness Neg Hx     Hypertension Neg Hx     Macular degeneration Neg Hx     Retinal detachment Neg Hx     Strabismus Neg Hx     Thyroid disease Neg Hx      MEDICATIONS:    Current Outpatient Medications:     amiodarone (PACERONE) 200 MG Tab, Take 0.5 tablets (100 mg total) by mouth once daily., Disp: 45 tablet, Rfl: 3    aspirin (ECOTRIN) 81 MG EC tablet, Take 81 mg by mouth once daily., Disp: , Rfl:     dulaglutide (TRULICITY) 1.5 mg/0.5 mL pen injector, Inject 1.5 mg into the skin every 7 days., Disp: 4 pen, Rfl: 11    fluorouracil (EFUDEX) 5 % cream, Apply topically Daily. , Disp: , Rfl:     lancets Misc, 1 each by Misc.(Non-Drug; Combo Route) route once daily., Disp: 100 each, Rfl: 3    meclizine (ANTIVERT) 25 mg tablet, Take 1 tablet (25 mg total) by mouth 3 (three) times daily as needed for Dizziness., Disp: 60 tablet, Rfl: 0    metoprolol succinate (TOPROL-XL) 50 MG 24 hr tablet, Take 50 mg by mouth., Disp: , Rfl:     mirabegron (MYRBETRIQ) 25 mg Tb24 ER tablet, Take 1 tablet (25 mg total) by mouth once daily. For bladder, Disp: 30 tablet, Rfl: 11    mv-min-FA-Po-Lv-kxzjmsx-lutein 0.4-162-18 mg Tab, Take by mouth. 1 Tablet Oral Every day, Disp: , Rfl:     sacubitriL-valsartan (ENTRESTO)  mg per tablet, Take 1 tablet by mouth 2 (two) times daily., Disp: 60 tablet, Rfl:  "14    TRULICITY 0.75 mg/0.5 mL pen injector, INJECT 0.75mg Subcutaneously EVERY 7 DAYS, Disp: , Rfl:     blood sugar diagnostic Strp, 1 each by Misc.(Non-Drug; Combo Route) route once daily. (Patient not taking: Reported on 1/4/2024), Disp: 100 each, Rfl: 3    conjugated estrogens (PREMARIN) vaginal cream, 1 g with applicator or dime-sized amt with finger in vagina nightly x 2 weeks, then twice a week thereafter (Patient not taking: Reported on 1/4/2024), Disp: 42.5 g, Rfl: 12    furosemide (LASIX) 20 MG tablet, Take 1 tablet (20 mg total) by mouth daily as needed (swelling). (Patient not taking: Reported on 1/4/2024), Disp: 30 tablet, Rfl: 11    magnesium oxide (MAG-OX) 400 mg (241.3 mg magnesium) tablet, Take 1 tablet (400 mg total) by mouth once daily. (Patient not taking: Reported on 1/4/2024), Disp: , Rfl: 0    vitamin D (VITAMIN D3) 1000 units Tab, Take 1,000 Units by mouth once daily., Disp: , Rfl:     ALLERGIES:  Review of patient's allergies indicates:   Allergen Reactions    Penicillins      Caused uti      REVIEW OF SYSTEMS:  Constitution: Negative. Negative for chills, fever and night sweats.    Hematologic/Lymphatic: Negative for bleeding problem. Does not bruise/bleed easily.   Skin: Negative for dry skin, itching and rash.   Musculoskeletal: Negative for falls. Positive for right shoulder pain and muscle weakness.     All other review of symptoms were reviewed and found to be noncontributory.     PHYSICAL EXAMINATION:  Vitals:  /75   Pulse 71   Ht 5' 2" (1.575 m)   Wt 77.8 kg (171 lb 8.3 oz)   BMI 31.37 kg/m²    General: Well-developed well-nourished 89 y.o. femalein no acute distress   Cardiovascular: Regular rhythm by palpation of distal pulse, normal color and temperature, no concerning varicosities on symptomatic side   Lungs: No labored breathing or wheezing appreciated   Neuro: Alert and oriented ×3   Psychiatric: well oriented to person, place and time, demonstrates normal mood and " affect   Skin: No rashes, lesions or ulcers, normal temperature, turgor, and texture on uninvolved extremity    Ortho/SPM Exam  Examination of the right shoulder demonstrates active forward elevation to 80, passive to 130° with some pain.  Passive external rotation with arm at side to 40°.  Active to 30°.  Internal rotation to back pocket.  Some soft tissue swelling.  No ecchymosis.  Some tenderness to palpation over the proximal humerus and the proximal biceps groove.  Negative AC joint.  Motor and sensory intact to the right shoulder.  She does have some resistance to scaption but limited by pain.  4/5.    She also reports some right knee pain 1st noticed after the fall.  She has mild swelling over the anterior aspect of the knee.  Intact extensor mechanism.  Positive patellar crepitus and grind.  Stable to varus and valgus stress.  Range of motion from full extension to 110° flexion.    IMAGING:  Xrays including AP, Outlet and Axillary Lateral of RIGHT shoulder are ordered / images reviewed by me:   No fracture seen.  No significant DJD.    ASSESSMENT:      ICD-10-CM ICD-9-CM   1. Acute pain of right shoulder  M25.511 719.41     PLAN:     Findings discussed with the patient and her daughter.  I see no fracture on x-ray.  We discussed certainly that she may have some underlying nondisplaced osseous injury visible only on MRI versus acute on chronic rotator cuff pathology.  Discussed conservative treatment.  Physical therapy recommended.  Referral to the [facility] location.  Significant past cardiac history.  I would not recommend any oral NSAIDs for that reason.  She reports a good bit of pain and we have discussed the potential benefit of a subacromial steroid injection.  Unfortunately she has poorly controlled serum glucose levels and I would not recommend a steroid injection today.  We will continue to monitor this and she will return in 4-6 weeks if pain is persistent or recurrent despite these  measures.    Procedures

## 2024-02-14 DIAGNOSIS — I50.22 CHRONIC SYSTOLIC (CONGESTIVE) HEART FAILURE: ICD-10-CM

## 2024-02-14 RX ORDER — METOPROLOL SUCCINATE 50 MG/1
TABLET, EXTENDED RELEASE ORAL
Qty: 180 TABLET | Refills: 3 | Status: SHIPPED | OUTPATIENT
Start: 2024-02-14 | End: 2024-02-22 | Stop reason: SDUPTHER

## 2024-02-22 ENCOUNTER — HOSPITAL ENCOUNTER (OUTPATIENT)
Dept: CARDIOLOGY | Facility: HOSPITAL | Age: 89
Discharge: HOME | End: 2024-02-22
Attending: INTERNAL MEDICINE
Payer: MEDICARE

## 2024-02-22 ENCOUNTER — OFFICE VISIT (OUTPATIENT)
Dept: CARDIOLOGY | Facility: CLINIC | Age: 89
End: 2024-02-22
Payer: MEDICARE

## 2024-02-22 VITALS
DIASTOLIC BLOOD PRESSURE: 60 MMHG | BODY MASS INDEX: 30.97 KG/M2 | WEIGHT: 169.31 LBS | HEART RATE: 71 BPM | SYSTOLIC BLOOD PRESSURE: 105 MMHG

## 2024-02-22 VITALS
BODY MASS INDEX: 30.36 KG/M2 | HEIGHT: 62 IN | DIASTOLIC BLOOD PRESSURE: 60 MMHG | SYSTOLIC BLOOD PRESSURE: 112 MMHG | WEIGHT: 165 LBS | HEART RATE: 60 BPM

## 2024-02-22 DIAGNOSIS — I50.42 CHRONIC COMBINED SYSTOLIC AND DIASTOLIC HEART FAILURE: Primary | ICD-10-CM

## 2024-02-22 DIAGNOSIS — Z95.810 PRESENCE OF CARDIAC RESYNCHRONIZATION THERAPY DEFIBRILLATOR (CRT-D): ICD-10-CM

## 2024-02-22 DIAGNOSIS — I42.8 NONISCHEMIC CARDIOMYOPATHY: ICD-10-CM

## 2024-02-22 DIAGNOSIS — I50.42 CHRONIC COMBINED SYSTOLIC (CONGESTIVE) AND DIASTOLIC (CONGESTIVE) HEART FAILURE: ICD-10-CM

## 2024-02-22 DIAGNOSIS — I50.42 CHRONIC COMBINED SYSTOLIC AND DIASTOLIC HEART FAILURE: ICD-10-CM

## 2024-02-22 DIAGNOSIS — E78.5 HYPERLIPIDEMIA ASSOCIATED WITH TYPE 2 DIABETES MELLITUS: ICD-10-CM

## 2024-02-22 DIAGNOSIS — E11.59 HYPERTENSION ASSOCIATED WITH DIABETES: ICD-10-CM

## 2024-02-22 DIAGNOSIS — E11.69 HYPERLIPIDEMIA ASSOCIATED WITH TYPE 2 DIABETES MELLITUS: ICD-10-CM

## 2024-02-22 DIAGNOSIS — I25.10 CORONARY ARTERY DISEASE INVOLVING NATIVE CORONARY ARTERY OF NATIVE HEART WITHOUT ANGINA PECTORIS: ICD-10-CM

## 2024-02-22 DIAGNOSIS — I15.2 HYPERTENSION ASSOCIATED WITH DIABETES: ICD-10-CM

## 2024-02-22 LAB
ASCENDING AORTA: 3.59 CM
AV INDEX (PROSTH): 0.93
AV MEAN GRADIENT: 4 MMHG
AV PEAK GRADIENT: 8 MMHG
AV VALVE AREA BY VELOCITY RATIO: 2.45 CM²
AV VALVE AREA: 2.73 CM²
AV VELOCITY RATIO: 0.84
BSA FOR ECHO PROCEDURE: 1.81 M2
CV ECHO LV RWT: 0.26 CM
DOP CALC AO PEAK VEL: 1.37 M/S
DOP CALC AO VTI: 28.2 CM
DOP CALC LVOT AREA: 2.9 CM2
DOP CALC LVOT DIAMETER: 1.93 CM
DOP CALC LVOT PEAK VEL: 1.15 M/S
DOP CALC LVOT STROKE VOLUME: 77.08 CM3
DOP CALC MV VTI: 26.63 CM
DOP CALCLVOT PEAK VEL VTI: 26.36 CM
E WAVE DECELERATION TIME: 245.48 MSEC
E/A RATIO: 0.56
E/E' RATIO: 10.83 M/S
ECHO LV POSTERIOR WALL: 0.77 CM (ref 0.6–1.1)
EJECTION FRACTION: 40 %
FRACTIONAL SHORTENING: 21 % (ref 28–44)
HR MV ECHO: 60 BPM
INTERVENTRICULAR SEPTUM: 0.79 CM (ref 0.6–1.1)
LA MAJOR: 4.62 CM
LA MINOR: 4.1 CM
LA WIDTH: 3.02 CM
LEFT ATRIUM SIZE: 3.33 CM
LEFT ATRIUM VOLUME INDEX MOD: 18.6 ML/M2
LEFT ATRIUM VOLUME INDEX: 21.1 ML/M2
LEFT ATRIUM VOLUME MOD: 32.79 CM3
LEFT ATRIUM VOLUME: 37.14 CM3
LEFT INTERNAL DIMENSION IN SYSTOLE: 4.68 CM (ref 2.1–4)
LEFT VENTRICLE DIASTOLIC VOLUME INDEX: 100.4 ML/M2
LEFT VENTRICLE DIASTOLIC VOLUME: 176.71 ML
LEFT VENTRICLE MASS INDEX: 101 G/M2
LEFT VENTRICLE SYSTOLIC VOLUME INDEX: 57.6 ML/M2
LEFT VENTRICLE SYSTOLIC VOLUME: 101.39 ML
LEFT VENTRICULAR INTERNAL DIMENSION IN DIASTOLE: 5.95 CM (ref 3.5–6)
LEFT VENTRICULAR MASS: 177.75 G
LV LATERAL E/E' RATIO: 10.83 M/S
LV SEPTAL E/E' RATIO: 10.83 M/S
MV A" WAVE DURATION": 13.42 MSEC
MV MEAN GRADIENT: 2 MMHG
MV PEAK A VEL: 1.17 M/S
MV PEAK E VEL: 0.65 M/S
MV PEAK GRADIENT: 5 MMHG
MV STENOSIS PRESSURE HALF TIME: 71.19 MS
MV VALVE AREA BY CONTINUITY EQUATION: 2.89 CM2
MV VALVE AREA P 1/2 METHOD: 3.09 CM2
PISA TR MAX VEL: 2.6 M/S
PULM VEIN S/D RATIO: 2.2
PV PEAK D VEL: 0.35 M/S
PV PEAK S VEL: 0.77 M/S
QEF: 48 %
RA MAJOR: 4.77 CM
RA PRESSURE ESTIMATED: 3 MMHG
RA WIDTH: 3.06 CM
RIGHT VENTRICULAR END-DIASTOLIC DIMENSION: 2.8 CM
RV TB RVSP: 6 MMHG
SINUS: 3.04 CM
STJ: 3.28 CM
TDI LATERAL: 0.06 M/S
TDI SEPTAL: 0.06 M/S
TDI: 0.06 M/S
TR MAX PG: 27 MMHG
TRICUSPID ANNULAR PLANE SYSTOLIC EXCURSION: 2.29 CM
TV REST PULMONARY ARTERY PRESSURE: 30 MMHG
Z-SCORE OF LEFT VENTRICULAR DIMENSION IN END DIASTOLE: 2.01
Z-SCORE OF LEFT VENTRICULAR DIMENSION IN END SYSTOLE: 3.49

## 2024-02-22 PROCEDURE — 99999 PR PBB SHADOW E&M-EST. PATIENT-LVL III: CPT | Mod: PBBFAC,,, | Performed by: INTERNAL MEDICINE

## 2024-02-22 PROCEDURE — 99214 OFFICE O/P EST MOD 30 MIN: CPT | Mod: 25,S$GLB,, | Performed by: INTERNAL MEDICINE

## 2024-02-22 PROCEDURE — 93306 TTE W/DOPPLER COMPLETE: CPT | Mod: 26,,, | Performed by: INTERNAL MEDICINE

## 2024-02-22 PROCEDURE — 93306 TTE W/DOPPLER COMPLETE: CPT

## 2024-02-22 RX ORDER — FUROSEMIDE 20 MG/1
20 TABLET ORAL DAILY PRN
Qty: 30 TABLET | Refills: 11 | Status: SHIPPED | OUTPATIENT
Start: 2024-02-22

## 2024-02-22 RX ORDER — METOPROLOL SUCCINATE 50 MG/1
TABLET, EXTENDED RELEASE ORAL
Qty: 180 TABLET | Refills: 3 | Status: SHIPPED | OUTPATIENT
Start: 2024-02-22

## 2024-02-22 NOTE — PROGRESS NOTES
Subjective:   02/22/2024     Patient ID:  Eliana Wagner is a 89 y.o. female who presents for evaulation of Follow-up      Comes in for follow-up.  Continues to have episodes of dizziness, but no defibrillator discharges.  No swelling.  No increased chest pains tightness or shortness of breath.    She has a history of a dilated cardiomyopathy and congestive heart failure, this had improved since implantation of a biventricular defibrillator, but a recent echo showed ejection fraction 35%.  She is not having chest pains tightness or shortness of breath now.  She did have a defibrillator discharge associated with syncope.  She was treated with amiodarone.  She had been admitted to [facility] for this.  A nuclear stress test did not show evidence for ischemia, ejection fraction was 45%.  She continues to follow-up with EP.    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  She does have less than 99 % pacing, improved with amiodarone    Hypertension is present, currently well controlled with guideline directed medical therapy for congestive heart failure.    Hypercholesterolemia is present, her LDL is well controlled on high-dose statin therapy.      Prior note reviewed:  She would just been in the emergency room with an episode of vertigo, blood pressures were low, metoprolol dose decreased.  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  Guideline directed medical therapy was being titrated.  She is now on high-dose Entresto.  Not on potassium, not on spironolactone.  She did have hyperkalemia previously    She continues on amiodarone, will follow-up with EP next month.  Given recent episode of dizziness, I have asked her to decrease to 1/2 tablet daily    Recent recommendations:   1. Discontinue amlodipine and irbesartan  2. Continue amiodarone 200 mg every day  3. Begin Entresto 97/103, 1 tablet twice a day with meals     Subsequent  lab:   09/06/23 09:33  Sodium: 140  Potassium: 5.4 (H)  Chloride: 104  CO2: 27  Anion Gap: 9  BUN: 12  Creatinine: 1.0  eGFR: 54.2 !  Glucose: 206 (H)  Calcium: 9.5         Congestive Heart Failure  Associated symptoms include nocturia. Pertinent negatives include no abdominal pain, chest pain, claudication, near-syncope, palpitations or shortness of breath. Her past medical history is significant for CAD.   Coronary Artery Disease  Symptoms include dizziness. Pertinent negatives include no chest pain, leg swelling, palpitations, shortness of breath or weight gain. Risk factors include hyperlipidemia. Her past medical history is significant for CHF.   Hyperlipidemia  Pertinent negatives include no chest pain, focal weakness, myalgias or shortness of breath.   Hypertension  Associated symptoms include neck pain. Pertinent negatives include no chest pain, headaches, malaise/fatigue, orthopnea, palpitations, PND or shortness of breath.   Follow-up  Associated symptoms include neck pain. Pertinent negatives include no abdominal pain, anorexia, chest pain, chills, congestion, coughing, diaphoresis, fever, headaches, myalgias, nausea, rash, sore throat, vertigo, vomiting or weakness.       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)    Hyperlipidemia associated with type 2 diabetes mellitus    DM type 2 without retinopathy    History of CVA (cerebrovascular accident)    Type 2 diabetes mellitus with hyperglycemia, without long-term current use of insulin    Class 1 obesity with serious comorbidity in adult    Premature  ventricular contractions    Risk for falls    Mixed incontinence    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, with long-term current use of insulin    Syncope    VT (ventricular tachycardia)    HFrEF (heart failure with reduced ejection fraction)    Nonischemic cardiomyopathy    Actinic keratosis    Other rosacea    Nonischemic congestive cardiomyopathy    Thrombocytopenia, unspecified        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, with long-term current use of insulin 04/24/2023    Diabetes mellitus, type 2     Hyperlipidemia     Retinal detachment     od     Squamous cell carcinoma of skin     Stroke     tia x 3   ( last in 2005)    Urinary incontinence without sensory awareness 05/12/2021       Past Surgical History:   Procedure Laterality Date    APPENDECTOMY      CARDIAC CATHETERIZATION      CATARACT EXTRACTION Bilateral     EYE SURGERY Bilateral     cataract    INSERT / REPLACE / REMOVE PACEMAKER      INSERTION OF PACEMAKER      july 22 2019     Orestes Kim MD    TONSILLECTOMY         Review of patient's allergies indicates:   Allergen Reactions    Penicillins      Caused uti         Current Outpatient Medications:     amiodarone (PACERONE) 200 MG Tab, Take 0.5 tablets (100 mg total) by mouth once daily., Disp: 45 tablet, Rfl: 3    aspirin (ECOTRIN) 81 MG EC tablet, Take 81 mg by mouth once daily., Disp: , Rfl:     blood sugar diagnostic Strp, 1 each by Misc.(Non-Drug; Combo Route) route once daily., Disp: 100 each, Rfl: 3    conjugated estrogens (PREMARIN) vaginal cream, 1 g with applicator or dime-sized amt with finger in vagina nightly x 2 weeks, then twice a week thereafter, Disp: 42.5 g, Rfl: 12    dulaglutide (TRULICITY) 1.5 mg/0.5 mL pen injector, Inject 1.5 mg into the skin  every 7 days., Disp: 4 pen, Rfl: 11    fluorouracil (EFUDEX) 5 % cream, Apply topically Daily. , Disp: , Rfl:     lancets Misc, 1 each by Misc.(Non-Drug; Combo Route) route once daily., Disp: 100 each, Rfl: 3    magnesium oxide (MAG-OX) 400 mg (241.3 mg magnesium) tablet, Take 1 tablet (400 mg total) by mouth once daily., Disp: , Rfl: 0    meclizine (ANTIVERT) 25 mg tablet, Take 1 tablet (25 mg total) by mouth 3 (three) times daily as needed for Dizziness., Disp: 60 tablet, Rfl: 0    mirabegron (MYRBETRIQ) 25 mg Tb24 ER tablet, Take 1 tablet (25 mg total) by mouth once daily. For bladder, Disp: 30 tablet, Rfl: 11    mv-min-FA-Ma-Ah-vwychno-lutein 0.4-162-18 mg Tab, Take by mouth. 1 Tablet Oral Every day, Disp: , Rfl:     sacubitriL-valsartan (ENTRESTO)  mg per tablet, Take 1 tablet by mouth 2 (two) times daily., Disp: 60 tablet, Rfl: 14    TRULICITY 0.75 mg/0.5 mL pen injector, INJECT 0.75mg Subcutaneously EVERY 7 DAYS, Disp: , Rfl:     vitamin D (VITAMIN D3) 1000 units Tab, Take 1,000 Units by mouth once daily., Disp: , Rfl:     furosemide (LASIX) 20 MG tablet, Take 1 tablet (20 mg total) by mouth daily as needed (swelling)., Disp: 30 tablet, Rfl: 11    metoprolol succinate (TOPROL-XL) 50 MG 24 hr tablet, TAKE ONE TABLET BY MOUTH TWICE DAILY FOR BLOOD PRESSURE AND HEART, Disp: 180 tablet, Rfl: 3            Objective:   Review of Systems   Constitutional: Negative for chills, decreased appetite, diaphoresis, fever, malaise/fatigue, weight gain and weight loss.   HENT:  Negative for congestion, hearing loss, nosebleeds and sore throat.    Eyes:  Negative for double vision, vision loss in left eye and vision loss in right eye.   Cardiovascular:  Negative for chest pain, claudication, cyanosis, dyspnea on exertion, irregular heartbeat, leg swelling, near-syncope, orthopnea, palpitations, paroxysmal nocturnal dyspnea and syncope.   Respiratory:  Negative for cough, hemoptysis, shortness of breath, sleep  disturbances due to breathing, snoring and wheezing.    Endocrine: Negative for cold intolerance and heat intolerance.   Hematologic/Lymphatic: Negative for adenopathy and bleeding problem. Bruises/bleeds easily.   Skin:  Negative for itching, nail changes and rash.   Musculoskeletal:  Positive for falls and neck pain. Negative for arthritis, back pain and myalgias.   Gastrointestinal:  Positive for constipation. Negative for bloating, abdominal pain, anorexia, diarrhea, hematochezia, melena, nausea and vomiting.   Genitourinary:  Positive for frequency and nocturia. Negative for hematuria.   Neurological:  Positive for excessive daytime sleepiness, dizziness, loss of balance and paresthesias. Negative for focal weakness, headaches, vertigo and weakness.   Psychiatric/Behavioral:  Negative for altered mental status, depression and memory loss.    Allergic/Immunologic: Negative for hives.       Vitals:    02/22/24 1000   BP: 105/60   Pulse: 71             Physical Exam  Vitals reviewed.   Constitutional:       General: She is not in acute distress.     Appearance: She is well-developed.   HENT:      Head: Normocephalic and atraumatic.      Nose: Nose normal.   Eyes:      Conjunctiva/sclera: Conjunctivae normal.      Pupils: Pupils are equal, round, and reactive to light.   Neck:      Vascular: No carotid bruit or JVD.   Cardiovascular:      Rate and Rhythm: Normal rate and regular rhythm.      Pulses: Normal pulses and intact distal pulses.      Heart sounds: Normal heart sounds. No murmur heard.     No friction rub. No gallop.      Comments: Jugular venous pressure normal  Defibrillator site appears normal  Pulmonary:      Effort: Pulmonary effort is normal. No respiratory distress.      Breath sounds: Normal breath sounds. No wheezing or rales.   Chest:      Chest wall: No tenderness.   Abdominal:      General: Bowel sounds are normal. There is no distension.      Palpations: Abdomen is soft.      Tenderness: There  is no abdominal tenderness.   Musculoskeletal:         General: No tenderness or deformity. Normal range of motion.      Cervical back: Normal range of motion and neck supple.      Right lower leg: No edema.      Left lower leg: No edema.   Skin:     General: Skin is warm and dry.      Findings: No erythema or rash.   Neurological:      Mental Status: She is alert and oriented to person, place, and time.      Cranial Nerves: No cranial nerve deficit.      Motor: No abnormal muscle tone.      Coordination: Coordination normal.   Psychiatric:         Behavior: Behavior normal.         Thought Content: Thought content normal.         Judgment: Judgment normal.              Assessment and Plan:     Chronic combined systolic and diastolic heart failure  Comments:  Recheck ejection fraction, currently euvolemic  Orders:  -     metoprolol succinate (TOPROL-XL) 50 MG 24 hr tablet; TAKE ONE TABLET BY MOUTH TWICE DAILY FOR BLOOD PRESSURE AND HEART  Dispense: 180 tablet; Refill: 3  -     furosemide (LASIX) 20 MG tablet; Take 1 tablet (20 mg total) by mouth daily as needed (swelling).  Dispense: 30 tablet; Refill: 11  -     Echo; Future; Expected date: 02/29/2024    Coronary artery disease involving native coronary artery of native heart without angina pectoris  Comments:  Remains asymptomatic    Chronic combined systolic (congestive) and diastolic (congestive) heart failure    Hyperlipidemia associated with type 2 diabetes mellitus  Comments:  Last LDL 95    Hypertension associated with diabetes  Comments:  Well controlled    Nonischemic cardiomyopathy  Comments:  Increase metoprolol from 25 b.i.d. to 50 b.i.d.    Presence of cardiac resynchronization therapy defibrillator (CRT-D)               Follow up in about 6 months (around 8/22/2024).

## 2024-04-01 DIAGNOSIS — E11.9 TYPE 2 DIABETES MELLITUS WITHOUT COMPLICATION, WITHOUT LONG-TERM CURRENT USE OF INSULIN: Primary | ICD-10-CM

## 2024-04-01 NOTE — TELEPHONE ENCOUNTER
----- Message from Rose Avalos sent at 4/1/2024 12:30 PM CDT -----  Contact: [facility] [phone]  Pharmacy is calling to clarify an RX.  RX name:  dulaglutide (TRULICITY) 1.5 mg/0.5 mL pen injector  What do they need to clarify:  Pharmacy is currently out of stock on this dose of medication and would like to know if there is another that can be substituted for it?  Comments:     Please call and advise.    Thank You

## 2024-04-01 NOTE — TELEPHONE ENCOUNTER
We shouldn't substitute it, is there another pharmacy we can send it to until they are back in stock?

## 2024-04-02 ENCOUNTER — CLINICAL SUPPORT (OUTPATIENT)
Dept: CARDIOLOGY | Facility: HOSPITAL | Age: 89
End: 2024-04-02
Payer: MEDICARE

## 2024-04-02 ENCOUNTER — CLINICAL SUPPORT (OUTPATIENT)
Dept: CARDIOLOGY | Facility: HOSPITAL | Age: 89
End: 2024-04-02
Attending: INTERNAL MEDICINE
Payer: MEDICARE

## 2024-04-02 DIAGNOSIS — E11.9 TYPE 2 DIABETES MELLITUS WITHOUT COMPLICATION, WITHOUT LONG-TERM CURRENT USE OF INSULIN: ICD-10-CM

## 2024-04-02 DIAGNOSIS — I47.20 VENTRICULAR TACHYCARDIA, UNSPECIFIED: ICD-10-CM

## 2024-04-02 DIAGNOSIS — Z95.810 PRESENCE OF AUTOMATIC (IMPLANTABLE) CARDIAC DEFIBRILLATOR: ICD-10-CM

## 2024-04-02 PROCEDURE — 93296 REM INTERROG EVL PM/IDS: CPT | Performed by: INTERNAL MEDICINE

## 2024-04-02 PROCEDURE — 93295 DEV INTERROG REMOTE 1/2/MLT: CPT | Mod: ,,, | Performed by: INTERNAL MEDICINE

## 2024-04-02 RX ORDER — DULAGLUTIDE 1.5 MG/.5ML
1.5 INJECTION, SOLUTION SUBCUTANEOUS
Qty: 4 PEN | Refills: 11 | Status: SHIPPED | OUTPATIENT
Start: 2024-04-02 | End: 2024-04-04

## 2024-04-02 NOTE — TELEPHONE ENCOUNTER
No care due was identified.  [facility] Embedded Care Due Messages. Reference number: [id number].   4/02/2024 7:34:33 AM CDT

## 2024-04-02 NOTE — TELEPHONE ENCOUNTER
Care Due:                  Date            Visit Type   Department     Provider  --------------------------------------------------------------------------------                                SAME DAY -                              ESTABLISHED   [facility]  Last Visit: 10-      PATIENT      CARE           Tyra Marquez  Next Visit: None Scheduled  None         None Found                                                            Last  Test          Frequency    Reason                     Performed    Due Date  --------------------------------------------------------------------------------    HBA1C.......  6 months...  dulaglutide..............  07- 01-    [facility] Embedded Care Due Messages. Reference number: [id number].   4/02/2024 8:56:57 AM CDT

## 2024-04-03 NOTE — TELEPHONE ENCOUNTER
Refill Routing Note   Medication(s) are not appropriate for processing by [facility] for the following reason(s):             [facility] action(s):  Defer      Medication Therapy Plan: pharmacy is requesting diferent script due to back order      Appointments  past 12m or future 3m with PCP    Date Provider   Last Visit   10/19/2023 Tyra Marquez MD   Next Visit   Visit date not found Tyra Marquez MD   ED visits in past 90 days: 0        Note composed:11:13 AM 04/03/2024

## 2024-04-04 RX ORDER — DULAGLUTIDE 1.5 MG/.5ML
1.5 INJECTION, SOLUTION SUBCUTANEOUS
Qty: 12 PEN | Refills: 3 | Status: SHIPPED | OUTPATIENT
Start: 2024-04-04 | End: 2024-04-09 | Stop reason: SDUPTHER

## 2024-04-05 ENCOUNTER — PATIENT MESSAGE (OUTPATIENT)
Dept: PRIMARY CARE CLINIC | Facility: CLINIC | Age: 89
End: 2024-04-05
Payer: MEDICARE

## 2024-04-05 DIAGNOSIS — E11.9 TYPE 2 DIABETES MELLITUS WITHOUT COMPLICATION, WITHOUT LONG-TERM CURRENT USE OF INSULIN: ICD-10-CM

## 2024-04-08 ENCOUNTER — TELEPHONE (OUTPATIENT)
Dept: PRIMARY CARE CLINIC | Facility: CLINIC | Age: 89
End: 2024-04-08
Payer: MEDICARE

## 2024-04-08 NOTE — TELEPHONE ENCOUNTER
----- Message from Kemaljovana Hansen sent at 4/8/2024 11:46 AM CDT -----  Contact: [facility] @ [facility] [phone]  Would like to receive medical advice.    Would they like a call back or a response via [facility]:  call back    Additional information:  Calling to get a different [id number] for above pt for dulaglutide (TRULICITY) 1.5 mg/0.5 mL pen injector Or possibly switch the medication due to it being on back order

## 2024-04-08 NOTE — TELEPHONE ENCOUNTER
Increasing to Trulicity 3 mg would be too much.  Is there an alternative pharmacy she would like us to send it to?

## 2024-04-09 RX ORDER — DULAGLUTIDE 1.5 MG/.5ML
1.5 INJECTION, SOLUTION SUBCUTANEOUS
Qty: 12 PEN | Refills: 3 | Status: SHIPPED | OUTPATIENT
Start: 2024-04-09 | End: 2025-04-09

## 2024-04-09 NOTE — TELEPHONE ENCOUNTER
No care due was identified.  [facility] Embedded Care Due Messages. Reference number: [id number].   4/09/2024 8:34:50 AM CDT

## 2024-04-18 NOTE — TELEPHONE ENCOUNTER
No care due was identified.  [facility] Embedded Care Due Messages. Reference number: [id number].   4/18/2024 8:58:22 AM CDT

## 2024-04-19 ENCOUNTER — PATIENT MESSAGE (OUTPATIENT)
Dept: ADMINISTRATIVE | Facility: HOSPITAL | Age: 89
End: 2024-04-19
Payer: MEDICARE

## 2024-04-19 NOTE — TELEPHONE ENCOUNTER
Refill Routing Note   Medication(s) are not appropriate for processing by [facility] for the following reason(s):        No active prescription written by provider    [facility] action(s):  Defer               Appointments  past 12m or future 3m with PCP    Date Provider   Last Visit   10/19/2023 Tyra Marquez MD   Next Visit   Visit date not found Tyra Marquez MD   ED visits in past 90 days: 0        Note composed:11:51 AM 04/19/2024

## 2024-04-21 RX ORDER — MIRABEGRON 25 MG/1
TABLET, FILM COATED, EXTENDED RELEASE ORAL
Qty: 90 TABLET | Refills: 1 | Status: SHIPPED | OUTPATIENT
Start: 2024-04-21

## 2024-04-23 LAB
OHS CV AF BURDEN PERCENT: < 1
OHS CV BIV PACING PERCENT: 69.88 %
OHS CV DC REMOTE DEVICE TYPE: NORMAL
OHS CV ICD SHOCK: NO
OHS CV RV PACING PERCENT: 70.09 %

## 2024-07-03 ENCOUNTER — CLINICAL SUPPORT (OUTPATIENT)
Dept: CARDIOLOGY | Facility: HOSPITAL | Age: 89
End: 2024-07-03
Payer: MEDICARE

## 2024-07-03 ENCOUNTER — CLINICAL SUPPORT (OUTPATIENT)
Dept: CARDIOLOGY | Facility: HOSPITAL | Age: 89
End: 2024-07-03
Attending: INTERNAL MEDICINE
Payer: MEDICARE

## 2024-07-03 DIAGNOSIS — I47.20 VENTRICULAR TACHYCARDIA, UNSPECIFIED: ICD-10-CM

## 2024-07-03 DIAGNOSIS — Z95.810 PRESENCE OF AUTOMATIC (IMPLANTABLE) CARDIAC DEFIBRILLATOR: ICD-10-CM

## 2024-07-19 ENCOUNTER — PATIENT MESSAGE (OUTPATIENT)
Dept: ADMINISTRATIVE | Facility: HOSPITAL | Age: 89
End: 2024-07-19
Payer: MEDICARE

## 2024-07-19 ENCOUNTER — PATIENT MESSAGE (OUTPATIENT)
Dept: PRIMARY CARE CLINIC | Facility: CLINIC | Age: 89
End: 2024-07-19
Payer: MEDICARE

## 2024-07-19 DIAGNOSIS — R42 VERTIGO: Primary | ICD-10-CM

## 2024-07-20 RX ORDER — MECLIZINE HYDROCHLORIDE 25 MG/1
25 TABLET ORAL 3 TIMES DAILY PRN
Qty: 60 TABLET | Refills: 0 | Status: CANCELLED | OUTPATIENT
Start: 2024-07-20

## 2024-07-22 DIAGNOSIS — R42 VERTIGO: Primary | ICD-10-CM

## 2024-07-22 RX ORDER — MECLIZINE HYDROCHLORIDE 25 MG/1
25 TABLET ORAL 3 TIMES DAILY PRN
Qty: 60 TABLET | Refills: 0 | Status: SHIPPED | OUTPATIENT
Start: 2024-07-22

## 2024-07-23 ENCOUNTER — OFFICE VISIT (OUTPATIENT)
Dept: PODIATRY | Facility: CLINIC | Age: 89
End: 2024-07-23
Payer: MEDICARE

## 2024-07-23 VITALS
HEIGHT: 62 IN | WEIGHT: 163.13 LBS | HEART RATE: 66 BPM | DIASTOLIC BLOOD PRESSURE: 73 MMHG | SYSTOLIC BLOOD PRESSURE: 110 MMHG | BODY MASS INDEX: 30.02 KG/M2

## 2024-07-23 DIAGNOSIS — E11.9 ENCOUNTER FOR DIABETIC FOOT EXAM: ICD-10-CM

## 2024-07-23 DIAGNOSIS — B35.1 ONYCHOMYCOSIS DUE TO DERMATOPHYTE: ICD-10-CM

## 2024-07-23 DIAGNOSIS — E11.42 DIABETIC POLYNEUROPATHY ASSOCIATED WITH TYPE 2 DIABETES MELLITUS: Primary | ICD-10-CM

## 2024-07-23 PROCEDURE — 1160F RVW MEDS BY RX/DR IN RCRD: CPT | Mod: CPTII,S$GLB,, | Performed by: PODIATRIST

## 2024-07-23 PROCEDURE — 1159F MED LIST DOCD IN RCRD: CPT | Mod: CPTII,S$GLB,, | Performed by: PODIATRIST

## 2024-07-23 PROCEDURE — 1125F AMNT PAIN NOTED PAIN PRSNT: CPT | Mod: CPTII,S$GLB,, | Performed by: PODIATRIST

## 2024-07-23 PROCEDURE — 99999 PR PBB SHADOW E&M-EST. PATIENT-LVL IV: CPT | Mod: PBBFAC,,, | Performed by: PODIATRIST

## 2024-07-23 PROCEDURE — 99214 OFFICE O/P EST MOD 30 MIN: CPT | Mod: S$GLB,,, | Performed by: PODIATRIST

## 2024-07-23 NOTE — PROGRESS NOTES
Subjective:      Patient ID: Eliana Wagner is a 89 y.o. female.    Chief Complaint:   Nail Problem (Left great toenail ) and Toe Pain    Eliana is a 89 y.o. female who presents to the clinic upon referral from Dr. Colette green. provider found  for evaluation and treatment of diabetic feet. 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       Last seen 1/2023  Presents today with family  Relates the big toenail/toe is hurting    PCP: Tyra Marquez MD    Date Last Seen by PCP:1/4/24    Current shoe gear: Casual shoes proper fitting    Hemoglobin A1C   Date Value Ref Range Status   07/21/2023 8.4 (H) 4.0 - 5.6 % Final     Comment:     ADA Screening Guidelines:  5.7-6.4%  Consistent with prediabetes  >or=6.5%  Consistent with diabetes    High levels of fetal hemoglobin interfere with the HbA1C  assay. Heterozygous hemoglobin variants (HbS, HgC, etc)do  not significantly interfere with this assay.   However, presence of multiple variants may affect accuracy.     05/09/2023 8.3 (H) 4.0 - 5.6 % Final     Comment:     ADA Screening Guidelines:  5.7-6.4%  Consistent with prediabetes  >or=6.5%  Consistent with diabetes    High levels of fetal hemoglobin interfere with the HbA1C  assay. Heterozygous hemoglobin variants (HbS, HgC, etc)do  not significantly interfere with this assay.   However, presence of multiple variants may affect accuracy.     12/07/2022 8.1 (H) 4.0 - 5.6 % Final     Comment:     ADA Screening Guidelines:  5.7-6.4%  Consistent with prediabetes  >or=6.5%  Consistent with diabetes    High levels of fetal hemoglobin interfere with the HbA1C  assay.  Heterozygous hemoglobin variants (HbS, HgC, etc)do  not significantly interfere with this assay.   However, presence of multiple variants may affect accuracy.              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, with long-term current use of insulin 04/24/2023    Diabetes mellitus, type 2     Hyperlipidemia     Retinal detachment     od     Squamous cell carcinoma of skin     Stroke     tia x 3   ( last in 2005)    Urinary incontinence without sensory awareness 05/12/2021     Past Surgical History:   Procedure Laterality Date    APPENDECTOMY      CARDIAC CATHETERIZATION      CATARACT EXTRACTION Bilateral     EYE SURGERY Bilateral     cataract    INSERT / REPLACE / REMOVE PACEMAKER      INSERTION OF PACEMAKER      july 22 2019     Orestes Kim MD    TONSILLECTOMY       Current Outpatient Medications on File Prior to Visit   Medication Sig Dispense Refill    amiodarone (PACERONE) 200 MG Tab Take 0.5 tablets (100 mg total) by mouth once daily. 45 tablet 3    aspirin (ECOTRIN) 81 MG EC tablet Take 81 mg by mouth once daily.      blood sugar diagnostic Strp 1 each by Misc.(Non-Drug; Combo Route) route once daily. 100 each 3    dulaglutide (TRULICITY) 1.5 mg/0.5 mL pen injector Inject 1.5 mg into the skin every 7 days. 12 pen 3    fluorouracil (EFUDEX) 5 % cream Apply topically Daily.       furosemide (LASIX) 20 MG tablet Take 1 tablet (20 mg total) by mouth daily as needed (swelling). 30 tablet 11    lancets Misc 1 each by Misc.(Non-Drug; Combo Route) route once daily. 100 each 3    magnesium oxide (MAG-OX) 400 mg (241.3 mg magnesium) tablet Take 1 tablet (400 mg total) by mouth once daily.  0    meclizine (ANTIVERT) 25 mg tablet Take 1 tablet (25 mg total) by mouth 3 (three) times daily as needed for Dizziness. 60 tablet 0    metoprolol succinate (TOPROL-XL) 50 MG 24 hr  tablet TAKE ONE TABLET BY MOUTH TWICE DAILY FOR BLOOD PRESSURE AND HEART 180 tablet 3    mv-min-FA-Xl-Nb-qnhvsan-lutein 0.4-162-18 mg Tab Take by mouth. 1 Tablet Oral Every day      MYRBETRIQ 25 mg Tb24 ER tablet TAKE ONE TABLET BY MOUTH EVERY DAY FOR FOR BLADDER 90 tablet 1    sacubitriL-valsartan (ENTRESTO)  mg per tablet Take 1 tablet by mouth 2 (two) times daily. 60 tablet 14    TRULICITY 0.75 mg/0.5 mL pen injector INJECT 0.75mg Subcutaneously EVERY 7 DAYS      vitamin D (VITAMIN D3) 1000 units Tab Take 1,000 Units by mouth once daily.      conjugated estrogens (PREMARIN) vaginal cream 1 g with applicator or dime-sized amt with finger in vagina nightly x 2 weeks, then twice a week thereafter (Patient not taking: Reported on 7/23/2024) 42.5 g 12     No current facility-administered medications on file prior to visit.     Review of patient's allergies indicates:   Allergen Reactions    Penicillins      Caused uti       Review of Systems   Constitutional: Negative for chills, decreased appetite, fever, malaise/fatigue, night sweats, weight gain and weight loss.   Cardiovascular:  Positive for leg swelling. Negative for chest pain, claudication, dyspnea on exertion, palpitations and syncope.   Respiratory:  Negative for cough and shortness of breath.    Endocrine: Negative for cold intolerance and heat intolerance.   Hematologic/Lymphatic: Negative for bleeding problem. Does not bruise/bleed easily.   Skin:  Positive for nail changes. Negative for color change, dry skin, flushing, itching, poor wound healing, rash, skin cancer, suspicious lesions and unusual hair distribution.   Musculoskeletal:  Positive for arthritis. Negative for back pain, falls, gout, joint pain, joint swelling, muscle cramps, muscle weakness, myalgias, neck pain and stiffness.   Gastrointestinal:  Negative for diarrhea, nausea and vomiting.   Neurological:  Positive for numbness. Negative for dizziness, focal weakness, light-headedness,  "paresthesias, tremors, vertigo and weakness.   Psychiatric/Behavioral:  Negative for altered mental status and depression. The patient does not have insomnia.    Allergic/Immunologic: Negative.            Objective:       Vitals:    24 1004   BP: 110/73   Pulse: 66   Weight: 74 kg (163 lb 2.3 oz)   Height: 5' 2" (1.575 m)   PainSc:   4   PainLoc: Toe   74 kg (163 lb 2.3 oz)     Physical Exam  Vitals reviewed.   Constitutional:       General: She is not in acute distress.     Appearance: She is well-developed. She is not ill-appearing, toxic-appearing or diaphoretic.      Comments: Proper supportive shoegear   Cardiovascular:      Pulses:           Dorsalis pedis pulses are 1+ on the right side and 1+ on the left side.        Posterior tibial pulses are 1+ on the right side and 1+ on the left side.      Comments: Decreased hair growth to lower shins with evidence of chronic venous stasis dz.   Musculoskeletal:         General: No tenderness.      Right lower le+ Edema present.      Left lower le+ Edema present.      Right foot: Decreased range of motion. Deformity and prominent metatarsal heads present. No tenderness or bony tenderness.      Left foot: Decreased range of motion. Deformity and prominent metatarsal heads present. No tenderness or bony tenderness.      Comments: Flexible pes planus foot type w/ medial arch collapse and mild gastroc equinus      Reducible extensor and flexor contractures at the MTPJ and PIPJ of toes 2-5, bilat.      1st MPJ exostosis w/ lateral deviation of hallux, non trackbound.     No pop to toe.  Mild pain with nail debridement   Feet:      Right foot:      Protective Sensation: 5 sites tested.  5 sites sensed.      Skin integrity: Dry skin present. No ulcer, blister, skin breakdown, erythema, warmth or callus.      Toenail Condition: Right toenails are abnormally thick. Fungal disease present.     Left foot:      Protective Sensation: 5 sites tested.  5 sites sensed. "      Skin integrity: Dry skin present. No ulcer, blister, skin breakdown, erythema, warmth or callus.      Toenail Condition: Left toenails are abnormally thick. Fungal disease present.     Comments: Toenails 1-5 bilaterally are elongated, proximal clearing, distal have thickening discolored no acute signs of infection  Left hallux distal aspect is lysed with subungual debris no soi     no HPK      Skin:     General: Skin is warm.      Capillary Refill: Capillary refill takes 2 to 3 seconds.      Coloration: Skin is not pale.      Findings: No erythema or rash.   Neurological:      Mental Status: She is alert and oriented to person, place, and time.      Gait: Gait abnormal.   Psychiatric:         Attention and Perception: Attention normal.         Mood and Affect: Mood normal.         Speech: Speech normal.         Behavior: Behavior normal.         Thought Content: Thought content normal.         Cognition and Memory: Cognition normal.         Judgment: Judgment normal.               Assessment:       Encounter Diagnoses   Name Primary?    Diabetic polyneuropathy associated with type 2 diabetes mellitus Yes    Onychomycosis due to dermatophyte     Encounter for diabetic foot exam              Plan:       Eliana was seen today for nail problem and toe pain.    Diagnoses and all orders for this visit:    Diabetic polyneuropathy associated with type 2 diabetes mellitus    Onychomycosis due to dermatophyte    Encounter for diabetic foot exam          I counseled the patient on her conditions, their implications and medical management.      DM foot exam    No soi    - Shoe inspection. Diabetic Foot Education. Patient reminded of the importance of good nutrition and blood sugar control to help prevent podiatric complications of diabetes. Patient instructed on proper foot hygeine. We discussed wearing proper shoe gear, daily foot inspections, never walking without protective shoe gear, never putting sharp instruments  to feet, routine podiatric nail visits every 2-3 months.      - With patient's permission, the toenails mentioned above were aggressively reduced and debrided using a nail nipper, removing all offending nail and debris.    The patient will continue to monitor the areas daily, inspect the feet, wear protective shoe gear when ambulatory, and moisturizer to maintain skin integrity.      Encouraged more freq visits

## 2024-07-30 DIAGNOSIS — Z00.00 ENCOUNTER FOR MEDICARE ANNUAL WELLNESS EXAM: ICD-10-CM

## 2024-08-22 ENCOUNTER — OFFICE VISIT (OUTPATIENT)
Dept: CARDIOLOGY | Facility: CLINIC | Age: 89
End: 2024-08-22
Payer: MEDICARE

## 2024-08-22 VITALS
DIASTOLIC BLOOD PRESSURE: 71 MMHG | WEIGHT: 178.13 LBS | HEART RATE: 80 BPM | BODY MASS INDEX: 32.78 KG/M2 | HEIGHT: 62 IN | SYSTOLIC BLOOD PRESSURE: 121 MMHG

## 2024-08-22 DIAGNOSIS — I50.20 HFREF (HEART FAILURE WITH REDUCED EJECTION FRACTION): Primary | ICD-10-CM

## 2024-08-22 DIAGNOSIS — Z95.810 PRESENCE OF CARDIAC RESYNCHRONIZATION THERAPY DEFIBRILLATOR (CRT-D): ICD-10-CM

## 2024-08-22 DIAGNOSIS — E78.5 HYPERLIPIDEMIA, UNSPECIFIED HYPERLIPIDEMIA TYPE: ICD-10-CM

## 2024-08-22 DIAGNOSIS — I47.20 VT (VENTRICULAR TACHYCARDIA): ICD-10-CM

## 2024-08-22 PROCEDURE — 1126F AMNT PAIN NOTED NONE PRSNT: CPT | Mod: CPTII,S$GLB,, | Performed by: INTERNAL MEDICINE

## 2024-08-22 PROCEDURE — 99999 PR PBB SHADOW E&M-EST. PATIENT-LVL IV: CPT | Mod: PBBFAC,,, | Performed by: INTERNAL MEDICINE

## 2024-08-22 PROCEDURE — 1101F PT FALLS ASSESS-DOCD LE1/YR: CPT | Mod: CPTII,S$GLB,, | Performed by: INTERNAL MEDICINE

## 2024-08-22 PROCEDURE — 3288F FALL RISK ASSESSMENT DOCD: CPT | Mod: CPTII,S$GLB,, | Performed by: INTERNAL MEDICINE

## 2024-08-22 PROCEDURE — 99214 OFFICE O/P EST MOD 30 MIN: CPT | Mod: S$GLB,,, | Performed by: INTERNAL MEDICINE

## 2024-08-22 PROCEDURE — 1159F MED LIST DOCD IN RCRD: CPT | Mod: CPTII,S$GLB,, | Performed by: INTERNAL MEDICINE

## 2024-08-22 RX ORDER — ATORVASTATIN CALCIUM 40 MG/1
40 TABLET, FILM COATED ORAL DAILY
Qty: 90 TABLET | Refills: 5 | Status: SHIPPED | OUTPATIENT
Start: 2024-08-22

## 2024-08-22 NOTE — PROGRESS NOTES
Subjective:   08/22/2024     Patient ID:  Eliana Wagner is a 89 y.o. female who presents for evaulation of Hypertension and Coronary Artery Disease      Comes in doing well, no chest pains or tightness, no PND or orthopnea.  She does have diaphragmatic stimulation from her CRT pacemaker, unchanged though.      She has a history of a dilated cardiomyopathy and congestive heart failure, this had improved since implantation of a biventricular defibrillator, but a recent echo showed ejection fraction 35%.  She is not having chest pains tightness or shortness of breath now.  She did have a defibrillator discharge associated with syncope.  She was treated with amiodarone.  She had been admitted to [place] for this.  A nuclear stress test did not show evidence for ischemia, ejection fraction was 45%.  She continues to follow-up with EP.    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  She does have less than 99 % pacing, improved with amiodarone    Hypertension is present, currently well controlled with guideline directed medical therapy for congestive heart failure.    Hypercholesterolemia present, currently not on atorvastatin, had been, will resume, LDL goal less than 100.        Prior note reviewed:  Comes in for follow-up.  Continues to have episodes of dizziness, but no defibrillator discharges.  No swelling.  No increased chest pains tightness or shortness of breath.          Assessment and Plan:     Chronic combined systolic and diastolic heart failure  Comments:  Recheck ejection fraction, currently euvolemic  Orders:  -     metoprolol succinate (TOPROL-XL) 50 MG 24 hr tablet; TAKE ONE TABLET BY MOUTH TWICE DAILY FOR BLOOD PRESSURE AND HEART  Dispense: 180 tablet; Refill: 3  -     furosemide (LASIX) 20 MG tablet; Take 1 tablet (20 mg total) by mouth daily as needed (swelling).  Dispense: 30 tablet; Refill: 11  -      Echo; Future; Expected date: 02/29/2024     Coronary artery disease involving native coronary artery of native heart without angina pectoris  Comments:  Remains asymptomatic     Chronic combined systolic (congestive) and diastolic (congestive) heart failure     Hyperlipidemia associated with type 2 diabetes mellitus  Comments:  Last LDL 95     Hypertension associated with diabetes  Comments:  Well controlled     Nonischemic cardiomyopathy  Comments:  Increase metoprolol from 25 b.i.d. to 50 b.i.d.     Presence of cardiac resynchronization therapy defibrillator (CRT-D)                    Follow up in about 6 months (around 8/22/2024).  Prior note reviewed:  She would just been in the emergency room with an episode of vertigo, blood pressures were low, metoprolol dose decreased.  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  Guideline directed medical therapy was being titrated.  She is now on high-dose Entresto.  Not on potassium, not on spironolactone.  She did have hyperkalemia previously    She continues on amiodarone, will follow-up with EP next month.  Given recent episode of dizziness, I have asked her to decrease to 1/2 tablet daily    Recent recommendations:   1. Discontinue amlodipine and irbesartan  2. Continue amiodarone 200 mg every day  3. Begin Entresto 97/103, 1 tablet twice a day with meals     Subsequent lab:   09/06/23 09:33  Sodium: 140  Potassium: 5.4 (H)  Chloride: 104  CO2: 27  Anion Gap: 9  BUN: 12  Creatinine: 1.0  eGFR: 54.2 !  Glucose: 206 (H)  Calcium: 9.5         Congestive Heart Failure  Associated symptoms include nocturia. Pertinent negatives include no abdominal pain, chest pain, claudication, near-syncope, palpitations or shortness of breath. Her past medical history is significant for CAD.   Coronary Artery Disease  Symptoms include dizziness. Pertinent negatives include no chest pain, leg swelling, palpitations, shortness of breath or weight gain. Risk factors include hyperlipidemia. Her  past medical history is significant for CHF.   Hyperlipidemia  Pertinent negatives include no chest pain, focal weakness, myalgias or shortness of breath.   Hypertension  Associated symptoms include neck pain. Pertinent negatives include no chest pain, headaches, malaise/fatigue, orthopnea, palpitations, PND or shortness of breath.   Follow-up  Associated symptoms include neck pain. Pertinent negatives include no abdominal pain, anorexia, chest pain, chills, congestion, coughing, diaphoresis, fever, headaches, myalgias, nausea, rash, sore throat, vertigo, vomiting or weakness.       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)    Hyperlipidemia associated with type 2 diabetes mellitus    DM type 2 without retinopathy    History of CVA (cerebrovascular accident)    Type 2 diabetes mellitus with hyperglycemia, without long-term current use of insulin    Class 1 obesity with serious comorbidity in adult    Premature ventricular contractions    Risk for falls    Mixed incontinence    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, with long-term current use of insulin    Syncope    VT (ventricular tachycardia)    HFrEF (heart failure with reduced ejection fraction)    Nonischemic cardiomyopathy    Actinic keratosis    Other rosacea    Nonischemic congestive cardiomyopathy    Thrombocytopenia, unspecified        Past Medical History:   Diagnosis Date    Cervical polyp 05/28/2015    Diabetes mellitus due to underlying condition with both eyes affected by  proliferative retinopathy and macular edema, with long-term current use of insulin 04/24/2023    Diabetes mellitus, type 2     Hyperlipidemia     Retinal detachment     od     Squamous cell carcinoma of skin     Stroke     tia x 3   ( last in 2005)    Urinary incontinence without sensory awareness 05/12/2021       Past Surgical History:   Procedure Laterality Date    APPENDECTOMY      CARDIAC CATHETERIZATION      CATARACT EXTRACTION Bilateral     EYE SURGERY Bilateral     cataract    INSERT / REPLACE / REMOVE PACEMAKER      INSERTION OF PACEMAKER      july 22 2019     Orestes Kim MD    TONSILLECTOMY         Review of patient's allergies indicates:   Allergen Reactions    Penicillins      Caused uti         Current Outpatient Medications:     amiodarone (PACERONE) 200 MG Tab, Take 0.5 tablets (100 mg total) by mouth once daily., Disp: 45 tablet, Rfl: 3    aspirin (ECOTRIN) 81 MG EC tablet, Take 81 mg by mouth once daily., Disp: , Rfl:     blood sugar diagnostic Strp, 1 each by Misc.(Non-Drug; Combo Route) route once daily., Disp: 100 each, Rfl: 3    dulaglutide (TRULICITY) 1.5 mg/0.5 mL pen injector, Inject 1.5 mg into the skin every 7 days., Disp: 12 pen , Rfl: 3    fluorouracil (EFUDEX) 5 % cream, Apply topically Daily. , Disp: , Rfl:     lancets Misc, 1 each by Misc.(Non-Drug; Combo Route) route once daily., Disp: 100 each, Rfl: 3    magnesium oxide (MAG-OX) 400 mg (241.3 mg magnesium) tablet, Take 1 tablet (400 mg total) by mouth once daily., Disp: , Rfl: 0    meclizine (ANTIVERT) 25 mg tablet, Take 1 tablet (25 mg total) by mouth 3 (three) times daily as needed for Dizziness., Disp: 60 tablet, Rfl: 0    metoprolol succinate (TOPROL-XL) 50 MG 24 hr tablet, TAKE ONE TABLET BY MOUTH TWICE DAILY FOR BLOOD PRESSURE AND HEART, Disp: 180 tablet, Rfl: 3    mv-min-FA-Hg-Oe-dqzrawq-lutein 0.4-162-18 mg Tab, Take by mouth. 1 Tablet Oral Every day, Disp: , Rfl:     MYRBETRIQ 25 mg Tb24 ER tablet, TAKE ONE TABLET BY  MOUTH EVERY DAY FOR FOR BLADDER, Disp: 90 tablet, Rfl: 1    sacubitriL-valsartan (ENTRESTO)  mg per tablet, Take 1 tablet by mouth 2 (two) times daily., Disp: 60 tablet, Rfl: 14    vitamin D (VITAMIN D3) 1000 units Tab, Take 1,000 Units by mouth once daily., Disp: , Rfl:     atorvastatin (LIPITOR) 40 MG tablet, Take 1 tablet (40 mg total) by mouth once daily., Disp: 90 tablet, Rfl: 5    furosemide (LASIX) 20 MG tablet, Take 1 tablet (20 mg total) by mouth daily as needed (swelling). (Patient not taking: Reported on 8/22/2024), Disp: 30 tablet, Rfl: 11            Objective:   Review of Systems   Constitutional: Negative for chills, decreased appetite, diaphoresis, fever, malaise/fatigue, weight gain and weight loss.   HENT:  Negative for congestion, hearing loss, nosebleeds and sore throat.    Eyes:  Negative for double vision, vision loss in left eye and vision loss in right eye.   Cardiovascular:  Negative for chest pain, claudication, cyanosis, dyspnea on exertion, irregular heartbeat, leg swelling, near-syncope, orthopnea, palpitations, paroxysmal nocturnal dyspnea and syncope.   Respiratory:  Negative for cough, hemoptysis, shortness of breath, sleep disturbances due to breathing, snoring and wheezing.    Endocrine: Negative for cold intolerance and heat intolerance.   Hematologic/Lymphatic: Negative for adenopathy and bleeding problem. Bruises/bleeds easily.   Skin:  Negative for itching, nail changes and rash.   Musculoskeletal:  Positive for falls and neck pain. Negative for arthritis, back pain and myalgias.   Gastrointestinal:  Positive for constipation. Negative for bloating, abdominal pain, anorexia, diarrhea, hematochezia, melena, nausea and vomiting.   Genitourinary:  Positive for frequency and nocturia. Negative for hematuria.   Neurological:  Positive for excessive daytime sleepiness, dizziness, loss of balance and paresthesias. Negative for focal weakness, headaches, vertigo and weakness.    Psychiatric/Behavioral:  Negative for altered mental status, depression and memory loss.    Allergic/Immunologic: Negative for hives.       Vitals:    08/22/24 1058   BP: 121/71   Pulse: 80             Physical Exam  Vitals reviewed.   Constitutional:       General: She is not in acute distress.     Appearance: She is well-developed.   HENT:      Head: Normocephalic and atraumatic.      Nose: Nose normal.   Eyes:      Conjunctiva/sclera: Conjunctivae normal.      Pupils: Pupils are equal, round, and reactive to light.   Neck:      Vascular: No carotid bruit or JVD.   Cardiovascular:      Rate and Rhythm: Normal rate and regular rhythm.      Pulses: Normal pulses and intact distal pulses.      Heart sounds: Normal heart sounds. No murmur heard.     No friction rub. No gallop.      Comments: Jugular venous pressure normal  Defibrillator site appears normal  Pulmonary:      Effort: Pulmonary effort is normal. No respiratory distress.      Breath sounds: Normal breath sounds. No wheezing or rales.   Chest:      Chest wall: No tenderness.   Abdominal:      General: Bowel sounds are normal. There is no distension.      Palpations: Abdomen is soft.      Tenderness: There is no abdominal tenderness.   Musculoskeletal:         General: No tenderness or deformity. Normal range of motion.      Cervical back: Normal range of motion and neck supple.      Right lower leg: No edema.      Left lower leg: No edema.   Skin:     General: Skin is warm and dry.      Findings: No erythema or rash.   Neurological:      Mental Status: She is alert and oriented to person, place, and time.      Cranial Nerves: No cranial nerve deficit.      Motor: No abnormal muscle tone.      Coordination: Coordination normal.   Psychiatric:         Behavior: Behavior normal.         Thought Content: Thought content normal.         Judgment: Judgment normal.              Assessment and Plan:     HFrEF (heart failure with reduced ejection fraction)  -      Ambulatory referral/consult to Cardiac Electrophysiology; Future; Expected date: 11/11/2024  -     Echo; Future; Expected date: 02/22/2025    VT (ventricular tachycardia)  -     Ambulatory referral/consult to Cardiac Electrophysiology; Future; Expected date: 11/11/2024    Presence of cardiac resynchronization therapy defibrillator (CRT-D)  -     Ambulatory referral/consult to Cardiac Electrophysiology; Future; Expected date: 11/11/2024    Hyperlipidemia, unspecified hyperlipidemia type  -     atorvastatin (LIPITOR) 40 MG tablet; Take 1 tablet (40 mg total) by mouth once daily.  Dispense: 90 tablet; Refill: 5           Will see back in 6 months with an echo.  Follow-up with EP.    Follow up in about 6 months (around 2/22/2025).

## 2024-08-26 ENCOUNTER — OFFICE VISIT (OUTPATIENT)
Dept: PRIMARY CARE CLINIC | Facility: CLINIC | Age: 89
End: 2024-08-26
Payer: MEDICARE

## 2024-08-26 VITALS
WEIGHT: 179.69 LBS | SYSTOLIC BLOOD PRESSURE: 118 MMHG | DIASTOLIC BLOOD PRESSURE: 62 MMHG | BODY MASS INDEX: 32.86 KG/M2 | HEART RATE: 84 BPM | OXYGEN SATURATION: 96 %

## 2024-08-26 DIAGNOSIS — Z79.899 OTHER LONG TERM (CURRENT) DRUG THERAPY: ICD-10-CM

## 2024-08-26 DIAGNOSIS — E11.59 HYPERTENSION ASSOCIATED WITH DIABETES: ICD-10-CM

## 2024-08-26 DIAGNOSIS — E11.65 TYPE 2 DIABETES MELLITUS WITH HYPERGLYCEMIA, WITHOUT LONG-TERM CURRENT USE OF INSULIN: Primary | ICD-10-CM

## 2024-08-26 DIAGNOSIS — I50.20 HFREF (HEART FAILURE WITH REDUCED EJECTION FRACTION): ICD-10-CM

## 2024-08-26 DIAGNOSIS — E08.3513 DIABETES MELLITUS DUE TO UNDERLYING CONDITION WITH BOTH EYES AFFECTED BY PROLIFERATIVE RETINOPATHY AND MACULAR EDEMA, WITH LONG-TERM CURRENT USE OF INSULIN: ICD-10-CM

## 2024-08-26 DIAGNOSIS — I15.2 HYPERTENSION ASSOCIATED WITH DIABETES: ICD-10-CM

## 2024-08-26 DIAGNOSIS — K59.00 CONSTIPATION, UNSPECIFIED CONSTIPATION TYPE: ICD-10-CM

## 2024-08-26 DIAGNOSIS — D69.6 THROMBOCYTOPENIA, UNSPECIFIED: ICD-10-CM

## 2024-08-26 DIAGNOSIS — Z79.4 DIABETES MELLITUS DUE TO UNDERLYING CONDITION WITH BOTH EYES AFFECTED BY PROLIFERATIVE RETINOPATHY AND MACULAR EDEMA, WITH LONG-TERM CURRENT USE OF INSULIN: ICD-10-CM

## 2024-08-26 PROCEDURE — 99999 PR PBB SHADOW E&M-EST. PATIENT-LVL IV: CPT | Mod: PBBFAC,,, | Performed by: STUDENT IN AN ORGANIZED HEALTH CARE EDUCATION/TRAINING PROGRAM

## 2024-08-26 NOTE — PROGRESS NOTES
SUBJECTIVE     Chief Complaint   Patient presents with    Annual Exam       HPI  Eliana Wagner is a very pleasant 89 y.o. female with medical diagnoses as listed in the medical history and problem list that presents for annual exam. Pt has been doing well since our last appointment.    Pt is UTD on age appropriate CA screening.    Family, social, surgical Hx reviewed     Type 2 diabetes -  Medications:  Trulicity 1.5 mg weekly  Tolerating medications without issues.  -due for foot exam and diabetic eye exam  Pneumonia vaccination:  Due   Complications:  + diabetic retinopathy, no increased thirst, frequency of urination  Urine microalbumin to creatinine ratio: due.   last A1c's were   Lab Results   Component Value Date    HGBA1C 8.4 (H) 07/21/2023    HGBA1C 8.3 (H) 05/09/2023    HGBA1C 8.1 (H) 12/07/2022     Lab Results   Component Value Date    LDLCALC 95.0 10/14/2023    CREATININE 0.8 10/15/2023   .       Thrombocytopenia: Noted on lab work and has remained stable over time. Last platelet count 144.    HFrEF/HTN:  Follows regularly with cardiologist, Dr. Kim  On Entresto  mg b.i.d.  Amiodarone 100 mg daily  Metoprolol succinate 50 mg daily    Endorses constipation worsening over the past few months.  Diet low in fiber.  Drinking normal amounts of water.  No recent medication changes.  Having a bowel movement about every 3 days.  Says she previously had a daily bowel movement.      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, with long-term current use of insulin 04/24/2023    Diabetes mellitus, type 2     Hyperlipidemia     Retinal detachment     od     Squamous cell carcinoma of skin     Stroke     tia x 3   ( last in 2005)    Urinary incontinence without sensory awareness 05/12/2021       PAST SURGICAL HISTORY:  Past Surgical History:   Procedure Laterality Date    APPENDECTOMY      CARDIAC CATHETERIZATION      CATARACT EXTRACTION Bilateral     EYE SURGERY Bilateral     cataract    INSERT / REPLACE / REMOVE PACEMAKER      INSERTION OF PACEMAKER      july 22 2019     Orestes Kim MD    TONSILLECTOMY         SOCIAL HISTORY:  Social History     Socioeconomic History    Marital status:    Tobacco Use    Smoking status: Never    Smokeless tobacco: Never   Substance and Sexual Activity    Alcohol use: Yes     Alcohol/week: 2.0 standard drinks of alcohol     Types: 2 Cans of beer per week     Comment: socially    Drug use: No    Sexual activity: Not Currently     Social Determinants of Health     Financial Resource Strain: Low Risk  (1/4/2024)    Overall Financial Resource Strain (CARDIA)     Difficulty of Paying Living Expenses: Not hard at all   Food Insecurity: No Food Insecurity (1/4/2024)    Hunger Vital Sign     Worried About Running Out of Food in the Last Year: Never true     Ran Out of Food in the Last Year: Never true   Transportation Needs: No Transportation Needs (1/4/2024)    PRAPARE - Transportation     Lack of Transportation (Medical): No     Lack of Transportation (Non-Medical): No   Physical Activity: Unknown (1/4/2024)    Exercise Vital Sign     Days of Exercise per Week: 2 days   Stress: No Stress Concern Present (1/4/2024)    [employer] - Occupational Stress Questionnaire     Feeling of Stress : Not at all   Housing Stability: Low Risk  (1/4/2024)     Housing Stability Vital Sign     Unable to Pay for Housing in the Last Year: No     Number of Places Lived in the Last Year: 1     Unstable Housing in the Last Year: No       FAMILY HISTORY:  Family History   Problem Relation Name Age of Onset    Cataracts Mother Philomena Rawls     Heart disease Mother Philomena Rawls     Cataracts Father Geoff Rawls     Cancer Father Geoff Rawls     Diabetes Father Geoff Rawls     Heart disease Father Geoff Rawls     Stroke Father Geoff Rawls     No Known Problems Sister      No Known Problems Brother      Cancer Son          prostate    Liver disease Son          liver transplant    No Known Problems Maternal Aunt      No Known Problems Maternal Uncle      No Known Problems Paternal Aunt      No Known Problems Paternal Uncle      No Known Problems Maternal Grandmother      Glaucoma Maternal Grandfather      No Known Problems Paternal Grandmother      No Known Problems Paternal Grandfather      Breast cancer Neg Hx      Colon cancer Neg Hx      Ovarian cancer Neg Hx      Amblyopia Neg Hx      Blindness Neg Hx      Hypertension Neg Hx      Macular degeneration Neg Hx      Retinal detachment Neg Hx      Strabismus Neg Hx      Thyroid disease Neg Hx         ALLERGIES AND MEDICATIONS: updated and reviewed.  Review of patient's allergies indicates:   Allergen Reactions    Penicillins      Caused uti     Current Outpatient Medications   Medication Sig Dispense Refill    amiodarone (PACERONE) 200 MG Tab Take 0.5 tablets (100 mg total) by mouth once daily. 45 tablet 3    aspirin (ECOTRIN) 81 MG EC tablet Take 81 mg by mouth once daily.      atorvastatin (LIPITOR) 40 MG tablet Take 1 tablet (40 mg total) by mouth once daily. 90 tablet 5    blood sugar diagnostic Strp 1 each by Misc.(Non-Drug; Combo Route) route once daily. 100 each 3    dulaglutide (TRULICITY) 1.5 mg/0.5 mL pen injector Inject 1.5 mg into the skin every 7 days. 12 pen 3    fluorouracil (EFUDEX) 5 % cream Apply topically Daily.       furosemide  (LASIX) 20 MG tablet Take 1 tablet (20 mg total) by mouth daily as needed (swelling). (Patient not taking: Reported on 8/22/2024) 30 tablet 11    lancets Misc 1 each by Misc.(Non-Drug; Combo Route) route once daily. 100 each 3    magnesium oxide (MAG-OX) 400 mg (241.3 mg magnesium) tablet Take 1 tablet (400 mg total) by mouth once daily.  0    meclizine (ANTIVERT) 25 mg tablet Take 1 tablet (25 mg total) by mouth 3 (three) times daily as needed for Dizziness. 60 tablet 0    metoprolol succinate (TOPROL-XL) 50 MG 24 hr tablet TAKE ONE TABLET BY MOUTH TWICE DAILY FOR BLOOD PRESSURE AND HEART 180 tablet 3    mv-min-FA-Jt-Jf-osfyhup-lutein 0.4-162-18 mg Tab Take by mouth. 1 Tablet Oral Every day      MYRBETRIQ 25 mg Tb24 ER tablet TAKE ONE TABLET BY MOUTH EVERY DAY FOR FOR BLADDER 90 tablet 1    sacubitriL-valsartan (ENTRESTO)  mg per tablet Take 1 tablet by mouth 2 (two) times daily. 60 tablet 14    vitamin D (VITAMIN D3) 1000 units Tab Take 1,000 Units by mouth once daily.       No current facility-administered medications for this visit.       ROS  Review of Systems   HENT:  Positive for hearing loss.    Eyes:  Negative for discharge.   Respiratory:  Negative for wheezing.    Cardiovascular:  Negative for chest pain and palpitations.   Gastrointestinal:  Positive for constipation. Negative for blood in stool, diarrhea and vomiting.   Genitourinary:  Negative for dysuria and hematuria.   Musculoskeletal:  Negative for neck pain.   Neurological:  Negative for weakness and headaches.   Endo/Heme/Allergies:  Negative for polydipsia.           OBJECTIVE     Physical Exam  Vitals:    08/26/24 0959   BP: 118/62   Pulse: 84    Body mass index is 32.86 kg/m².  Weight: 81.5 kg (179 lb 10.8 oz)         Physical Exam  HENT:      Head: Normocephalic and atraumatic.      Nose: Nose normal.      Mouth/Throat:      Mouth: Mucous membranes are moist.      Pharynx: Oropharynx is clear.   Eyes:      Extraocular Movements:  Extraocular movements intact.      Conjunctiva/sclera: Conjunctivae normal.      Pupils: Pupils are equal, round, and reactive to light.   Cardiovascular:      Rate and Rhythm: Normal rate and regular rhythm.   Pulmonary:      Effort: Pulmonary effort is normal.      Breath sounds: Normal breath sounds.   Musculoskeletal:         General: No swelling. Normal range of motion.      Cervical back: Normal range of motion.      Right lower leg: No edema.      Left lower leg: No edema.   Skin:     General: Skin is warm.      Findings: No lesion or rash.   Neurological:      General: No focal deficit present.      Mental Status: She is alert and oriented to person, place, and time.      Motor: No weakness.   Psychiatric:         Mood and Affect: Mood normal.         Thought Content: Thought content normal.               ASSESSMENT     89 y.o. female with     1. Type 2 diabetes mellitus with hyperglycemia, without long-term current use of insulin    2. Hypertension associated with diabetes    3. HFrEF (heart failure with reduced ejection fraction)    4. 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 Thrombocytopenia, unspecified    6. Constipation, unspecified constipation type    7. Other long term (current) drug therapy        PLAN:     1. Type 2 diabetes mellitus with hyperglycemia, without long-term current use of insulin  Stable on medications, continue regimen    2. Hypertension associated with diabetes  Stable on medications, continue regimen    3. HFrEF (heart failure with reduced ejection fraction)  Stable on medications, continue regimen    4. 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 Thrombocytopenia, unspecified  Stable    6. Constipation, unspecified constipation type  Encouraged increased fiber intake, given list of high fiber foods.  Also encouraged increase in physical activity.  Avoid starchy foods that can worsen constipation.  Rec otc stool softener bid and prn Miralax.      7. Other long term (current) drug therapy  -     Hemoglobin A1C; Future; Expected date: 08/26/2024  -     TSH; Future; Expected date: 08/26/2024  -     Lipid Panel; Future; Expected date: 08/26/2024  -     Comprehensive Metabolic Panel; Future; Expected date: 08/26/2024  -     CBC Auto Differential; Future; Expected date: 08/26/2024        Discussed age and gender appropriate screenings at this visit and encouraged a healthy diet low in simple carbohydrates, and increased physical activity.  Counseled on medically appropriate vaccines based on age and current health condition.  Screening test reviewed and discussed with patient.      RTC in 6 months    Tyra Marquez MD  08/26/2024 10:11 AM

## 2024-08-30 DIAGNOSIS — I47.20 VT (VENTRICULAR TACHYCARDIA): Primary | ICD-10-CM

## 2024-09-20 ENCOUNTER — PATIENT MESSAGE (OUTPATIENT)
Dept: PRIMARY CARE CLINIC | Facility: CLINIC | Age: 89
End: 2024-09-20
Payer: MEDICARE

## 2024-09-20 DIAGNOSIS — I50.20 HFREF (HEART FAILURE WITH REDUCED EJECTION FRACTION): ICD-10-CM

## 2024-09-20 RX ORDER — SACUBITRIL AND VALSARTAN 97; 103 MG/1; MG/1
1 TABLET, FILM COATED ORAL 2 TIMES DAILY
Qty: 60 TABLET | Refills: 14 | Status: SHIPPED | OUTPATIENT
Start: 2024-09-20

## 2024-09-26 ENCOUNTER — PATIENT MESSAGE (OUTPATIENT)
Dept: PRIMARY CARE CLINIC | Facility: CLINIC | Age: 89
End: 2024-09-26
Payer: MEDICARE

## 2024-09-26 NOTE — TELEPHONE ENCOUNTER
Patient is trying to get reimbursement for [place] vacation in October of 2023. Was seen in hospital for vertigo and also had HFU with you after. Would you be able to complete?     Was seen recently on 8/26/24

## 2024-10-01 ENCOUNTER — CLINICAL SUPPORT (OUTPATIENT)
Dept: CARDIOLOGY | Facility: HOSPITAL | Age: 89
End: 2024-10-01
Payer: MEDICARE

## 2024-10-01 ENCOUNTER — PATIENT MESSAGE (OUTPATIENT)
Dept: PODIATRY | Facility: CLINIC | Age: 89
End: 2024-10-01
Payer: MEDICARE

## 2024-10-01 DIAGNOSIS — I47.20 VENTRICULAR TACHYCARDIA, UNSPECIFIED: ICD-10-CM

## 2024-10-01 DIAGNOSIS — Z95.810 PRESENCE OF AUTOMATIC (IMPLANTABLE) CARDIAC DEFIBRILLATOR: ICD-10-CM

## 2024-10-02 ENCOUNTER — CLINICAL SUPPORT (OUTPATIENT)
Dept: CARDIOLOGY | Facility: HOSPITAL | Age: 89
End: 2024-10-02
Attending: INTERNAL MEDICINE
Payer: MEDICARE

## 2024-10-02 DIAGNOSIS — Z95.810 PRESENCE OF AUTOMATIC (IMPLANTABLE) CARDIAC DEFIBRILLATOR: ICD-10-CM

## 2024-10-02 DIAGNOSIS — I47.20 VENTRICULAR TACHYCARDIA, UNSPECIFIED: ICD-10-CM

## 2024-10-02 PROCEDURE — 93296 REM INTERROG EVL PM/IDS: CPT | Performed by: INTERNAL MEDICINE

## 2024-10-02 PROCEDURE — 93295 DEV INTERROG REMOTE 1/2/MLT: CPT | Mod: ,,, | Performed by: INTERNAL MEDICINE

## 2024-10-18 ENCOUNTER — PATIENT MESSAGE (OUTPATIENT)
Dept: ADMINISTRATIVE | Facility: HOSPITAL | Age: 89
End: 2024-10-18
Payer: MEDICARE

## 2024-10-18 DIAGNOSIS — E11.9 TYPE 2 DIABETES MELLITUS WITHOUT COMPLICATION, UNSPECIFIED WHETHER LONG TERM INSULIN USE: ICD-10-CM

## 2024-10-30 LAB
OHS CV AF BURDEN PERCENT: < 1
OHS CV BIV PACING PERCENT: 67.33 %
OHS CV DC REMOTE DEVICE TYPE: NORMAL
OHS CV RV PACING PERCENT: 67.89 %

## 2024-11-21 DIAGNOSIS — I47.20 VT (VENTRICULAR TACHYCARDIA): ICD-10-CM

## 2024-11-21 RX ORDER — AMIODARONE HYDROCHLORIDE 200 MG/1
100 TABLET ORAL
Qty: 45 TABLET | Refills: 3 | Status: SHIPPED | OUTPATIENT
Start: 2024-11-21

## 2024-11-21 RX ORDER — MIRABEGRON 25 MG/1
TABLET, FILM COATED, EXTENDED RELEASE ORAL
Qty: 90 TABLET | Refills: 1 | Status: SHIPPED | OUTPATIENT
Start: 2024-11-21

## 2024-11-21 NOTE — TELEPHONE ENCOUNTER
Refill Routing Note   Medication(s) are not appropriate for processing by [facility] for the following reason(s):        Required labs outdated    [facility] action(s):  Defer     Requires labs : Yes             Appointments  past 12m or future 3m with PCP    Date Provider   Last Visit   8/26/2024 Tyra Marquez MD   Next Visit   Visit date not found Tyra Marquez MD   ED visits in past 90 days: 0        Note composed:1:04 PM 11/21/2024

## 2024-11-21 NOTE — TELEPHONE ENCOUNTER
Care Due:                  Date            Visit Type   Department     Provider  --------------------------------------------------------------------------------                                MYCHART                              FOLLOWUP/OF  [facility]  Last Visit: 08-      [facility] VISIT   JESS Marquez  Next Visit: None Scheduled  None         None Found                                                            Last  Test          Frequency    Reason                     Performed    Due Date  --------------------------------------------------------------------------------    HBA1C.......  6 months...  dulaglutide..............  07- 01-    [facility] Embedded Care Due Messages. Reference number: [id number].   11/21/2024 8:29:40 AM CST

## 2024-12-06 ENCOUNTER — HOSPITAL ENCOUNTER (OUTPATIENT)
Dept: CARDIOLOGY | Facility: CLINIC | Age: 89
Discharge: HOME | End: 2024-12-06
Payer: MEDICARE

## 2024-12-06 ENCOUNTER — CLINICAL SUPPORT (OUTPATIENT)
Dept: CARDIOLOGY | Facility: HOSPITAL | Age: 89
End: 2024-12-06
Attending: STUDENT IN AN ORGANIZED HEALTH CARE EDUCATION/TRAINING PROGRAM
Payer: MEDICARE

## 2024-12-06 ENCOUNTER — OFFICE VISIT (OUTPATIENT)
Dept: ELECTROPHYSIOLOGY | Facility: CLINIC | Age: 89
End: 2024-12-06
Payer: MEDICARE

## 2024-12-06 VITALS
BODY MASS INDEX: 32.62 KG/M2 | DIASTOLIC BLOOD PRESSURE: 80 MMHG | HEART RATE: 76 BPM | WEIGHT: 177.25 LBS | HEIGHT: 62 IN | SYSTOLIC BLOOD PRESSURE: 142 MMHG

## 2024-12-06 DIAGNOSIS — E78.5 HYPERLIPIDEMIA ASSOCIATED WITH TYPE 2 DIABETES MELLITUS: ICD-10-CM

## 2024-12-06 DIAGNOSIS — Z86.73 HISTORY OF CVA (CEREBROVASCULAR ACCIDENT): ICD-10-CM

## 2024-12-06 DIAGNOSIS — E11.69 OBESITY, DIABETES, AND HYPERTENSION SYNDROME: ICD-10-CM

## 2024-12-06 DIAGNOSIS — E66.811 CLASS 1 OBESITY WITH BODY MASS INDEX (BMI) OF 32.0 TO 32.9 IN ADULT, UNSPECIFIED OBESITY TYPE, UNSPECIFIED WHETHER SERIOUS COMORBIDITY PRESENT: ICD-10-CM

## 2024-12-06 DIAGNOSIS — I47.20 VT (VENTRICULAR TACHYCARDIA): ICD-10-CM

## 2024-12-06 DIAGNOSIS — Z95.810 BIVENTRICULAR ICD (IMPLANTABLE CARDIOVERTER-DEFIBRILLATOR) IN PLACE: ICD-10-CM

## 2024-12-06 DIAGNOSIS — E78.2 MIXED HYPERLIPIDEMIA: ICD-10-CM

## 2024-12-06 DIAGNOSIS — E11.65 TYPE 2 DIABETES MELLITUS WITH HYPERGLYCEMIA, WITHOUT LONG-TERM CURRENT USE OF INSULIN: ICD-10-CM

## 2024-12-06 DIAGNOSIS — I47.20 VT (VENTRICULAR TACHYCARDIA): Primary | ICD-10-CM

## 2024-12-06 DIAGNOSIS — E11.69 HYPERLIPIDEMIA ASSOCIATED WITH TYPE 2 DIABETES MELLITUS: ICD-10-CM

## 2024-12-06 DIAGNOSIS — I15.2 HYPERTENSION ASSOCIATED WITH DIABETES: ICD-10-CM

## 2024-12-06 DIAGNOSIS — I50.42 CHRONIC COMBINED SYSTOLIC (CONGESTIVE) AND DIASTOLIC (CONGESTIVE) HEART FAILURE: ICD-10-CM

## 2024-12-06 DIAGNOSIS — I50.42 CHRONIC COMBINED SYSTOLIC AND DIASTOLIC HEART FAILURE: ICD-10-CM

## 2024-12-06 DIAGNOSIS — I10 PRIMARY HYPERTENSION: ICD-10-CM

## 2024-12-06 DIAGNOSIS — E11.59 OBESITY, DIABETES, AND HYPERTENSION SYNDROME: ICD-10-CM

## 2024-12-06 DIAGNOSIS — I50.20 HFREF (HEART FAILURE WITH REDUCED EJECTION FRACTION): ICD-10-CM

## 2024-12-06 DIAGNOSIS — E66.9 OBESITY, DIABETES, AND HYPERTENSION SYNDROME: ICD-10-CM

## 2024-12-06 DIAGNOSIS — I49.3 PREMATURE VENTRICULAR CONTRACTIONS: ICD-10-CM

## 2024-12-06 DIAGNOSIS — Z95.810 PRESENCE OF CARDIAC RESYNCHRONIZATION THERAPY DEFIBRILLATOR (CRT-D): ICD-10-CM

## 2024-12-06 DIAGNOSIS — I15.2 OBESITY, DIABETES, AND HYPERTENSION SYNDROME: ICD-10-CM

## 2024-12-06 DIAGNOSIS — I42.0 NONISCHEMIC CONGESTIVE CARDIOMYOPATHY: ICD-10-CM

## 2024-12-06 DIAGNOSIS — I25.10 CORONARY ARTERY DISEASE INVOLVING NATIVE CORONARY ARTERY OF NATIVE HEART WITHOUT ANGINA PECTORIS: ICD-10-CM

## 2024-12-06 DIAGNOSIS — I42.8 NONISCHEMIC CARDIOMYOPATHY: ICD-10-CM

## 2024-12-06 DIAGNOSIS — E11.59 HYPERTENSION ASSOCIATED WITH DIABETES: ICD-10-CM

## 2024-12-06 LAB
OHS QRS DURATION: 154 MS
OHS QTC CALCULATION: 537 MS

## 2024-12-06 PROCEDURE — 99999 PR PBB SHADOW E&M-EST. PATIENT-LVL III: CPT | Mod: PBBFAC,,, | Performed by: STUDENT IN AN ORGANIZED HEALTH CARE EDUCATION/TRAINING PROGRAM

## 2024-12-06 PROCEDURE — 93005 ELECTROCARDIOGRAM TRACING: CPT | Mod: 59,S$GLB,, | Performed by: STUDENT IN AN ORGANIZED HEALTH CARE EDUCATION/TRAINING PROGRAM

## 2024-12-06 PROCEDURE — 93284 PRGRMG EVAL IMPLANTABLE DFB: CPT | Mod: 26,,, | Performed by: STUDENT IN AN ORGANIZED HEALTH CARE EDUCATION/TRAINING PROGRAM

## 2024-12-06 PROCEDURE — 93010 ELECTROCARDIOGRAM REPORT: CPT | Mod: S$GLB,,, | Performed by: INTERNAL MEDICINE

## 2024-12-06 PROCEDURE — 93284 PRGRMG EVAL IMPLANTABLE DFB: CPT

## 2024-12-06 NOTE — PROGRESS NOTES
"Electrophysiology Clinic Note    Reason for follow-up patient visit: Ongoing evaluation and routine device surveillance of a Medtronic CRT-D that is approaching RRT.     PRESENTING HISTORY:     History of Present Illness:  Ms. Eliana Wagner is a bridgett 89-year-old woman who returns to clinic today for ongoing evaluation and routine device surveillance of a Medtronic CRT-D that is approaching RRT. She has a past medical history significant for nonischemic cardiomyopathy with most recent LVEF 40-45%, s/p implantation of a Medtronic CRT-D 6/17/2019 with Dr. Kim, baseline LBBB, hypertension, hyperlipidemia, type II diabetes mellitus, history of prior CVA, and obesity. She previously suffered an event of VF on 8/5/2023 lasting 12 seconds that required ATP burst during charging and one effective 20J shock. She had a prior event of MMVT on 4/23/2023 that was successfully ATP terminated without requiring defibrillation. She remains on metoprolol succinate 50mg po daily and amiodarone 100mg po daily with no subsequent ventricular arrhythmias on device interrogation today. Her battery longevity continues to decline, with an estimated 7 months remaining until requiring a generator change.      In brief review of her prior cardiac history, she has a history of a dilated cardiomyopathy and left bundle branch block with minimal improvement after implantation of a cardiac resynchronization therapy. She is biventricularly pacing 97.1%, improved from prior to amiodarone initiation. She had never required ICD tachytherapies until her event of VT on 8/5/2023. At that time, she describes that she had just gotten into her car after getting her groceries when she suddenly lost consciousness, with no reported prodromal symptoms. She reports feeling "weak" for some time afterwards. She drove home shortly after and told her family, who promptly called 911 and she was taken to [facility]. She underwent cardiac evaluation with " "a pharmacologic nuclear cardiac stress test that did not reveal any ischemia or prior infarction. She was subsequently started on amiodarone and has not had any further events since initiation of this therapy. She continues to live an active lifestyle, and is able to exercise at the gym and perform her ADLs independently. At our previous encounter, we reprogrammed her device with burst ATP as per MADIT-RIT. Of note, she has approximately 7 months of remaining battery longevity prior to requiring a generator change. She occasionally reports "hiccups", consistent with phrenic nerve stimulation from her CS lead. Her vectors were assessed today with selection of the least frequent PNS vector.     In discussion with Ms. Wagner and her daughter today, she tells me that she is feeling overall quite well. She denies any episodes of dizziness, lightheadedness, syncope/presyncope, chest pain or chest discomfort, palpitations, nausea or vomiting, orthopnea, or PND. She reports baseline mild shortness of breath and dyspnea with exertion that has remained stable for her. She has difficulty climbing stairs secondary to knee and hip pain, but has continued to exercise at the gym on a seated bicycle or seated elliptical without reported limitation. She recently started walking with a rolling walker for longer distances secondary to periods of transient dizziness associated with her vertigo. She denies any alarms or alerts from her device.     Review of Systems:  Review of Systems   Constitutional:  Negative for activity change.   HENT:  Negative for nasal congestion, nosebleeds, postnasal drip, rhinorrhea, sinus pressure/congestion, sneezing and sore throat.    Respiratory:  Positive for shortness of breath. Negative for apnea, cough, chest tightness and wheezing.    Cardiovascular:  Negative for chest pain, palpitations and leg swelling.   Gastrointestinal:  Negative for abdominal distention, abdominal pain, blood in stool, " change in bowel habit, constipation, diarrhea, nausea and vomiting.   Genitourinary:  Negative for dysuria and hematuria.   Musculoskeletal:  Positive for arthralgias and back pain. Negative for gait problem.   Neurological:  Positive for vertigo. Negative for dizziness, seizures, syncope, weakness, light-headedness, headaches and coordination difficulties.        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, with long-term current use of insulin 04/24/2023    Diabetes mellitus, type 2     Hyperlipidemia     Retinal detachment     od     Squamous cell carcinoma of skin     Stroke     tia x 3   ( last in 2005)    Urinary incontinence without sensory awareness 05/12/2021       Past Surgical History:   Procedure Laterality Date    APPENDECTOMY      CARDIAC CATHETERIZATION      CATARACT EXTRACTION Bilateral     EYE SURGERY Bilateral     cataract    INSERT / REPLACE / REMOVE PACEMAKER      INSERTION OF PACEMAKER      july 22 2019     Orestes Kim MD    TONSILLECTOMY         Family History:  Family History   Problem Relation Name Age of Onset    Cataracts Mother Philomena Rawls     Heart disease Mother Philomena Rawls     Cataracts Father Geoff Rawls     Cancer Father Geoff Rawls     Diabetes Father Geoff Rawls     Heart disease Father Geoff Rawls     Stroke Father Geoff Rawls     No Known Problems Sister      No Known Problems Brother      Cancer Son          prostate    Liver disease Son          liver transplant    No Known Problems Maternal Aunt      No Known Problems Maternal Uncle      No Known Problems Paternal Aunt      No Known Problems Paternal Uncle      No Known Problems Maternal Grandmother      Glaucoma Maternal Grandfather      No Known Problems Paternal Grandmother      No Known Problems Paternal Grandfather      Breast cancer Neg Hx      Colon cancer Neg Hx      Ovarian  cancer Neg Hx      Amblyopia Neg Hx      Blindness Neg Hx      Hypertension Neg Hx      Macular degeneration Neg Hx      Retinal detachment Neg Hx      Strabismus Neg Hx      Thyroid disease Neg Hx         Social History:  She  reports that she has never smoked. She has never used smokeless tobacco. She reports current alcohol use of about 2.0 standard drinks of alcohol per week. She reports that she does not use drugs.      MEDICATIONS & ALLERGIES:     Review of patient's allergies indicates:   Allergen Reactions    Penicillins      Caused uti       Current Outpatient Medications on File Prior to Visit   Medication Sig Dispense Refill    amiodarone (PACERONE) 200 MG Tab TAKE 1/2 TABLET BY MOUTH EVERY DAY 45 tablet 3    aspirin (ECOTRIN) 81 MG EC tablet Take 81 mg by mouth once daily.      atorvastatin (LIPITOR) 40 MG tablet Take 1 tablet (40 mg total) by mouth once daily. 90 tablet 5    blood sugar diagnostic Strp 1 each by Misc.(Non-Drug; Combo Route) route once daily. 100 each 3    dulaglutide (TRULICITY) 1.5 mg/0.5 mL pen injector Inject 1.5 mg into the skin every 7 days. 12 pen 3    ENTRESTO  mg per tablet TAKE ONE TABLET BY MOUTH TWICE DAILY 60 tablet 14    fluorouracil (EFUDEX) 5 % cream Apply topically Daily.       furosemide (LASIX) 20 MG tablet Take 1 tablet (20 mg total) by mouth daily as needed (swelling). (Patient not taking: Reported on 8/22/2024) 30 tablet 11    lancets Misc 1 each by Misc.(Non-Drug; Combo Route) route once daily. 100 each 3    magnesium oxide (MAG-OX) 400 mg (241.3 mg magnesium) tablet Take 1 tablet (400 mg total) by mouth once daily.  0    meclizine (ANTIVERT) 25 mg tablet Take 1 tablet (25 mg total) by mouth 3 (three) times daily as needed for Dizziness. 60 tablet 0    metoprolol succinate (TOPROL-XL) 50 MG 24 hr tablet TAKE ONE TABLET BY MOUTH TWICE DAILY FOR BLOOD PRESSURE AND HEART 180 tablet 3    mirabegron (MYRBETRIQ) 25 mg Tb24 ER tablet TAKE ONE TABLET BY MOUTH EVERY  "DAY FOR FOR BLADDER 90 tablet 1    mv-min-FA-El-Zn-cmgcmum-lutein 0.4-162-18 mg Tab Take by mouth. 1 Tablet Oral Every day      vitamin D (VITAMIN D3) 1000 units Tab Take 1,000 Units by mouth once daily.       No current facility-administered medications on file prior to visit.        OBJECTIVE:     Vital Signs:  BP (!) 142/80   Pulse 76   Ht 5' 2" (1.575 m)   Wt 80.4 kg (177 lb 4 oz)   BMI 32.42 kg/m²     Physical Exam:  Physical Exam  Constitutional:       General: She is not in acute distress.     Appearance: Normal appearance. She is not ill-appearing or diaphoretic.      Comments: Well-appearing elderly woman in NAD, ambulating with a rolling walker.    HENT:      Head: Normocephalic and atraumatic.      Nose: Nose normal.      Mouth/Throat:      Mouth: Mucous membranes are moist.      Pharynx: Oropharynx is clear.   Eyes:      Pupils: Pupils are equal, round, and reactive to light.   Cardiovascular:      Rate and Rhythm: Normal rate and regular rhythm.      Pulses: Normal pulses.      Heart sounds: Normal heart sounds. No murmur heard.     No friction rub. No gallop.      Comments: Left anterior chest wall incision site is in excellent repair. She has lost weight since implantation and the device is located slightly more superficially.   Pulmonary:      Effort: Pulmonary effort is normal. No respiratory distress.      Breath sounds: Normal breath sounds. No wheezing, rhonchi or rales.   Chest:      Chest wall: No tenderness.   Abdominal:      General: There is no distension.      Palpations: Abdomen is soft.      Tenderness: There is no abdominal tenderness.   Musculoskeletal:         General: No swelling or tenderness.      Cervical back: Normal range of motion.      Right lower leg: No edema.      Left lower leg: No edema.   Skin:     General: Skin is warm and dry.      Findings: No erythema, lesion or rash.   Neurological:      General: No focal deficit present.      Mental Status: She is alert and " oriented to person, place, and time. Mental status is at baseline.      Motor: No weakness.      Gait: Gait normal.   Psychiatric:         Mood and Affect: Mood normal.         Behavior: Behavior normal.        Laboratory Data:  Lab Results   Component Value Date    WBC 9.12 10/15/2023    HGB 13.9 10/15/2023    HCT 43.4 10/15/2023    MCV 91 10/15/2023     (L) 10/15/2023     Lab Results   Component Value Date     (H) 10/15/2023     10/15/2023    K 4.1 10/15/2023     10/15/2023    CO2 27 10/15/2023    BUN 14 10/15/2023    CREATININE 0.8 10/15/2023    CALCIUM 8.7 10/15/2023    MG 2.1 10/15/2023     Lab Results   Component Value Date    INR 1.2 10/14/2023    INR 1.0 10/14/2023    INR 1.0 06/06/2008       Pertinent Cardiac Data:  Personal interpretation of today's ECG: Atrially-paced, biventricularly-paced rhythm with rate of 76 bpm, AV delay 168 ms, biventricularly paced  ms, QT/QTc 478/537 ms.     Resting 2D Transthoracic Echocardiogram - 9/27/2021:  The left ventricle is mildly enlarged with mildly decreased systolic function.  The estimated ejection fraction is 45%.  There is abnormal septal wall motion consistent with left bundle branch block.  Grade I left ventricular diastolic dysfunction.  The estimated PA systolic pressure is 26 mmHg.  Normal right ventricular size with normal right ventricular systolic function.  Normal central venous pressure (3 mmHg).  Mild mitral regurgitation.  There is no pulmonary hypertension.    Resting 2D Transthoracic Echocardiogram - 1/13/2022:  The left ventricle is normal in size with mildly decreased systolic function.  The estimated ejection fraction is 45%.  There are segmental left ventricular wall motion abnormalities: akinetic inferolateral and apical inferior segments.  Grade I left ventricular diastolic dysfunction.  Mild aortic regurgitation.  Normal right ventricular size with normal right ventricular systolic function.  Normal central  venous pressure (3 mmHg).  The estimated PA systolic pressure is 23 mmHg.    Resting 2D Transthoracic Echocardiogram - 5/17/2023:  The left ventricle is moderately enlarged with moderately decreased systolic function.  The estimated ejection fraction is 35%.  The quantitatively derived ejection fraction is 33%.  The left ventricular global longitudinal strain is -9.9%.  There are segmental left ventricular wall motion abnormalities. Septal akinesis  Grade I left ventricular diastolic dysfunction.  The estimated PA systolic pressure is 28 mmHg.  Normal right ventricular size with normal right ventricular systolic function.  Normal central venous pressure (3 mmHg).  Mild aortic regurgitation.  Mild mitral regurgitation.  Mild tricuspid regurgitation.  There is no pulmonary hypertension.    Pharmacologic Nuclear Cardiac Stress Test - SPECT - 8/7/2023:  1. No convincing scintigraphic evidence for ischemia or infarct.  2. The global left ventricular systolic function is mildly diminished with an LV ejection fraction of 45 % and no evidence of LV dilatation.    Resting 2D Transthoracic Echocardiogram - 2/22/2024:    Left Ventricle: The left ventricle is mildly dilated. Normal wall thickness. There is eccentric hypertrophy. Regional wall motion abnormalities present. There is mildly reduced systolic function with a visually estimated ejection fraction of 40 - 45%. Ejection fraction by visual approximation is 40%. There is normal diastolic function.    Right Ventricle: Normal right ventricular cavity size. Wall thickness is normal. Right ventricle wall motion  is normal. Systolic function is normal.    Aortic Valve: There is mild aortic regurgitation.    Mitral Valve: There is no stenosis. The mean pressure gradient across the mitral valve is 2 mmHg at a heart rate of 60 bpm.    Pulmonary Artery: No pulmonary hypertension. The estimated pulmonary artery systolic pressure is 30 mmHg.    IVC/SVC: Normal venous pressure at 3  mmHg.   Systolic function appears improved compared to prior study.    Personal interpretation of today's Device Interrogation: Personal review of her device interrogation report (Medtronic Claria MRI CRT-D, implanted 7/17/2019) reveals declining battery longevity with an estimated 7 months of battery life remaining. Her leads were interrogated with acceptable and stable lead parameters. Of note, she had phrenic stimulation with high LV pacing output, with widening of her pulse width to 1.5ms with no reported symptoms. She is presently in underlying sinus rhythm with first degree AB block and is currently AP-BiVP. She is RA paced 48%, biventricularly paced 97.1%. Her previously recorded PVC burden of 2.6% has improved to 1.6% since initiation of amiodarone therapy. There are no concerning AHR or VHR episodes noted, and she has not required tachytherapies since her prior event of VF on 8/5/2023 lasting 12 seconds that required ATP burst during charging and one effective 20J shock. She has not had any recurrent events of atrial fibrillation since her last interrogation with an overall AT/AF burden of 0%.       ASSESSMENT & PLAN:   Ms. Eliana Wagner is a bridgett 89-year-old woman who returns to clinic today for ongoing evaluation and routine device surveillance of a Medtronic CRT-D that is approaching RRT. She has a past medical history significant for nonischemic cardiomyopathy with most recent LVEF 40-45%, s/p implantation of a Medtronic CRT-D 6/17/2019 with Dr. Kim, baseline LBBB, hypertension, hyperlipidemia, type II diabetes mellitus, history of prior CVA, and obesity. She previously suffered an event of VF on 8/5/2023 lasting 12 seconds that required ATP burst during charging and one effective 20J shock. She had a prior event of MMVT on 4/23/2023 that was successfully ATP terminated without requiring defibrillation. She remains on metoprolol succinate 50mg po daily and amiodarone 100mg po daily with no  subsequent ventricular arrhythmias on device interrogation today. Her battery longevity continues to decline, with an estimated 7 months remaining until requiring a generator change.      - She has a normally functioning CRT-D on device interrogation today with underlying sinus rhythm with first degree AV block. She has a biventricular paced QRSd that remains relatively wide at 154ms. She is biventricularly paced 97.1%, with improved suppression of her previously noted frequent PVCs (prior PVC burden of 2.6%, currently 1.6%).   - We will continue pharmacologic ectopy suppression with metoprolol succinate 50mg po daily and amiodarone 100mg po daily. She has not required any further device tachytherapies since starting this therapy.   - She continues to have mildly reduced systolic function on her most recent echocardiogram with most recent LVEF 40-45%, improved from her prior LVEF of 35%. Dr. Kim continues to closely monitor her guideline-directed medical therapy. She reports improved symptoms with improved exercise capacity and denies any overt cardiac symptoms on assessment today.  - We discussed that she will require a generator change in approximately 7 months. This procedure was described in detail, in addition to potential risks, benefits, and alternative treatment options. Ms. Wagner voices understanding and is in agreement with proceeding once she reaches RRT.   - We discussed continued device surveillance, and she continues to send remote transmission every month as she approaches RRT, with repeat in-office interrogation in six months.     This patient will return to clinic with me in six months for an in-office device interrogation, with ongoing remote device transmissions monthly in the interim. She will continue to follow with her general cardiologist, Dr. Kim, and her PCP. All questions and concerns were addressed at this encounter. Total time spent in this patient encounter: 33 minutes.      Signing Physician:       RAMANDEEP Mittal MD  Electrophysiology Attending

## 2024-12-11 LAB
OHS CV AF BURDEN PERCENT: < 1
OHS CV DC REMOTE DEVICE TYPE: NORMAL

## 2024-12-30 ENCOUNTER — OFFICE VISIT (OUTPATIENT)
Dept: PRIMARY CARE CLINIC | Facility: CLINIC | Age: 89
End: 2024-12-30
Payer: MEDICARE

## 2024-12-30 VITALS
HEIGHT: 62 IN | RESPIRATION RATE: 16 BRPM | DIASTOLIC BLOOD PRESSURE: 64 MMHG | OXYGEN SATURATION: 98 % | HEART RATE: 75 BPM | WEIGHT: 179.25 LBS | BODY MASS INDEX: 32.99 KG/M2 | SYSTOLIC BLOOD PRESSURE: 122 MMHG

## 2024-12-30 DIAGNOSIS — Z86.73 HISTORY OF CVA (CEREBROVASCULAR ACCIDENT): ICD-10-CM

## 2024-12-30 DIAGNOSIS — Z96.1 PSEUDOPHAKIA: ICD-10-CM

## 2024-12-30 DIAGNOSIS — I15.2 HYPERTENSION ASSOCIATED WITH DIABETES: ICD-10-CM

## 2024-12-30 DIAGNOSIS — I50.42 CHRONIC COMBINED SYSTOLIC AND DIASTOLIC HEART FAILURE: ICD-10-CM

## 2024-12-30 DIAGNOSIS — E11.9 DM TYPE 2 WITHOUT RETINOPATHY: ICD-10-CM

## 2024-12-30 DIAGNOSIS — E08.3513 DIABETES MELLITUS DUE TO UNDERLYING CONDITION WITH BOTH EYES AFFECTED BY PROLIFERATIVE RETINOPATHY AND MACULAR EDEMA, WITH LONG-TERM CURRENT USE OF INSULIN: ICD-10-CM

## 2024-12-30 DIAGNOSIS — I42.8 NONISCHEMIC CARDIOMYOPATHY: ICD-10-CM

## 2024-12-30 DIAGNOSIS — I42.0 NONISCHEMIC CONGESTIVE CARDIOMYOPATHY: ICD-10-CM

## 2024-12-30 DIAGNOSIS — L57.0 ACTINIC KERATOSIS: ICD-10-CM

## 2024-12-30 DIAGNOSIS — E78.5 HYPERLIPIDEMIA ASSOCIATED WITH TYPE 2 DIABETES MELLITUS: ICD-10-CM

## 2024-12-30 DIAGNOSIS — I47.20 VT (VENTRICULAR TACHYCARDIA): ICD-10-CM

## 2024-12-30 DIAGNOSIS — E11.69 HYPERLIPIDEMIA ASSOCIATED WITH TYPE 2 DIABETES MELLITUS: ICD-10-CM

## 2024-12-30 DIAGNOSIS — E11.59 HYPERTENSION ASSOCIATED WITH DIABETES: ICD-10-CM

## 2024-12-30 DIAGNOSIS — D69.6 THROMBOCYTOPENIA, UNSPECIFIED: ICD-10-CM

## 2024-12-30 DIAGNOSIS — Z95.810 PRESENCE OF CARDIAC RESYNCHRONIZATION THERAPY DEFIBRILLATOR (CRT-D): ICD-10-CM

## 2024-12-30 DIAGNOSIS — I15.2 OBESITY, DIABETES, AND HYPERTENSION SYNDROME: ICD-10-CM

## 2024-12-30 DIAGNOSIS — I49.3 PREMATURE VENTRICULAR CONTRACTIONS: ICD-10-CM

## 2024-12-30 DIAGNOSIS — Z00.00 ENCOUNTER FOR MEDICARE ANNUAL WELLNESS EXAM: Primary | ICD-10-CM

## 2024-12-30 DIAGNOSIS — L71.8 OTHER ROSACEA: ICD-10-CM

## 2024-12-30 DIAGNOSIS — N39.46 MIXED INCONTINENCE: ICD-10-CM

## 2024-12-30 DIAGNOSIS — E11.59 OBESITY, DIABETES, AND HYPERTENSION SYNDROME: ICD-10-CM

## 2024-12-30 DIAGNOSIS — E11.69 OBESITY, DIABETES, AND HYPERTENSION SYNDROME: ICD-10-CM

## 2024-12-30 DIAGNOSIS — E11.65 TYPE 2 DIABETES MELLITUS WITH HYPERGLYCEMIA, WITHOUT LONG-TERM CURRENT USE OF INSULIN: ICD-10-CM

## 2024-12-30 DIAGNOSIS — I50.20 HFREF (HEART FAILURE WITH REDUCED EJECTION FRACTION): ICD-10-CM

## 2024-12-30 DIAGNOSIS — I25.10 CORONARY ARTERY DISEASE INVOLVING NATIVE CORONARY ARTERY OF NATIVE HEART WITHOUT ANGINA PECTORIS: ICD-10-CM

## 2024-12-30 DIAGNOSIS — I50.42 CHRONIC COMBINED SYSTOLIC (CONGESTIVE) AND DIASTOLIC (CONGESTIVE) HEART FAILURE: ICD-10-CM

## 2024-12-30 DIAGNOSIS — Z79.4 DIABETES MELLITUS DUE TO UNDERLYING CONDITION WITH BOTH EYES AFFECTED BY PROLIFERATIVE RETINOPATHY AND MACULAR EDEMA, WITH LONG-TERM CURRENT USE OF INSULIN: ICD-10-CM

## 2024-12-30 DIAGNOSIS — E66.9 OBESITY, DIABETES, AND HYPERTENSION SYNDROME: ICD-10-CM

## 2024-12-30 DIAGNOSIS — D31.31 CHOROIDAL NEVUS OF RIGHT EYE: ICD-10-CM

## 2024-12-30 DIAGNOSIS — Z91.81 RISK FOR FALLS: ICD-10-CM

## 2024-12-30 PROCEDURE — 1126F AMNT PAIN NOTED NONE PRSNT: CPT | Mod: CPTII,S$GLB,, | Performed by: NURSE PRACTITIONER

## 2024-12-30 PROCEDURE — 1159F MED LIST DOCD IN RCRD: CPT | Mod: CPTII,S$GLB,, | Performed by: NURSE PRACTITIONER

## 2024-12-30 PROCEDURE — 1158F ADVNC CARE PLAN TLK DOCD: CPT | Mod: CPTII,S$GLB,, | Performed by: NURSE PRACTITIONER

## 2024-12-30 PROCEDURE — 1160F RVW MEDS BY RX/DR IN RCRD: CPT | Mod: CPTII,S$GLB,, | Performed by: NURSE PRACTITIONER

## 2024-12-30 PROCEDURE — 99999 PR PBB SHADOW E&M-EST. PATIENT-LVL V: CPT | Mod: PBBFAC,,, | Performed by: NURSE PRACTITIONER

## 2024-12-30 PROCEDURE — G0439 PPPS, SUBSEQ VISIT: HCPCS | Mod: S$GLB,,, | Performed by: NURSE PRACTITIONER

## 2024-12-30 NOTE — PROGRESS NOTES
"  Eliana Wagner presented for an initial Medicare AWV today. The following components were reviewed and updated:    Medical history  Family History  Social history  Allergies and Current Medications  Health Risk Assessment  Health Maintenance  Care Team    **See Completed Assessments for Annual Wellness visit with in the encounter summary    The following assessments were completed:  Depression Screening  Cognitive function Screening  Timed Get Up Test  Whisper Test      Opioid documentation:      Patient does not have a current opioid prescription.          Vitals:    12/30/24 0918   BP: 122/64   BP Location: Right arm   Patient Position: Sitting   Pulse: 75   Resp: 16   SpO2: 98%   Weight: 81.3 kg (179 lb 3.7 oz)   Height: 5' 2" (1.575 m)     Body mass index is 32.78 kg/m².       Physical Exam  Vitals and nursing note reviewed.   Constitutional:       General: She is not in acute distress.     Appearance: Normal appearance. She is not ill-appearing.   HENT:      Head: Normocephalic and atraumatic.      Nose: Nose normal.      Mouth/Throat:      Mouth: Mucous membranes are moist.      Pharynx: No posterior oropharyngeal erythema.   Cardiovascular:      Rate and Rhythm: Normal rate and regular rhythm.      Pulses: Normal pulses.      Heart sounds: Normal heart sounds.   Pulmonary:      Effort: Pulmonary effort is normal. No respiratory distress.      Breath sounds: Normal breath sounds.   Abdominal:      Palpations: Abdomen is soft.   Musculoskeletal:         General: Normal range of motion.      Cervical back: Normal range of motion and neck supple.   Skin:     General: Skin is warm and dry.   Neurological:      General: No focal deficit present.      Mental Status: She is alert and oriented to person, place, and time.   Psychiatric:         Mood and Affect: Mood normal.         Behavior: Behavior normal.         Thought Content: Thought content normal.         Judgment: Judgment normal.         Diagnoses and " health risks identified today and associated recommendations/orders:  1. Encounter for Medicare annual wellness exam  - Ambulatory Referral/Consult to Enhanced Annual Wellness Visit (eAWV)    2. Chronic combined systolic and diastolic heart failure  Stable on current meds, followed by Dr. Kyrie REYES and Dr. Kim cardiology  Will continue to monitor  Continue scheduled f/u    3. VT (ventricular tachycardia)  Stable on current meds, followed by Dr. Kyrie REYES and Dr. Kim cardiology  Will continue to monitor  Continue scheduled f/u    4. Coronary artery disease involving native coronary artery of native heart without angina pectoris  Stable on current meds, followed by Dr. Kyrie REYES and Dr. Kim cardiology  Will continue to monitor  Continue scheduled f/u    5. Presence of cardiac resynchronization therapy defibrillator (CRT-D)  Stable on current meds, followed by Dr. Kyrie REYES and Dr. Kim cardiology  Will continue to monitor  Continue scheduled f/u    6. Chronic combined systolic (congestive) and diastolic (congestive) heart failure  Stable on current meds, followed by Dr. Kyrie REYES and Dr. Kim cardiology  Will continue to monitor  Continue scheduled f/u    7. HFrEF (heart failure with reduced ejection fraction)  Stable on current meds, followed by Dr. Kyrie REYES and Dr. Kim cardiology  Will continue to monitor  Continue scheduled f/u    8. 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 Hypertension associated with diabetes  Stable on current meds, followed by Dr. Judy young  Will continue to monitor    10. Type 2 diabetes mellitus with hyperglycemia, without long-term current use of insulin  Stable on trulicity, will continue to monitor    11. Hyperlipidemia  associated with type 2 diabetes mellitus  Stable on atorvastatin, will continue to monitor    12. Risk for falls  No recent falls, pt will return to PT  Will continue to monitor    13. Premature ventricular contractions  Stable on current meds, followed by Dr. Kyrie REYES and Dr. Kim cardiology  Will continue to monitor  Continue scheduled f/u    14. Nonischemic congestive cardiomyopathy  Stable on current meds, followed by Dr. Kyrie REYES and Dr. Kim cardiology  Will continue to monitor  Continue scheduled f/u    15. Nonischemic cardiomyopathy  Stable on current meds, followed by Dr. Kyrie REYES and Dr. Kim cardiology  Will continue to monitor  Continue scheduled f/u    16. History of CVA (cerebrovascular accident)  Patient has been stable  We will continue to monitor    17. Pseudophakia  Has been stable  Continue scheduled follow-ups with ophthalmology  We will continue to monitor    18. Choroidal nevus of right eye  Has been stable  Continue scheduled follow-ups with ophthalmology  We will continue to monitor    19. Actinic keratosis  Has been stable we will continue to monitor    20. Obesity, diabetes, and hypertension syndrome  Has been stable  BMI 32.78  We will continue to monitor    21. Mixed incontinence  Has been stable on Myrbetriq  We will continue to monitor    22. Thrombocytopenia, unspecified  Has been stable  We will continue to monitor    23. DM type 2 without retinopathy  Has been stable  We will continue to monitor through diabetic eye exams  Continue scheduled follow-ups with ophthalmology    24. Other rosacea  Has been stable  We will continue to monitor      Provided Eliana with a 5-10 year written screening schedule and personal prevention plan. Recommendations were developed using the USPSTF age appropriate recommendations. Education, counseling, and referrals were provided as needed.  After Visit Summary printed and given to patient which includes a list of additional  screenings\tests needed.    Follow up in about 1 year (around 12/30/2025), or if symptoms worsen or fail to improve.      Stephania Davis, RAUL    I offered to discuss advanced care planning, including how to pick a person who would make decisions for you if you were unable to make them for yourself, called a health care power of , and what kind of decisions you might make such as use of life sustaining treatments such as ventilators and tube feeding when faced with a life limiting illness recorded on a living will that they will need to know. (How you want to be cared for as you near the end of your natural life)     X Patient is interested in learning more about how to make advanced directives.  I provided them paperwork and offered to discuss this with them.  I offered to discuss advanced care planning, including how to pick a person who would make decisions for you if you were unable to make them for yourself, called a health care power of , and what kind of decisions you might make such as use of life sustaining treatments such as ventilators and tube feeding when faced with a life limiting illness recorded on a living will that they will need to know. (How you want to be cared for as you near the end of your natural life)     X Patient is interested in learning more about how to make advanced directives.  I provided them paperwork and offered to discuss this with them.

## 2025-01-06 NOTE — PATIENT INSTRUCTIONS
Counseling and Referral of Other Preventative  (Italic type indicates deductible and co-insurance are waived)    Patient Name: Eliana Wagner  Today's Date: 1/6/2025    Health Maintenance       Date Due Completion Date    RSV Vaccine (Age 60+ and Pregnant patients) (1 - 1-dose 75+ series) Never done ---    Hemoglobin A1c 10/21/2023 7/21/2023    Diabetes Urine Screening 05/10/2024 5/10/2023    Lipid Panel 10/14/2024 10/14/2023    DEXA Scan 07/21/2025 7/21/2023    Override on 8/30/2018: Done (ordered today)    Aspirin/Antiplatelet Therapy 12/06/2025 12/6/2024    TETANUS VACCINE 10/11/2029 10/11/2019        No orders of the defined types were placed in this encounter.    The following information is provided to all patients.  This information is to help you find resources for any of the problems found today that may be affecting your health:                  Living healthy guide: www.[facility].[place].[facility]      Understanding Diabetes: www.diabetes.org      Eating healthy: www.cdc.gov/healthyweight      [facility] home safety checklist: www.cdc.gov/steadi/patient.html      Agency on Aging: www.goea.[place].[facility]      Alcoholics anonymous (AA): www.aa.org      Physical Activity: www.leobardo.nih.gov/pn5xbng      Tobacco use: www.quitwithusla.org         Counseling and Referral of Other Preventative  (Italic type indicates deductible and co-insurance are waived)    Patient Name: Eliana Wagner  Today's Date: 1/6/2025    Health Maintenance       Date Due Completion Date    RSV Vaccine (Age 60+ and Pregnant patients) (1 - 1-dose 75+ series) Never done ---    Hemoglobin A1c 10/21/2023 7/21/2023    Diabetes Urine Screening 05/10/2024 5/10/2023    Lipid Panel 10/14/2024 10/14/2023    DEXA Scan 07/21/2025 7/21/2023    Override on 8/30/2018: Done (ordered today)    Aspirin/Antiplatelet Therapy 12/06/2025 12/6/2024    TETANUS VACCINE 10/11/2029 10/11/2019        No orders of the defined types were placed in this encounter.    The following  information is provided to all patients.  This information is to help you find resources for any of the problems found today that may be affecting your health:                  Living healthy guide: www.[facility].[place].[facility]      Understanding Diabetes: www.diabetes.org      Eating healthy: www.cdc.gov/healthyweight      CDC home safety checklist: www.cdc.gov/steadi/patient.html      Agency on Aging: www.goea.[place].[facility]      Alcoholics anonymous (AA): www.aa.org      Physical Activity: www.leobardo.nih.gov/ju9qynv      Tobacco use: www.quitwithusla.org

## 2025-01-07 ENCOUNTER — CLINICAL SUPPORT (OUTPATIENT)
Dept: CARDIOLOGY | Facility: HOSPITAL | Age: OVER 89
End: 2025-01-07
Attending: INTERNAL MEDICINE
Payer: MEDICARE

## 2025-01-07 ENCOUNTER — CLINICAL SUPPORT (OUTPATIENT)
Dept: CARDIOLOGY | Facility: HOSPITAL | Age: OVER 89
End: 2025-01-07

## 2025-01-07 DIAGNOSIS — I47.20 VENTRICULAR TACHYCARDIA, UNSPECIFIED: ICD-10-CM

## 2025-01-07 DIAGNOSIS — Z95.810 PRESENCE OF AUTOMATIC (IMPLANTABLE) CARDIAC DEFIBRILLATOR: ICD-10-CM

## 2025-01-07 PROCEDURE — 93295 DEV INTERROG REMOTE 1/2/MLT: CPT | Mod: ,,, | Performed by: INTERNAL MEDICINE

## 2025-01-07 PROCEDURE — 93296 REM INTERROG EVL PM/IDS: CPT | Performed by: INTERNAL MEDICINE

## 2025-02-20 ENCOUNTER — HOSPITAL ENCOUNTER (OUTPATIENT)
Dept: CARDIOLOGY | Facility: HOSPITAL | Age: OVER 89
Discharge: HOME | End: 2025-02-20
Attending: INTERNAL MEDICINE
Payer: MEDICARE

## 2025-02-20 VITALS
SYSTOLIC BLOOD PRESSURE: 120 MMHG | BODY MASS INDEX: 32.94 KG/M2 | HEART RATE: 60 BPM | DIASTOLIC BLOOD PRESSURE: 70 MMHG | HEIGHT: 62 IN | WEIGHT: 179 LBS

## 2025-02-20 DIAGNOSIS — I50.20 HFREF (HEART FAILURE WITH REDUCED EJECTION FRACTION): ICD-10-CM

## 2025-02-20 LAB
ASCENDING AORTA: 3.61 CM
AV AREA BY CONTINUOUS VTI: 2.9 CM2
AV INDEX (PROSTH): 0.84
AV LVOT MEAN GRADIENT: 3 MMHG
AV LVOT PEAK GRADIENT: 7 MMHG
AV MEAN GRADIENT: 5 MMHG
AV PEAK GRADIENT: 9 MMHG
AV REGURGITATION PRESSURE HALF TIME: 624 MS
AV VALVE AREA BY VELOCITY RATIO: 3.2 CM²
AV VALVE AREA: 2.9 CM2
AV VELOCITY RATIO: 0.93
BSA FOR ECHO PROCEDURE: 1.88 M2
CV ECHO LV RWT: 0.31 CM
DOP CALC AO PEAK VEL: 1.5 M/S
DOP CALC AO VTI: 36 CM
DOP CALC LVOT AREA: 3.5 CM2
DOP CALC LVOT DIAMETER: 2.1 CM
DOP CALC LVOT PEAK VEL: 1.4 M/S
DOP CALC LVOT STROKE VOLUME: 105.2 CM3
DOP CALC RVOT AREA: 3.63 CM2
DOP CALC RVOT DIAMETER: 2.15 CM
DOP CALCLVOT PEAK VEL VTI: 30.4 CM
E WAVE DECELERATION TIME: 194 MS
E/A RATIO: 0.6
E/E' RATIO: 11 M/S
ECHO EF ESTIMATED: 44 %
ECHO LV POSTERIOR WALL: 0.9 CM (ref 0.6–1.1)
FRACTIONAL SHORTENING: 22 % (ref 28–44)
HR MV ECHO: 60 BPM
INTERVENTRICULAR SEPTUM: 0.9 CM (ref 0.6–1.1)
IVC DIAMETER: 1.2 CM
LA MAJOR: 4.7 CM
LA MINOR: 4.8 CM
LA WIDTH: 3.4 CM
LEFT ATRIUM SIZE: 3.7 CM
LEFT ATRIUM VOLUME INDEX MOD: 22 ML/M2
LEFT ATRIUM VOLUME INDEX: 28 ML/M2
LEFT ATRIUM VOLUME MOD: 40 ML
LEFT ATRIUM VOLUME: 51 CM3
LEFT INTERNAL DIMENSION IN SYSTOLE: 4.6 CM (ref 2.1–4)
LEFT VENTRICLE DIASTOLIC VOLUME INDEX: 95.3 ML/M2
LEFT VENTRICLE DIASTOLIC VOLUME: 173.44 ML
LEFT VENTRICLE MASS INDEX: 115.1 G/M2
LEFT VENTRICLE SYSTOLIC VOLUME INDEX: 53.7 ML/M2
LEFT VENTRICLE SYSTOLIC VOLUME: 97.82 ML
LEFT VENTRICULAR INTERNAL DIMENSION IN DIASTOLE: 5.9 CM (ref 3.5–6)
LEFT VENTRICULAR MASS: 209.6 G
LV LATERAL E/E' RATIO: 11
LV SEPTAL E/E' RATIO: 11
MV A" WAVE DURATION": 134.16 MS
MV MEAN GRADIENT: 2 MMHG
MV PEAK A VEL: 1.1 M/S
MV PEAK E VEL: 0.66 M/S
OHS CV RV/LV RATIO: 0.42 CM
OHS LV EJECTION FRACTION SIMPSONS BIPLANE MOD: 46 %
PISA TR MAX VEL: 2.5 M/S
PULM VEIN A" WAVE DURATION": 134.16 MS
PULM VEIN S/D RATIO: 1
PULMONIC VEIN PEAK A VELOCITY: 0.3 M/S
PV PEAK D VEL: 0.35 M/S
PV PEAK S VEL: 0.35 M/S
RA MAJOR: 4.5 CM
RA PRESSURE ESTIMATED: 3 MMHG
RA WIDTH: 3.18 CM
RIGHT ATRIAL AREA: 14.4 CM2
RIGHT VENTRICLE DIASTOLIC BASEL DIMENSION: 2.5 CM
RV TB RVSP: 6 MMHG
RV TISSUE DOPPLER FREE WALL SYSTOLIC VELOCITY 1 (APICAL 4 CHAMBER VIEW): 15.55 CM/S
SINUS: 2.82 CM
STJ: 2.45 CM
TDI LATERAL: 0.06 M/S
TDI SEPTAL: 0.06 M/S
TDI: 0.06 M/S
TR MAX PG: 25 MMHG
TRICUSPID ANNULAR PLANE SYSTOLIC EXCURSION: 1.84 CM
TV PEAK GRADIENT: 26 MMHG
TV REST PULMONARY ARTERY PRESSURE: 28 MMHG
Z-SCORE OF LEFT VENTRICULAR DIMENSION IN END DIASTOLE: 1.58
Z-SCORE OF LEFT VENTRICULAR DIMENSION IN END SYSTOLE: 3.09

## 2025-02-20 PROCEDURE — 93306 TTE W/DOPPLER COMPLETE: CPT

## 2025-02-26 ENCOUNTER — PATIENT MESSAGE (OUTPATIENT)
Dept: CARDIOLOGY | Facility: CLINIC | Age: OVER 89
End: 2025-02-26
Payer: MEDICARE

## 2025-02-26 ENCOUNTER — TELEPHONE (OUTPATIENT)
Dept: CARDIOLOGY | Facility: CLINIC | Age: OVER 89
End: 2025-02-26
Payer: MEDICARE

## 2025-02-26 DIAGNOSIS — I50.42 CHRONIC COMBINED SYSTOLIC AND DIASTOLIC HEART FAILURE: ICD-10-CM

## 2025-02-26 RX ORDER — METOPROLOL SUCCINATE 50 MG/1
TABLET, EXTENDED RELEASE ORAL
Qty: 180 TABLET | Refills: 3 | Status: SHIPPED | OUTPATIENT
Start: 2025-02-26

## 2025-02-26 NOTE — TELEPHONE ENCOUNTER
----- Message from [employer] sent at 2/26/2025 12:09 PM CST -----    Patient Returning CallWho Called:ptDoes the patient know what this is regarding?:need nurse to please call pt with results for testWould the patient rather a call back or a response via [facility]? [facility] Call Back Number:[phone] Information: call back

## 2025-03-13 ENCOUNTER — OFFICE VISIT (OUTPATIENT)
Dept: CARDIOLOGY | Facility: CLINIC | Age: OVER 89
End: 2025-03-13
Payer: MEDICARE

## 2025-03-13 VITALS
HEART RATE: 65 BPM | WEIGHT: 177.94 LBS | SYSTOLIC BLOOD PRESSURE: 112 MMHG | DIASTOLIC BLOOD PRESSURE: 65 MMHG | BODY MASS INDEX: 32.54 KG/M2

## 2025-03-13 DIAGNOSIS — I50.20 HFREF (HEART FAILURE WITH REDUCED EJECTION FRACTION): ICD-10-CM

## 2025-03-13 DIAGNOSIS — I49.3 PREMATURE VENTRICULAR CONTRACTIONS: ICD-10-CM

## 2025-03-13 DIAGNOSIS — I42.8 NONISCHEMIC CARDIOMYOPATHY: ICD-10-CM

## 2025-03-13 DIAGNOSIS — I50.42 CHRONIC COMBINED SYSTOLIC (CONGESTIVE) AND DIASTOLIC (CONGESTIVE) HEART FAILURE: Primary | ICD-10-CM

## 2025-03-13 DIAGNOSIS — E11.69 HYPERLIPIDEMIA ASSOCIATED WITH TYPE 2 DIABETES MELLITUS: ICD-10-CM

## 2025-03-13 DIAGNOSIS — I47.20 VT (VENTRICULAR TACHYCARDIA): ICD-10-CM

## 2025-03-13 DIAGNOSIS — E78.5 HYPERLIPIDEMIA ASSOCIATED WITH TYPE 2 DIABETES MELLITUS: ICD-10-CM

## 2025-03-13 DIAGNOSIS — I15.2 HYPERTENSION ASSOCIATED WITH DIABETES: ICD-10-CM

## 2025-03-13 DIAGNOSIS — Z95.810 PRESENCE OF CARDIAC RESYNCHRONIZATION THERAPY DEFIBRILLATOR (CRT-D): ICD-10-CM

## 2025-03-13 DIAGNOSIS — I25.10 CORONARY ARTERY DISEASE INVOLVING NATIVE CORONARY ARTERY OF NATIVE HEART WITHOUT ANGINA PECTORIS: ICD-10-CM

## 2025-03-13 DIAGNOSIS — E11.59 HYPERTENSION ASSOCIATED WITH DIABETES: ICD-10-CM

## 2025-03-13 PROCEDURE — 99999 PR PBB SHADOW E&M-EST. PATIENT-LVL IV: CPT | Mod: PBBFAC,,, | Performed by: INTERNAL MEDICINE

## 2025-03-13 NOTE — PROGRESS NOTES
Subjective:   03/13/2025     Patient ID:  Eliana Wagner is a 89 y.o. female who presents for evaulation of Follow-up (6 month f/u)      Comes in doing well, no chest pains or tightness, no PND or orthopnea.  She does have diaphragmatic stimulation from her CRT pacemaker, unchanged though.  Is approaching BEN.  Continues to follow-up with EP    She has a history of a dilated cardiomyopathy and congestive heart failure, this had improved since implantation of a biventricular defibrillator, but a recent echo showed ejection fraction a stable ejection fraction 40-45%.    She is not having chest pains tightness or shortness of breath now.      She did have a defibrillator discharge associated with syncope.  She was treated with amiodarone.  She had been admitted to [place] for this.  A nuclear stress test did not show evidence for ischemia, ejection fraction was 45%.  She continues to follow-up with EP.    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  She does have less than 99 % pacing, improved with amiodarone    Hypertension is present, currently well controlled with guideline directed medical therapy for congestive heart failure.    Hypercholesterolemia present, currently on atorvastatin, LDL goal less than 70, check with PCP.           Congestive Heart Failure  Associated symptoms include nocturia. Pertinent negatives include no abdominal pain, chest pain, claudication, near-syncope, palpitations or shortness of breath. Her past medical history is significant for CAD.   Coronary Artery Disease  Symptoms include dizziness. Pertinent negatives include no chest pain, leg swelling, palpitations, shortness of breath or weight gain. Risk factors include hyperlipidemia. Her past medical history is significant for CHF.   Hyperlipidemia  Pertinent negatives include no chest pain, focal weakness, myalgias or shortness of breath.    Hypertension  Associated symptoms include neck pain. Pertinent negatives include no chest pain, headaches, malaise/fatigue, orthopnea, palpitations, PND or shortness of breath.   Follow-up  Associated symptoms include neck pain. Pertinent negatives include no abdominal pain, anorexia, chest pain, chills, congestion, coughing, diaphoresis, fever, headaches, myalgias, nausea, rash, sore throat, vertigo, vomiting or weakness.       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)    Hyperlipidemia associated with type 2 diabetes mellitus    DM type 2 without retinopathy    History of CVA (cerebrovascular accident)    Type 2 diabetes mellitus with hyperglycemia, without long-term current use of insulin    Class 1 obesity with serious comorbidity in adult    Premature ventricular contractions    Risk for falls    Mixed incontinence    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, with long-term current use of insulin    Syncope    VT (ventricular tachycardia)    HFrEF (heart failure with reduced ejection fraction)    Nonischemic cardiomyopathy    Actinic keratosis    Other rosacea    Nonischemic congestive cardiomyopathy    Thrombocytopenia, unspecified        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, with long-term current use of insulin 04/24/2023    Diabetes mellitus, type 2     Hyperlipidemia     Retinal  detachment     od     Squamous cell carcinoma of skin     Stroke     tia x 3   ( last in 2005)    Urinary incontinence without sensory awareness 05/12/2021       Past Surgical History:   Procedure Laterality Date    APPENDECTOMY      CARDIAC CATHETERIZATION      CATARACT EXTRACTION Bilateral     EYE SURGERY Bilateral     cataract    INSERT / REPLACE / REMOVE PACEMAKER      INSERTION OF PACEMAKER      july 22 2019     Orestes Kim MD    TONSILLECTOMY         Review of patient's allergies indicates:   Allergen Reactions    Penicillins      Caused uti         Current Outpatient Medications:     amiodarone (PACERONE) 200 MG Tab, TAKE 1/2 TABLET BY MOUTH EVERY DAY, Disp: 45 tablet, Rfl: 3    aspirin (ECOTRIN) 81 MG EC tablet, Take 81 mg by mouth once daily., Disp: , Rfl:     atorvastatin (LIPITOR) 40 MG tablet, Take 1 tablet (40 mg total) by mouth once daily., Disp: 90 tablet, Rfl: 5    blood sugar diagnostic Strp, 1 each by Misc.(Non-Drug; Combo Route) route once daily., Disp: 100 each, Rfl: 3    dulaglutide (TRULICITY) 1.5 mg/0.5 mL pen injector, Inject 1.5 mg into the skin every 7 days., Disp: 12 pen , Rfl: 3    ENTRESTO  mg per tablet, TAKE ONE TABLET BY MOUTH TWICE DAILY, Disp: 60 tablet, Rfl: 14    furosemide (LASIX) 20 MG tablet, Take 1 tablet (20 mg total) by mouth daily as needed (swelling)., Disp: 30 tablet, Rfl: 11    lancets Misc, 1 each by Misc.(Non-Drug; Combo Route) route once daily., Disp: 100 each, Rfl: 3    meclizine (ANTIVERT) 25 mg tablet, Take 1 tablet (25 mg total) by mouth 3 (three) times daily as needed for Dizziness., Disp: 60 tablet, Rfl: 0    metoprolol succinate (TOPROL-XL) 50 MG 24 hr tablet, TAKE ONE TABLET BY MOUTH TWICE DAILY FOR BLOOD PRESSURE AND HEART, Disp: 180 tablet, Rfl: 3    mirabegron (MYRBETRIQ) 25 mg Tb24 ER tablet, TAKE ONE TABLET BY MOUTH EVERY DAY FOR FOR BLADDER, Disp: 90 tablet, Rfl: 1    mv-min-FA-Bc-Gn-fcxdkyb-lutein 0.4-162-18 mg Tab, Take by mouth. 1 Tablet  Oral Every day, Disp: , Rfl:     vitamin D (VITAMIN D3) 1000 units Tab, Take 1,000 Units by mouth once daily., Disp: , Rfl:             Objective:   Review of Systems   Constitutional: Negative for chills, decreased appetite, diaphoresis, fever, malaise/fatigue, weight gain and weight loss.   HENT:  Negative for congestion, hearing loss, nosebleeds and sore throat.    Eyes:  Negative for double vision, vision loss in left eye and vision loss in right eye.   Cardiovascular:  Negative for chest pain, claudication, cyanosis, dyspnea on exertion, irregular heartbeat, leg swelling, near-syncope, orthopnea, palpitations, paroxysmal nocturnal dyspnea and syncope.   Respiratory:  Negative for cough, hemoptysis, shortness of breath, sleep disturbances due to breathing, snoring and wheezing.    Endocrine: Negative for cold intolerance and heat intolerance.   Hematologic/Lymphatic: Negative for adenopathy and bleeding problem. Bruises/bleeds easily.   Skin:  Negative for itching, nail changes and rash.   Musculoskeletal:  Positive for falls and neck pain. Negative for arthritis, back pain and myalgias.   Gastrointestinal:  Positive for constipation. Negative for bloating, abdominal pain, anorexia, diarrhea, hematochezia, melena, nausea and vomiting.   Genitourinary:  Positive for frequency and nocturia. Negative for hematuria.   Neurological:  Positive for excessive daytime sleepiness, dizziness, loss of balance and paresthesias. Negative for focal weakness, headaches, vertigo and weakness.   Psychiatric/Behavioral:  Negative for altered mental status, depression and memory loss.    Allergic/Immunologic: Negative for hives.       Vitals:    03/13/25 1008   BP: 112/65   Pulse: 65             Physical Exam  Vitals reviewed.   Constitutional:       General: She is not in acute distress.     Appearance: She is well-developed.   HENT:      Head: Normocephalic and atraumatic.      Nose: Nose normal.   Eyes:      Conjunctiva/sclera:  Conjunctivae normal.      Pupils: Pupils are equal, round, and reactive to light.   Neck:      Vascular: No carotid bruit or JVD.   Cardiovascular:      Rate and Rhythm: Normal rate and regular rhythm.      Pulses: Normal pulses and intact distal pulses.      Heart sounds: Normal heart sounds. No murmur heard.     No friction rub. No gallop.      Comments: Jugular venous pressure normal  Defibrillator site appears normal  Pulmonary:      Effort: Pulmonary effort is normal. No respiratory distress.      Breath sounds: Normal breath sounds. No wheezing or rales.   Chest:      Chest wall: No tenderness.   Abdominal:      General: Bowel sounds are normal. There is no distension.      Palpations: Abdomen is soft.      Tenderness: There is no abdominal tenderness.   Musculoskeletal:         General: No tenderness or deformity. Normal range of motion.      Cervical back: Normal range of motion and neck supple.      Right lower leg: No edema.      Left lower leg: No edema.   Skin:     General: Skin is warm and dry.      Findings: No erythema or rash.   Neurological:      Mental Status: She is alert and oriented to person, place, and time.      Cranial Nerves: No cranial nerve deficit.      Motor: No abnormal muscle tone.      Coordination: Coordination normal.   Psychiatric:         Behavior: Behavior normal.         Thought Content: Thought content normal.         Judgment: Judgment normal.              Assessment and Plan:     Chronic combined systolic (congestive) and diastolic (congestive) heart failure  Comments:  Asymptomatic, euvolemic    Coronary artery disease involving native coronary artery of native heart without angina pectoris  Comments:  Asymptomatic    HFrEF (heart failure with reduced ejection fraction)  Comments:  Continue tolerated guideline directed medical therapy    Hyperlipidemia associated with type 2 diabetes mellitus  Comments:  On high-dose statin, continue, lipids with PCP    Hypertension  associated with diabetes  Comments:  Well controlled on continued guideline directed medical therapy    Nonischemic cardiomyopathy    Premature ventricular contractions  Comments:  On amiodarone    Presence of cardiac resynchronization therapy defibrillator (CRT-D)    VT (ventricular tachycardia)  Comments:  On amiodarone, continue follow-up with EP              Follow up in about 6 months (around 9/13/2025).

## 2025-03-25 DIAGNOSIS — E11.9 TYPE 2 DIABETES MELLITUS WITHOUT COMPLICATION, WITHOUT LONG-TERM CURRENT USE OF INSULIN: ICD-10-CM

## 2025-03-25 RX ORDER — DULAGLUTIDE 1.5 MG/.5ML
1.5 INJECTION, SOLUTION SUBCUTANEOUS
Qty: 12 PEN | Refills: 3 | Status: SHIPPED | OUTPATIENT
Start: 2025-03-25 | End: 2026-03-25

## 2025-03-25 RX ORDER — DULAGLUTIDE 1.5 MG/.5ML
1.5 INJECTION, SOLUTION SUBCUTANEOUS
Qty: 12 PEN | Refills: 3 | Status: CANCELLED | OUTPATIENT
Start: 2025-03-25 | End: 2026-03-25

## 2025-03-25 NOTE — TELEPHONE ENCOUNTER
Care Due:                  Date            Visit Type   Department     Provider  --------------------------------------------------------------------------------                                MYCHART                              FOLLOWUP/OF  [facility]  Last Visit: 08-      [facility] VISIT   JESS Marquez  Next Visit: None Scheduled  None         None Found                                                            Last  Test          Frequency    Reason                     Performed    Due Date  --------------------------------------------------------------------------------    HBA1C.......  6 months...  dulaglutide..............  07- 01-    [facility] Embedded Care Due Messages. Reference number: [id number].   3/25/2025 9:16:23 AM CDT

## 2025-03-25 NOTE — TELEPHONE ENCOUNTER
No care due was identified.  [facility] Embedded Care Due Messages. Reference number: [id number].   3/25/2025 9:18:04 AM CDT

## 2025-03-26 LAB
OHS CV AF BURDEN PERCENT: < 1
OHS CV BIV PACING PERCENT: 59.36 %
OHS CV DC REMOTE DEVICE TYPE: NORMAL
OHS CV RV PACING PERCENT: 59.63 %

## 2025-04-01 ENCOUNTER — CLINICAL SUPPORT (OUTPATIENT)
Dept: CARDIOLOGY | Facility: HOSPITAL | Age: OVER 89
End: 2025-04-01
Attending: INTERNAL MEDICINE
Payer: MEDICARE

## 2025-04-01 ENCOUNTER — CLINICAL SUPPORT (OUTPATIENT)
Dept: CARDIOLOGY | Facility: HOSPITAL | Age: OVER 89
End: 2025-04-01
Payer: MEDICARE

## 2025-04-01 DIAGNOSIS — Z95.810 PRESENCE OF AUTOMATIC (IMPLANTABLE) CARDIAC DEFIBRILLATOR: ICD-10-CM

## 2025-04-01 DIAGNOSIS — I47.20 VENTRICULAR TACHYCARDIA, UNSPECIFIED: ICD-10-CM

## 2025-04-01 PROCEDURE — 93296 REM INTERROG EVL PM/IDS: CPT | Performed by: INTERNAL MEDICINE

## 2025-04-03 LAB
OHS CV AF BURDEN PERCENT: < 1
OHS CV BIV PACING PERCENT: 87.4 %
OHS CV DC REMOTE DEVICE TYPE: NORMAL
OHS CV RV PACING PERCENT: 27.3 %

## 2025-05-12 ENCOUNTER — TELEPHONE (OUTPATIENT)
Dept: ELECTROPHYSIOLOGY | Facility: CLINIC | Age: OVER 89
End: 2025-05-12
Payer: MEDICARE

## 2025-05-12 NOTE — TELEPHONE ENCOUNTER
The patients' CIED has reached ELECTIVE REPLACEMENT.     Current Device  and Model:  Medtronic Claria CRTD    Date of BEN, if known:  5/12/25    Is this replacement indicator early or unexpected due to an advisory:  No    Battery Voltage (if available):   N/A    Pacemaker Dependent:  No    Anticoagulation Status:  None    Last EF: 40-45% 2/20/25    Model of Leads:      Any known lead recalls:  No    Leads MRI compatible:   No.  LV lead is NOT MRI compatible.    Any history of treated ventricular arrhythmias (ATP or shocks)?  Yes     Any history of device treated atrial arrhythmias (atrial ATP)?  No      *RN coordinator notified for scheduling; device coordinator notified to contact patient

## 2025-05-14 ENCOUNTER — PATIENT MESSAGE (OUTPATIENT)
Dept: ELECTROPHYSIOLOGY | Facility: CLINIC | Age: OVER 89
End: 2025-05-14
Payer: MEDICARE

## 2025-05-14 ENCOUNTER — TELEPHONE (OUTPATIENT)
Dept: ELECTROPHYSIOLOGY | Facility: CLINIC | Age: OVER 89
End: 2025-05-14
Payer: MEDICARE

## 2025-05-14 DIAGNOSIS — I42.0 NONISCHEMIC CONGESTIVE CARDIOMYOPATHY: ICD-10-CM

## 2025-05-14 DIAGNOSIS — I50.42 CHRONIC COMBINED SYSTOLIC AND DIASTOLIC HEART FAILURE: Primary | ICD-10-CM

## 2025-05-14 DIAGNOSIS — Z95.810 PRESENCE OF CARDIAC RESYNCHRONIZATION THERAPY DEFIBRILLATOR (CRT-D): ICD-10-CM

## 2025-05-14 DIAGNOSIS — Z45.02 END OF BATTERY LIFE OF CARDIAC RESYNCHRONIZATION THERAPY DEFIBRILLATOR (CRT-D): ICD-10-CM

## 2025-05-14 NOTE — TELEPHONE ENCOUNTER
Spoke with patient and scheduled procedure: CRT-D battery change on 6/11/25 with an arrival time of 8AM at [facility] 3rd floor cardiac short stay.  Reviewed procedure with patient.  Labs to be done at: non-fasting labs at [facility] on 6/4/25  Confirmed that patient is on GLP-1 agonist. Patient confirmed that she takes Trulicity weekly on Tuesdays or Wednesdays. Instructed to take last dose on Tuesday, 6/3/25.  Confirmed that patient has a MDT CRT-D in situ.   Instructions will be sent via portal, as requested.   Patient shared that she has a son awaiting a liver transplant and should he receive a call for a transplant she may need to reschedule her procedure. Informed patient to call [phone] should she need to reschedule.   Patient verbalized understanding.

## 2025-05-15 ENCOUNTER — TELEPHONE (OUTPATIENT)
Dept: CARDIOLOGY | Facility: HOSPITAL | Age: OVER 89
End: 2025-05-15
Payer: MEDICARE

## 2025-05-15 NOTE — TELEPHONE ENCOUNTER
Spoke with pt and informed her that her device has reached RRT. She stated the nurse already scheduled her change out date.

## 2025-06-03 ENCOUNTER — TELEPHONE (OUTPATIENT)
Dept: ELECTROPHYSIOLOGY | Facility: CLINIC | Age: OVER 89
End: 2025-06-03
Payer: MEDICARE

## 2025-06-04 ENCOUNTER — LAB VISIT (OUTPATIENT)
Dept: LAB | Facility: HOSPITAL | Age: OVER 89
End: 2025-06-04
Attending: STUDENT IN AN ORGANIZED HEALTH CARE EDUCATION/TRAINING PROGRAM
Payer: MEDICARE

## 2025-06-04 DIAGNOSIS — I50.42 CHRONIC COMBINED SYSTOLIC AND DIASTOLIC HEART FAILURE: ICD-10-CM

## 2025-06-04 DIAGNOSIS — I42.0 NONISCHEMIC CONGESTIVE CARDIOMYOPATHY: ICD-10-CM

## 2025-06-04 DIAGNOSIS — Z95.810 PRESENCE OF CARDIAC RESYNCHRONIZATION THERAPY DEFIBRILLATOR (CRT-D): ICD-10-CM

## 2025-06-04 DIAGNOSIS — Z45.02 END OF BATTERY LIFE OF CARDIAC RESYNCHRONIZATION THERAPY DEFIBRILLATOR (CRT-D): ICD-10-CM

## 2025-06-04 LAB
ANION GAP (OHS): 8 MMOL/L (ref 8–16)
APTT PPP: 32.6 SECONDS (ref 21–32)
BUN SERPL-MCNC: 20 MG/DL (ref 8–23)
CALCIUM SERPL-MCNC: 9 MG/DL (ref 8.7–10.5)
CHLORIDE SERPL-SCNC: 107 MMOL/L (ref 95–110)
CO2 SERPL-SCNC: 26 MMOL/L (ref 23–29)
CREAT SERPL-MCNC: 0.9 MG/DL (ref 0.5–1.4)
ERYTHROCYTE [DISTWIDTH] IN BLOOD BY AUTOMATED COUNT: 13.6 % (ref 11.5–14.5)
GFR SERPLBLD CREATININE-BSD FMLA CKD-EPI: >60 ML/MIN/1.73/M2
GLUCOSE SERPL-MCNC: 207 MG/DL (ref 70–110)
HCT VFR BLD AUTO: 43.3 % (ref 37–48.5)
HGB BLD-MCNC: 13.8 GM/DL (ref 12–16)
INR PPP: 1 (ref 0.8–1.2)
MCH RBC QN AUTO: 29.7 PG (ref 27–31)
MCHC RBC AUTO-ENTMCNC: 31.9 G/DL (ref 32–36)
MCV RBC AUTO: 93 FL (ref 82–98)
PLATELET # BLD AUTO: 147 K/UL (ref 150–450)
PMV BLD AUTO: 11.1 FL (ref 9.2–12.9)
POTASSIUM SERPL-SCNC: 5.2 MMOL/L (ref 3.5–5.1)
PROTHROMBIN TIME: 11.1 SECONDS (ref 9–12.5)
RBC # BLD AUTO: 4.64 M/UL (ref 4–5.4)
SODIUM SERPL-SCNC: 141 MMOL/L (ref 136–145)
WBC # BLD AUTO: 10.13 K/UL (ref 3.9–12.7)

## 2025-06-04 PROCEDURE — 36415 COLL VENOUS BLD VENIPUNCTURE: CPT

## 2025-06-04 PROCEDURE — 85730 THROMBOPLASTIN TIME PARTIAL: CPT

## 2025-06-04 PROCEDURE — 85610 PROTHROMBIN TIME: CPT

## 2025-06-04 PROCEDURE — 80048 BASIC METABOLIC PNL TOTAL CA: CPT

## 2025-06-04 PROCEDURE — 85027 COMPLETE CBC AUTOMATED: CPT

## 2025-06-09 ENCOUNTER — TELEPHONE (OUTPATIENT)
Dept: ELECTROPHYSIOLOGY | Facility: CLINIC | Age: OVER 89
End: 2025-06-09
Payer: MEDICARE

## 2025-06-09 NOTE — TELEPHONE ENCOUNTER
Spoke to patient.     CONFIRMED procedure arrival time of 8AM on 6/11/25.    Reiterated instructions including:  -Directions to check in desk  -NPO after midnight night prior to procedure; may drink water until 6AM on day of procedure.   -High importance of HOLDING Trulicity. Patient confirmed last dose of Trulicity was taken on 6/3/25.  -Pre-procedure LABS reviewed. K + 5.2. Patient denies taking K+supplement. Patient confirmed that she eats bananas daily and drinks Orange juice daily. Instructed patient to avoid consuming bananas and orange juice for the next 3 days.   -Confirmed presence of implanted device/stimulator. Pt has a MDT CRT-D in situ.   -Confirmed no fever, cough, or shortness of breath in the past 30 days  -Do not wear mascara day of procedure  -Bathe night prior and morning prior to procedure with Hibiclens solution or an antibacterial soap  -Patient confirmed that she will be accompanied by her daughter.   Patient verbalized understanding of above and appreciated the call.

## 2025-06-09 NOTE — TELEPHONE ENCOUNTER
----- Message from Katheryn sent at 6/9/2025 12:22 PM CDT -----  Regarding: Procedure instructions  Pt [phone] has misplaced her instructions for her procedure and would like a call about what she can and cannot do. I did inform her that it was sent through her portal on 5/14/25.Thanks

## 2025-06-09 NOTE — TELEPHONE ENCOUNTER
Spoke with daughter. Daughter confirmed that she received the pre-procedure instructions and located them in the portal.

## 2025-06-10 ENCOUNTER — ANESTHESIA EVENT (OUTPATIENT)
Dept: MEDSURG UNIT | Facility: HOSPITAL | Age: OVER 89
End: 2025-06-10
Payer: MEDICARE

## 2025-06-11 ENCOUNTER — ANESTHESIA (OUTPATIENT)
Dept: MEDSURG UNIT | Facility: HOSPITAL | Age: OVER 89
End: 2025-06-11
Payer: MEDICARE

## 2025-06-11 ENCOUNTER — HOSPITAL ENCOUNTER (OUTPATIENT)
Facility: HOSPITAL | Age: OVER 89
Discharge: HOME | End: 2025-06-11
Attending: STUDENT IN AN ORGANIZED HEALTH CARE EDUCATION/TRAINING PROGRAM | Admitting: STUDENT IN AN ORGANIZED HEALTH CARE EDUCATION/TRAINING PROGRAM
Payer: MEDICARE

## 2025-06-11 VITALS
OXYGEN SATURATION: 95 % | TEMPERATURE: 97 F | WEIGHT: 168 LBS | HEIGHT: 62 IN | SYSTOLIC BLOOD PRESSURE: 157 MMHG | HEART RATE: 61 BPM | DIASTOLIC BLOOD PRESSURE: 61 MMHG | BODY MASS INDEX: 30.91 KG/M2 | RESPIRATION RATE: 19 BRPM

## 2025-06-11 DIAGNOSIS — I49.9 ARRHYTHMIA: ICD-10-CM

## 2025-06-11 DIAGNOSIS — I42.0 NONISCHEMIC CONGESTIVE CARDIOMYOPATHY: ICD-10-CM

## 2025-06-11 DIAGNOSIS — Z95.810 PRESENCE OF CARDIAC RESYNCHRONIZATION THERAPY DEFIBRILLATOR (CRT-D): ICD-10-CM

## 2025-06-11 DIAGNOSIS — I50.42 CHRONIC COMBINED SYSTOLIC AND DIASTOLIC HEART FAILURE: ICD-10-CM

## 2025-06-11 DIAGNOSIS — Z95.9 CARDIAC DEVICE IN SITU: ICD-10-CM

## 2025-06-11 DIAGNOSIS — Z45.02 END OF BATTERY LIFE OF CARDIAC RESYNCHRONIZATION THERAPY DEFIBRILLATOR (CRT-D): ICD-10-CM

## 2025-06-11 LAB
OHS QRS DURATION: 160 MS
OHS QTC CALCULATION: 552 MS
POCT GLUCOSE: 126 MG/DL (ref 70–110)
POCT GLUCOSE: 171 MG/DL (ref 70–110)

## 2025-06-11 PROCEDURE — 93005 ELECTROCARDIOGRAM TRACING: CPT

## 2025-06-11 PROCEDURE — 37000009 HC ANESTHESIA EA ADD 15 MINS: Performed by: STUDENT IN AN ORGANIZED HEALTH CARE EDUCATION/TRAINING PROGRAM

## 2025-06-11 PROCEDURE — 82962 GLUCOSE BLOOD TEST: CPT | Performed by: STUDENT IN AN ORGANIZED HEALTH CARE EDUCATION/TRAINING PROGRAM

## 2025-06-11 PROCEDURE — 25000003 PHARM REV CODE 250: Performed by: STUDENT IN AN ORGANIZED HEALTH CARE EDUCATION/TRAINING PROGRAM

## 2025-06-11 PROCEDURE — C1887 CATHETER, GUIDING: HCPCS | Performed by: STUDENT IN AN ORGANIZED HEALTH CARE EDUCATION/TRAINING PROGRAM

## 2025-06-11 PROCEDURE — 25000003 PHARM REV CODE 250: Performed by: INTERNAL MEDICINE

## 2025-06-11 PROCEDURE — 27201423 OPTIME MED/SURG SUP & DEVICES STERILE SUPPLY: Performed by: STUDENT IN AN ORGANIZED HEALTH CARE EDUCATION/TRAINING PROGRAM

## 2025-06-11 PROCEDURE — 33249 INSJ/RPLCMT DEFIB W/LEAD(S): CPT | Performed by: STUDENT IN AN ORGANIZED HEALTH CARE EDUCATION/TRAINING PROGRAM

## 2025-06-11 PROCEDURE — 37000008 HC ANESTHESIA 1ST 15 MINUTES: Performed by: STUDENT IN AN ORGANIZED HEALTH CARE EDUCATION/TRAINING PROGRAM

## 2025-06-11 PROCEDURE — 25500020 PHARM REV CODE 255: Performed by: NURSE ANESTHETIST, CERTIFIED REGISTERED

## 2025-06-11 PROCEDURE — 33249 INSJ/RPLCMT DEFIB W/LEAD(S): CPT | Mod: 22,,, | Performed by: STUDENT IN AN ORGANIZED HEALTH CARE EDUCATION/TRAINING PROGRAM

## 2025-06-11 PROCEDURE — 93010 ELECTROCARDIOGRAM REPORT: CPT | Mod: ,,, | Performed by: INTERNAL MEDICINE

## 2025-06-11 PROCEDURE — C1898 LEAD, PMKR, OTHER THAN TRANS: HCPCS | Performed by: STUDENT IN AN ORGANIZED HEALTH CARE EDUCATION/TRAINING PROGRAM

## 2025-06-11 PROCEDURE — 63600175 PHARM REV CODE 636 W HCPCS: Performed by: NURSE ANESTHETIST, CERTIFIED REGISTERED

## 2025-06-11 PROCEDURE — 27800903 OPTIME MED/SURG SUP & DEVICES OTHER IMPLANTS: Performed by: STUDENT IN AN ORGANIZED HEALTH CARE EDUCATION/TRAINING PROGRAM

## 2025-06-11 PROCEDURE — C1769 GUIDE WIRE: HCPCS | Performed by: STUDENT IN AN ORGANIZED HEALTH CARE EDUCATION/TRAINING PROGRAM

## 2025-06-11 PROCEDURE — 33241 REMOVE PULSE GENERATOR: CPT | Performed by: STUDENT IN AN ORGANIZED HEALTH CARE EDUCATION/TRAINING PROGRAM

## 2025-06-11 PROCEDURE — 33241 REMOVE PULSE GENERATOR: CPT | Mod: 51,,, | Performed by: STUDENT IN AN ORGANIZED HEALTH CARE EDUCATION/TRAINING PROGRAM

## 2025-06-11 PROCEDURE — C1894 INTRO/SHEATH, NON-LASER: HCPCS | Performed by: STUDENT IN AN ORGANIZED HEALTH CARE EDUCATION/TRAINING PROGRAM

## 2025-06-11 PROCEDURE — C1721 AICD, DUAL CHAMBER: HCPCS | Performed by: STUDENT IN AN ORGANIZED HEALTH CARE EDUCATION/TRAINING PROGRAM

## 2025-06-11 PROCEDURE — 63600175 PHARM REV CODE 636 W HCPCS: Performed by: ANESTHESIOLOGY

## 2025-06-11 PROCEDURE — 25000003 PHARM REV CODE 250: Performed by: NURSE ANESTHETIST, CERTIFIED REGISTERED

## 2025-06-11 PROCEDURE — 63600175 PHARM REV CODE 636 W HCPCS: Performed by: STUDENT IN AN ORGANIZED HEALTH CARE EDUCATION/TRAINING PROGRAM

## 2025-06-11 DEVICE — LEAD 3830 US MKT/ 69CM MRI LBBAP
Type: IMPLANTABLE DEVICE | Site: HEART | Status: FUNCTIONAL
Brand: SELECTSECURE™ MRI SURESCAN™

## 2025-06-11 DEVICE — CAP KIT 5867-3M STERILE OUS US EIFU: Type: IMPLANTABLE DEVICE | Site: HEART | Status: FUNCTIONAL

## 2025-06-11 DEVICE — ENVELOPE CMRM6133 ABSORB LRG MR
Type: IMPLANTABLE DEVICE | Site: CHEST | Status: FUNCTIONAL
Brand: TYRX™

## 2025-06-11 DEVICE — ICD DDPC3D1 CROME DR MRI DF1 USA
Type: IMPLANTABLE DEVICE | Site: HEART | Status: FUNCTIONAL
Brand: CROME™ DR MRI SURESCAN™

## 2025-06-11 RX ORDER — LIDOCAINE HYDROCHLORIDE 20 MG/ML
INJECTION INTRAVENOUS
Status: DISCONTINUED | OUTPATIENT
Start: 2025-06-11 | End: 2025-06-11

## 2025-06-11 RX ORDER — PROPOFOL 10 MG/ML
VIAL (ML) INTRAVENOUS CONTINUOUS PRN
Status: DISCONTINUED | OUTPATIENT
Start: 2025-06-11 | End: 2025-06-11

## 2025-06-11 RX ORDER — SODIUM CHLORIDE 0.9 % (FLUSH) 0.9 %
3 SYRINGE (ML) INJECTION
Status: DISCONTINUED | OUTPATIENT
Start: 2025-06-11 | End: 2025-06-11 | Stop reason: HOSPADM

## 2025-06-11 RX ORDER — SODIUM CHLORIDE 0.9 G/100ML
INJECTION, SOLUTION IRRIGATION
Status: DISCONTINUED | OUTPATIENT
Start: 2025-06-11 | End: 2025-06-11 | Stop reason: HOSPADM

## 2025-06-11 RX ORDER — GLUCAGON 1 MG
1 KIT INJECTION
Status: DISCONTINUED | OUTPATIENT
Start: 2025-06-11 | End: 2025-06-11 | Stop reason: HOSPADM

## 2025-06-11 RX ORDER — DEXMEDETOMIDINE HYDROCHLORIDE 100 UG/ML
INJECTION, SOLUTION INTRAVENOUS
Status: DISCONTINUED | OUTPATIENT
Start: 2025-06-11 | End: 2025-06-11

## 2025-06-11 RX ORDER — FENTANYL CITRATE 50 UG/ML
25 INJECTION, SOLUTION INTRAMUSCULAR; INTRAVENOUS EVERY 5 MIN PRN
Status: DISCONTINUED | OUTPATIENT
Start: 2025-06-11 | End: 2025-06-11 | Stop reason: HOSPADM

## 2025-06-11 RX ORDER — VANCOMYCIN HYDROCHLORIDE 1 G/20ML
INJECTION, POWDER, LYOPHILIZED, FOR SOLUTION INTRAVENOUS
Status: DISCONTINUED | OUTPATIENT
Start: 2025-06-11 | End: 2025-06-11 | Stop reason: HOSPADM

## 2025-06-11 RX ORDER — ONDANSETRON HYDROCHLORIDE 2 MG/ML
INJECTION, SOLUTION INTRAVENOUS
Status: DISCONTINUED | OUTPATIENT
Start: 2025-06-11 | End: 2025-06-11

## 2025-06-11 RX ORDER — IODIXANOL 320 MG/ML
INJECTION, SOLUTION INTRAVASCULAR
Status: DISCONTINUED | OUTPATIENT
Start: 2025-06-11 | End: 2025-06-11

## 2025-06-11 RX ORDER — DOXYCYCLINE HYCLATE 100 MG
100 TABLET ORAL 2 TIMES DAILY
Qty: 10 TABLET | Refills: 0 | Status: SHIPPED | OUTPATIENT
Start: 2025-06-11 | End: 2025-06-16

## 2025-06-11 RX ORDER — BUPIVACAINE HYDROCHLORIDE 2.5 MG/ML
INJECTION, SOLUTION EPIDURAL; INFILTRATION; INTRACAUDAL; PERINEURAL
Status: DISCONTINUED | OUTPATIENT
Start: 2025-06-11 | End: 2025-06-11 | Stop reason: HOSPADM

## 2025-06-11 RX ORDER — PHENYLEPHRINE HYDROCHLORIDE 10 MG/ML
INJECTION INTRAVENOUS
Status: DISCONTINUED | OUTPATIENT
Start: 2025-06-11 | End: 2025-06-11

## 2025-06-11 RX ORDER — ACETAMINOPHEN 325 MG/1
650 TABLET ORAL EVERY 4 HOURS PRN
Status: DISCONTINUED | OUTPATIENT
Start: 2025-06-11 | End: 2025-06-11 | Stop reason: HOSPADM

## 2025-06-11 RX ORDER — FENTANYL CITRATE 50 UG/ML
INJECTION, SOLUTION INTRAMUSCULAR; INTRAVENOUS
Status: DISCONTINUED | OUTPATIENT
Start: 2025-06-11 | End: 2025-06-11

## 2025-06-11 RX ORDER — LIDOCAINE HYDROCHLORIDE 20 MG/ML
INJECTION, SOLUTION INFILTRATION; PERINEURAL
Status: DISCONTINUED | OUTPATIENT
Start: 2025-06-11 | End: 2025-06-11 | Stop reason: HOSPADM

## 2025-06-11 RX ORDER — HALOPERIDOL LACTATE 5 MG/ML
0.5 INJECTION, SOLUTION INTRAMUSCULAR EVERY 10 MIN PRN
Status: DISCONTINUED | OUTPATIENT
Start: 2025-06-11 | End: 2025-06-11 | Stop reason: HOSPADM

## 2025-06-11 RX ADMIN — PHENYLEPHRINE HYDROCHLORIDE 100 MCG: 10 INJECTION INTRAVENOUS at 12:06

## 2025-06-11 RX ADMIN — FENTANYL CITRATE 25 MCG: 50 INJECTION INTRAMUSCULAR; INTRAVENOUS at 03:06

## 2025-06-11 RX ADMIN — PROPOFOL 100 MCG/KG/MIN: 10 INJECTION, EMULSION INTRAVENOUS at 10:06

## 2025-06-11 RX ADMIN — PROPOFOL 10 MG: 10 INJECTION, EMULSION INTRAVENOUS at 02:06

## 2025-06-11 RX ADMIN — PHENYLEPHRINE HYDROCHLORIDE 100 MCG: 10 INJECTION INTRAVENOUS at 11:06

## 2025-06-11 RX ADMIN — PHENYLEPHRINE HYDROCHLORIDE 50 MCG: 10 INJECTION INTRAVENOUS at 11:06

## 2025-06-11 RX ADMIN — DEXMEDETOMIDINE 4 MCG: 200 INJECTION, SOLUTION INTRAVENOUS at 02:06

## 2025-06-11 RX ADMIN — FENTANYL CITRATE 25 MCG: 50 INJECTION, SOLUTION INTRAMUSCULAR; INTRAVENOUS at 01:06

## 2025-06-11 RX ADMIN — FENTANYL CITRATE 25 MCG: 50 INJECTION, SOLUTION INTRAMUSCULAR; INTRAVENOUS at 11:06

## 2025-06-11 RX ADMIN — PHENYLEPHRINE HYDROCHLORIDE 150 MCG: 10 INJECTION INTRAVENOUS at 12:06

## 2025-06-11 RX ADMIN — PROPOFOL 50 MCG/KG/MIN: 10 INJECTION, EMULSION INTRAVENOUS at 01:06

## 2025-06-11 RX ADMIN — PHENYLEPHRINE HYDROCHLORIDE 0.5 MCG/KG/MIN: 10 INJECTION INTRAVENOUS at 12:06

## 2025-06-11 RX ADMIN — DEXMEDETOMIDINE 8 MCG: 200 INJECTION, SOLUTION INTRAVENOUS at 10:06

## 2025-06-11 RX ADMIN — SODIUM CHLORIDE: 9 INJECTION, SOLUTION INTRAVENOUS at 10:06

## 2025-06-11 RX ADMIN — IODIXANOL 10 ML: 320 INJECTION, SOLUTION INTRAVASCULAR at 11:06

## 2025-06-11 RX ADMIN — FENTANYL CITRATE 25 MCG: 50 INJECTION, SOLUTION INTRAMUSCULAR; INTRAVENOUS at 02:06

## 2025-06-11 RX ADMIN — PROPOFOL 20 MG: 10 INJECTION, EMULSION INTRAVENOUS at 02:06

## 2025-06-11 RX ADMIN — LIDOCAINE HYDROCHLORIDE 20 MG: 20 INJECTION INTRAVENOUS at 10:06

## 2025-06-11 RX ADMIN — ONDANSETRON 4 MG: 2 INJECTION INTRAMUSCULAR; INTRAVENOUS at 01:06

## 2025-06-11 RX ADMIN — VANCOMYCIN HYDROCHLORIDE 1000 MG: 1 INJECTION, POWDER, LYOPHILIZED, FOR SOLUTION INTRAVENOUS at 10:06

## 2025-06-11 RX ADMIN — ACETAMINOPHEN 650 MG: 325 TABLET ORAL at 04:06

## 2025-06-11 RX ADMIN — FENTANYL CITRATE 25 MCG: 50 INJECTION, SOLUTION INTRAMUSCULAR; INTRAVENOUS at 10:06

## 2025-06-11 NOTE — H&P
[facility]iology  Cardiac Electrophysiology  Discharge Summary        Patient Name: Eliana Wagner   MRN: [id number]   Admission Date: 6/11/2025    Hospital Length of Stay: 0   Discharge Date: 06/11/2025   Attending Physician: Marybeth Mittal MD   Discharging Provider: Bigg Sherman MD      HPI:   Mrs Eliana Wagner is a 89 year old female with PMH of non ischemic cardiomyopathy (EF 40-45%), s/p CRT-D implant 2019, h/o VT requiring defibrillation who [place] [facility] [place] with Dr. Mittal. Recent diaphragmatic stim noted on device, planning for left bundle branch area pacing lead as well.         Presenting EKG: AP BiV paced rhythm   CHADS-VASc: N/A  Anticoagulants: None   Antiarrhythmics: Amiodarone 100 mg daily   LV EF: 40-45% (TTE 2/2025)   Pertinent labs: Hgb 13.8, INR 1.0, Cr 0.9        Hospital Course:   Mrs Wagner underwent successful LBBAP implant and generator change. She tolerated the procedure well with no immediate complications. She completed bed rest and ambulated without bleeding/hematoma. She was discharged home in stable condition.     Follow up:   Follow up in device clinic in 1 week   Follow up with Dr. Mittal in 4 months     Disposition:   Home or Self Care         Medication List        START taking these medications      doxycycline 100 MG tablet  Commonly known as: VIBRA-TABS  Take 1 tablet (100 mg total) by mouth 2 (two) times daily. for 5 days            CONTINUE taking these medications      amiodarone 200 MG Tab  Commonly known as: PACERONE  TAKE 1/2 TABLET BY MOUTH EVERY DAY     aspirin 81 MG EC tablet  Commonly known as: ECOTRIN     atorvastatin 40 MG tablet  Commonly known as: LIPITOR  Take 1 tablet (40 mg total) by mouth once daily.     blood sugar diagnostic Strp  1 each by Misc.(Non-Drug; Combo Route) route once daily.     ENTRESTO  mg per tablet  Generic drug: sacubitriL-valsartan  TAKE ONE TABLET BY MOUTH TWICE DAILY     furosemide 20 MG  tablet  Commonly known as: LASIX  Take 1 tablet (20 mg total) by mouth daily as needed (swelling).     lancets Misc  1 each by Misc.(Non-Drug; Combo Route) route once daily.     meclizine 25 mg tablet  Commonly known as: ANTIVERT  Take 1 tablet (25 mg total) by mouth 3 (three) times daily as needed for Dizziness.     metoprolol succinate 50 MG 24 hr tablet  Commonly known as: TOPROL-XL  TAKE ONE TABLET BY MOUTH TWICE DAILY FOR BLOOD PRESSURE AND HEART     mv-min-FA-Az-Oj-rvkhxod-lutein 0.4-162-18 mg Tab     MYRBETRIQ 25 mg Tb24 ER tablet  Generic drug: mirabegron  TAKE ONE TABLET BY MOUTH EVERY DAY FOR FOR BLADDER     TRULICITY 1.5 mg/0.5 mL pen injector  Generic drug: dulaglutide  Inject 1.5 mg into the skin every 7 days.     vitamin D 1000 units Tab  Commonly known as: VITAMIN D3               Where to Get Your Medications        These medications were sent to [facility] [address] [address]      Hours: Always Open Phone: [phone]   doxycycline 100 MG tablet           Bigg Sherman MD  [facility]  Electrophysiology, PGY-VII

## 2025-06-11 NOTE — TRANSFER OF CARE
"Anesthesia Transfer of Care Note    Patient: Eliana Wagner    Procedure(s) Performed: Procedure(s) (LRB):  Change, ICD, BIV, Generator (N/A)  Pocket revision  INSERTION, ELECTRODE LEAD, PACEMAKER, 1 ELECTRODE LEAD    Patient location: Cath Lab    Anesthesia Type: general    Transport from OR: Transported from OR on room air with adequate spontaneous ventilation    Post pain: adequate analgesia    Post assessment: no apparent anesthetic complications and tolerated procedure well    Post vital signs: stable    Level of consciousness: awake, alert and oriented    Nausea/Vomiting: no nausea/vomiting    Transfer of care protocol was followed      Last vitals: Visit Vitals  BP (!) 142/63 (BP Location: Left arm, Patient Position: Lying)   Pulse 64   Temp 36 °C (96.8 °F) (Temporal)   Resp 14   Ht 5' 2" (1.575 m)   Wt 76.2 kg (168 lb)   SpO2 98%   Breastfeeding No   BMI 30.73 kg/m²     "

## 2025-06-11 NOTE — PLAN OF CARE
Patient received to sscu room 2 s/p device placement. Report received from WES Luke. Patient is aaox4. Vss. Family notified and to bedside. Left chest wall with dressing intact. No bleeding, no hematoma noted. Sling and swaddler to left arm. Tele monitor in place.Patient without any complaints. No distress noted. Bedrest instructions reviewed with patient and family. Call light placed within reach. Will monitor.

## 2025-06-11 NOTE — NURSING
Pt prepped in room 10 on SSCU. Pt is AAOx4 and follows commands appropriately. VS taken and wnl and pt is free from s/s of pain/distress. IVs started, 12 lead EKG completed, and preop questions completed. Consents not done at this time. Safety measures in place. Pt's daughter is at bedside.  .

## 2025-06-11 NOTE — PLAN OF CARE
Bedrest completed. Patient has ambulated and voided. Tolerated fluids and solids without any problems. No complaints. Procedure site with dry Aquacell Ag  dressing intact. Pressure dressing removed. Sling/swaddler in place. No bleeding/no hematoma noted. PIV removed. Telemetry monitor removed. AVS printed and reviewed. Family at bedside. Wheelchair provided for discharge.

## 2025-06-11 NOTE — NURSING TRANSFER
Nursing Transfer Note      6/11/2025   3:50 PM    Nurse giving handoff:AMY Luke PACU RN   Nurse receiving handoff:ANGELINE Soriano RN     Reason patient is being transferred: post procedure     Transfer To: SSU 2     Transfer via stretcher    Transfer with cardiac monitoring    Transported by RN

## 2025-06-11 NOTE — ANESTHESIA PREPROCEDURE EVALUATION
"                                                                                                             06/11/2025  [facility]  Anesthesia Pre-Operative Evaluation         Patient Name: Eliana Wagner  YOB: 1935  MRN: [id number]    SUBJECTIVE:     Pre-operative evaluation for Procedure(s) (LRB):  Change, ICD, BIV, Generator (N/A)     06/11/2025    Eliana Wagner is a 89 y.o. female w/ a pertinent PMHx of NICM (EF 45%), s/p CRT-D here for generator change.      Full medical history listed below      LDA: None documented.    Prev airway: None documented.     GTTs: None documented.        Problem List[1]    Review of patient's allergies indicates:   Allergen Reactions    Penicillins      Caused uti       Current Inpatient Medications:      Medications Ordered Prior to Encounter[2]    Past Surgical History:   Procedure Laterality Date    APPENDECTOMY      CARDIAC CATHETERIZATION      CATARACT EXTRACTION Bilateral     EYE SURGERY Bilateral     cataract    INSERT / REPLACE / REMOVE PACEMAKER      INSERTION OF PACEMAKER      july 22 2019     Orestes Kim MD    TONSILLECTOMY         Social History[3]    OBJECTIVE:     Vital Signs Range (Last 24H):  Temp:  [36 °C (96.8 °F)]   Pulse:  [64]   Resp:  [14]   BP: (140-142)/(63-65)   SpO2:  [98 %]       CBC:   No results for input(s): "WBC", "RBC", "HGB", "HCT", "PLT", "MCV", "MCH", "MCHC" in the last 72 hours.    CMP: No results for input(s): "NA", "K", "CL", "CO2", "BUN", "CREATININE", "GLU", "MG", "PHOS", "CALCIUM", "ALBUMIN", "PROT", "ALKPHOS", "ALT", "AST", "BILITOT" in the last 72 hours.    INR:  No results for input(s): "PT", "INR", "PROTIME", "APTT" in the last 72 hours.    Diagnostic Studies: No relevant studies.    EKG:   Results for orders placed or performed in visit on 03/13/25   IN OFFICE EKG 12-LEAD (to Walker)    Collection Time: 03/13/25 10:17 AM   Result Value Ref Range    QRS Duration 150 ms    [facility] QTC Calculation " 517 ms    Narrative    Test Reason : I50.42,I25.10,I50.20,E11.69,E78.5,E11.59,I15.2,I42.8,I49.3,Z95.810,I47.20,    Vent. Rate :  63 BPM     Atrial Rate :  63 BPM     P-R Int : 168 ms          QRS Dur : 150 ms      QT Int : 506 ms       P-R-T Axes :    137  83 degrees    QTcB Int : 517 ms    AV dual-paced rhythm  Abnormal ECG  When compared with ECG of 06-Dec-2024 08:23,  Vent. rate has decreased by  13 bpm  Confirmed by Orestes Kim ([id number]) on 3/17/2025 4:57:30 PM    Referred By:            Confirmed By: Orestes Kim        2D ECHO:   Results for orders placed during the hospital encounter of 02/20/25    Echo    Interpretation Summary    Left Ventricle: The left ventricle is moderately dilated. Normal wall thickness. Basal septal akinesis present There is mildly reduced systolic function with a visually estimated ejection fraction of 40 - 45%. There is normal diastolic function.  The calculated ejection fraction is 46%.         ASSESSMENT/PLAN:           Pre-op Assessment    I have reviewed the Patient Summary Reports.     I have reviewed the Nursing Notes. I have reviewed the NPO Status.   I have reviewed the Medications.     Review of Systems  Anesthesia Hx:   History of prior surgery of interest to airway management or planning:          Denies Family Hx of Anesthesia complications.    Denies Personal Hx of Anesthesia complications.                        Physical Exam  General: Well nourished, Cooperative, Alert and Oriented    Airway:  Mallampati: II   Mouth Opening: Normal  TM Distance: Normal  Tongue: Normal  Neck ROM: Normal ROM    Dental:  Intact    Chest/Lungs:  Clear to auscultation, Normal Respiratory Rate    Heart:  Rate: Normal  Rhythm: Regular Rhythm        Anesthesia Plan  Type of Anesthesia, risks & benefits discussed:    Anesthesia Type: Gen Natural Airway  Intra-op Monitoring Plan: Standard ASA Monitors  Post Op Pain Control Plan: multimodal analgesia and IV/PO Opioids PRN  Induction:   IV  Airway Plan: Video  Informed Consent: Informed consent signed with the Patient and all parties understand the risks and agree with anesthesia plan.  All questions answered.   ASA Score: 3  Day of Surgery Review of History & Physical: H&P Update referred to the surgeon/provider.    Ready For Surgery From Anesthesia Perspective.     .           [1]   Patient Active Problem List  Diagnosis    Hypertension associated with diabetes    Refractive error    Pseudophakia    Choroidal nevus of right eye    Chronic combined systolic (congestive) and diastolic (congestive) heart failure    Obesity, diabetes, and hypertension syndrome    Coronary artery disease involving native coronary artery of native heart without angina pectoris    Presence of cardiac resynchronization therapy defibrillator (CRT-D)    Hyperlipidemia associated with type 2 diabetes mellitus    DM type 2 without retinopathy    History of CVA (cerebrovascular accident)    Type 2 diabetes mellitus with hyperglycemia, without long-term current use of insulin    Class 1 obesity with serious comorbidity in adult    Premature ventricular contractions    Risk for falls    Mixed incontinence    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     Current Outpatient Medications on File Prior to Encounter   Medication Sig Dispense Refill    amiodarone (PACERONE) 200 MG Tab TAKE 1/2 TABLET BY MOUTH EVERY DAY 45 tablet 3    aspirin (ECOTRIN) 81 MG EC tablet Take 81 mg by mouth once daily.      atorvastatin (LIPITOR) 40 MG tablet Take 1 tablet (40  mg total) by mouth once daily. 90 tablet 5    ENTRESTO  mg per tablet TAKE ONE TABLET BY MOUTH TWICE DAILY 60 tablet 14    meclizine (ANTIVERT) 25 mg tablet Take 1 tablet (25 mg total) by mouth 3 (three) times daily as needed for Dizziness. 60 tablet 0    metoprolol succinate (TOPROL-XL) 50 MG 24 hr tablet TAKE ONE TABLET BY MOUTH TWICE DAILY FOR BLOOD PRESSURE AND HEART 180 tablet 3    mirabegron (MYRBETRIQ) 25 mg Tb24 ER tablet TAKE ONE TABLET BY MOUTH EVERY DAY FOR FOR BLADDER 90 tablet 1    vitamin D (VITAMIN D3) 1000 units Tab Take 1,000 Units by mouth once daily.      blood sugar diagnostic Strp 1 each by Misc.(Non-Drug; Combo Route) route once daily. 100 each 3    dulaglutide (TRULICITY) 1.5 mg/0.5 mL pen injector Inject 1.5 mg into the skin every 7 days. 12 pen 3    furosemide (LASIX) 20 MG tablet Take 1 tablet (20 mg total) by mouth daily as needed (swelling). 30 tablet 11    lancets Misc 1 each by Misc.(Non-Drug; Combo Route) route once daily. 100 each 3    mv-min-FA-Ri-Gi-betsvng-lutein 0.4-162-18 mg Tab Take by mouth. 1 Tablet Oral Every day     [3]   Social History  Socioeconomic History    Marital status:    Tobacco Use    Smoking status: Never    Smokeless tobacco: Never   Substance and Sexual Activity    Alcohol use: Yes     Alcohol/week: 2.0 standard drinks of alcohol     Types: 2 Cans of beer per week     Comment: socially    Drug use: No    Sexual activity: Not Currently     Social Drivers of Health     Financial Resource Strain: Low Risk  (12/30/2024)    Overall Financial Resource Strain (CARDIA)     Difficulty of Paying Living Expenses: Not hard at all   Food Insecurity: No Food Insecurity (12/30/2024)    Hunger Vital Sign     Worried About Running Out of Food in the Last Year: Never true     Ran Out of Food in the Last Year: Never true   Transportation Needs: No Transportation Needs (12/30/2024)    PRAPARE - Transportation     Lack of Transportation (Medical): No     Lack of  Transportation (Non-Medical): No   Physical Activity: Insufficiently Active (12/30/2024)    Exercise Vital Sign     Days of Exercise per Week: 2 days     Minutes of Exercise per Session: 40 min   Stress: No Stress Concern Present (12/30/2024)    [employer] [place] [employer] - Occupational Stress Questionnaire     Feeling of Stress : Not at all   Housing Stability: Unknown (12/30/2024)    Housing Stability Vital Sign     Unable to Pay for Housing in the Last Year: No     Homeless in the Last Year: No

## 2025-06-11 NOTE — Clinical Note
A generator pocket was opened at the left chest with blunt dissection, electrocautery, plasma blade and sharp dissection.
A generator pocket was revised at the left chest with blunt dissection, electrocautery, plasma blade and sharp dissection.
A percutaneous stick was made to the left axillary vein.
A venogram was performed in the left axillary vein. The vessel was injected via hand injection  with 10 mL of contrast. Given by CRNA.
An incision was made at the left  upper chest.
ID band present and verified. Family is in the lobby.
The chest was prepped. The site was prepped with ChloraPrep and Ioban. The patient was draped.
The chronic RV lead was capped.
The defib pads were placed on the patient's chest and back.
The generator pocket was irrigated with Vancomycin 1G/500mL NS.
The generator pocket was sutured closed.
The generator was inserted into antibiotic pouch in the pocket the left upper chest.
The generator was inserted into pocket in and was sutured into the pocket in the the left upper chest.
The grounding pads were placed on the patient's bilateral thighs.
The lead thresholds were tested and was repositioned.
The lead was connected to generator.
The lead was connected to generator. A new lead was attached to the right atrium and right ventricle.
The lead was inserted under fluorscopic guidance and thresholds were tested. A new lead was attached to the left bundle.
The lead was inserted under fluorscopic guidance and thresholds were tested. A new lead was attached to the left bundle.
The lead was removed.
The lead was sutured in place.
The leads were disconnected. The generator was returned to .
The patient's elbows and knees were padded, heels floated, warming blanket given, and safety strap applied.
The procedural consent was signed. A history and physical note was completed in the chart.
The sheath was inserted into the left axillary vein.
The sheath was inserted through the larger sheath in the left axillary vein.
The sheath was inserted through the larger sheath in the left axillary vein.
The sheath was removed from the left axillary vein.
The sheath was removed from the left axillary vein. Successfully removed with slitter.
The skin at the left upper chest was closed with dermal adhesive.
The skin at the left upper chest was closed with dermal adhesive.
The wire was inserted into the left axillary vein.
The wire was removed from the  left axillary vein.
There was an existing generator. The generator was removed.
dry, intact, no bleeding and no hematoma.
My signature below certifies that the above stated patient is homebound and upon completion of the Face-To-Face encounter, has the need for intermittent skilled nursing, physical therapy and/or speech or occupational therapy services in their home for their current diagnosis as outlined in their initial plan of care. These services will continue to be monitored by myself or another physician.

## 2025-06-11 NOTE — H&P
[facility] [place]  Electrophysiology  H&P      Eliana Wagner   YOB: 1935   Medical Record Number: [id number]   Attending Physician:    Date of Admission: 06/11/2025       Hospital Day:  0  Current Principal Problem:  <principal problem not specified>      History     Cc: [facility] at [facility]     \[facility]\""  Mrs Eliana Wagner is a 89 year old female with PMH of non ischemic cardiomyopathy (EF 40-45%), s/p CRT-D implant 2019, h/o VT requiring defibrillation who [place] [facility] [place] with Dr. Mittal. Recent diaphragmatic stim noted on device, planning for left bundle branch area pacing lead as well.       Presenting EKG: AP BiV paced rhythm   CHADS-VASc: N/A  Anticoagulants: None   Antiarrhythmics: Amiodarone 100 mg daily   LV EF: 40-45% (TTE 2/2025)   Pertinent labs: Hgb 13.8, INR 1.0, Cr 0.9         Medications - Outpatient  Prior to Admission medications    Medication Sig Start Date End Date Taking? Authorizing Provider   amiodarone (PACERONE) 200 MG Tab TAKE 1/2 TABLET BY MOUTH EVERY DAY 11/21/24  Yes Orestes Kim Jr., MD   aspirin (ECOTRIN) 81 MG EC tablet Take 81 mg by mouth once daily.   Yes Provider, Historical   atorvastatin (LIPITOR) 40 MG tablet Take 1 tablet (40 mg total) by mouth once daily. 8/22/24  Yes Orestes Kim Jr., MD   ENTRESTO  mg per tablet TAKE ONE TABLET BY MOUTH TWICE DAILY 9/20/24  Yes Orestes Kim Jr., MD   meclizine (ANTIVERT) 25 mg tablet Take 1 tablet (25 mg total) by mouth 3 (three) times daily as needed for Dizziness. 7/22/24  Yes Tyra Marquez MD   metoprolol succinate (TOPROL-XL) 50 MG 24 hr tablet TAKE ONE TABLET BY MOUTH TWICE DAILY FOR BLOOD PRESSURE AND HEART 2/26/25  Yes Orestes Kim Jr., MD   mirabegron (MYRBETRIQ) 25 mg Tb24 ER tablet TAKE ONE TABLET BY MOUTH EVERY DAY FOR FOR BLADDER 11/21/24  Yes Tyra Marquez MD   vitamin D (VITAMIN D3) 1000 units Tab Take 1,000 Units by mouth once daily.    Yes Provider, Historical   blood sugar diagnostic Strp 1 each by Misc.(Non-Drug; Combo Route) route once daily. 9/24/18   Nara Lawton MD   dulaglutide (TRULICITY) 1.5 mg/0.5 mL pen injector Inject 1.5 mg into the skin every 7 days. 3/25/25 3/25/26  Tyra Marquez MD   furosemide (LASIX) 20 MG tablet Take 1 tablet (20 mg total) by mouth daily as needed (swelling). 2/22/24   Orestes Kim Jr., MD   lancets Misc 1 each by Misc.(Non-Drug; Combo Route) route once daily. 9/24/18   Nara Lawton MD   mv-min-FA-Vz-Rt-aebjmgz-lutein 0.4-162-18 mg Tab Take by mouth. 1 Tablet Oral Every day    Provider, Historical         Medications - Current  Scheduled Meds:  Continuous Infusions:  PRN Meds:.  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, with long-term current use of insulin 04/24/2023    Diabetes mellitus, type 2     Hyperlipidemia     Retinal detachment     od     Squamous cell carcinoma of skin     Stroke     tia x 3   ( last in 2005)    Urinary incontinence without sensory awareness 05/12/2021         Past Surgical History  Past Surgical History:   Procedure Laterality Date    APPENDECTOMY      CARDIAC CATHETERIZATION      CATARACT EXTRACTION Bilateral     EYE SURGERY Bilateral     cataract    INSERT / REPLACE / REMOVE PACEMAKER      INSERTION OF PACEMAKER      july 22 2019     Orestes Kim MD    TONSILLECTOMY           Social History  Social History     Socioeconomic History    Marital status:    Tobacco Use    Smoking status: Never    Smokeless tobacco: Never    Substance and Sexual Activity    Alcohol use: Yes     Alcohol/week: 2.0 standard drinks of alcohol     Types: 2 Cans of beer per week     Comment: socially    Drug use: No    Sexual activity: Not Currently     Social Drivers of Health     Financial Resource Strain: Low Risk  (12/30/2024)    Overall Financial Resource Strain (CARDIA)     Difficulty of Paying Living Expenses: Not hard at all   Food Insecurity: No Food Insecurity (12/30/2024)    Hunger Vital Sign     Worried About Running Out of Food in the Last Year: Never true     Ran Out of Food in the Last Year: Never true   Transportation Needs: No Transportation Needs (12/30/2024)    PRAPARE - Transportation     Lack of Transportation (Medical): No     Lack of Transportation (Non-Medical): No   Physical Activity: Insufficiently Active (12/30/2024)    Exercise Vital Sign     Days of Exercise per Week: 2 days     Minutes of Exercise per Session: 40 min   Stress: No Stress Concern Present (12/30/2024)    [employer] - Occupational Stress Questionnaire     Feeling of Stress : Not at all   Housing Stability: Unknown (12/30/2024)    Housing Stability Vital Sign     Unable to Pay for Housing in the Last Year: No     Homeless in the Last Year: No         ROS  10 point ROS performed and negative except as stated in HPI     Physical Examination         Vital Signs  Vitals  Temp: 96.8 °F (36 °C)  Temp Source: Temporal  Pulse: 64  Heart Rate Source: Monitor  Resp: 14  SpO2: 98 %  Pulse Oximetry Type: Intermittent  BP: (!) 142/63  MAP (mmHg): 90  BP Location: Left arm  Patient Position: Lying          24 Hour VS Range    Temp:  [96.8 °F (36 °C)]   Pulse:  [64]   Resp:  [14]   BP: (140-142)/(63-65)   SpO2:  [98 %]   No intake or output data in the 24 hours ending 06/11/25 1004        Physical Exam:   Constitutional: no acute distress  HEENT: NCAT, EOMI, no scleral icterus  Cardiovascular: Regular rate and rhythm  Pulmonary: Normal respiratory  "effort   Abdomen: nontender, non-distended   Neuro: alert and oriented, no focal deficits  Extremities: warm, no edema   MSK: no deformities  Integument: intact, no rashes       Data       No results for input(s): "WBC", "HGB", "HCT", "PLT" in the last 168 hours.     No results for input(s): "PROTIME", "INR" in the last 168 hours.     No results for input(s): "NA", "K", "CL", "CO2", "BUN", "CREATININE", "ANIONGAP", "CALCIUM" in the last 168 hours.     No results for input(s): "PROT", "ALBUMIN", "BILITOT", "ALKPHOS", "AST", "ALT" in the last 168 hours.     No results for input(s): "TROPONINI" in the last 168 hours.     BNP (pg/mL)   Date Value   08/05/2023 137 (H)       No results for input(s): "LABBLOO" in the last 168 hours.         Assessment & Plan   89 year old female with PMH of non ischemic cardiomyopathy (EF 40-45%), s/p CRT-D implant 2019, h/o VT requiring defibrillation who [place] [facility] [place]. Mittal.    CIED at [facility]:   Risks/benefits/alternatives discussed with patient and she agrees to proceed. Consents signed.   -To EP lab for gen change + LBBAP lead addition       Bigg Sherman MD  [facility]  Electrophysiology, PGY-VII      "

## 2025-06-11 NOTE — NURSING
Pt ambulated around unit. Tolerated walk well. Vital signs remain stable. No c/o pain or discomfort at this time, resp even and unlabored. Gauze/tegaderm dressing to right groin site is CDI. No active bleeding. No hematoma noted.

## 2025-06-12 LAB
OHS QRS DURATION: 110 MS
OHS QTC CALCULATION: 475 MS

## 2025-06-13 NOTE — ANESTHESIA POSTPROCEDURE EVALUATION
Anesthesia Post Evaluation    Patient: Eliana Wagnre    Procedure(s) Performed: Procedure(s) (LRB):  Change, ICD, BIV, Generator (N/A)  Pocket revision  INSERTION, ELECTRODE LEAD, PACEMAKER, 1 ELECTRODE LEAD  REVISION, PACEMAKER LEAD    Final Anesthesia Type: general      Patient location during evaluation: PACU  Patient participation: Yes- Able to Participate  Level of consciousness: awake and alert and oriented  Post-procedure vital signs: reviewed and stable  Pain management: adequate  Airway patency: patent    PONV status at discharge: No PONV  Anesthetic complications: no      Cardiovascular status: blood pressure returned to baseline and hemodynamically stable  Respiratory status: unassisted  Hydration status: euvolemic  Follow-up not needed.              Vitals Value Taken Time   /61 06/11/25 17:15   Temp 36.3 °C (97.4 °F) 06/11/25 17:15   Pulse 61 06/11/25 17:15   Resp 19 06/11/25 17:15   SpO2 95 % 06/11/25 17:15         No case tracking events are documented in the log.      Pain/Kevin Score: No data recorded

## 2025-06-18 DIAGNOSIS — I50.42 CHRONIC COMBINED SYSTOLIC AND DIASTOLIC HEART FAILURE: Primary | ICD-10-CM

## 2025-06-18 DIAGNOSIS — I50.42 CHRONIC COMBINED SYSTOLIC AND DIASTOLIC HEART FAILURE: ICD-10-CM

## 2025-06-18 RX ORDER — METOPROLOL SUCCINATE 50 MG/1
TABLET, EXTENDED RELEASE ORAL
Qty: 180 TABLET | Refills: 3 | Status: SHIPPED | OUTPATIENT
Start: 2025-06-18

## 2025-06-19 ENCOUNTER — CLINICAL SUPPORT (OUTPATIENT)
Dept: CARDIOLOGY | Facility: HOSPITAL | Age: OVER 89
End: 2025-06-19
Attending: STUDENT IN AN ORGANIZED HEALTH CARE EDUCATION/TRAINING PROGRAM
Payer: MEDICARE

## 2025-06-19 DIAGNOSIS — I50.42 CHRONIC COMBINED SYSTOLIC AND DIASTOLIC HEART FAILURE: ICD-10-CM

## 2025-06-19 DIAGNOSIS — Z95.810 PRESENCE OF CARDIAC RESYNCHRONIZATION THERAPY DEFIBRILLATOR (CRT-D): ICD-10-CM

## 2025-06-19 DIAGNOSIS — I42.0 NONISCHEMIC CONGESTIVE CARDIOMYOPATHY: ICD-10-CM

## 2025-06-19 DIAGNOSIS — Z45.02 END OF BATTERY LIFE OF CARDIAC RESYNCHRONIZATION THERAPY DEFIBRILLATOR (CRT-D): ICD-10-CM

## 2025-06-19 PROCEDURE — 93283 PRGRMG EVAL IMPLANTABLE DFB: CPT

## 2025-06-20 LAB
OHS QRS DURATION: 160 MS
OHS QTC CALCULATION: 518 MS

## 2025-06-25 ENCOUNTER — PATIENT MESSAGE (OUTPATIENT)
Dept: ELECTROPHYSIOLOGY | Facility: CLINIC | Age: OVER 89
End: 2025-06-25
Payer: MEDICARE

## 2025-06-30 ENCOUNTER — CLINICAL SUPPORT (OUTPATIENT)
Dept: CARDIOLOGY | Facility: HOSPITAL | Age: OVER 89
End: 2025-06-30
Payer: MEDICARE

## 2025-06-30 ENCOUNTER — CLINICAL SUPPORT (OUTPATIENT)
Dept: CARDIOLOGY | Facility: HOSPITAL | Age: OVER 89
End: 2025-06-30
Attending: INTERNAL MEDICINE
Payer: MEDICARE

## 2025-06-30 DIAGNOSIS — I47.20 VENTRICULAR TACHYCARDIA, UNSPECIFIED: ICD-10-CM

## 2025-06-30 DIAGNOSIS — Z95.810 PRESENCE OF AUTOMATIC (IMPLANTABLE) CARDIAC DEFIBRILLATOR: ICD-10-CM

## 2025-06-30 PROCEDURE — 93296 REM INTERROG EVL PM/IDS: CPT | Performed by: INTERNAL MEDICINE

## 2025-06-30 PROCEDURE — 93295 DEV INTERROG REMOTE 1/2/MLT: CPT | Mod: ,,, | Performed by: INTERNAL MEDICINE

## 2025-07-07 LAB
OHS CV AF BURDEN PERCENT: < 1
OHS CV DC REMOTE DEVICE TYPE: NORMAL
OHS CV RV PACING PERCENT: 99.51 %

## 2025-07-18 ENCOUNTER — OFFICE VISIT (OUTPATIENT)
Dept: INTERNAL MEDICINE | Facility: CLINIC | Age: OVER 89
End: 2025-07-18
Payer: MEDICARE

## 2025-07-18 VITALS
OXYGEN SATURATION: 97 % | SYSTOLIC BLOOD PRESSURE: 100 MMHG | BODY MASS INDEX: 32.14 KG/M2 | RESPIRATION RATE: 16 BRPM | DIASTOLIC BLOOD PRESSURE: 62 MMHG | HEIGHT: 62 IN | WEIGHT: 174.63 LBS | HEART RATE: 66 BPM | TEMPERATURE: 98 F

## 2025-07-18 DIAGNOSIS — I47.20 VT (VENTRICULAR TACHYCARDIA): ICD-10-CM

## 2025-07-18 DIAGNOSIS — S09.90XA HEAD TRAUMA, INITIAL ENCOUNTER: ICD-10-CM

## 2025-07-18 DIAGNOSIS — Z95.810 PRESENCE OF CARDIAC RESYNCHRONIZATION THERAPY DEFIBRILLATOR (CRT-D): ICD-10-CM

## 2025-07-18 DIAGNOSIS — Z91.81 RISK FOR FALLS: Primary | ICD-10-CM

## 2025-07-18 DIAGNOSIS — Z79.4 DIABETES MELLITUS DUE TO UNDERLYING CONDITION WITH BOTH EYES AFFECTED BY PROLIFERATIVE RETINOPATHY AND MACULAR EDEMA, WITH LONG-TERM CURRENT USE OF INSULIN: ICD-10-CM

## 2025-07-18 DIAGNOSIS — R42 VERTIGO: ICD-10-CM

## 2025-07-18 DIAGNOSIS — E08.3513 DIABETES MELLITUS DUE TO UNDERLYING CONDITION WITH BOTH EYES AFFECTED BY PROLIFERATIVE RETINOPATHY AND MACULAR EDEMA, WITH LONG-TERM CURRENT USE OF INSULIN: ICD-10-CM

## 2025-07-18 DIAGNOSIS — Z86.73 HISTORY OF CVA (CEREBROVASCULAR ACCIDENT): ICD-10-CM

## 2025-07-18 PROCEDURE — 99999 PR PBB SHADOW E&M-EST. PATIENT-LVL V: CPT | Mod: PBBFAC,,,

## 2025-07-18 RX ORDER — MIRABEGRON 25 MG/1
25 TABLET, FILM COATED, EXTENDED RELEASE ORAL DAILY
Qty: 30 EACH | Refills: 1 | Status: SHIPPED | OUTPATIENT
Start: 2025-07-18 | End: 2026-07-18

## 2025-07-18 RX ORDER — MECLIZINE HYDROCHLORIDE 25 MG/1
25 TABLET ORAL 3 TIMES DAILY PRN
Qty: 60 TABLET | Refills: 1 | Status: SHIPPED | OUTPATIENT
Start: 2025-07-18

## 2025-07-18 RX ORDER — MECLIZINE HYDROCHLORIDE 25 MG/1
25 TABLET ORAL 3 TIMES DAILY PRN
Qty: 60 TABLET | Refills: 0 | Status: SHIPPED | OUTPATIENT
Start: 2025-07-18 | End: 2025-07-18

## 2025-07-18 NOTE — PROGRESS NOTES
Eliana Wagner  1935        Subjective     Chief Complaint: Fall (Pt have had 4 falls in the past 3 months, legs are not weak.)      History of Present Illness:  Ms. Eliana Wagner is a 89 y.o. female with HFrEF, HTN, HLD, obesity, DM2, CVA who presents to clinic for frequent falls    Multiple falls in the past few months.  Fall last night with left arm and back of head trauma on wall at home.  Denies tripping or leg weakness.  Denies lightheadedness, dizziness, palpitations, chest pain, dyspnea.  Uses walker intermittently at home.  Not using walker today in clinic.  Another episode of fall around 4th of July with facial trauma.        Review of Systems   Constitutional:  Negative for chills and fever.   Respiratory:  Negative for shortness of breath.    Cardiovascular:  Negative for chest pain and palpitations.   Gastrointestinal:  Negative for abdominal pain, constipation, diarrhea, nausea and vomiting.   Genitourinary:  Negative for dysuria and hematuria.   Musculoskeletal:  Positive for falls.   Neurological:  Negative for dizziness and weakness.        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, with long-term current use of insulin 04/24/2023    Diabetes mellitus, type 2     Hyperlipidemia     Retinal detachment     od     Squamous cell carcinoma of skin     Stroke     tia x 3   ( last in 2005)    Urinary incontinence without sensory awareness 05/12/2021       Past Surgical History:   Procedure Laterality Date    APPENDECTOMY      CARDIAC CATHETERIZATION      CATARACT EXTRACTION Bilateral     CHANGE, ICD, BIV, GENERATOR N/A 6/11/2025    Procedure: Change, ICD, BIV, Generator;  Surgeon: GENESIS Mittal MD;  Location: [facility];  Service: Cardiology;  Laterality: N/A;  [facility]/NICM, CRT-D gen chg, IVAN, MAC, EH, 3prep, *MDT CRT-D in  situ*    EYE SURGERY Bilateral     cataract    INSERT / REPLACE / REMOVE PACEMAKER      INSERTION OF PACEMAKER      july 22 2019     Orestes Kim MD    INSERTION, ELECTRODE LEAD, CARDIAC PACEMAKER, 1 ELECTRODE LEAD  6/11/2025    Procedure: INSERTION, ELECTRODE LEAD, PACEMAKER, 1 ELECTRODE LEAD;  Surgeon: GENESIS Mittal MD;  Location: [facility] EP LAB;  Service: Cardiology;;    REVISION OF CARDIAC DEVICE POCKET  6/11/2025    Procedure: Pocket revision;  Surgeon: GENESIS Mittal MD;  Location: [facility] EP LAB;  Service: Cardiology;;    REVISION OF PROCEDURE INVOLVING PACEMAKER LEAD  6/11/2025    Procedure: REVISION, PACEMAKER LEAD;  Surgeon: GENESIS Mittal MD;  Location: [facility] EP LAB;  Service: Cardiology;;    TONSILLECTOMY         Family History   Problem Relation Name Age of Onset    Cataracts Mother Philomena Rawls     Heart disease Mother Philomena Rawls     Cataracts Father Geoff Rawls     Cancer Father Geoff Rawls     Diabetes Father Geoff Rawls     Heart disease Father Geoff Rawls     Stroke Father Geoff Rawls     No Known Problems Sister      No Known Problems Brother      Cancer Son          prostate    Liver disease Son          liver transplant    No Known Problems Maternal Aunt      No Known Problems Maternal Uncle      No Known Problems Paternal Aunt      No Known Problems Paternal Uncle      No Known Problems Maternal Grandmother      Glaucoma Maternal Grandfather      No Known Problems Paternal Grandmother      No Known Problems Paternal Grandfather      Breast cancer Neg Hx      Colon cancer Neg Hx      Ovarian cancer Neg Hx      Amblyopia Neg Hx      Blindness Neg Hx      Hypertension Neg Hx      Macular degeneration Neg Hx      Retinal detachment Neg Hx      Strabismus Neg Hx      Thyroid disease Neg Hx         Social History     Socioeconomic History    Marital status:    Tobacco Use    Smoking status: Never    Smokeless tobacco: Never   Substance and Sexual Activity    Alcohol use: Yes     Alcohol/week: 2.0  standard drinks of alcohol     Types: 2 Cans of beer per week     Comment: socially    Drug use: No    Sexual activity: Not Currently     Social Drivers of Health     Financial Resource Strain: Low Risk  (7/18/2025)    Overall Financial Resource Strain (CARDIA)     Difficulty of Paying Living Expenses: Not hard at all   Food Insecurity: No Food Insecurity (7/18/2025)    Hunger Vital Sign     Worried About Running Out of Food in the Last Year: Never true     Ran Out of Food in the Last Year: Never true   Transportation Needs: No Transportation Needs (7/18/2025)    PRAPARE - Transportation     Lack of Transportation (Medical): No     Lack of Transportation (Non-Medical): No   Physical Activity: Insufficiently Active (7/18/2025)    Exercise Vital Sign     Days of Exercise per Week: 1 day     Minutes of Exercise per Session: 20 min   Stress: No Stress Concern Present (7/18/2025)    [employer] - Occupational Stress Questionnaire     Feeling of Stress : Not at all   Housing Stability: Low Risk  (7/18/2025)    Housing Stability Vital Sign     Unable to Pay for Housing in the Last Year: No     Number of Times Moved in the Last Year: 0     Homeless in the Last Year: No       MEDICATIONS & ALLERGIES:     Current Outpatient Medications on File Prior to Visit   Medication Sig    amiodarone (PACERONE) 200 MG Tab TAKE 1/2 TABLET BY MOUTH EVERY DAY    aspirin (ECOTRIN) 81 MG EC tablet Take 81 mg by mouth once daily.    atorvastatin (LIPITOR) 40 MG tablet Take 1 tablet (40 mg total) by mouth once daily.    blood sugar diagnostic Strp 1 each by Misc.(Non-Drug; Combo Route) route once daily.    dulaglutide (TRULICITY) 1.5 mg/0.5 mL pen injector Inject 1.5 mg into the skin every 7 days.    ENTRESTO  mg per tablet TAKE ONE TABLET BY MOUTH TWICE DAILY    furosemide (LASIX) 20 MG tablet Take 1 tablet (20 mg total) by mouth daily as needed (swelling).    lancets Misc 1 each by Misc.(Non-Drug; Combo  "Route) route once daily.    metoprolol succinate (TOPROL-XL) 50 MG 24 hr tablet TAKE ONE TABLET BY MOUTH TWICE DAILY FOR BLOOD PRESSURE AND HEART    mv-min-FA-Ac-Zw-zcpmlya-lutein 0.4-162-18 mg Tab Take by mouth. 1 Tablet Oral Every day    vitamin D (VITAMIN D3) 1000 units Tab Take 1,000 Units by mouth once daily.    [DISCONTINUED] meclizine (ANTIVERT) 25 mg tablet Take 1 tablet (25 mg total) by mouth 3 (three) times daily as needed for Dizziness.    [DISCONTINUED] mirabegron (MYRBETRIQ) 25 mg Tb24 ER tablet TAKE ONE TABLET BY MOUTH EVERY DAY FOR FOR BLADDER     No current facility-administered medications on file prior to visit.       Review of patient's allergies indicates:   Allergen Reactions    Penicillins      Caused uti       OBJECTIVE:     Vital Signs:  Vitals:    07/18/25 1330   BP: 100/62   BP Location: Right arm   Patient Position: Sitting   Pulse: 66   Resp: 16   Temp: 98.2 °F (36.8 °C)   TempSrc: Oral   SpO2: 97%   Weight: 79.2 kg (174 lb 9.7 oz)   Height: 5' 2" (1.575 m)       Body mass index is 31.94 kg/m².     Physical Exam:  Physical Exam  Constitutional:       General: She is not in acute distress.     Appearance: She is not ill-appearing.   HENT:      Head: Normocephalic.      Mouth/Throat:      Mouth: Mucous membranes are moist.      Pharynx: Oropharynx is clear.   Eyes:      Extraocular Movements: Extraocular movements intact.      Conjunctiva/sclera: Conjunctivae normal.   Cardiovascular:      Rate and Rhythm: Normal rate and regular rhythm.   Pulmonary:      Effort: Pulmonary effort is normal.   Musculoskeletal:         General: Normal range of motion.      Cervical back: Normal range of motion.      Right lower leg: No edema.      Left lower leg: No edema.   Skin:     General: Skin is warm and dry.      Findings: Bruising (bilateral underneath eyes) present.   Neurological:      General: No focal deficit present.      Mental Status: She is alert and oriented to person, place, and time. " "  Psychiatric:         Mood and Affect: Mood normal.         Behavior: Behavior normal.            Laboratory  Lab Results   Component Value Date    WBC 10.13 06/04/2025    HGB 13.8 06/04/2025    HCT 43.3 06/04/2025    MCV 93 06/04/2025     (L) 06/04/2025     Lab Results   Component Value Date     (H) 06/04/2025     06/04/2025    K 5.2 (H) 06/04/2025     06/04/2025    CO2 26 06/04/2025    BUN 20 06/04/2025    CREATININE 0.9 06/04/2025    CALCIUM 9.0 06/04/2025    MG 2.1 10/15/2023     Lab Results   Component Value Date    INR 1.0 06/04/2025    INR 1.2 10/14/2023    INR 1.0 10/14/2023    PROTIME 11.1 06/04/2025     Lab Results   Component Value Date    HGBA1C 8.4 (H) 07/21/2023     No results for input(s): "POCTGLUCOSE" in the last 72 hours.      Health Maintenance         Date Due Completion Date    RSV Vaccine (Age 60+ and Pregnant patients) (1 - 1-dose 75+ series) Never done ---    Hemoglobin A1c 10/21/2023 7/21/2023    Diabetes Urine Screening 05/10/2024 5/10/2023    Lipid Panel 10/14/2024 10/14/2023    DEXA Scan 07/21/2025 7/21/2023    Override on 8/30/2018: Done (ordered today)    Influenza Vaccine (1) 09/01/2025 9/20/2024    Aspirin/Antiplatelet Therapy 06/11/2026 6/11/2025    TETANUS VACCINE 10/11/2029 10/11/2019            ASSESSMENT & PLAN:   89 y.o. female who was seen today in clinic for frequent falls.    Risk for falls  -     Ambulatory Referral/Consult to Physical Therapy    History of CVA (cerebrovascular accident)    VT (ventricular tachycardia)    Presence of cardiac resynchronization therapy defibrillator (CRT-D)    Head trauma, initial encounter  -     CT Head Without Contrast; Future; Expected date: 07/18/2025  -     CT Maxillofacial Without Contrast; Future; Expected date: 07/18/2025    Vertigo  -     Discontinue: meclizine (ANTIVERT) 25 mg tablet; Take 1 tablet (25 mg total) by mouth 3 (three) times daily as needed for Dizziness.  Dispense: 60 tablet; Refill: 0  -     " meclizine (ANTIVERT) 25 mg tablet; Take 1 tablet (25 mg total) by mouth 3 (three) times daily as needed for Dizziness.  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  Dispense: 30 each; Refill: 1         1. Risk for falls    2. History of CVA (cerebrovascular accident)    3. VT (ventricular tachycardia)    4. Presence of cardiac resynchronization therapy defibrillator (CRT-D)    5. Head trauma, initial encounter    6. Vertigo    7. 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 Prior TIAs in past and remote stroke history on chart review.  Repeat imaging with CTH and CT maxfacial.  Consider MRI however with PPM.  PT ordered for frequent falls.  Follows with cardiology closely with PPM in place for arrhythmias.  Strict RTC/ED precautions given.  Continue follow up with cardiology.  Discussed importance of consistent walker use at home to prevent frequent falls.    7. Last A1c reviewed.  Repeat labs ordered by PCP, patient to follow up with PCP and complete labs.  Continue meds.  Discussed diabetic diet.  Follow up with ophthalmologist.      RTC with PCP or sooner if needed    Scott Zamora MD  [facility]    This note was generated with the assistance of ambient listening technology. Verbal consent was obtained by the patient and accompanying visitor(s) for the recording of patient appointment to facilitate this note. I attest to having reviewed and edited the generated note for accuracy, though some syntax or spelling errors may persist. Please contact the author of this note for any clarification.

## 2025-07-26 ENCOUNTER — HOSPITAL ENCOUNTER (OUTPATIENT)
Dept: RADIOLOGY | Facility: HOSPITAL | Age: OVER 89
Discharge: HOME | End: 2025-07-26
Payer: MEDICARE

## 2025-07-26 DIAGNOSIS — S09.90XA HEAD TRAUMA, INITIAL ENCOUNTER: ICD-10-CM

## 2025-07-26 PROCEDURE — 70486 CT MAXILLOFACIAL W/O DYE: CPT | Mod: 26,,, | Performed by: RADIOLOGY

## 2025-07-26 PROCEDURE — 70450 CT HEAD/BRAIN W/O DYE: CPT | Mod: TC

## 2025-07-26 PROCEDURE — 70450 CT HEAD/BRAIN W/O DYE: CPT | Mod: 26,,, | Performed by: RADIOLOGY

## 2025-07-26 PROCEDURE — 70486 CT MAXILLOFACIAL W/O DYE: CPT | Mod: TC

## 2025-07-28 ENCOUNTER — RESULTS FOLLOW-UP (OUTPATIENT)
Dept: INTERNAL MEDICINE | Facility: CLINIC | Age: OVER 89
End: 2025-07-28
Payer: MEDICARE

## 2025-07-28 DIAGNOSIS — S02.2XXA CLOSED FRACTURE OF NASAL BONE, INITIAL ENCOUNTER: Primary | ICD-10-CM

## 2025-07-30 ENCOUNTER — PATIENT MESSAGE (OUTPATIENT)
Dept: PRIMARY CARE CLINIC | Facility: CLINIC | Age: OVER 89
End: 2025-07-30
Payer: MEDICARE

## 2025-08-01 ENCOUNTER — OFFICE VISIT (OUTPATIENT)
Dept: OTOLARYNGOLOGY | Facility: CLINIC | Age: OVER 89
End: 2025-08-01
Payer: MEDICARE

## 2025-08-01 VITALS
WEIGHT: 180.31 LBS | HEART RATE: 76 BPM | DIASTOLIC BLOOD PRESSURE: 82 MMHG | BODY MASS INDEX: 32.98 KG/M2 | SYSTOLIC BLOOD PRESSURE: 151 MMHG

## 2025-08-01 DIAGNOSIS — S02.2XXA CLOSED FRACTURE OF NASAL BONE, INITIAL ENCOUNTER: Primary | ICD-10-CM

## 2025-08-01 PROCEDURE — 99999 PR PBB SHADOW E&M-EST. PATIENT-LVL III: CPT | Mod: PBBFAC,,, | Performed by: OTOLARYNGOLOGY

## 2025-08-01 NOTE — PROGRESS NOTES
Patient ID: Eliana Wagner is a 89 y.o. female.    Chief Complaint: Closed fracture of nasal bone   History of Present Illness:   Eliana Wagner is a 89 y.o. year old female evaluated in the Otolaryngology-Head and Neck Surgery Clinic at [facility]. The patient was referred by PCP for evaluation of nasal fracture.          She reports a fall approximately one month ago at 2:00 AM while going to the bathroom. She slipped on tile miller and sustained a small cut to her nose with subsequent swelling and purple bruising. She currently reports minimal residual swelling and denies any breathing difficulties through the nose since the fall. Her nose has not changed in appearance and is not deviated. She did not seek medical attention initially after the fall.    HEARING LOSS:  She reports complete hearing loss with significant impairment in auditory function and difficulty communicating. She appears to be using hearing aids but is experiencing challenges with their effectiveness. She denies any partial hearing capacity.    SURGICAL HISTORY:  She has a history of cancer surgery involving the nose performed by Dr. Farris, involving tumor removal and subsequent reconstruction.   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, with long-term current use of insulin 04/24/2023    Diabetes mellitus, type 2     Hyperlipidemia     Retinal detachment     od     Squamous cell carcinoma of skin     Stroke     tia x 3   ( last in 2005)    Urinary incontinence without sensory awareness 05/12/2021     Her  has a past surgical history that includes Appendectomy; Eye surgery (Bilateral); Tonsillectomy; Insertion of pacemaker; Cataract extraction (Bilateral); Insert / replace / remove pacemaker; Cardiac catheterization; change, icd, biv, generator  (N/A, 6/11/2025); Revision of cardiac device pocket (6/11/2025); insertion, electrode lead, cardiac pacemaker, 1 electrode lead (6/11/2025); and Revision of procedure involving pacemaker lead (6/11/2025).     Past Family/Social History  Her family history includes Cancer in her father and son; Cataracts in her father and mother; Diabetes in her father; Glaucoma in her maternal grandfather; Heart disease in her father and mother; Liver disease in her son; No Known Problems in her brother, maternal aunt, maternal grandmother, maternal uncle, paternal aunt, paternal grandfather, paternal grandmother, paternal uncle, and sister; Stroke in her father.  She  reports that she has never smoked. She has never used smokeless tobacco. She reports current alcohol use of about 2.0 standard drinks of alcohol per week. She reports that she does not use drugs.     Medications/Allergies/Immunizations  Her current medication(s) include:   Current Medications[1]     Allergies: Penicillins     Immunizations:   Immunization History   Administered Date(s) Administered    COVID-19, MRNA, LN-S, PF (Pfizer) (Purple Cap) 01/09/2021, 01/30/2021, 10/19/2021    Hepatitis B, Adult 10/24/2005    Influenza (FLUAD) - Quadrivalent - Adjuvanted - PF *Preferred* (65+) 10/22/2020, 01/07/2022, 12/07/2022    Influenza - Trivalent - Fluzone High Dose - PF (65 years and older) 09/04/2013, 10/29/2015, 12/27/2016, 11/06/2017, 11/07/2018, 10/07/2019    Influenza Split 12/03/2007    Pneumococcal Conjugate - 13 Valent 08/30/2018    Pneumococcal Polysaccharide - 23 Valent 10/11/2019    Td (Adult), Unspecified Formulation 09/13/2005    Tdap 10/11/2019    Zoster 03/08/2014    Zoster Recombinant 12/05/2019, 07/08/2020         Review of Systems   Answers submitted by the patient for this visit:  Review of Symptoms Questionnaire  (Submitted on 8/1/2025)  Fatigue (Tiredness)?: Yes  hearing loss: Yes  None of these : Yes  None of these: Yes  None of these : Yes  None  of these: Yes  Urinating too frequently?: Yes  None of these: Yes  None of these : Yes  None of these: Yes  None of these : Yes  dizziness: Yes  None of these: Yes  None of these: Yes       All other systems are negative except for that listed in the HPI.      PHYSICAL EXAM:   Vital Signs:  BP (!) 151/82 (BP Location: Right arm, Patient Position: Sitting)   Pulse 76   Wt 81.8 kg (180 lb 5.4 oz)   BMI 32.98 kg/m²      General:  Well-developed, well-nourished  Communication and Voice:  Clear pitch and clarity  Hearing: Hearing adequate for verbal communication bilaterally   Inspection:  Normocephalic and atraumatic without mass or lesion  Palpation:  Facial skeleton intact without bony stepoffs  Parotid Glands:  No mass or tenderness  Facial Strength:  Facial motility symmetric and full bilaterally  Pinna:  External ear intact and fully developed  External canal:  Canal is patent with intact skin  Tympanic Membrane:  Clear and mobile  External nose:  No scar or anatomic deformity  Internal Nose:  Septum intact and midline.  No edema, polyp, or rhinorrhea.  TMJ:  No pain to palpation with full mobility  Oral cavity, Lips, Teeth, and Gums:  Mucosa and teeth intact and viable, No lesions, masses or ulcers  Oropharynx: No erythema or exudate, no masses or ulcerations, non-obstructive tonsils  Nasopharynx:  No mass or lesion with intact mucosa  Hypopharynx:  Not well visualized secondary to gagging  Larynx:  Not well visualized secondary to gagging  Neck, Trachea, Lymphatics:  Midline trachea without mass or lesion, no lymphadenopathy  Thyroid:  No mass or nodularity  Eyes: No nystagmus with equal extraocular motion bilaterally  Neuro/Psych/Balance: Patient oriented and appropriate in interaction;  Appropriate mood and affect;  Gait is intact with no imbalance; Cranial nerves I-XII are intact  Respiratory effort:  Equal inspiration and expiration without stridor  Peripheral Vascular:  Warm extremities with equal  pulses    RADIOLOGIC REVIEW:    I have personally reviewed the imaging and went over the images with the patient small non operative nasal fracture.    CT max face 07/26/2025  Impression:     Comminuted nasal fractures with mild deformity.        Assessment & Plan    S02.2XXA Closed fracture of nasal bone, initial encounter    IMPRESSION:  - Assessed fall from approximately 1 month ago, resulting in nasal injury.  - Evaluated CT Head from 2 weeks prior, identifying possible old nasal fracture with bone already healing.  - Determined no surgical intervention necessary due to age and lack of significant deformity or functional issues.  - Concluded routine follow-up sufficient, with emphasis on fall prevention.    CLOSED FRACTURE OF NASAL BONE, INITIAL ENCOUNTER:  - Explained CT findings, including anatomy of head and nasal area.  - Discussed normal healing process of nasal fractures, which can take up to 6 weeks for swelling to subside.  - Recommend being careful to avoid future falls.         I believe that Ms. Wagner has a good understanding of the issues involved and I answered all of her questions.     DISCLAIMER: This note was prepared with [employer] voice recognition transcription software. Garbled syntax, mangled pronouns, and other bizarre constructions may be attributed to that software system. While efforts were made to correct any mistakes made by this voice recognition program, some errors and/or omissions may remain in the note that were missed when the note was originally created.     This note was generated with the assistance of ambient listening technology. Verbal consent was obtained by the patient and accompanying visitor(s) for the recording of patient appointment to facilitate this note. I attest to having reviewed and edited the generated note for accuracy, though some syntax or spelling errors may persist. Please contact the author of this note for any clarification.           [1]   Current  Outpatient Medications   Medication Sig Dispense Refill    amiodarone (PACERONE) 200 MG Tab TAKE 1/2 TABLET BY MOUTH EVERY DAY 45 tablet 3    aspirin (ECOTRIN) 81 MG EC tablet Take 81 mg by mouth once daily.      atorvastatin (LIPITOR) 40 MG tablet Take 1 tablet (40 mg total) by mouth once daily. 90 tablet 5    blood sugar diagnostic Strp 1 each by Misc.(Non-Drug; Combo Route) route once daily. 100 each 3    dulaglutide (TRULICITY) 1.5 mg/0.5 mL pen injector Inject 1.5 mg into the skin every 7 days. 12 pen 3    ENTRESTO  mg per tablet TAKE ONE TABLET BY MOUTH TWICE DAILY 60 tablet 14    furosemide (LASIX) 20 MG tablet Take 1 tablet (20 mg total) by mouth daily as needed (swelling). 30 tablet 11    lancets Misc 1 each by Misc.(Non-Drug; Combo Route) route once daily. 100 each 3    meclizine (ANTIVERT) 25 mg tablet Take 1 tablet (25 mg total) by mouth 3 (three) times daily as needed for Dizziness. 60 tablet 1    metoprolol succinate (TOPROL-XL) 50 MG 24 hr tablet TAKE ONE TABLET BY MOUTH TWICE DAILY FOR BLOOD PRESSURE AND HEART 180 tablet 3    mirabegron (MYRBETRIQ) 25 mg Tb24 ER tablet Take 1 tablet (25 mg total) by mouth once daily. 30 each 1    mv-min-FA-Yu-Mq-rnvdwkg-lutein 0.4-162-18 mg Tab Take by mouth. 1 Tablet Oral Every day      vitamin D (VITAMIN D3) 1000 units Tab Take 1,000 Units by mouth once daily.       No current facility-administered medications for this visit.

## 2025-08-30 DIAGNOSIS — I47.20 VT (VENTRICULAR TACHYCARDIA): ICD-10-CM

## 2025-09-02 RX ORDER — AMIODARONE HYDROCHLORIDE 200 MG/1
100 TABLET ORAL
Qty: 45 TABLET | Refills: 3 | Status: SHIPPED | OUTPATIENT
Start: 2025-09-02

## (undated) DEVICE — ADHESIVE DERMABOND ADVANCED

## (undated) DEVICE — PACK PACER PERMANENT OMC

## (undated) DEVICE — KIT MICROINTRO 4F .018X40X7CM

## (undated) DEVICE — PAD DEFIB CADENCE ADULT R2

## (undated) DEVICE — SUT V-LOC 180 V-20 3-0 12IN

## (undated) DEVICE — KIT WRENCH

## (undated) DEVICE — PENCIL SMK EVAC CONNECTOR 10FT

## (undated) DEVICE — DRAPE INCISE IOBAN 2 23X17IN

## (undated) DEVICE — SLITTER UNIVERSAL

## (undated) DEVICE — ELECTRODE REM PLYHSV RETURN 9

## (undated) DEVICE — GUIDEWIRE EMERALD 150CM PTFE

## (undated) DEVICE — DEVICE PLASMABLADE X 3.0S LT

## (undated) DEVICE — SUT V-LOC 90 ABSRB UD 4-0 12IN

## (undated) DEVICE — INTRODUCER PRELUDESNAP 7F 13CM

## (undated) DEVICE — SUT NUROLON 2-0 CR/SH 8-18

## (undated) DEVICE — CATH ANGIO RIGHTSITE HIS 7X43